# Patient Record
Sex: MALE | Race: WHITE | NOT HISPANIC OR LATINO | Employment: OTHER | URBAN - METROPOLITAN AREA
[De-identification: names, ages, dates, MRNs, and addresses within clinical notes are randomized per-mention and may not be internally consistent; named-entity substitution may affect disease eponyms.]

---

## 2017-01-03 ENCOUNTER — CONVERSION ENCOUNTER (OUTPATIENT)
Dept: CARDIOLOGY | Facility: CLINIC | Age: 82
End: 2017-01-03

## 2017-01-10 ENCOUNTER — CONVERSION ENCOUNTER (OUTPATIENT)
Dept: CARDIOLOGY | Facility: CLINIC | Age: 82
End: 2017-01-10

## 2017-01-30 ENCOUNTER — CONVERSION ENCOUNTER (OUTPATIENT)
Dept: CARDIOLOGY | Facility: CLINIC | Age: 82
End: 2017-01-30

## 2017-02-28 ENCOUNTER — CONVERSION ENCOUNTER (OUTPATIENT)
Dept: CARDIOLOGY | Facility: CLINIC | Age: 82
End: 2017-02-28

## 2017-03-15 ENCOUNTER — CONVERSION ENCOUNTER (OUTPATIENT)
Dept: CARDIOLOGY | Facility: CLINIC | Age: 82
End: 2017-03-15

## 2017-04-24 ENCOUNTER — CONVERSION ENCOUNTER (OUTPATIENT)
Dept: CARDIOLOGY | Facility: CLINIC | Age: 82
End: 2017-04-24

## 2017-05-08 ENCOUNTER — CONVERSION ENCOUNTER (OUTPATIENT)
Dept: CARDIOLOGY | Facility: CLINIC | Age: 82
End: 2017-05-08

## 2017-06-09 ENCOUNTER — CONVERSION ENCOUNTER (OUTPATIENT)
Dept: CARDIOLOGY | Facility: CLINIC | Age: 82
End: 2017-06-09

## 2017-06-15 ENCOUNTER — HOSPITAL ENCOUNTER (OUTPATIENT)
Dept: SLEEP MEDICINE | Facility: HOSPITAL | Age: 82
Discharge: HOME | End: 2017-06-15
Attending: INTERNAL MEDICINE | Admitting: INTERNAL MEDICINE

## 2017-06-16 ENCOUNTER — CONVERSION ENCOUNTER (OUTPATIENT)
Dept: CARDIOLOGY | Facility: CLINIC | Age: 82
End: 2017-06-16

## 2017-06-30 ENCOUNTER — CONVERSION ENCOUNTER (OUTPATIENT)
Dept: CARDIOLOGY | Facility: CLINIC | Age: 82
End: 2017-06-30

## 2017-07-02 ENCOUNTER — CONVERSION ENCOUNTER (OUTPATIENT)
Dept: CARDIOLOGY | Facility: CLINIC | Age: 82
End: 2017-07-02

## 2017-08-04 ENCOUNTER — CONVERSION ENCOUNTER (OUTPATIENT)
Dept: CARDIOLOGY | Facility: CLINIC | Age: 82
End: 2017-08-04

## 2017-09-01 ENCOUNTER — CONVERSION ENCOUNTER (OUTPATIENT)
Dept: CARDIOLOGY | Facility: CLINIC | Age: 82
End: 2017-09-01

## 2017-09-28 ENCOUNTER — CONVERSION ENCOUNTER (OUTPATIENT)
Dept: CARDIOLOGY | Facility: CLINIC | Age: 82
End: 2017-09-28

## 2017-10-30 ENCOUNTER — HOSPITAL ENCOUNTER (OUTPATIENT)
Dept: LAB | Facility: HOSPITAL | Age: 82
Discharge: HOME | End: 2017-10-30
Attending: INTERNAL MEDICINE | Admitting: INTERNAL MEDICINE

## 2017-10-30 LAB
ALBUMIN SERPL-MCNC: 3.6 G/DL (ref 3.5–4.8)
ALP SERPL-CCNC: 56 IU/L (ref 32–91)
ALT SERPL-CCNC: 9 IU/L (ref 17–63)
ANION GAP SERPL CALC-SCNC: 10.4 MMOL/L (ref 10–20)
AST SERPL-CCNC: 14 IU/L (ref 15–41)
BILIRUB DIRECT SERPL-MCNC: 0.1 MG/DL (ref 0.1–0.5)
BILIRUB SERPL-MCNC: 0.5 MG/DL (ref 0.3–1.2)
BUN SERPL-MCNC: 11 MG/DL (ref 8–20)
BUN/CREAT SERPL: 9.2 (ref 6.2–20.3)
CALCIUM SERPL-MCNC: 8.7 MG/DL (ref 8.9–10.3)
CHLORIDE SERPL-SCNC: 103 MMOL/L (ref 101–111)
CHOLEST SERPL-MCNC: 117 MG/DL
CHOLEST/HDLC SERPL: 3.1 {RATIO}
CK SERPL-CCNC: 286 IU/L (ref 49–397)
CONV CO2: 25 MMOL/L (ref 22–32)
CONV LDL CHOLESTEROL DIRECT: 54 MG/DL (ref 0–100)
CONV TOTAL PROTEIN: 6.1 G/DL (ref 6.1–7.9)
CREAT UR-MCNC: 1.2 MG/DL (ref 0.7–1.2)
GLUCOSE SERPL-MCNC: 271 MG/DL (ref 65–99)
HDLC SERPL-MCNC: 38 MG/DL
LDLC/HDLC SERPL: 1.4 {RATIO}
LIPID INTERPRETATION: ABNORMAL
POTASSIUM SERPL-SCNC: 4.4 MMOL/L (ref 3.6–5.1)
SODIUM SERPL-SCNC: 134 MMOL/L (ref 136–144)
TRIGL SERPL-MCNC: 244 MG/DL
VLDLC SERPL CALC-MCNC: 25.5 MG/DL

## 2017-11-06 ENCOUNTER — CONVERSION ENCOUNTER (OUTPATIENT)
Dept: CARDIOLOGY | Facility: CLINIC | Age: 82
End: 2017-11-06

## 2017-11-16 ENCOUNTER — CONVERSION ENCOUNTER (OUTPATIENT)
Dept: CARDIOLOGY | Facility: CLINIC | Age: 82
End: 2017-11-16

## 2017-11-17 ENCOUNTER — HOSPITAL ENCOUNTER (OUTPATIENT)
Dept: LAB | Facility: HOSPITAL | Age: 82
Setting detail: SPECIMEN
Discharge: HOME | End: 2017-11-17
Attending: INTERNAL MEDICINE | Admitting: INTERNAL MEDICINE

## 2017-11-17 LAB
ALBUMIN SERPL-MCNC: 3.5 G/DL (ref 3.5–4.8)
ALBUMIN/GLOB SERPL: 1.3 {RATIO} (ref 1–1.7)
ALP SERPL-CCNC: 55 IU/L (ref 32–91)
ALT SERPL-CCNC: 8 IU/L (ref 17–63)
ANION GAP SERPL CALC-SCNC: 10.9 MMOL/L (ref 10–20)
AST SERPL-CCNC: 12 IU/L (ref 15–41)
BILIRUB SERPL-MCNC: 0.9 MG/DL (ref 0.3–1.2)
BUN SERPL-MCNC: 13 MG/DL (ref 8–20)
BUN/CREAT SERPL: 10 (ref 6.2–20.3)
CALCIUM SERPL-MCNC: 9 MG/DL (ref 8.9–10.3)
CHLORIDE SERPL-SCNC: 103 MMOL/L (ref 101–111)
CONV CO2: 25 MMOL/L (ref 22–32)
CONV TOTAL PROTEIN: 6.1 G/DL (ref 6.1–7.9)
CREAT UR-MCNC: 1.3 MG/DL (ref 0.7–1.2)
GLOBULIN UR ELPH-MCNC: 2.6 G/DL (ref 2.5–3.8)
GLUCOSE SERPL-MCNC: 270 MG/DL (ref 65–99)
POTASSIUM SERPL-SCNC: 3.9 MMOL/L (ref 3.6–5.1)
SODIUM SERPL-SCNC: 135 MMOL/L (ref 136–144)

## 2017-12-05 ENCOUNTER — CONVERSION ENCOUNTER (OUTPATIENT)
Dept: CARDIOLOGY | Facility: CLINIC | Age: 82
End: 2017-12-05

## 2018-01-04 ENCOUNTER — CONVERSION ENCOUNTER (OUTPATIENT)
Dept: CARDIOLOGY | Facility: CLINIC | Age: 83
End: 2018-01-04

## 2018-02-05 ENCOUNTER — CONVERSION ENCOUNTER (OUTPATIENT)
Dept: CARDIOLOGY | Facility: CLINIC | Age: 83
End: 2018-02-05

## 2018-02-12 ENCOUNTER — HOSPITAL ENCOUNTER (OUTPATIENT)
Dept: LAB | Facility: HOSPITAL | Age: 83
Setting detail: SPECIMEN
Discharge: HOME | End: 2018-02-12
Attending: INTERNAL MEDICINE | Admitting: INTERNAL MEDICINE

## 2018-02-12 LAB
ALBUMIN SERPL-MCNC: 4.1 G/DL (ref 3.5–4.8)
ALBUMIN/GLOB SERPL: 1.6 {RATIO} (ref 1–1.7)
ALP SERPL-CCNC: 49 IU/L (ref 32–91)
ALT SERPL-CCNC: 9 IU/L (ref 17–63)
ANION GAP SERPL CALC-SCNC: 11.4 MMOL/L (ref 10–20)
AST SERPL-CCNC: 11 IU/L (ref 15–41)
BILIRUB SERPL-MCNC: 0.9 MG/DL (ref 0.3–1.2)
BUN SERPL-MCNC: 15 MG/DL (ref 8–20)
BUN/CREAT SERPL: 12.5 (ref 6.2–20.3)
CALCIUM SERPL-MCNC: 9.9 MG/DL (ref 8.9–10.3)
CHLORIDE SERPL-SCNC: 106 MMOL/L (ref 101–111)
CHOLEST SERPL-MCNC: 107 MG/DL
CHOLEST/HDLC SERPL: 2.5 {RATIO}
CONV CO2: 25 MMOL/L (ref 22–32)
CONV LDL CHOLESTEROL DIRECT: 50 MG/DL (ref 0–100)
CONV MICROALBUM.,U,RANDOM: 25 MG/L
CONV TOTAL PROTEIN: 6.6 G/DL (ref 6.1–7.9)
CREAT 24H UR-MCNC: 140.1 MG/DL
CREAT UR-MCNC: 1.2 MG/DL (ref 0.7–1.2)
GLOBULIN UR ELPH-MCNC: 2.5 G/DL (ref 2.5–3.8)
GLUCOSE SERPL-MCNC: 153 MG/DL (ref 65–99)
HDLC SERPL-MCNC: 43 MG/DL
LDLC/HDLC SERPL: 1.2 {RATIO}
LIPID INTERPRETATION: NORMAL
MICROALBUMIN/CREAT UR: 17.8 UG/MG
POTASSIUM SERPL-SCNC: 4.4 MMOL/L (ref 3.6–5.1)
SODIUM SERPL-SCNC: 138 MMOL/L (ref 136–144)
TRIGL SERPL-MCNC: 92 MG/DL
VLDLC SERPL CALC-MCNC: 14.8 MG/DL

## 2018-02-15 ENCOUNTER — CONVERSION ENCOUNTER (OUTPATIENT)
Dept: CARDIOLOGY | Facility: CLINIC | Age: 83
End: 2018-02-15

## 2018-03-05 ENCOUNTER — CONVERSION ENCOUNTER (OUTPATIENT)
Dept: CARDIOLOGY | Facility: CLINIC | Age: 83
End: 2018-03-05

## 2018-04-10 ENCOUNTER — CONVERSION ENCOUNTER (OUTPATIENT)
Dept: CARDIOLOGY | Facility: CLINIC | Age: 83
End: 2018-04-10

## 2018-05-21 ENCOUNTER — CONVERSION ENCOUNTER (OUTPATIENT)
Dept: CARDIOLOGY | Facility: CLINIC | Age: 83
End: 2018-05-21

## 2018-06-15 ENCOUNTER — CONVERSION ENCOUNTER (OUTPATIENT)
Dept: CARDIOLOGY | Facility: CLINIC | Age: 83
End: 2018-06-15

## 2018-06-25 ENCOUNTER — CONVERSION ENCOUNTER (OUTPATIENT)
Dept: CARDIOLOGY | Facility: CLINIC | Age: 83
End: 2018-06-25

## 2018-07-30 ENCOUNTER — CONVERSION ENCOUNTER (OUTPATIENT)
Dept: CARDIOLOGY | Facility: CLINIC | Age: 83
End: 2018-07-30

## 2018-08-09 ENCOUNTER — HOSPITAL ENCOUNTER (OUTPATIENT)
Dept: LAB | Facility: HOSPITAL | Age: 83
Setting detail: SPECIMEN
Discharge: HOME | End: 2018-08-09
Attending: INTERNAL MEDICINE | Admitting: INTERNAL MEDICINE

## 2018-08-09 LAB
ALBUMIN SERPL-MCNC: 3.5 G/DL (ref 3.5–4.8)
ALBUMIN/GLOB SERPL: 1.5 {RATIO} (ref 1–1.7)
ALP SERPL-CCNC: 59 IU/L (ref 32–91)
ALT SERPL-CCNC: 9 IU/L (ref 17–63)
ANION GAP SERPL CALC-SCNC: 9.9 MMOL/L (ref 10–20)
AST SERPL-CCNC: 13 IU/L (ref 15–41)
BILIRUB SERPL-MCNC: 0.3 MG/DL (ref 0.3–1.2)
BUN SERPL-MCNC: 18 MG/DL (ref 8–20)
BUN/CREAT SERPL: 13.8 (ref 6.2–20.3)
CALCIUM SERPL-MCNC: 9.3 MG/DL (ref 8.9–10.3)
CHLORIDE SERPL-SCNC: 106 MMOL/L (ref 101–111)
CHOLEST SERPL-MCNC: 94 MG/DL
CHOLEST/HDLC SERPL: 2.9 {RATIO}
CONV CO2: 26 MMOL/L (ref 22–32)
CONV LDL CHOLESTEROL DIRECT: 41 MG/DL (ref 0–100)
CONV MICROALBUM.,U,RANDOM: 17 MG/L
CONV TOTAL PROTEIN: 5.9 G/DL (ref 6.1–7.9)
CREAT 24H UR-MCNC: 135.5 MG/DL
CREAT UR-MCNC: 1.3 MG/DL (ref 0.7–1.2)
GLOBULIN UR ELPH-MCNC: 2.4 G/DL (ref 2.5–3.8)
GLUCOSE SERPL-MCNC: 224 MG/DL (ref 65–99)
HDLC SERPL-MCNC: 33 MG/DL
LDLC/HDLC SERPL: 1.3 {RATIO}
LIPID INTERPRETATION: ABNORMAL
MICROALBUMIN/CREAT UR: 12.5 UG/MG
POTASSIUM SERPL-SCNC: 3.9 MMOL/L (ref 3.6–5.1)
SODIUM SERPL-SCNC: 138 MMOL/L (ref 136–144)
TRIGL SERPL-MCNC: 199 MG/DL
VLDLC SERPL CALC-MCNC: 20.2 MG/DL

## 2018-08-16 ENCOUNTER — CONVERSION ENCOUNTER (OUTPATIENT)
Dept: CARDIOLOGY | Facility: CLINIC | Age: 83
End: 2018-08-16

## 2018-09-10 ENCOUNTER — CONVERSION ENCOUNTER (OUTPATIENT)
Dept: CARDIOLOGY | Facility: CLINIC | Age: 83
End: 2018-09-10

## 2018-10-15 ENCOUNTER — CONVERSION ENCOUNTER (OUTPATIENT)
Dept: CARDIOLOGY | Facility: CLINIC | Age: 83
End: 2018-10-15

## 2018-11-06 ENCOUNTER — HOSPITAL ENCOUNTER (OUTPATIENT)
Dept: LAB | Facility: HOSPITAL | Age: 83
Discharge: HOME | End: 2018-11-06
Attending: FAMILY MEDICINE | Admitting: FAMILY MEDICINE

## 2018-11-06 LAB
ALBUMIN SERPL-MCNC: 3.7 G/DL (ref 3.5–4.8)
ALBUMIN/GLOB SERPL: 1.4 {RATIO} (ref 1–1.7)
ALP SERPL-CCNC: 61 IU/L (ref 32–91)
ALT SERPL-CCNC: 11 IU/L (ref 17–63)
ANION GAP SERPL CALC-SCNC: 12.1 MMOL/L (ref 10–20)
AST SERPL-CCNC: 15 IU/L (ref 15–41)
BILIRUB SERPL-MCNC: 0.7 MG/DL (ref 0.3–1.2)
BUN SERPL-MCNC: 25 MG/DL (ref 8–20)
BUN/CREAT SERPL: 15.6 (ref 6.2–20.3)
CALCIUM SERPL-MCNC: 9 MG/DL (ref 8.9–10.3)
CHLORIDE SERPL-SCNC: 104 MMOL/L (ref 101–111)
CHOLEST SERPL-MCNC: 128 MG/DL
CHOLEST/HDLC SERPL: 3.1 {RATIO}
CONV CO2: 27 MMOL/L (ref 22–32)
CONV LDL CHOLESTEROL DIRECT: 70 MG/DL (ref 0–100)
CONV TOTAL PROTEIN: 6.3 G/DL (ref 6.1–7.9)
CREAT UR-MCNC: 1.6 MG/DL (ref 0.7–1.2)
GLOBULIN UR ELPH-MCNC: 2.6 G/DL (ref 2.5–3.8)
GLUCOSE SERPL-MCNC: 225 MG/DL (ref 65–99)
HDLC SERPL-MCNC: 41 MG/DL
LDLC/HDLC SERPL: 1.7 {RATIO}
LIPID INTERPRETATION: ABNORMAL
MAGNESIUM SERPL-MCNC: 1.9 MG/DL (ref 1.8–2.5)
POTASSIUM SERPL-SCNC: 5.1 MMOL/L (ref 3.6–5.1)
SODIUM SERPL-SCNC: 138 MMOL/L (ref 136–144)
TRIGL SERPL-MCNC: 208 MG/DL
VLDLC SERPL CALC-MCNC: 16.7 MG/DL

## 2018-11-19 ENCOUNTER — CONVERSION ENCOUNTER (OUTPATIENT)
Dept: CARDIOLOGY | Facility: CLINIC | Age: 83
End: 2018-11-19

## 2018-11-27 ENCOUNTER — HOSPITAL ENCOUNTER (OUTPATIENT)
Dept: LAB | Facility: HOSPITAL | Age: 83
Discharge: HOME | End: 2018-11-27
Attending: INTERNAL MEDICINE | Admitting: INTERNAL MEDICINE

## 2018-11-27 LAB
ALBUMIN SERPL-MCNC: 3.7 G/DL (ref 3.5–4.8)
ALP SERPL-CCNC: 56 IU/L (ref 32–91)
ALT SERPL-CCNC: 12 IU/L (ref 17–63)
ANION GAP SERPL CALC-SCNC: 16.6 MMOL/L (ref 10–20)
AST SERPL-CCNC: 15 IU/L (ref 15–41)
BILIRUB DIRECT SERPL-MCNC: 0.1 MG/DL (ref 0.1–0.5)
BILIRUB SERPL-MCNC: 0.5 MG/DL (ref 0.3–1.2)
BUN SERPL-MCNC: 15 MG/DL (ref 8–20)
BUN/CREAT SERPL: 10 (ref 6.2–20.3)
CALCIUM SERPL-MCNC: 9.5 MG/DL (ref 8.9–10.3)
CHLORIDE SERPL-SCNC: 101 MMOL/L (ref 101–111)
CHOLEST SERPL-MCNC: 113 MG/DL
CHOLEST/HDLC SERPL: 2.8 {RATIO}
CK SERPL-CCNC: 40 IU/L (ref 49–397)
CONV CO2: 24 MMOL/L (ref 22–32)
CONV LDL CHOLESTEROL DIRECT: 55 MG/DL (ref 0–100)
CONV TOTAL PROTEIN: 6.2 G/DL (ref 6.1–7.9)
CREAT UR-MCNC: 1.5 MG/DL (ref 0.7–1.2)
DIGOXIN SERPL-MCNC: 0.8 NG/ML (ref 0.8–2)
GLUCOSE SERPL-MCNC: 200 MG/DL (ref 65–99)
HDLC SERPL-MCNC: 41 MG/DL
LDLC/HDLC SERPL: 1.3 {RATIO}
LIPID INTERPRETATION: ABNORMAL
POTASSIUM SERPL-SCNC: 4.6 MMOL/L (ref 3.6–5.1)
SODIUM SERPL-SCNC: 137 MMOL/L (ref 136–144)
TRIGL SERPL-MCNC: 239 MG/DL
VLDLC SERPL CALC-MCNC: 17.4 MG/DL

## 2018-12-03 ENCOUNTER — CONVERSION ENCOUNTER (OUTPATIENT)
Dept: CARDIOLOGY | Facility: CLINIC | Age: 83
End: 2018-12-03

## 2018-12-21 ENCOUNTER — CONVERSION ENCOUNTER (OUTPATIENT)
Dept: CARDIOLOGY | Facility: CLINIC | Age: 83
End: 2018-12-21

## 2019-01-04 ENCOUNTER — HOSPITAL ENCOUNTER (OUTPATIENT)
Dept: LAB | Facility: HOSPITAL | Age: 84
Discharge: HOME | End: 2019-01-04
Attending: INTERNAL MEDICINE | Admitting: INTERNAL MEDICINE

## 2019-01-04 LAB
ANION GAP SERPL CALC-SCNC: 13.1 MMOL/L (ref 10–20)
BILIRUB UR QL STRIP: NEGATIVE MG/DL
BUN SERPL-MCNC: 23 MG/DL (ref 8–20)
BUN/CREAT SERPL: 16.4 (ref 6.2–20.3)
CALCIUM SERPL-MCNC: 9 MG/DL (ref 8.9–10.3)
CASTS URNS QL MICRO: ABNORMAL /[LPF]
CHLORIDE SERPL-SCNC: 101 MMOL/L (ref 101–111)
COLOR UR: YELLOW
CONV BACTERIA IN URINE MICRO: NEGATIVE
CONV CLARITY OF URINE: CLEAR
CONV CO2: 26 MMOL/L (ref 22–32)
CONV HYALINE CASTS IN URINE MICRO: 3 /[LPF] (ref 0–5)
CONV PROTEIN IN URINE BY AUTOMATED TEST STRIP: NEGATIVE MG/DL
CONV SMALL ROUND CELLS: ABNORMAL /[HPF]
CONV UROBILINOGEN IN URINE BY AUTOMATED TEST STRIP: 0.2 MG/DL
CREAT 24H UR-MCNC: 109.3 MG/DL
CREAT UR-MCNC: 1.4 MG/DL (ref 0.7–1.2)
CULTURE INDICATED?: ABNORMAL
ERYTHROCYTE [DISTWIDTH] IN BLOOD BY AUTOMATED COUNT: 15 % (ref 11.5–14.5)
GLUCOSE SERPL-MCNC: 289 MG/DL (ref 65–99)
GLUCOSE UR QL: >1000 MG/DL
HCT VFR BLD AUTO: 37.2 % (ref 40–54)
HGB BLD-MCNC: 12.2 G/DL (ref 14–18)
HGB UR QL STRIP: NEGATIVE
KETONES UR QL STRIP: NEGATIVE MG/DL
LEUKOCYTE ESTERASE UR QL STRIP: ABNORMAL
MCH RBC QN AUTO: 32.8 PG (ref 26–32)
MCHC RBC AUTO-ENTMCNC: 32.9 G/DL (ref 32–36)
MCV RBC AUTO: 99.7 FL (ref 80–94)
NITRITE UR QL STRIP: NEGATIVE
PH UR STRIP.AUTO: 5.5 [PH] (ref 4.5–8)
PLATELET # BLD AUTO: 235 10*3/UL (ref 150–450)
PMV BLD AUTO: 7.9 FL (ref 7.4–10.4)
POTASSIUM SERPL-SCNC: 5.1 MMOL/L (ref 3.6–5.1)
PROT UR-MCNC: 14 MG/DL
PROT/CREAT UR: 0.1 MG/MG (ref 0–22)
RBC # BLD AUTO: 3.73 10*6/UL (ref 4.6–6)
RBC #/AREA URNS HPF: 0 /[HPF] (ref 0–3)
SODIUM SERPL-SCNC: 135 MMOL/L (ref 136–144)
SP GR UR: 1.02 (ref 1–1.03)
SPERM URNS QL MICRO: ABNORMAL /[HPF]
SQUAMOUS SPT QL MICRO: 1 /[HPF] (ref 0–5)
UNIDENT CRYS URNS QL MICRO: ABNORMAL /[HPF]
WBC # BLD AUTO: 9.1 10*3/UL (ref 4.5–11.5)
WBC #/AREA URNS HPF: 11 /[HPF] (ref 0–5)
YEAST SPEC QL WET PREP: ABNORMAL /[HPF]

## 2019-01-08 LAB — PROT PATTERN SERPL IFE-IMP: NORMAL

## 2019-01-24 ENCOUNTER — CONVERSION ENCOUNTER (OUTPATIENT)
Dept: CARDIOLOGY | Facility: CLINIC | Age: 84
End: 2019-01-24

## 2019-01-25 ENCOUNTER — HOSPITAL ENCOUNTER (OUTPATIENT)
Dept: OTHER | Facility: HOSPITAL | Age: 84
Discharge: HOME | End: 2019-01-25
Attending: INTERNAL MEDICINE | Admitting: INTERNAL MEDICINE

## 2019-02-06 ENCOUNTER — HOSPITAL ENCOUNTER (OUTPATIENT)
Dept: LAB | Facility: HOSPITAL | Age: 84
Setting detail: SPECIMEN
Discharge: HOME | End: 2019-02-06
Attending: INTERNAL MEDICINE | Admitting: INTERNAL MEDICINE

## 2019-02-06 LAB
ALBUMIN SERPL-MCNC: 3.6 G/DL (ref 3.5–4.8)
ALBUMIN/GLOB SERPL: 1.4 {RATIO} (ref 1–1.7)
ALP SERPL-CCNC: 57 IU/L (ref 32–91)
ALT SERPL-CCNC: 13 IU/L (ref 17–63)
ANION GAP SERPL CALC-SCNC: 12.6 MMOL/L (ref 10–20)
AST SERPL-CCNC: 13 IU/L (ref 15–41)
BILIRUB SERPL-MCNC: 0.6 MG/DL (ref 0.3–1.2)
BUN SERPL-MCNC: 20 MG/DL (ref 8–20)
BUN/CREAT SERPL: 14.3 (ref 6.2–20.3)
CALCIUM SERPL-MCNC: 8.9 MG/DL (ref 8.9–10.3)
CHLORIDE SERPL-SCNC: 101 MMOL/L (ref 101–111)
CONV CO2: 24 MMOL/L (ref 22–32)
CONV TOTAL PROTEIN: 6.2 G/DL (ref 6.1–7.9)
CREAT UR-MCNC: 1.4 MG/DL (ref 0.7–1.2)
GLOBULIN UR ELPH-MCNC: 2.6 G/DL (ref 2.5–3.8)
GLUCOSE SERPL-MCNC: 250 MG/DL (ref 65–99)
POTASSIUM SERPL-SCNC: 4.6 MMOL/L (ref 3.6–5.1)
SODIUM SERPL-SCNC: 133 MMOL/L (ref 136–144)

## 2019-02-12 ENCOUNTER — CONVERSION ENCOUNTER (OUTPATIENT)
Dept: CARDIOLOGY | Facility: CLINIC | Age: 84
End: 2019-02-12

## 2019-02-13 ENCOUNTER — CONVERSION ENCOUNTER (OUTPATIENT)
Dept: CARDIOLOGY | Facility: CLINIC | Age: 84
End: 2019-02-13

## 2019-02-28 ENCOUNTER — CONVERSION ENCOUNTER (OUTPATIENT)
Dept: CARDIOLOGY | Facility: CLINIC | Age: 84
End: 2019-02-28

## 2019-04-04 ENCOUNTER — CONVERSION ENCOUNTER (OUTPATIENT)
Dept: CARDIOLOGY | Facility: CLINIC | Age: 84
End: 2019-04-04

## 2019-04-11 ENCOUNTER — HOSPITAL ENCOUNTER (OUTPATIENT)
Dept: LAB | Facility: HOSPITAL | Age: 84
Setting detail: SPECIMEN
Discharge: HOME | End: 2019-04-11
Attending: NURSE PRACTITIONER | Admitting: NURSE PRACTITIONER

## 2019-04-11 LAB
ALBUMIN SERPL-MCNC: 3.8 G/DL (ref 3.5–4.8)
ALBUMIN/GLOB SERPL: 1.5 {RATIO} (ref 1–1.7)
ALP SERPL-CCNC: 53 IU/L (ref 32–91)
ALT SERPL-CCNC: 7 IU/L (ref 17–63)
ANION GAP SERPL CALC-SCNC: 15.2 MMOL/L (ref 10–20)
AST SERPL-CCNC: 10 IU/L (ref 15–41)
BILIRUB SERPL-MCNC: 0.7 MG/DL (ref 0.3–1.2)
BUN SERPL-MCNC: 13 MG/DL (ref 8–20)
BUN/CREAT SERPL: 9.3 (ref 6.2–20.3)
CALCIUM SERPL-MCNC: 9 MG/DL (ref 8.9–10.3)
CHLORIDE SERPL-SCNC: 104 MMOL/L (ref 101–111)
CHOLEST SERPL-MCNC: 109 MG/DL
CHOLEST/HDLC SERPL: 3.1 {RATIO}
CONV CO2: 23 MMOL/L (ref 22–32)
CONV LDL CHOLESTEROL DIRECT: 50 MG/DL (ref 0–100)
CONV MICROALBUM.,U,RANDOM: 71 MG/L
CONV TOTAL PROTEIN: 6.4 G/DL (ref 6.1–7.9)
CREAT 24H UR-MCNC: 148.9 MG/DL
CREAT UR-MCNC: 1.4 MG/DL (ref 0.7–1.2)
GLOBULIN UR ELPH-MCNC: 2.6 G/DL (ref 2.5–3.8)
GLUCOSE SERPL-MCNC: 184 MG/DL (ref 65–99)
HDLC SERPL-MCNC: 35 MG/DL
LDLC/HDLC SERPL: 1.4 {RATIO}
LIPID INTERPRETATION: ABNORMAL
MICROALBUMIN/CREAT UR: 47.7 UG/MG
POTASSIUM SERPL-SCNC: 4.2 MMOL/L (ref 3.6–5.1)
SODIUM SERPL-SCNC: 138 MMOL/L (ref 136–144)
TRIGL SERPL-MCNC: 173 MG/DL
VLDLC SERPL CALC-MCNC: 24.3 MG/DL

## 2019-04-18 ENCOUNTER — CONVERSION ENCOUNTER (OUTPATIENT)
Dept: CARDIOLOGY | Facility: CLINIC | Age: 84
End: 2019-04-18

## 2019-05-02 ENCOUNTER — CONVERSION ENCOUNTER (OUTPATIENT)
Dept: CARDIOLOGY | Facility: CLINIC | Age: 84
End: 2019-05-02

## 2019-05-02 ENCOUNTER — HOSPITAL ENCOUNTER (OUTPATIENT)
Dept: FAMILY MEDICINE CLINIC | Facility: CLINIC | Age: 84
Setting detail: SPECIMEN
Discharge: HOME | End: 2019-05-02
Attending: FAMILY MEDICINE | Admitting: FAMILY MEDICINE

## 2019-05-03 ENCOUNTER — ON CAMPUS - OUTPATIENT (AMBULATORY)
Dept: URBAN - METROPOLITAN AREA HOSPITAL 85 | Facility: HOSPITAL | Age: 84
End: 2019-05-03
Payer: MEDICARE

## 2019-05-03 DIAGNOSIS — I10 ESSENTIAL (PRIMARY) HYPERTENSION: ICD-10-CM

## 2019-05-03 DIAGNOSIS — K92.1 MELENA: ICD-10-CM

## 2019-05-03 DIAGNOSIS — R19.7 DIARRHEA, UNSPECIFIED: ICD-10-CM

## 2019-05-03 DIAGNOSIS — I48.91 UNSPECIFIED ATRIAL FIBRILLATION: ICD-10-CM

## 2019-05-03 DIAGNOSIS — K30 FUNCTIONAL DYSPEPSIA: ICD-10-CM

## 2019-05-03 DIAGNOSIS — R11.2 NAUSEA WITH VOMITING, UNSPECIFIED: ICD-10-CM

## 2019-05-03 PROCEDURE — 99204 OFFICE O/P NEW MOD 45 MIN: CPT | Performed by: NURSE PRACTITIONER

## 2019-05-04 ENCOUNTER — ON CAMPUS - OUTPATIENT (AMBULATORY)
Dept: URBAN - METROPOLITAN AREA HOSPITAL 85 | Facility: HOSPITAL | Age: 84
End: 2019-05-04
Payer: MEDICARE

## 2019-05-04 DIAGNOSIS — K44.9 DIAPHRAGMATIC HERNIA WITHOUT OBSTRUCTION OR GANGRENE: ICD-10-CM

## 2019-05-04 DIAGNOSIS — D63.8 ANEMIA IN OTHER CHRONIC DISEASES CLASSIFIED ELSEWHERE: ICD-10-CM

## 2019-05-04 DIAGNOSIS — K92.1 MELENA: ICD-10-CM

## 2019-05-04 DIAGNOSIS — K29.70 GASTRITIS, UNSPECIFIED, WITHOUT BLEEDING: ICD-10-CM

## 2019-05-04 PROCEDURE — 43235 EGD DIAGNOSTIC BRUSH WASH: CPT | Performed by: INTERNAL MEDICINE

## 2019-05-29 ENCOUNTER — CONVERSION ENCOUNTER (OUTPATIENT)
Dept: FAMILY MEDICINE CLINIC | Facility: CLINIC | Age: 84
End: 2019-05-29

## 2019-06-04 VITALS
WEIGHT: 193 LBS | SYSTOLIC BLOOD PRESSURE: 165 MMHG | WEIGHT: 182.75 LBS | BODY MASS INDEX: 29.41 KG/M2 | SYSTOLIC BLOOD PRESSURE: 111 MMHG | HEART RATE: 70 BPM | HEART RATE: 103 BPM | WEIGHT: 185.75 LBS | DIASTOLIC BLOOD PRESSURE: 71 MMHG | SYSTOLIC BLOOD PRESSURE: 136 MMHG | DIASTOLIC BLOOD PRESSURE: 66 MMHG | SYSTOLIC BLOOD PRESSURE: 105 MMHG | WEIGHT: 193 LBS | SYSTOLIC BLOOD PRESSURE: 135 MMHG | HEART RATE: 70 BPM | SYSTOLIC BLOOD PRESSURE: 135 MMHG | SYSTOLIC BLOOD PRESSURE: 119 MMHG | OXYGEN SATURATION: 97 % | RESPIRATION RATE: 16 BRPM | DIASTOLIC BLOOD PRESSURE: 56 MMHG | HEART RATE: 70 BPM | WEIGHT: 185 LBS | WEIGHT: 190 LBS | DIASTOLIC BLOOD PRESSURE: 64 MMHG | HEART RATE: 70 BPM | WEIGHT: 188.75 LBS | SYSTOLIC BLOOD PRESSURE: 118 MMHG | SYSTOLIC BLOOD PRESSURE: 131 MMHG | HEART RATE: 70 BPM | SYSTOLIC BLOOD PRESSURE: 125 MMHG | BODY MASS INDEX: 28.31 KG/M2 | SYSTOLIC BLOOD PRESSURE: 128 MMHG | BODY MASS INDEX: 27.15 KG/M2 | OXYGEN SATURATION: 95 % | DIASTOLIC BLOOD PRESSURE: 70 MMHG | DIASTOLIC BLOOD PRESSURE: 72 MMHG | RESPIRATION RATE: 18 BRPM | WEIGHT: 188 LBS | HEART RATE: 71 BPM | DIASTOLIC BLOOD PRESSURE: 72 MMHG | WEIGHT: 190 LBS | DIASTOLIC BLOOD PRESSURE: 66 MMHG | WEIGHT: 178 LBS | WEIGHT: 182.25 LBS | SYSTOLIC BLOOD PRESSURE: 108 MMHG | HEART RATE: 70 BPM | HEIGHT: 67 IN | BODY MASS INDEX: 29.48 KG/M2 | RESPIRATION RATE: 14 BRPM | SYSTOLIC BLOOD PRESSURE: 120 MMHG | WEIGHT: 196 LBS | DIASTOLIC BLOOD PRESSURE: 66 MMHG | SYSTOLIC BLOOD PRESSURE: 142 MMHG | WEIGHT: 189.75 LBS | BODY MASS INDEX: 25.96 KG/M2 | SYSTOLIC BLOOD PRESSURE: 128 MMHG | DIASTOLIC BLOOD PRESSURE: 54 MMHG | BODY MASS INDEX: 27.57 KG/M2 | WEIGHT: 188 LBS | BODY MASS INDEX: 28.7 KG/M2 | SYSTOLIC BLOOD PRESSURE: 149 MMHG | SYSTOLIC BLOOD PRESSURE: 126 MMHG | DIASTOLIC BLOOD PRESSURE: 67 MMHG | DIASTOLIC BLOOD PRESSURE: 89 MMHG | SYSTOLIC BLOOD PRESSURE: 122 MMHG | HEART RATE: 70 BPM | HEIGHT: 67 IN | RESPIRATION RATE: 18 BRPM | WEIGHT: 180.4 LBS | DIASTOLIC BLOOD PRESSURE: 67 MMHG | DIASTOLIC BLOOD PRESSURE: 61 MMHG | SYSTOLIC BLOOD PRESSURE: 122 MMHG | WEIGHT: 173 LBS | OXYGEN SATURATION: 98 % | DIASTOLIC BLOOD PRESSURE: 67 MMHG | HEART RATE: 70 BPM | WEIGHT: 187 LBS | WEIGHT: 187.25 LBS | HEART RATE: 70 BPM | DIASTOLIC BLOOD PRESSURE: 68 MMHG | HEART RATE: 70 BPM | OXYGEN SATURATION: 95 % | BODY MASS INDEX: 28.97 KG/M2 | WEIGHT: 189 LBS | HEART RATE: 70 BPM | WEIGHT: 188.25 LBS | HEART RATE: 70 BPM | SYSTOLIC BLOOD PRESSURE: 127 MMHG | OXYGEN SATURATION: 97 % | SYSTOLIC BLOOD PRESSURE: 92 MMHG | WEIGHT: 189.5 LBS | OXYGEN SATURATION: 94 % | WEIGHT: 186.75 LBS | HEART RATE: 70 BPM | SYSTOLIC BLOOD PRESSURE: 120 MMHG | BODY MASS INDEX: 29.09 KG/M2 | HEART RATE: 70 BPM | BODY MASS INDEX: 27.94 KG/M2 | HEART RATE: 70 BPM | DIASTOLIC BLOOD PRESSURE: 66 MMHG | BODY MASS INDEX: 29.03 KG/M2 | DIASTOLIC BLOOD PRESSURE: 70 MMHG | SYSTOLIC BLOOD PRESSURE: 140 MMHG | SYSTOLIC BLOOD PRESSURE: 147 MMHG | HEART RATE: 71 BPM | HEART RATE: 70 BPM | BODY MASS INDEX: 28.62 KG/M2 | HEART RATE: 70 BPM | WEIGHT: 186 LBS | WEIGHT: 190 LBS | SYSTOLIC BLOOD PRESSURE: 143 MMHG | HEART RATE: 70 BPM | DIASTOLIC BLOOD PRESSURE: 59 MMHG | HEART RATE: 70 BPM | HEART RATE: 70 BPM | WEIGHT: 183.25 LBS | WEIGHT: 188.25 LBS | DIASTOLIC BLOOD PRESSURE: 69 MMHG | SYSTOLIC BLOOD PRESSURE: 116 MMHG | BODY MASS INDEX: 28 KG/M2 | WEIGHT: 190 LBS | HEART RATE: 70 BPM | SYSTOLIC BLOOD PRESSURE: 140 MMHG | SYSTOLIC BLOOD PRESSURE: 131 MMHG | SYSTOLIC BLOOD PRESSURE: 132 MMHG | BODY MASS INDEX: 29.76 KG/M2 | DIASTOLIC BLOOD PRESSURE: 72 MMHG | SYSTOLIC BLOOD PRESSURE: 110 MMHG | BODY MASS INDEX: 29.6 KG/M2 | SYSTOLIC BLOOD PRESSURE: 121 MMHG | DIASTOLIC BLOOD PRESSURE: 57 MMHG | SYSTOLIC BLOOD PRESSURE: 127 MMHG | SYSTOLIC BLOOD PRESSURE: 101 MMHG | SYSTOLIC BLOOD PRESSURE: 122 MMHG | WEIGHT: 185 LBS | DIASTOLIC BLOOD PRESSURE: 71 MMHG | OXYGEN SATURATION: 99 % | HEART RATE: 70 BPM | WEIGHT: 184.5 LBS | HEART RATE: 70 BPM | WEIGHT: 191.75 LBS | DIASTOLIC BLOOD PRESSURE: 67 MMHG | SYSTOLIC BLOOD PRESSURE: 130 MMHG | HEART RATE: 70 BPM | BODY MASS INDEX: 27.17 KG/M2 | HEART RATE: 70 BPM | HEIGHT: 67 IN | DIASTOLIC BLOOD PRESSURE: 61 MMHG | SYSTOLIC BLOOD PRESSURE: 131 MMHG | SYSTOLIC BLOOD PRESSURE: 133 MMHG | HEART RATE: 70 BPM | OXYGEN SATURATION: 97 % | HEART RATE: 72 BPM | HEART RATE: 70 BPM | BODY MASS INDEX: 29.19 KG/M2 | HEART RATE: 70 BPM | OXYGEN SATURATION: 96 % | HEART RATE: 70 BPM | OXYGEN SATURATION: 93 % | DIASTOLIC BLOOD PRESSURE: 61 MMHG | WEIGHT: 165.4 LBS | DIASTOLIC BLOOD PRESSURE: 79 MMHG | HEART RATE: 71 BPM | WEIGHT: 190 LBS | WEIGHT: 176 LBS | DIASTOLIC BLOOD PRESSURE: 63 MMHG | WEIGHT: 173.5 LBS | WEIGHT: 195.5 LBS | DIASTOLIC BLOOD PRESSURE: 68 MMHG | DIASTOLIC BLOOD PRESSURE: 60 MMHG | HEIGHT: 67 IN | HEIGHT: 67 IN | WEIGHT: 187.75 LBS | HEART RATE: 70 BPM | WEIGHT: 178.75 LBS | DIASTOLIC BLOOD PRESSURE: 59 MMHG | HEART RATE: 71 BPM | HEART RATE: 70 BPM | HEIGHT: 67 IN | DIASTOLIC BLOOD PRESSURE: 85 MMHG | SYSTOLIC BLOOD PRESSURE: 117 MMHG | DIASTOLIC BLOOD PRESSURE: 63 MMHG | DIASTOLIC BLOOD PRESSURE: 82 MMHG | DIASTOLIC BLOOD PRESSURE: 54 MMHG | DIASTOLIC BLOOD PRESSURE: 68 MMHG | WEIGHT: 185 LBS | HEART RATE: 70 BPM | RESPIRATION RATE: 18 BRPM | BODY MASS INDEX: 28.54 KG/M2 | BODY MASS INDEX: 29.03 KG/M2 | SYSTOLIC BLOOD PRESSURE: 125 MMHG | DIASTOLIC BLOOD PRESSURE: 64 MMHG | DIASTOLIC BLOOD PRESSURE: 62 MMHG | DIASTOLIC BLOOD PRESSURE: 74 MMHG | BODY MASS INDEX: 28.9 KG/M2 | WEIGHT: 189 LBS | HEART RATE: 70 BPM | HEIGHT: 67 IN

## 2019-06-04 VITALS
DIASTOLIC BLOOD PRESSURE: 57 MMHG | HEIGHT: 67 IN | WEIGHT: 172.4 LBS | HEART RATE: 70 BPM | BODY MASS INDEX: 27.06 KG/M2 | SYSTOLIC BLOOD PRESSURE: 106 MMHG | RESPIRATION RATE: 20 BRPM | OXYGEN SATURATION: 96 %

## 2019-06-06 NOTE — PROGRESS NOTES
[    Visit Type:  Follow-up Visit  Referring Provider:  Yusef Sosa MD  Primary Provider:  Ian SALAS MD    CC:  Hospital follow up nausea, vomiting and diarrhea.    History of Present Illness:  Chief complaint:  Gastroenteritis coagulopathy    The patient is a 83-year-old white male comes in for follow-up of maintenance of his current problems which include    1. gastroenteritis -new-patient was recently hospitalized by me because of nausea vomiting and diarrhea.  The patient was hospitalized.  He was treated with IV fluids anti medics.  He underwent upper GI endoscopy which was normal.  Patient's nausea   vomiting and diarrhea improved.  He was able to be discharged home.    2.  Coagulopathy- new-while in the hospital patient was found to have elevated INR.  The patient had is Coumadin withheld.  He was given vitamin K. With this his INR returned to normal.  Patient on discharge was continued on his Coumadin 3 mg daily.    His other problems include COPD sleep apnea hypertension hyperlipidemia type 2 diabetes mellitus coronary artery disease in atrial fibrillation      Past Medical History:     Reviewed history from 05/04/2016 and no changes required:        Coronary Artery Disease        Diabetes, Type 2        Hypertension        C O P D        emphysema        Sleep Apnea        Cataract        Ventricular Arrhythmia        Bradycardia        Cardiomyopathy    Past Surgical History:     Reviewed history from 05/04/2016 and no changes required:        Lung resection 2008        Carotid Endarterectomy:  (1900)        Tracheostomy 1900        laryngoscopy 1900        P T C A: (1997)        Heart Catherization (02/04/2015)        Heart Catherization (04/25/2016)        Pacemaker: Dual chamber: [employer]  (04/25/2016)    Family History Summary:      Reviewed history Last on 05/02/2019 and no changes required:05/29/2019  Father - Has Family History of Stroke/CVA - Entered On: 2/12/2019  Mother -  Has Family History of Hypertension - Entered On: 2/12/2019  Mother - Has Family History of Heart Disease - Entered On: 2/12/2019    General Comments - FH:  FH Heart Disease mother brother  FH Hypertension   FH Stroke: No father  FH Breast Cancersister  FH Colon Cancer sister  FH Diabetes uncle      Social History:     Reviewed history from 05/02/2019 and no changes required:        Patient is a former smoker.        Passive Smoke: N        Alcohol Use: Y        Drug Use: N        HIV/High Risk: N        Regular Exercise: N            Social History Summary:  Patient is a former smoker.  Passive Smoke: N  Alcohol Use: Y  Drug Use: N  HIV/High Risk: N  Regular Exercise: N  Social History Reviewed: 05/29/2019          Risk Factors:     Smoked Tobacco Use:  Former smoker     Cigarettes:  Yes        Year quit:  2008        Years Since Last Quit:  11  Smokeless Tobacco Use:  Never  Passive smoke exposure:  no  Drug use:  no  HIV high-risk behavior:  no  Caffeine use:  <1 drinks per day  Alcohol use:  yes     Drinks per day:  social  Exercise:  no  Seatbelt use:  100 %  Sun Exposure:  remote    Family History Risk Factors:     Family History of MI in females < 65 years old:  no     Family History of MI in males < 55 years old:  no    Previous Tobacco Use: Signed On - 05/02/2019  Smoked Tobacco Use:  Former smoker     Cigarettes:  Yes        Year quit:  2008        Years Since Last Quit:  11 years, 4 months, 28 days  Smokeless Tobacco Use:  Never     Counseled to quit/cut down:  no  Passive smoke exposure:  no  Drug use:  no  HIV high-risk behavior:  no  Caffeine use:  <1 drinks per day    Previous Alcohol Use: Signed On - 05/02/2019  Alcohol use:  yes     Drinks per day:  social  Exercise:  no  Seatbelt use:  100 %  Sun Exposure:  remote    Family History Risk Factors:     Family History of MI in females < 65 years old:  no     Family History of MI in males < 55 years old:  no    Colonoscopy History:     Date of Last  Colonoscopy:  10/07/2015        Vital Signs:    Patient Profile:    83 Years Old Male  Height:     67 inches (170.18 cm)  Weight:     172.4 pounds  BMI:        27.00     O2 Sat:     96 %  Temp:       97.5 degrees F oral  Pulse rate: 70 / minute  Resp:       20 per minute  BP Sittin / 57  (left arm)    Cuff size:  large      Problems: Active problems were reviewed with the patient during this visit.  Medications: Medications were reviewed with the patient during this visit.  Allergies: Allergies were reviewed with the patient during this visit.        Vitals Entered By: Yajaira Lr CMA (May 29, 2019 11:38 AM)      Physical Exam    General:      No distress  Head:      normocephalic and atraumatic.    Eyes:      PERRL/EOM intact, conjunctiva and sclera clear with out nystagmus.    Ears:      TM's intact and clear with normal canals with grossly normal hearing.    Nose:      no deformity, discharge, inflammation, or lesions.    Mouth:      no deformity or lesions with good dentition.    Neck:      no masses, thyromegaly, or abnormal cervical nodes.    Lungs:      clear bilaterally to auscultation.    Heart:      irregularly irregular rhythm, normal rate, no murmurs and no rubs.    Abdomen:       normal bowel sounds; no hepatosplenomegaly no ventral,umbilical hernias or masses noted.    Msk:      no deformity or scoliosis noted of thoracic or lumbar spine.    Extremities:      no clubbing, cyanosis, edema, or deformity noted with normal full range of motion of joints of all four extremities   Skin:      intact without lesions or rashes.        Blood Pressure:  Today's BP: 106/57 mm Hg    Labwork:   Most Recent Lab Results:   LDL: 50 mg/dL 2019  HbA1c: : 6.8 % 2019        Impression & Recommendations:    Problem # 1:  Gastroenteritis (ICD-558.9) (DCX38-D46.9)  Assessment: New    Problem # 2:  Coagulopathy, coumadin-induced (ICD-286.5) (MJU51-M92.32)  Assessment: New        Patient Instructions:  1)   Continue your current medications and treatment.  2)  Please schedule a follow-up appointment in 6 months.  3)  Laboratory testing at that time                      Medication Administration    Orders Added:  1)  Ofc Vst, Est Level III [22419]  ]      Electronically signed by Yusef Sosa MD on 05/29/2019 at 12:11 PM  ________________________________________________________________________       Disclaimer: Converted Note message may not contain all data elements that existed in the [employer] source system. Please see [employer] for the original note details.

## 2019-06-11 ENCOUNTER — HOSPITAL ENCOUNTER (OUTPATIENT)
Dept: LAB | Facility: HOSPITAL | Age: 84
Discharge: HOME | End: 2019-06-11
Attending: INTERNAL MEDICINE | Admitting: INTERNAL MEDICINE

## 2019-06-11 LAB
ALBUMIN SERPL-MCNC: 3.7 G/DL (ref 3.5–4.8)
ALP SERPL-CCNC: 42 IU/L (ref 32–91)
ALT SERPL-CCNC: 13 IU/L (ref 17–63)
ANION GAP SERPL CALC-SCNC: 13.8 MMOL/L (ref 10–20)
AST SERPL-CCNC: 13 IU/L (ref 15–41)
BILIRUB DIRECT SERPL-MCNC: 0.1 MG/DL (ref 0.1–0.5)
BILIRUB SERPL-MCNC: 0.5 MG/DL (ref 0.3–1.2)
BUN SERPL-MCNC: 19 MG/DL (ref 8–20)
BUN/CREAT SERPL: 14.6 (ref 6.2–20.3)
CALCIUM SERPL-MCNC: 9 MG/DL (ref 8.9–10.3)
CHLORIDE SERPL-SCNC: 110 MMOL/L (ref 101–111)
CHOLEST SERPL-MCNC: 90 MG/DL
CHOLEST/HDLC SERPL: 2.5 {RATIO}
CK SERPL-CCNC: 47 IU/L (ref 49–397)
CONV CO2: 22 MMOL/L (ref 22–32)
CONV LDL CHOLESTEROL DIRECT: 39 MG/DL (ref 0–100)
CONV TOTAL PROTEIN: 6.2 G/DL (ref 6.1–7.9)
CREAT UR-MCNC: 1.3 MG/DL (ref 0.7–1.2)
GLUCOSE SERPL-MCNC: 115 MG/DL (ref 65–99)
HDLC SERPL-MCNC: 37 MG/DL
LDLC/HDLC SERPL: 1.1 {RATIO}
LIPID INTERPRETATION: ABNORMAL
POTASSIUM SERPL-SCNC: 3.8 MMOL/L (ref 3.6–5.1)
SODIUM SERPL-SCNC: 142 MMOL/L (ref 136–144)
TRIGL SERPL-MCNC: 133 MG/DL
VLDLC SERPL CALC-MCNC: 14.8 MG/DL

## 2019-06-14 ENCOUNTER — OFFICE (AMBULATORY)
Dept: URBAN - METROPOLITAN AREA CLINIC 64 | Facility: CLINIC | Age: 84
End: 2019-06-14
Payer: MEDICARE

## 2019-06-14 VITALS
SYSTOLIC BLOOD PRESSURE: 105 MMHG | HEIGHT: 67 IN | WEIGHT: 171 LBS | HEART RATE: 71 BPM | DIASTOLIC BLOOD PRESSURE: 53 MMHG

## 2019-06-14 DIAGNOSIS — K92.1 MELENA: ICD-10-CM

## 2019-06-14 DIAGNOSIS — R11.2 NAUSEA WITH VOMITING, UNSPECIFIED: ICD-10-CM

## 2019-06-14 DIAGNOSIS — R19.7 DIARRHEA, UNSPECIFIED: ICD-10-CM

## 2019-06-14 PROCEDURE — 99214 OFFICE O/P EST MOD 30 MIN: CPT | Performed by: NURSE PRACTITIONER

## 2019-06-17 ENCOUNTER — CLINICAL SUPPORT NO REQUIREMENTS (OUTPATIENT)
Dept: CARDIOLOGY | Facility: CLINIC | Age: 84
End: 2019-06-17

## 2019-06-17 ENCOUNTER — ANTICOAGULATION VISIT (OUTPATIENT)
Dept: CARDIOLOGY | Facility: CLINIC | Age: 84
End: 2019-06-17

## 2019-06-17 ENCOUNTER — OFFICE VISIT (OUTPATIENT)
Dept: CARDIOLOGY | Facility: CLINIC | Age: 84
End: 2019-06-17

## 2019-06-17 VITALS
HEIGHT: 67 IN | BODY MASS INDEX: 27.47 KG/M2 | SYSTOLIC BLOOD PRESSURE: 157 MMHG | HEART RATE: 70 BPM | WEIGHT: 175 LBS | DIASTOLIC BLOOD PRESSURE: 80 MMHG

## 2019-06-17 VITALS
HEART RATE: 70 BPM | DIASTOLIC BLOOD PRESSURE: 80 MMHG | BODY MASS INDEX: 27.41 KG/M2 | SYSTOLIC BLOOD PRESSURE: 157 MMHG | WEIGHT: 175 LBS

## 2019-06-17 DIAGNOSIS — I10 ESSENTIAL HYPERTENSION: ICD-10-CM

## 2019-06-17 DIAGNOSIS — I25.118 CORONARY ARTERY DISEASE OF NATIVE ARTERY OF NATIVE HEART WITH STABLE ANGINA PECTORIS (HCC): ICD-10-CM

## 2019-06-17 DIAGNOSIS — Z79.01 LONG TERM (CURRENT) USE OF ANTICOAGULANTS: ICD-10-CM

## 2019-06-17 DIAGNOSIS — I49.5 SICK SINUS SYNDROME (HCC): Primary | ICD-10-CM

## 2019-06-17 DIAGNOSIS — I48.21 PERMANENT ATRIAL FIBRILLATION (HCC): ICD-10-CM

## 2019-06-17 DIAGNOSIS — E78.2 MIXED HYPERLIPIDEMIA: Primary | ICD-10-CM

## 2019-06-17 PROBLEM — I48.91 ATRIAL FIBRILLATION (HCC): Status: ACTIVE | Noted: 2019-06-17

## 2019-06-17 LAB — INR PPP: 2.2 (ref 2–3)

## 2019-06-17 PROCEDURE — 36416 COLLJ CAPILLARY BLOOD SPEC: CPT | Performed by: INTERNAL MEDICINE

## 2019-06-17 PROCEDURE — 99213 OFFICE O/P EST LOW 20 MIN: CPT | Performed by: INTERNAL MEDICINE

## 2019-06-17 PROCEDURE — 85610 PROTHROMBIN TIME: CPT | Performed by: INTERNAL MEDICINE

## 2019-06-17 RX ORDER — SIMVASTATIN 20 MG
20 TABLET ORAL
COMMUNITY
Start: 2019-04-08 | End: 2019-10-03 | Stop reason: SDUPTHER

## 2019-06-17 RX ORDER — GLIMEPIRIDE 2 MG/1
2 TABLET ORAL 2 TIMES DAILY
COMMUNITY
Start: 2019-05-15 | End: 2020-02-04 | Stop reason: SDUPTHER

## 2019-06-17 RX ORDER — TAMSULOSIN HYDROCHLORIDE 0.4 MG/1
0.4 CAPSULE ORAL DAILY
COMMUNITY
Start: 2019-05-15 | End: 2019-07-03 | Stop reason: SINTOL

## 2019-06-17 RX ORDER — DIGOXIN 125 MCG
125 TABLET ORAL DAILY
COMMUNITY
Start: 2019-04-08 | End: 2019-10-18 | Stop reason: SDUPTHER

## 2019-06-17 RX ORDER — LISINOPRIL 2.5 MG/1
2.5 TABLET ORAL DAILY
COMMUNITY
Start: 2019-04-08 | End: 2019-07-03 | Stop reason: SINTOL

## 2019-06-17 RX ORDER — CLONIDINE HYDROCHLORIDE 0.2 MG/1
0.1 TABLET ORAL NIGHTLY
COMMUNITY
Start: 2014-03-07 | End: 2019-10-03

## 2019-06-17 RX ORDER — CARVEDILOL 12.5 MG/1
12.5 TABLET ORAL 2 TIMES DAILY
COMMUNITY
Start: 2017-08-28 | End: 2020-03-09

## 2019-06-17 RX ORDER — THEOPHYLLINE 300 MG/1
300 TABLET, EXTENDED RELEASE ORAL NIGHTLY
COMMUNITY
Start: 2019-04-08

## 2019-06-17 RX ORDER — WARFARIN SODIUM 3 MG/1
3 TABLET ORAL
COMMUNITY
Start: 2019-06-10 | End: 2019-07-14 | Stop reason: SDUPTHER

## 2019-06-17 NOTE — PROGRESS NOTES
"    Subjective:     Encounter Date:06/17/2019      Patient ID: Cali Santoro is a 83 y.o. male.    Chief Complaint:  History of Present Illness  83-year-old white male patient with known history of paroxysmal atrial fibrillation CAD sick sinus node syndrome status post biventricular pacemaker hypertension dyslipidemia diabetes COPD comes back for follow-up    Last cardiac catheterization showed LAD 40% disease mild to moderate diffuse in-stent restenosis to 50% and also in the midportion 60 to 70% left main 40% RCA up to 40%  Patient denies of any active symptoms his labs showed LDL 39 so advised him to take the simvastatin every other day creatinine 1.3 so advised him to follow-up with PCP regarding that  Echocardiogram May 2019 EF 60% concentric LVH biatrial enlargement mild aortic stenosis  Patient will be followed again in 6 months  Pacemaker check within normal limits    Past Medical History:   Diagnosis Date   • Arrhythmia    • Bradycardia    • Cardiomyopathy (CMS/HCC)    • Cataract    • COPD (chronic obstructive pulmonary disease) (CMS/HCC)    • Coronary artery disease    • Diabetes mellitus, type 2 (CMS/HCC)    • Emphysema, unspecified (CMS/HCC)    • Hypertension    • Sleep apnea    • Ventricular arrhythmia      Past Surgical History:   Procedure Laterality Date   • CARDIAC CATHETERIZATION  02/04/2015   • CARDIAC CATHETERIZATION  04/25/2016   • CAROTID ENDARTERECTOMY     • CORONARY ANGIOPLASTY  1997   • LARYNGOSCOPY     • LUNG SURGERY  2008    Lung resection   • PACEMAKER IMPLANTATION  04/25/2016    Dual chamber; [employer]   • TRACHEOSTOMY       /80   Pulse 70   Ht 170.2 cm (67\")   Wt 79.4 kg (175 lb)   BMI 27.41 kg/m²   Family History   Problem Relation Age of Onset   • Heart disease Mother    • Hypertension Mother    • Stroke Father    • Breast cancer Sister    • Colon cancer Sister    • Heart disease Brother    • Diabetes Other        Current Outpatient Medications:   •  aspirin 81 MG " tablet, Take 81 mg by mouth Daily., Disp: , Rfl:   •  carvedilol (COREG) 12.5 MG tablet, Take 12.5 mg by mouth 2 (Two) Times a Day., Disp: , Rfl:   •  Cholecalciferol (VITAMIN D) 1000 units tablet, Take 5,000 Int'l Units by mouth Daily., Disp: , Rfl:   •  CloNIDine (CATAPRES) 0.2 MG tablet, Take 0.2 mg by mouth Daily., Disp: , Rfl:   •  SITagliptin-metFORMIN HCl ER (JANUMET XR)  MG tablet, Take  mg by mouth Every 12 (Twelve) Hours., Disp: , Rfl:   •  digoxin (LANOXIN) 125 MCG tablet, Take 125 mcg by mouth Daily., Disp: , Rfl:   •  glimepiride (AMARYL) 2 MG tablet, Take 2 mg by mouth 2 (Two) Times a Day., Disp: , Rfl:   •  lisinopril (PRINIVIL,ZESTRIL) 2.5 MG tablet, Take 2.5 mg by mouth 2 (Two) Times a Day., Disp: , Rfl:   •  simvastatin (ZOCOR) 20 MG tablet, Take 20 mg by mouth every night at bedtime., Disp: , Rfl:   •  tamsulosin (FLOMAX) 0.4 MG capsule 24 hr capsule, Take 0.4 mg by mouth Daily., Disp: , Rfl:   •  theophylline (THEODUR) 300 MG 12 hr tablet, Take 300 mg by mouth 2 (Two) Times a Day., Disp: , Rfl:   •  warfarin (COUMADIN) 3 MG tablet, Take 3 mg by mouth Daily. Or as directed, Disp: , Rfl:   Social History     Socioeconomic History   • Marital status:      Spouse name: Not on file   • Number of children: Not on file   • Years of education: Not on file   • Highest education level: Not on file   Occupational History   • Occupation: Retired   Social Needs   • Financial resource strain: Patient refused   • Food insecurity:     Worry: Patient refused     Inability: Patient refused   • Transportation needs:     Medical: Patient refused     Non-medical: Patient refused   Tobacco Use   • Smoking status: Former Smoker     Types: Cigarettes   • Smokeless tobacco: Never Used   Substance and Sexual Activity   • Alcohol use: Yes   • Drug use: No     Allergies   Allergen Reactions   • Penicillins Itching     Review of Systems   Constitution: Negative for fever and malaise/fatigue.   HENT:  Negative for congestion and hearing loss.    Eyes: Negative for double vision and visual disturbance.   Cardiovascular: Negative for chest pain, claudication, dyspnea on exertion, leg swelling and syncope.   Respiratory: Negative for cough and shortness of breath.    Endocrine: Negative for cold intolerance.   Skin: Negative for color change and rash.   Musculoskeletal: Negative for arthritis and joint pain.   Gastrointestinal: Negative for abdominal pain and heartburn.   Genitourinary: Negative for hematuria.   Neurological: Negative for excessive daytime sleepiness and dizziness.   Psychiatric/Behavioral: Negative for depression. The patient is not nervous/anxious.    All other systems reviewed and are negative.             Objective:     Physical Exam   Constitutional: He is oriented to person, place, and time. He appears well-developed and well-nourished. He is cooperative.   HENT:   Head: Normocephalic and atraumatic.   Mouth/Throat: Uvula is midline and oropharynx is clear and moist. No oral lesions.   Eyes: Conjunctivae are normal. No scleral icterus.   Neck: Trachea normal. Neck supple. No JVD present. Carotid bruit is not present. No thyromegaly present.   Cardiovascular: Normal rate, regular rhythm, S1 normal, S2 normal, normal heart sounds, intact distal pulses and normal pulses. PMI is not displaced. Exam reveals no gallop and no friction rub.   No murmur heard.  Pulmonary/Chest: Effort normal and breath sounds normal.   Abdominal: Soft. Bowel sounds are normal.   Musculoskeletal: Normal range of motion.   Neurological: He is alert and oriented to person, place, and time. He has normal strength.   No focal deficits   Skin: Skin is warm. No cyanosis.   Psychiatric: He has a normal mood and affect.       Procedures    Lab Review:       Assessment:          Diagnosis Plan   1. Mixed hyperlipidemia  Comprehensive Metabolic Panel    Lipid Panel    CK   2. Coronary artery disease of native artery of native  heart with stable angina pectoris (CMS/HCC)  Comprehensive Metabolic Panel    Lipid Panel    CK   3. Essential hypertension  Comprehensive Metabolic Panel    Lipid Panel    CK   Status post permanent pacemaker placement  Atrial fibrillation  Renal insufficiency       Plan:       Will decrease the simvastatin  Follow-up in 6 months

## 2019-06-17 NOTE — PROGRESS NOTES
Subjective   Cali Santoro is a 83 y.o. male.     History of Present Illness     {Common H&P Review Areas:32486}    Review of Systems   Respiratory: Negative for shortness of breath.    Cardiovascular: Negative for chest pain, palpitations and leg swelling.   Gastrointestinal: Negative for nausea and vomiting.   Neurological: Negative for dizziness, syncope and light-headedness.       Objective   Physical Exam      Assessment/Plan   {Assess/PlanSmartLinks:84228}

## 2019-06-20 ENCOUNTER — CLINICAL SUPPORT NO REQUIREMENTS (OUTPATIENT)
Dept: CARDIOLOGY | Facility: CLINIC | Age: 84
End: 2019-06-20

## 2019-06-20 DIAGNOSIS — I49.5 SICK SINUS SYNDROME (HCC): Primary | ICD-10-CM

## 2019-06-20 PROCEDURE — 93281 PM DEVICE PROGR EVAL MULTI: CPT | Performed by: INTERNAL MEDICINE

## 2019-06-27 ENCOUNTER — HOSPITAL ENCOUNTER (OUTPATIENT)
Facility: HOSPITAL | Age: 84
Setting detail: OBSERVATION
Discharge: LEFT BEFORE DISCHARGE | End: 2019-06-28
Attending: EMERGENCY MEDICINE | Admitting: FAMILY MEDICINE

## 2019-06-27 ENCOUNTER — APPOINTMENT (OUTPATIENT)
Dept: GENERAL RADIOLOGY | Facility: HOSPITAL | Age: 84
End: 2019-06-27

## 2019-06-27 DIAGNOSIS — I10 UNCONTROLLED HYPERTENSION: ICD-10-CM

## 2019-06-27 DIAGNOSIS — I25.10 CORONARY ARTERY DISEASE INVOLVING NATIVE CORONARY ARTERY OF NATIVE HEART WITHOUT ANGINA PECTORIS: ICD-10-CM

## 2019-06-27 DIAGNOSIS — R07.9 CHEST PAIN, UNSPECIFIED TYPE: Primary | ICD-10-CM

## 2019-06-27 PROBLEM — J44.9 CHRONIC OBSTRUCTIVE PULMONARY DISEASE (HCC): Status: ACTIVE | Noted: 2019-06-27

## 2019-06-27 PROBLEM — G47.30 SLEEP APNEA: Status: ACTIVE | Noted: 2019-06-27

## 2019-06-27 PROBLEM — N19 RENAL FAILURE: Status: ACTIVE | Noted: 2018-12-03

## 2019-06-27 PROBLEM — R06.00 DYSPNEA: Status: RESOLVED | Noted: 2019-06-27 | Resolved: 2019-06-27

## 2019-06-27 PROBLEM — R06.00 DYSPNEA: Status: ACTIVE | Noted: 2019-06-27

## 2019-06-27 PROBLEM — E11.65 TYPE 2 DIABETES MELLITUS WITH HYPERGLYCEMIA (HCC): Status: ACTIVE | Noted: 2019-06-27

## 2019-06-27 LAB
ANION GAP SERPL CALCULATED.3IONS-SCNC: 11.4 MMOL/L (ref 10–20)
BASOPHILS # BLD AUTO: 0.1 10*3/MM3 (ref 0–0.2)
BASOPHILS NFR BLD AUTO: 0.8 % (ref 0–1.5)
BNP SERPL-MCNC: 228 PG/ML
BUN BLD-MCNC: 18 MG/DL (ref 8–20)
BUN/CREAT SERPL: 12.9 (ref 6.2–20.3)
CALCIUM SPEC-SCNC: 9.1 MG/DL (ref 8.9–10.3)
CHLORIDE SERPL-SCNC: 107 MMOL/L (ref 101–111)
CO2 SERPL-SCNC: 24 MMOL/L (ref 22–32)
CREAT BLD-MCNC: 1.4 MG/DL (ref 0.7–1.2)
DEPRECATED RDW RBC AUTO: 59.1 FL (ref 37–54)
EOSINOPHIL # BLD AUTO: 0.3 10*3/MM3 (ref 0–0.4)
EOSINOPHIL NFR BLD AUTO: 3.4 % (ref 0.3–6.2)
ERYTHROCYTE [DISTWIDTH] IN BLOOD BY AUTOMATED COUNT: 17 % (ref 12.3–15.4)
GFR SERPL CREATININE-BSD FRML MDRD: 49 ML/MIN/1.73
GLUCOSE BLD-MCNC: 149 MG/DL (ref 65–99)
GLUCOSE BLDC GLUCOMTR-MCNC: 117 MG/DL (ref 70–105)
GLUCOSE BLDC GLUCOMTR-MCNC: 128 MG/DL (ref 70–105)
HCT VFR BLD AUTO: 36.6 % (ref 37.5–51)
HGB BLD-MCNC: 12.2 G/DL (ref 13–17.7)
HOLD SPECIMEN: NORMAL
HOLD SPECIMEN: NORMAL
INR PPP: 2.36 (ref 2–3)
INR PPP: 2.58 (ref 2–3)
LYMPHOCYTES # BLD AUTO: 1.7 10*3/MM3 (ref 0.7–3.1)
LYMPHOCYTES NFR BLD AUTO: 21.2 % (ref 19.6–45.3)
MCH RBC QN AUTO: 33.3 PG (ref 26.6–33)
MCHC RBC AUTO-ENTMCNC: 33.4 G/DL (ref 31.5–35.7)
MCV RBC AUTO: 99.6 FL (ref 79–97)
MONOCYTES # BLD AUTO: 0.7 10*3/MM3 (ref 0.1–0.9)
MONOCYTES NFR BLD AUTO: 9 % (ref 5–12)
NEUTROPHILS # BLD AUTO: 5.1 10*3/MM3 (ref 1.7–7)
NEUTROPHILS NFR BLD AUTO: 65.6 % (ref 42.7–76)
NRBC BLD AUTO-RTO: 0 /100 WBC (ref 0–0.2)
PLATELET # BLD AUTO: 227 10*3/MM3 (ref 140–450)
PMV BLD AUTO: 7.5 FL (ref 6–12)
POTASSIUM BLD-SCNC: 4.4 MMOL/L (ref 3.6–5.1)
PROTHROMBIN TIME: 22.7 SECONDS (ref 19.4–28.5)
PROTHROMBIN TIME: 24.9 SECONDS (ref 19.4–28.5)
RBC # BLD AUTO: 3.68 10*6/MM3 (ref 4.14–5.8)
SODIUM BLD-SCNC: 138 MMOL/L (ref 136–144)
TROPONIN I SERPL-MCNC: 0.03 NG/ML (ref 0–0.03)
TROPONIN I SERPL-MCNC: 0.03 NG/ML (ref 0–0.03)
TROPONIN I SERPL-MCNC: <0.03 NG/ML (ref 0–0.03)
WBC NRBC COR # BLD: 7.8 10*3/MM3 (ref 3.4–10.8)
WHOLE BLOOD HOLD SPECIMEN: NORMAL
WHOLE BLOOD HOLD SPECIMEN: NORMAL

## 2019-06-27 PROCEDURE — 25010000002 HEPARIN (PORCINE) PER 1000 UNITS: Performed by: FAMILY MEDICINE

## 2019-06-27 PROCEDURE — 80048 BASIC METABOLIC PNL TOTAL CA: CPT | Performed by: EMERGENCY MEDICINE

## 2019-06-27 PROCEDURE — 94799 UNLISTED PULMONARY SVC/PX: CPT

## 2019-06-27 PROCEDURE — 84484 ASSAY OF TROPONIN QUANT: CPT | Performed by: FAMILY MEDICINE

## 2019-06-27 PROCEDURE — 99215 OFFICE O/P EST HI 40 MIN: CPT | Performed by: NURSE PRACTITIONER

## 2019-06-27 PROCEDURE — 85025 COMPLETE CBC W/AUTO DIFF WBC: CPT | Performed by: EMERGENCY MEDICINE

## 2019-06-27 PROCEDURE — 85610 PROTHROMBIN TIME: CPT | Performed by: EMERGENCY MEDICINE

## 2019-06-27 PROCEDURE — 93005 ELECTROCARDIOGRAM TRACING: CPT | Performed by: EMERGENCY MEDICINE

## 2019-06-27 PROCEDURE — 82962 GLUCOSE BLOOD TEST: CPT

## 2019-06-27 PROCEDURE — G0378 HOSPITAL OBSERVATION PER HR: HCPCS

## 2019-06-27 PROCEDURE — 99285 EMERGENCY DEPT VISIT HI MDM: CPT

## 2019-06-27 PROCEDURE — 71045 X-RAY EXAM CHEST 1 VIEW: CPT

## 2019-06-27 PROCEDURE — 25010000002 FUROSEMIDE PER 20 MG: Performed by: FAMILY MEDICINE

## 2019-06-27 PROCEDURE — 99222 1ST HOSP IP/OBS MODERATE 55: CPT | Performed by: FAMILY MEDICINE

## 2019-06-27 PROCEDURE — 96374 THER/PROPH/DIAG INJ IV PUSH: CPT

## 2019-06-27 PROCEDURE — 84484 ASSAY OF TROPONIN QUANT: CPT | Performed by: EMERGENCY MEDICINE

## 2019-06-27 PROCEDURE — 85610 PROTHROMBIN TIME: CPT | Performed by: FAMILY MEDICINE

## 2019-06-27 PROCEDURE — 96372 THER/PROPH/DIAG INJ SC/IM: CPT

## 2019-06-27 PROCEDURE — 83880 ASSAY OF NATRIURETIC PEPTIDE: CPT | Performed by: EMERGENCY MEDICINE

## 2019-06-27 RX ORDER — TAMSULOSIN HYDROCHLORIDE 0.4 MG/1
0.4 CAPSULE ORAL DAILY
Status: DISCONTINUED | OUTPATIENT
Start: 2019-06-27 | End: 2019-06-28 | Stop reason: HOSPADM

## 2019-06-27 RX ORDER — CARVEDILOL 6.25 MG/1
12.5 TABLET ORAL 2 TIMES DAILY WITH MEALS
Status: DISCONTINUED | OUTPATIENT
Start: 2019-06-27 | End: 2019-06-28 | Stop reason: HOSPADM

## 2019-06-27 RX ORDER — SODIUM CHLORIDE 0.9 % (FLUSH) 0.9 %
3 SYRINGE (ML) INJECTION EVERY 12 HOURS SCHEDULED
Status: DISCONTINUED | OUTPATIENT
Start: 2019-06-27 | End: 2019-06-28 | Stop reason: HOSPADM

## 2019-06-27 RX ORDER — ACETAMINOPHEN 325 MG/1
650 TABLET ORAL EVERY 4 HOURS PRN
Status: DISCONTINUED | OUTPATIENT
Start: 2019-06-27 | End: 2019-06-28 | Stop reason: HOSPADM

## 2019-06-27 RX ORDER — CALCITRIOL 0.25 UG/1
0.25 CAPSULE, LIQUID FILLED ORAL 3 TIMES WEEKLY
COMMUNITY

## 2019-06-27 RX ORDER — CALCIUM CARBONATE 200(500)MG
2 TABLET,CHEWABLE ORAL 2 TIMES DAILY PRN
Status: DISCONTINUED | OUTPATIENT
Start: 2019-06-27 | End: 2019-06-28 | Stop reason: HOSPADM

## 2019-06-27 RX ORDER — DIGOXIN 125 MCG
125 TABLET ORAL DAILY
Status: DISCONTINUED | OUTPATIENT
Start: 2019-06-27 | End: 2019-06-28 | Stop reason: HOSPADM

## 2019-06-27 RX ORDER — SENNA PLUS 8.6 MG/1
1 TABLET ORAL NIGHTLY
Status: DISCONTINUED | OUTPATIENT
Start: 2019-06-27 | End: 2019-06-28 | Stop reason: HOSPADM

## 2019-06-27 RX ORDER — ASPIRIN 81 MG/1
324 TABLET, CHEWABLE ORAL ONCE
Status: DISCONTINUED | OUTPATIENT
Start: 2019-06-27 | End: 2019-06-28 | Stop reason: HOSPADM

## 2019-06-27 RX ORDER — SODIUM CHLORIDE 0.9 % (FLUSH) 0.9 %
3-10 SYRINGE (ML) INJECTION AS NEEDED
Status: DISCONTINUED | OUTPATIENT
Start: 2019-06-27 | End: 2019-06-28 | Stop reason: HOSPADM

## 2019-06-27 RX ORDER — SODIUM CHLORIDE 0.9 % (FLUSH) 0.9 %
10 SYRINGE (ML) INJECTION AS NEEDED
Status: DISCONTINUED | OUTPATIENT
Start: 2019-06-27 | End: 2019-06-28 | Stop reason: HOSPADM

## 2019-06-27 RX ORDER — WARFARIN SODIUM 3 MG/1
3 TABLET ORAL
Status: DISCONTINUED | OUTPATIENT
Start: 2019-06-27 | End: 2019-06-28 | Stop reason: HOSPADM

## 2019-06-27 RX ORDER — THEOPHYLLINE 300 MG/1
300 TABLET, EXTENDED RELEASE ORAL 2 TIMES DAILY
Status: DISCONTINUED | OUTPATIENT
Start: 2019-06-27 | End: 2019-06-28 | Stop reason: HOSPADM

## 2019-06-27 RX ORDER — FUROSEMIDE 10 MG/ML
40 INJECTION INTRAMUSCULAR; INTRAVENOUS ONCE
Status: COMPLETED | OUTPATIENT
Start: 2019-06-27 | End: 2019-06-27

## 2019-06-27 RX ORDER — DIPHENHYDRAMINE HCL 25 MG
25 TABLET ORAL NIGHTLY PRN
COMMUNITY
End: 2019-07-31

## 2019-06-27 RX ORDER — HEPARIN SODIUM 5000 [USP'U]/ML
5000 INJECTION, SOLUTION INTRAVENOUS; SUBCUTANEOUS EVERY 12 HOURS SCHEDULED
Status: DISCONTINUED | OUTPATIENT
Start: 2019-06-27 | End: 2019-06-27

## 2019-06-27 RX ORDER — WARFARIN SODIUM 4 MG/1
4.5 TABLET ORAL
COMMUNITY
End: 2019-07-31

## 2019-06-27 RX ORDER — LISINOPRIL 5 MG/1
2.5 TABLET ORAL DAILY
Status: DISCONTINUED | OUTPATIENT
Start: 2019-06-27 | End: 2019-06-28 | Stop reason: HOSPADM

## 2019-06-27 RX ORDER — FUROSEMIDE 20 MG/1
20 TABLET ORAL DAILY
Status: DISCONTINUED | OUTPATIENT
Start: 2019-06-28 | End: 2019-06-28

## 2019-06-27 RX ORDER — ASPIRIN 81 MG/1
81 TABLET, CHEWABLE ORAL DAILY
Status: DISCONTINUED | OUTPATIENT
Start: 2019-06-27 | End: 2019-06-28 | Stop reason: HOSPADM

## 2019-06-27 RX ORDER — FAMOTIDINE 20 MG/1
40 TABLET, FILM COATED ORAL DAILY
Status: DISCONTINUED | OUTPATIENT
Start: 2019-06-27 | End: 2019-06-28 | Stop reason: HOSPADM

## 2019-06-27 RX ORDER — CLONIDINE HYDROCHLORIDE 0.1 MG/1
0.2 TABLET ORAL NIGHTLY
Status: DISCONTINUED | OUTPATIENT
Start: 2019-06-27 | End: 2019-06-28 | Stop reason: HOSPADM

## 2019-06-27 RX ORDER — ATORVASTATIN CALCIUM 10 MG/1
10 TABLET, FILM COATED ORAL NIGHTLY
Status: DISCONTINUED | OUTPATIENT
Start: 2019-06-27 | End: 2019-06-28 | Stop reason: HOSPADM

## 2019-06-27 RX ORDER — NITROGLYCERIN 0.4 MG/1
0.4 TABLET SUBLINGUAL
Status: DISCONTINUED | OUTPATIENT
Start: 2019-06-27 | End: 2019-06-28 | Stop reason: HOSPADM

## 2019-06-27 RX ORDER — ONDANSETRON 2 MG/ML
4 INJECTION INTRAMUSCULAR; INTRAVENOUS EVERY 6 HOURS PRN
Status: DISCONTINUED | OUTPATIENT
Start: 2019-06-27 | End: 2019-06-28 | Stop reason: HOSPADM

## 2019-06-27 RX ADMIN — ASPIRIN 81 MG 81 MG: 81 TABLET ORAL at 17:08

## 2019-06-27 RX ADMIN — ATORVASTATIN CALCIUM 10 MG: 10 TABLET, FILM COATED ORAL at 20:31

## 2019-06-27 RX ADMIN — FUROSEMIDE 40 MG: 10 INJECTION, SOLUTION INTRAVENOUS at 17:06

## 2019-06-27 RX ADMIN — DIGOXIN 125 MCG: 0.12 TABLET ORAL at 17:08

## 2019-06-27 RX ADMIN — NITROGLYCERIN 1 INCH: 20 OINTMENT TOPICAL at 12:23

## 2019-06-27 RX ADMIN — CLONIDINE HYDROCHLORIDE 0.2 MG: 0.1 TABLET ORAL at 20:31

## 2019-06-27 RX ADMIN — WARFARIN SODIUM 3 MG: 3 TABLET ORAL at 17:07

## 2019-06-27 RX ADMIN — LISINOPRIL 2.5 MG: 5 TABLET ORAL at 17:07

## 2019-06-27 RX ADMIN — Medication 3 ML: at 20:32

## 2019-06-27 RX ADMIN — THEOPHYLLINE 300 MG: 300 TABLET, EXTENDED RELEASE ORAL at 20:32

## 2019-06-27 RX ADMIN — TAMSULOSIN HYDROCHLORIDE 0.4 MG: 0.4 CAPSULE ORAL at 17:06

## 2019-06-27 RX ADMIN — SENNOSIDES 1 TABLET: 8.6 TABLET, FILM COATED ORAL at 20:31

## 2019-06-27 RX ADMIN — HEPARIN SODIUM 5000 UNITS: 5000 INJECTION INTRAVENOUS; SUBCUTANEOUS at 20:31

## 2019-06-27 RX ADMIN — FAMOTIDINE 40 MG: 20 TABLET, FILM COATED ORAL at 17:07

## 2019-06-27 RX ADMIN — CARVEDILOL 12.5 MG: 6.25 TABLET, FILM COATED ORAL at 17:09

## 2019-06-28 ENCOUNTER — APPOINTMENT (OUTPATIENT)
Dept: NUCLEAR MEDICINE | Facility: HOSPITAL | Age: 84
End: 2019-06-28

## 2019-06-28 VITALS
SYSTOLIC BLOOD PRESSURE: 167 MMHG | DIASTOLIC BLOOD PRESSURE: 71 MMHG | WEIGHT: 167.77 LBS | TEMPERATURE: 97.2 F | OXYGEN SATURATION: 93 % | HEIGHT: 67 IN | HEART RATE: 73 BPM | RESPIRATION RATE: 14 BRPM | BODY MASS INDEX: 26.33 KG/M2

## 2019-06-28 LAB
ALBUMIN SERPL-MCNC: 3.5 G/DL (ref 3.5–4.8)
ALBUMIN/GLOB SERPL: 1.6 G/DL (ref 1–1.7)
ALP SERPL-CCNC: 49 U/L (ref 32–91)
ALT SERPL W P-5'-P-CCNC: 13 U/L (ref 17–63)
ANION GAP SERPL CALCULATED.3IONS-SCNC: 15.7 MMOL/L (ref 10–20)
AST SERPL-CCNC: 12 U/L (ref 15–41)
BASOPHILS # BLD AUTO: 0.1 10*3/MM3 (ref 0–0.2)
BASOPHILS NFR BLD AUTO: 1 % (ref 0–1.5)
BILIRUB SERPL-MCNC: 0.7 MG/DL (ref 0.3–1.2)
BUN BLD-MCNC: 20 MG/DL (ref 8–20)
BUN/CREAT SERPL: 14.3 (ref 6.2–20.3)
CALCIUM SPEC-SCNC: 9.3 MG/DL (ref 8.9–10.3)
CHLORIDE SERPL-SCNC: 105 MMOL/L (ref 101–111)
CO2 SERPL-SCNC: 22 MMOL/L (ref 22–32)
CREAT BLD-MCNC: 1.4 MG/DL (ref 0.7–1.2)
DEPRECATED RDW RBC AUTO: 59.9 FL (ref 37–54)
EOSINOPHIL # BLD AUTO: 0.3 10*3/MM3 (ref 0–0.4)
EOSINOPHIL NFR BLD AUTO: 3.5 % (ref 0.3–6.2)
ERYTHROCYTE [DISTWIDTH] IN BLOOD BY AUTOMATED COUNT: 17 % (ref 12.3–15.4)
GFR SERPL CREATININE-BSD FRML MDRD: 49 ML/MIN/1.73
GLOBULIN UR ELPH-MCNC: 2.2 GM/DL (ref 2.5–3.8)
GLUCOSE BLD-MCNC: 141 MG/DL (ref 65–99)
GLUCOSE BLDC GLUCOMTR-MCNC: 117 MG/DL (ref 70–105)
GLUCOSE BLDC GLUCOMTR-MCNC: 130 MG/DL (ref 70–105)
GLUCOSE BLDC GLUCOMTR-MCNC: 216 MG/DL (ref 70–105)
HCT VFR BLD AUTO: 34.6 % (ref 37.5–51)
HGB BLD-MCNC: 11.4 G/DL (ref 13–17.7)
INR PPP: 2.42 (ref 2–3)
LYMPHOCYTES # BLD AUTO: 1.9 10*3/MM3 (ref 0.7–3.1)
LYMPHOCYTES NFR BLD AUTO: 19.4 % (ref 19.6–45.3)
MCH RBC QN AUTO: 33 PG (ref 26.6–33)
MCHC RBC AUTO-ENTMCNC: 33.1 G/DL (ref 31.5–35.7)
MCV RBC AUTO: 99.6 FL (ref 79–97)
MONOCYTES # BLD AUTO: 0.9 10*3/MM3 (ref 0.1–0.9)
MONOCYTES NFR BLD AUTO: 8.8 % (ref 5–12)
NEUTROPHILS # BLD AUTO: 6.6 10*3/MM3 (ref 1.7–7)
NEUTROPHILS NFR BLD AUTO: 67.3 % (ref 42.7–76)
NRBC BLD AUTO-RTO: 0 /100 WBC (ref 0–0.2)
PLATELET # BLD AUTO: 236 10*3/MM3 (ref 140–450)
PMV BLD AUTO: 7.2 FL (ref 6–12)
POTASSIUM BLD-SCNC: 4.7 MMOL/L (ref 3.6–5.1)
PROT SERPL-MCNC: 5.7 G/DL (ref 6.1–7.9)
PROTHROMBIN TIME: 23.3 SECONDS (ref 19.4–28.5)
RBC # BLD AUTO: 3.47 10*6/MM3 (ref 4.14–5.8)
SODIUM BLD-SCNC: 138 MMOL/L (ref 136–144)
TROPONIN I SERPL-MCNC: 0.03 NG/ML (ref 0–0.03)
WBC NRBC COR # BLD: 9.9 10*3/MM3 (ref 3.4–10.8)

## 2019-06-28 PROCEDURE — 99214 OFFICE O/P EST MOD 30 MIN: CPT | Performed by: INTERNAL MEDICINE

## 2019-06-28 PROCEDURE — G0378 HOSPITAL OBSERVATION PER HR: HCPCS

## 2019-06-28 PROCEDURE — 78452 HT MUSCLE IMAGE SPECT MULT: CPT

## 2019-06-28 PROCEDURE — 99238 HOSP IP/OBS DSCHRG MGMT 30/<: CPT | Performed by: FAMILY MEDICINE

## 2019-06-28 PROCEDURE — 25010000002 REGADENOSON 0.4 MG/5ML SOLUTION: Performed by: FAMILY MEDICINE

## 2019-06-28 PROCEDURE — 82962 GLUCOSE BLOOD TEST: CPT

## 2019-06-28 PROCEDURE — 93016 CV STRESS TEST SUPVJ ONLY: CPT | Performed by: NURSE PRACTITIONER

## 2019-06-28 PROCEDURE — 80053 COMPREHEN METABOLIC PANEL: CPT | Performed by: FAMILY MEDICINE

## 2019-06-28 PROCEDURE — 0 TECHNETIUM SESTAMIBI: Performed by: FAMILY MEDICINE

## 2019-06-28 PROCEDURE — 84484 ASSAY OF TROPONIN QUANT: CPT | Performed by: FAMILY MEDICINE

## 2019-06-28 PROCEDURE — 78452 HT MUSCLE IMAGE SPECT MULT: CPT | Performed by: INTERNAL MEDICINE

## 2019-06-28 PROCEDURE — 85610 PROTHROMBIN TIME: CPT | Performed by: FAMILY MEDICINE

## 2019-06-28 PROCEDURE — 93017 CV STRESS TEST TRACING ONLY: CPT

## 2019-06-28 PROCEDURE — 85025 COMPLETE CBC W/AUTO DIFF WBC: CPT | Performed by: FAMILY MEDICINE

## 2019-06-28 PROCEDURE — 93018 CV STRESS TEST I&R ONLY: CPT | Performed by: INTERNAL MEDICINE

## 2019-06-28 PROCEDURE — A9500 TC99M SESTAMIBI: HCPCS | Performed by: FAMILY MEDICINE

## 2019-06-28 RX ORDER — FUROSEMIDE 40 MG/1
40 TABLET ORAL DAILY
Status: DISCONTINUED | OUTPATIENT
Start: 2019-06-28 | End: 2019-06-28 | Stop reason: HOSPADM

## 2019-06-28 RX ORDER — FUROSEMIDE 40 MG/1
40 TABLET ORAL DAILY
Qty: 30 TABLET | Refills: 2 | Status: SHIPPED | OUTPATIENT
Start: 2019-06-29 | End: 2019-09-16 | Stop reason: SDUPTHER

## 2019-06-28 RX ADMIN — ASPIRIN 81 MG 81 MG: 81 TABLET ORAL at 08:21

## 2019-06-28 RX ADMIN — FUROSEMIDE 40 MG: 40 TABLET ORAL at 08:27

## 2019-06-28 RX ADMIN — DIGOXIN 125 MCG: 0.12 TABLET ORAL at 08:21

## 2019-06-28 RX ADMIN — ACETAMINOPHEN 650 MG: 325 TABLET, FILM COATED ORAL at 05:15

## 2019-06-28 RX ADMIN — THEOPHYLLINE 300 MG: 300 TABLET, EXTENDED RELEASE ORAL at 08:17

## 2019-06-28 RX ADMIN — Medication 10 ML: at 08:16

## 2019-06-28 RX ADMIN — CARVEDILOL 12.5 MG: 6.25 TABLET, FILM COATED ORAL at 17:43

## 2019-06-28 RX ADMIN — TECHNETIUM TC 99M SESTAMIBI 1 DOSE: 1 INJECTION INTRAVENOUS at 08:15

## 2019-06-28 RX ADMIN — FAMOTIDINE 40 MG: 20 TABLET, FILM COATED ORAL at 08:22

## 2019-06-28 RX ADMIN — CARVEDILOL 12.5 MG: 6.25 TABLET, FILM COATED ORAL at 11:09

## 2019-06-28 RX ADMIN — LISINOPRIL 2.5 MG: 5 TABLET ORAL at 08:17

## 2019-06-28 RX ADMIN — TAMSULOSIN HYDROCHLORIDE 0.4 MG: 0.4 CAPSULE ORAL at 08:22

## 2019-06-28 RX ADMIN — TECHNETIUM TC 99M SESTAMIBI 1 DOSE: 1 INJECTION INTRAVENOUS at 10:00

## 2019-06-28 RX ADMIN — WARFARIN SODIUM 4.5 MG: 2.5 TABLET ORAL at 17:40

## 2019-06-28 RX ADMIN — REGADENOSON 0.4 MG: 0.08 INJECTION, SOLUTION INTRAVENOUS at 10:00

## 2019-06-29 LAB
BH CV NUCLEAR PRIOR STUDY: 3
BH CV STRESS COMMENTS STAGE 1: NORMAL
BH CV STRESS DOSE REGADENOSON STAGE 1: 0.4
BH CV STRESS DURATION MIN STAGE 1: 0
BH CV STRESS DURATION SEC STAGE 1: 10
BH CV STRESS PROTOCOL 1: NORMAL
BH CV STRESS RECOVERY BP: NORMAL MMHG
BH CV STRESS RECOVERY HR: 70 BPM
BH CV STRESS STAGE 1: 1
LV EF NUC BP: 74 %
MAXIMAL PREDICTED HEART RATE: 138 BPM
PERCENT MAX PREDICTED HR: 57.25 %
STRESS BASELINE BP: NORMAL MMHG
STRESS BASELINE HR: 70 BPM
STRESS PERCENT HR: 67 %
STRESS POST PEAK BP: NORMAL MMHG
STRESS POST PEAK HR: 79 BPM
STRESS TARGET HR: 117 BPM

## 2019-07-01 ENCOUNTER — TELEPHONE (OUTPATIENT)
Dept: FAMILY MEDICINE CLINIC | Facility: CLINIC | Age: 84
End: 2019-07-01

## 2019-07-02 NOTE — TELEPHONE ENCOUNTER
I spoke with the patient's wife.  He is to stop the Lisinopril and decrease the Clonidine to 0.1mgm at night.  He is to see me to omid.

## 2019-07-03 ENCOUNTER — OFFICE VISIT (OUTPATIENT)
Dept: FAMILY MEDICINE CLINIC | Facility: CLINIC | Age: 84
End: 2019-07-03

## 2019-07-03 VITALS
BODY MASS INDEX: 25.3 KG/M2 | SYSTOLIC BLOOD PRESSURE: 137 MMHG | DIASTOLIC BLOOD PRESSURE: 65 MMHG | RESPIRATION RATE: 14 BRPM | WEIGHT: 161.2 LBS | TEMPERATURE: 97.6 F | OXYGEN SATURATION: 93 % | HEIGHT: 67 IN | HEART RATE: 70 BPM

## 2019-07-03 DIAGNOSIS — I25.5 ISCHEMIC CARDIOMYOPATHY: ICD-10-CM

## 2019-07-03 DIAGNOSIS — I42.2 HYPERTROPHIC NONOBSTRUCTIVE CARDIOMYOPATHY (HCC): ICD-10-CM

## 2019-07-03 DIAGNOSIS — I10 ESSENTIAL HYPERTENSION: Primary | ICD-10-CM

## 2019-07-03 DIAGNOSIS — N18.30 STAGE 3 CHRONIC KIDNEY DISEASE (HCC): ICD-10-CM

## 2019-07-03 DIAGNOSIS — E11.65 TYPE 2 DIABETES MELLITUS WITH HYPERGLYCEMIA, WITHOUT LONG-TERM CURRENT USE OF INSULIN (HCC): ICD-10-CM

## 2019-07-03 PROBLEM — N19 RENAL FAILURE: Status: RESOLVED | Noted: 2018-12-03 | Resolved: 2019-07-03

## 2019-07-03 PROBLEM — R07.9 CHEST PAIN: Status: RESOLVED | Noted: 2019-06-27 | Resolved: 2019-07-03

## 2019-07-03 PROCEDURE — 99213 OFFICE O/P EST LOW 20 MIN: CPT | Performed by: FAMILY MEDICINE

## 2019-07-03 NOTE — PATIENT INSTRUCTIONS
Continue current medications and treatment    Up in the office in 2 weeks.    Laboratory testing at that time including BUN/creatinine and potassium.

## 2019-07-03 NOTE — PROGRESS NOTES
"Subjective   Cali Santoro is a 83 y.o. male.     Chief Complaint   Patient presents with   • Shortness of Breath     HOSPITAL F/U       HPI  Chief complaint: Cardia myopathy hypertension chronic kidney disease    The patient is a 83-year-old white male comes in for follow-up and maintenance of his current problems which include    1.  Ischemic cardia myopathy-deteriorated-patient was recently hospitalized for shortness of breath.  Patient found to have volume excess.  Patient was diuresed.  With this the patient shortness of breath improved  And he was able to be discharged home.  The patient had chest pain orthopnea or PND.  The patient does complain of increased shortness of breath and weakness with minimal activity.    2. hypertension-.-Patient states that patient has episodes where his blood pressure is very low.  Patient had been on lisinopril 2.5 mg once a day which was stopped and he was on clonidine 20 mg a day which was decreased 0.1 mg daily yesterday.  Patient is on Lasix 40 mg daily and Coreg.  His blood pressure is better controlled since making these changes    3. patient has history of chronic kidney disease.  Patient's creatinines run slightly elevated.  Patient needs follow-up BUN and creatinine.    4.  Type 2 diabetes mellitus-stable-patient on Amaryl metformin and Januvia denied polydipsia polyphagia or polyuria        The following portions of the patient's history were reviewed and updated as appropriate: allergies, current medications, past family history, past medical history, past social history, past surgical history and problem list.    Review of Systems    Objective     /65 (BP Location: Left arm, Patient Position: Sitting, Cuff Size: Adult)   Pulse 70   Temp 97.6 °F (36.4 °C) (Oral)   Resp 14   Ht 170.2 cm (67\")   Wt 73.1 kg (161 lb 3.2 oz)   SpO2 93%   BMI 25.25 kg/m²     Physical Exam   Constitutional: He is oriented to person, place, and time. He appears well-developed " and well-nourished.   HENT:   Head: Normocephalic and atraumatic.   Eyes: Conjunctivae and EOM are normal. Pupils are equal, round, and reactive to light.   Neck: Normal range of motion. Neck supple.   Cardiovascular: Normal rate, regular rhythm, normal heart sounds and intact distal pulses.   Pulmonary/Chest: Effort normal and breath sounds normal.   Abdominal: Soft. Bowel sounds are normal.   Musculoskeletal: Normal range of motion.   Neurological: He is alert and oriented to person, place, and time.   Skin: Skin is warm and dry.   Psychiatric: He has a normal mood and affect. His behavior is normal.   Nursing note and vitals reviewed.        Assessment/Plan   Cali was seen today for shortness of breath.    Diagnoses and all orders for this visit:    Essential hypertension    Hypertrophic nonobstructive cardiomyopathy (CMS/HCC)  -     Comprehensive Metabolic Panel; Future  -     CBC & Differential; Future  -     Basic Metabolic Panel; Future    Type 2 diabetes mellitus with hyperglycemia, without long-term current use of insulin (CMS/HCC)    Ischemic cardiomyopathy    Stage 3 chronic kidney disease (CMS/HCC)      Patient Instructions   Continue current medications and treatment    Up in the office in 2 weeks.    Laboratory testing at that time including BUN/creatinine and potassium.      Yusef Sosa Jr., MD    07/03/19

## 2019-07-12 ENCOUNTER — OFFICE VISIT (OUTPATIENT)
Dept: FAMILY MEDICINE CLINIC | Facility: CLINIC | Age: 84
End: 2019-07-12

## 2019-07-12 VITALS
RESPIRATION RATE: 16 BRPM | SYSTOLIC BLOOD PRESSURE: 115 MMHG | TEMPERATURE: 98.1 F | OXYGEN SATURATION: 96 % | WEIGHT: 151.2 LBS | BODY MASS INDEX: 23.73 KG/M2 | DIASTOLIC BLOOD PRESSURE: 72 MMHG | HEIGHT: 67 IN | HEART RATE: 72 BPM

## 2019-07-12 DIAGNOSIS — R63.4 WEIGHT LOSS: ICD-10-CM

## 2019-07-12 DIAGNOSIS — R04.2 HEMOPTYSIS: Primary | ICD-10-CM

## 2019-07-12 DIAGNOSIS — R63.0 ANOREXIA: ICD-10-CM

## 2019-07-12 PROCEDURE — 99213 OFFICE O/P EST LOW 20 MIN: CPT | Performed by: FAMILY MEDICINE

## 2019-07-12 RX ORDER — MIRTAZAPINE 15 MG/1
15 TABLET, FILM COATED ORAL NIGHTLY
Qty: 30 TABLET | Refills: 2 | Status: SHIPPED | OUTPATIENT
Start: 2019-07-12 | End: 2019-08-21

## 2019-07-12 NOTE — PATIENT INSTRUCTIONS
Have the follow up labs and CT scan of the chest done and call for results.    Continue your current medications and treatment    Start taking the Remeron at night.    Follow up with Dr Hopson.

## 2019-07-12 NOTE — PROGRESS NOTES
"Subjective   Cali Santoro is a 83 y.o. male.     Chief Complaint   Patient presents with   • Coughing Up Blood     Says dark colored. Coughed blood x one week    • Cardiomyopathy      follow up    • Diabetes       HPI  Chief complain hemoptysis anorexia and weight loss    The patient is a 83-year-old white male who states that last week he had several days where he had hemoptysis.  The patient denied fever chills.  He denies shortness of breath.  Denies chest pain.  States that it has subsided.  Patient does have atrial fibrillation and is on.  Last INR was at goal    Patient also complains of lack of appetite and weight loss.  Patient lost about 25 pounds in the past 6 weeks.  He states he just does not have an appetite.  He denies fever chills.  He denies abdominal pain.  He states that nothing tastes good.      His other problems include atrial for relation chronic disease hypertension ischemic cardiopathy COPD sleep apnea type studies noticed chronic kidney disease.        The following portions of the patient's history were reviewed and updated as appropriate: allergies, current medications, past family history, past medical history, past social history, past surgical history and problem list.    Review of Systems    Objective     /72 (BP Location: Right arm, Patient Position: Sitting, Cuff Size: Adult)   Pulse 72   Temp 98.1 °F (36.7 °C) (Oral)   Resp 16   Ht 170.2 cm (67\")   Wt 68.6 kg (151 lb 3.2 oz)   SpO2 96%   BMI 23.68 kg/m²     Physical Exam   Constitutional: He is oriented to person, place, and time. He appears well-developed and well-nourished.   HENT:   Head: Normocephalic and atraumatic.   Eyes: Conjunctivae and EOM are normal. Pupils are equal, round, and reactive to light.   Neck: Normal range of motion. Neck supple.   Cardiovascular: Normal rate, normal heart sounds and intact distal pulses. An irregularly irregular rhythm present.   Pulmonary/Chest: Effort normal and breath sounds " normal.   Abdominal: Soft. Bowel sounds are normal.   Musculoskeletal: Normal range of motion.   Neurological: He is alert and oriented to person, place, and time.   Skin: Skin is warm and dry.   Psychiatric: He has a normal mood and affect. His behavior is normal.   Nursing note and vitals reviewed.        Assessment/Plan   Cali was seen today for coughing up blood, cardiomyopathy and diabetes.    Diagnoses and all orders for this visit:    Hemoptysis  -     CT Chest Without Contrast; Future    Anorexia    Weight loss  -     Comprehensive Metabolic Panel; Future  -     CBC & Differential; Future  -     Protime-INR; Future  -     Digoxin level; Future  -     CT Chest Without Contrast; Future  -     External Prothrombin Time; Future  -     Urinalysis With / Culture If Indicated -; Future    Other orders  -     mirtazapine (REMERON) 15 MG tablet; Take 1 tablet by mouth Every Night.    The patient and his wife were lysed that I was unable to find an obvious reason for his anorexia and weight loss.  Patient is to have testing done.    Patient Instructions   Have the follow up labs and CT scan of the chest done and call for results.    Continue your current medications and treatment    Start taking the Remeron at night.    Follow up with Dr Hopson.          Yusef Sosa Jr., MD    07/12/19

## 2019-07-13 ENCOUNTER — LAB (OUTPATIENT)
Dept: LAB | Facility: HOSPITAL | Age: 84
End: 2019-07-13

## 2019-07-13 DIAGNOSIS — R63.4 WEIGHT LOSS: ICD-10-CM

## 2019-07-13 DIAGNOSIS — I42.2 HYPERTROPHIC NONOBSTRUCTIVE CARDIOMYOPATHY (HCC): ICD-10-CM

## 2019-07-13 LAB
ALBUMIN SERPL-MCNC: 3.9 G/DL (ref 3.5–4.8)
ALBUMIN/GLOB SERPL: 1.1 G/DL (ref 1–1.7)
ALP SERPL-CCNC: 67 U/L (ref 32–91)
ALT SERPL W P-5'-P-CCNC: 10 U/L (ref 17–63)
ANION GAP SERPL CALCULATED.3IONS-SCNC: 17.8 MMOL/L (ref 5–15)
AST SERPL-CCNC: 13 U/L (ref 15–41)
BASOPHILS # BLD AUTO: 0.1 10*3/MM3 (ref 0–0.2)
BASOPHILS NFR BLD AUTO: 0.7 % (ref 0–1.5)
BILIRUB SERPL-MCNC: 0.9 MG/DL (ref 0.3–1.2)
BUN BLD-MCNC: 26 MG/DL (ref 8–20)
BUN/CREAT SERPL: 15.3 (ref 6.2–20.3)
CALCIUM SPEC-SCNC: 9.9 MG/DL (ref 8.9–10.3)
CHLORIDE SERPL-SCNC: 98 MMOL/L (ref 101–111)
CO2 SERPL-SCNC: 27 MMOL/L (ref 22–32)
CREAT BLD-MCNC: 1.7 MG/DL (ref 0.7–1.2)
DEPRECATED RDW RBC AUTO: 52.1 FL (ref 37–54)
EOSINOPHIL # BLD AUTO: 0.2 10*3/MM3 (ref 0–0.4)
EOSINOPHIL NFR BLD AUTO: 1.3 % (ref 0.3–6.2)
ERYTHROCYTE [DISTWIDTH] IN BLOOD BY AUTOMATED COUNT: 15.1 % (ref 12.3–15.4)
GFR SERPL CREATININE-BSD FRML MDRD: 39 ML/MIN/1.73
GLOBULIN UR ELPH-MCNC: 3.4 GM/DL (ref 2.5–3.8)
GLUCOSE BLD-MCNC: 194 MG/DL (ref 65–99)
HCT VFR BLD AUTO: 40.8 % (ref 37.5–51)
HGB BLD-MCNC: 13.7 G/DL (ref 13–17.7)
LYMPHOCYTES # BLD AUTO: 1.8 10*3/MM3 (ref 0.7–3.1)
LYMPHOCYTES NFR BLD AUTO: 14 % (ref 19.6–45.3)
MCH RBC QN AUTO: 32.2 PG (ref 26.6–33)
MCHC RBC AUTO-ENTMCNC: 33.4 G/DL (ref 31.5–35.7)
MCV RBC AUTO: 96.3 FL (ref 79–97)
MONOCYTES # BLD AUTO: 1.1 10*3/MM3 (ref 0.1–0.9)
MONOCYTES NFR BLD AUTO: 8.6 % (ref 5–12)
NEUTROPHILS # BLD AUTO: 9.5 10*3/MM3 (ref 1.7–7)
NEUTROPHILS NFR BLD AUTO: 75.4 % (ref 42.7–76)
NRBC BLD AUTO-RTO: 0.1 /100 WBC (ref 0–0.2)
PLATELET # BLD AUTO: 339 10*3/MM3 (ref 140–450)
PMV BLD AUTO: 7.7 FL (ref 6–12)
POTASSIUM BLD-SCNC: 4.8 MMOL/L (ref 3.6–5.1)
PROT SERPL-MCNC: 7.3 G/DL (ref 6.1–7.9)
RBC # BLD AUTO: 4.24 10*6/MM3 (ref 4.14–5.8)
SODIUM BLD-SCNC: 138 MMOL/L (ref 136–144)
WBC NRBC COR # BLD: 12.6 10*3/MM3 (ref 3.4–10.8)

## 2019-07-13 PROCEDURE — 85025 COMPLETE CBC W/AUTO DIFF WBC: CPT

## 2019-07-13 PROCEDURE — 36415 COLL VENOUS BLD VENIPUNCTURE: CPT

## 2019-07-13 PROCEDURE — 80053 COMPREHEN METABOLIC PANEL: CPT

## 2019-07-15 RX ORDER — WARFARIN SODIUM 3 MG/1
TABLET ORAL
Qty: 34 TABLET | Refills: 1 | Status: SHIPPED | OUTPATIENT
Start: 2019-07-15 | End: 2019-09-14 | Stop reason: SDUPTHER

## 2019-07-16 ENCOUNTER — TELEPHONE (OUTPATIENT)
Dept: FAMILY MEDICINE CLINIC | Facility: CLINIC | Age: 84
End: 2019-07-16

## 2019-07-16 NOTE — TELEPHONE ENCOUNTER
PA for CT Chest was submitted via [employer]-Approved [id number] at [facility] from 7/16/19-08/15/19. I called and LVM for patient about the auth and he could call and schedule with [facility]. I faxed the referral with authorization information to [facility]

## 2019-07-19 ENCOUNTER — APPOINTMENT (OUTPATIENT)
Dept: CT IMAGING | Facility: HOSPITAL | Age: 84
End: 2019-07-19

## 2019-07-22 ENCOUNTER — ANTICOAGULATION VISIT (OUTPATIENT)
Dept: CARDIOLOGY | Facility: CLINIC | Age: 84
End: 2019-07-22

## 2019-07-22 VITALS
DIASTOLIC BLOOD PRESSURE: 83 MMHG | WEIGHT: 158.25 LBS | HEART RATE: 70 BPM | BODY MASS INDEX: 24.79 KG/M2 | SYSTOLIC BLOOD PRESSURE: 128 MMHG

## 2019-07-22 DIAGNOSIS — Z79.01 LONG TERM (CURRENT) USE OF ANTICOAGULANTS: ICD-10-CM

## 2019-07-22 DIAGNOSIS — I48.20 CHRONIC ATRIAL FIBRILLATION (HCC): ICD-10-CM

## 2019-07-22 LAB — INR PPP: 2.4 (ref 2–3)

## 2019-07-22 PROCEDURE — 85610 PROTHROMBIN TIME: CPT | Performed by: INTERNAL MEDICINE

## 2019-07-22 PROCEDURE — 36416 COLLJ CAPILLARY BLOOD SPEC: CPT | Performed by: INTERNAL MEDICINE

## 2019-07-25 DIAGNOSIS — R63.4 WEIGHT LOSS: ICD-10-CM

## 2019-07-25 DIAGNOSIS — R04.2 HEMOPTYSIS: ICD-10-CM

## 2019-07-30 ENCOUNTER — TELEPHONE (OUTPATIENT)
Dept: CARDIOLOGY | Facility: CLINIC | Age: 84
End: 2019-07-30

## 2019-07-30 NOTE — PROGRESS NOTES
"    Subjective:     Encounter Date:07/31/2019      Patient ID: Cali Santoro is a 83 y.o. male.    Chief Complaint: Abnormal Chest CT  History of Present Illness  83-year-old white male patient with known history of paroxysmal atrial fibrillation CAD sick sinus node syndrome status post biventricular pacemaker hypertension dyslipidemia diabetes COPD comes back for follow-up     Last cardiac catheterization showed LAD 40% disease mild to moderate diffuse in-stent restenosis to 50% and also in the midportion 60 to 70% left main 40% RCA up to 40%  Patient does have mild chronic renal insufficiency  Echocardiogram May 2019 EF 60% concentric LVH biatrial enlargement mild aortic stenosis    Recently patient had significant cough followed by small amounts of hemoptysis  CT scan of the chest showed small to moderate pericardial effusion  Patient denies of any active symptoms today I advised repeat echocardiogram for pericardial effusion  Follow-up with echo results    Past Medical History:   Diagnosis Date   • Arrhythmia    • Bradycardia    • Cardiomyopathy (CMS/HCC)    • Cataract    • COPD (chronic obstructive pulmonary disease) (CMS/HCC)    • Coronary artery disease    • Diabetes mellitus, type 2 (CMS/HCC)    • Emphysema, unspecified (CMS/HCC)    • Hypertension    • Sleep apnea    • Ventricular arrhythmia      Past Surgical History:   Procedure Laterality Date   • CARDIAC CATHETERIZATION  02/04/2015   • CARDIAC CATHETERIZATION  04/25/2016   • CAROTID ENDARTERECTOMY     • CORONARY ANGIOPLASTY  1997   • LARYNGOSCOPY     • LUNG SURGERY  2008    Lung resection   • PACEMAKER IMPLANTATION  04/25/2016    Dual chamber; [employer]   • TRACHEOSTOMY       /78 (BP Location: Right arm, Patient Position: Sitting, Cuff Size: Adult)   Pulse 74   Ht 170.2 cm (67\")   Wt 73.8 kg (162 lb 12.8 oz)   SpO2 96%   BMI 25.50 kg/m²   Family History   Problem Relation Age of Onset   • Heart disease Mother    • Hypertension Mother  "   • Stroke Father    • Breast cancer Sister    • Colon cancer Sister    • Heart disease Brother    • Diabetes Other        Current Outpatient Medications:   •  aspirin 81 MG tablet, Take 81 mg by mouth Daily., Disp: , Rfl:   •  calcitriol (ROCALTROL) 0.25 MCG capsule, Take 0.25 mcg by mouth 3 (Three) Times a Week. Mon, Wed, Fri, Disp: , Rfl:   •  carvedilol (COREG) 12.5 MG tablet, Take 12.5 mg by mouth 2 (Two) Times a Day. Take 1 tablet in the am and 1/2 tablet in the pm, Disp: , Rfl:   •  CloNIDine (CATAPRES) 0.2 MG tablet, Take 0.1 mg by mouth Every Night., Disp: , Rfl:   •  digoxin (LANOXIN) 125 MCG tablet, Take 125 mcg by mouth Daily., Disp: , Rfl:   •  furosemide (LASIX) 40 MG tablet, Take 1 tablet by mouth Daily., Disp: 30 tablet, Rfl: 2  •  glimepiride (AMARYL) 2 MG tablet, Take 2 mg by mouth 2 (Two) Times a Day., Disp: , Rfl:   •  mirtazapine (REMERON) 15 MG tablet, Take 1 tablet by mouth Every Night., Disp: 30 tablet, Rfl: 2  •  simvastatin (ZOCOR) 20 MG tablet, Take 20 mg by mouth every night at bedtime., Disp: , Rfl:   •  SITagliptin-metFORMIN HCl ER (JANUMET XR)  MG tablet, Take 1 tablet by mouth Daily., Disp: , Rfl:   •  theophylline (THEODUR) 300 MG 12 hr tablet, Take 300 mg by mouth 2 (Two) Times a Day., Disp: , Rfl:   •  warfarin (COUMADIN) 3 MG tablet, Take 1 and 1/2 tablets on Monday and Friday and take 1 tablet the other 5 days a week., Disp: 34 tablet, Rfl: 1  Social History     Socioeconomic History   • Marital status:      Spouse name: Not on file   • Number of children: Not on file   • Years of education: Not on file   • Highest education level: Not on file   Occupational History   • Occupation: Retired   Social Needs   • Financial resource strain: Patient refused   • Food insecurity:     Worry: Patient refused     Inability: Patient refused   • Transportation needs:     Medical: Patient refused     Non-medical: Patient refused   Tobacco Use   • Smoking status: Former Smoker      Types: Cigarettes   • Smokeless tobacco: Never Used   Substance and Sexual Activity   • Alcohol use: Yes   • Drug use: No   • Sexual activity: Defer     Allergies   Allergen Reactions   • Penicillins Itching     Review of Systems   Constitution: Negative for fever and malaise/fatigue.   HENT: Negative for congestion and hearing loss.    Eyes: Negative for double vision and visual disturbance.   Cardiovascular: Negative for chest pain, claudication, dyspnea on exertion, leg swelling and syncope.   Respiratory: Negative for cough and shortness of breath.    Endocrine: Negative for cold intolerance.   Skin: Negative for color change and rash.   Musculoskeletal: Negative for arthritis and joint pain.   Gastrointestinal: Negative for abdominal pain and heartburn.   Genitourinary: Negative for hematuria.   Neurological: Negative for excessive daytime sleepiness and dizziness.   Psychiatric/Behavioral: Negative for depression. The patient is not nervous/anxious.    All other systems reviewed and are negative.             Objective:     Physical Exam   Constitutional: He is oriented to person, place, and time. He appears well-developed and well-nourished. He is cooperative.   HENT:   Head: Normocephalic and atraumatic.   Mouth/Throat: Uvula is midline and oropharynx is clear and moist. No oral lesions.   Eyes: Conjunctivae are normal. No scleral icterus.   Neck: Trachea normal. Neck supple. No JVD present. Carotid bruit is not present. No thyromegaly present.   Cardiovascular: Normal rate, regular rhythm, S1 normal, S2 normal, normal heart sounds, intact distal pulses and normal pulses. PMI is not displaced. Exam reveals no gallop and no friction rub.   No murmur heard.  Pulmonary/Chest: Effort normal and breath sounds normal.   Abdominal: Soft. Bowel sounds are normal.   Musculoskeletal: Normal range of motion.   Neurological: He is alert and oriented to person, place, and time. He has normal strength.   No focal deficits    Skin: Skin is warm. No cyanosis.   Psychiatric: He has a normal mood and affect.       Procedures    Lab Review:       Assessment:          Diagnosis Plan   1. Chronic atrial fibrillation (CMS/HCC)  Adult Transthoracic Echo Complete W/ Cont if Necessary Per Protocol   2. Coronary artery disease involving native coronary artery of native heart without angina pectoris  Adult Transthoracic Echo Complete W/ Cont if Necessary Per Protocol   3. Essential hypertension  Adult Transthoracic Echo Complete W/ Cont if Necessary Per Protocol   4. Presence of cardiac pacemaker  Adult Transthoracic Echo Complete W/ Cont if Necessary Per Protocol   5. Ischemic cardiomyopathy  Adult Transthoracic Echo Complete W/ Cont if Necessary Per Protocol   6. Stage 3 chronic kidney disease (CMS/HCC)  Adult Transthoracic Echo Complete W/ Cont if Necessary Per Protocol   7. Pericardial effusion  Adult Transthoracic Echo Complete W/ Cont if Necessary Per Protocol          Plan:       Chronic atrial fibrillation on anticoagulation  History of CAD stable without any active symptoms  Hypertension stable  Pacemaker functioning well  History of ischemic cardiomyopathy now having some problems with pericardial effusion will have a follow-up echocardiogram  Chronic renal insufficiency follow-up with PCP regularly

## 2019-07-30 NOTE — TELEPHONE ENCOUNTER
PATIENT HAD CT OF CHEST DONE AT [facility]. REPORT IS SCANNED INTO MEDIA TAB IN CHART. PATIENTS WIFE CALLED AND SAID DR BRAVO CALLED AND INFORMED THEM THERE WAS FLUID AROUND HIS HEART. IF IMMEDIATE ACTION IS NOT TAKEN, DR BRAVO IS GOING TO ADMIT HIM. PLEASE ADVISE. I WILL NOTIFY CRISTIAN AT [facility] TO PRINT OUT REPORT AND HAVE DEV REVIEW. PATIENT NUMBER# [phone]

## 2019-07-31 ENCOUNTER — OFFICE VISIT (OUTPATIENT)
Dept: CARDIOLOGY | Facility: CLINIC | Age: 84
End: 2019-07-31

## 2019-07-31 VITALS
OXYGEN SATURATION: 96 % | HEIGHT: 67 IN | WEIGHT: 162.8 LBS | SYSTOLIC BLOOD PRESSURE: 133 MMHG | DIASTOLIC BLOOD PRESSURE: 78 MMHG | BODY MASS INDEX: 25.55 KG/M2 | HEART RATE: 74 BPM

## 2019-07-31 DIAGNOSIS — I25.5 ISCHEMIC CARDIOMYOPATHY: ICD-10-CM

## 2019-07-31 DIAGNOSIS — I10 ESSENTIAL HYPERTENSION: ICD-10-CM

## 2019-07-31 DIAGNOSIS — I25.10 CORONARY ARTERY DISEASE INVOLVING NATIVE CORONARY ARTERY OF NATIVE HEART WITHOUT ANGINA PECTORIS: ICD-10-CM

## 2019-07-31 DIAGNOSIS — N18.30 STAGE 3 CHRONIC KIDNEY DISEASE (HCC): ICD-10-CM

## 2019-07-31 DIAGNOSIS — I48.20 CHRONIC ATRIAL FIBRILLATION (HCC): Primary | ICD-10-CM

## 2019-07-31 DIAGNOSIS — Z95.0 PRESENCE OF CARDIAC PACEMAKER: ICD-10-CM

## 2019-07-31 DIAGNOSIS — I31.39 PERICARDIAL EFFUSION: ICD-10-CM

## 2019-07-31 PROCEDURE — 99214 OFFICE O/P EST MOD 30 MIN: CPT | Performed by: INTERNAL MEDICINE

## 2019-08-09 ENCOUNTER — HOSPITAL ENCOUNTER (OUTPATIENT)
Dept: CARDIOLOGY | Facility: HOSPITAL | Age: 84
Discharge: HOME | End: 2019-08-09
Admitting: INTERNAL MEDICINE

## 2019-08-09 VITALS
HEIGHT: 67 IN | WEIGHT: 160 LBS | BODY MASS INDEX: 25.11 KG/M2 | SYSTOLIC BLOOD PRESSURE: 129 MMHG | DIASTOLIC BLOOD PRESSURE: 75 MMHG

## 2019-08-09 DIAGNOSIS — I48.20 CHRONIC ATRIAL FIBRILLATION (HCC): ICD-10-CM

## 2019-08-09 DIAGNOSIS — Z95.0 PRESENCE OF CARDIAC PACEMAKER: ICD-10-CM

## 2019-08-09 DIAGNOSIS — I10 ESSENTIAL HYPERTENSION: ICD-10-CM

## 2019-08-09 DIAGNOSIS — N18.30 STAGE 3 CHRONIC KIDNEY DISEASE (HCC): ICD-10-CM

## 2019-08-09 DIAGNOSIS — I25.5 ISCHEMIC CARDIOMYOPATHY: ICD-10-CM

## 2019-08-09 DIAGNOSIS — I25.10 CORONARY ARTERY DISEASE INVOLVING NATIVE CORONARY ARTERY OF NATIVE HEART WITHOUT ANGINA PECTORIS: ICD-10-CM

## 2019-08-09 DIAGNOSIS — I31.39 PERICARDIAL EFFUSION: ICD-10-CM

## 2019-08-09 LAB
BH CV ECHO MEAS - AO MAX PG (FULL): 9.1 MMHG
BH CV ECHO MEAS - AO MAX PG: 12.1 MMHG
BH CV ECHO MEAS - AO MEAN PG (FULL): 4.8 MMHG
BH CV ECHO MEAS - AO MEAN PG: 6.5 MMHG
BH CV ECHO MEAS - AO ROOT AREA: 7.9 CM^2
BH CV ECHO MEAS - AO ROOT DIAM: 3.2 CM
BH CV ECHO MEAS - AO V2 MAX: 173.6 CM/SEC
BH CV ECHO MEAS - AO V2 MEAN: 122.2 CM/SEC
BH CV ECHO MEAS - AO V2 VTI: 31.6 CM
BH CV ECHO MEAS - ASC AORTA: 3.2 CM
BH CV ECHO MEAS - AVA(I,A): 1.8 CM^2
BH CV ECHO MEAS - AVA(I,D): 1.8 CM^2
BH CV ECHO MEAS - AVA(V,A): 1.7 CM^2
BH CV ECHO MEAS - AVA(V,D): 1.7 CM^2
BH CV ECHO MEAS - EDV(CUBED): 68.3 ML
BH CV ECHO MEAS - EDV(MOD-SP2): 53 ML
BH CV ECHO MEAS - EDV(MOD-SP4): 62.8 ML
BH CV ECHO MEAS - EDV(TEICH): 73.7 ML
BH CV ECHO MEAS - EF(CUBED): 86.9 %
BH CV ECHO MEAS - EF(MOD-SP2): 65.4 %
BH CV ECHO MEAS - EF(MOD-SP4): 86.7 %
BH CV ECHO MEAS - EF(TEICH): 81 %
BH CV ECHO MEAS - ESV(CUBED): 9 ML
BH CV ECHO MEAS - ESV(MOD-SP2): 18.4 ML
BH CV ECHO MEAS - ESV(MOD-SP4): 8.4 ML
BH CV ECHO MEAS - ESV(TEICH): 14 ML
BH CV ECHO MEAS - FS: 49.2 %
BH CV ECHO MEAS - IVS/LVPW: 1.1
BH CV ECHO MEAS - IVSD: 1.5 CM
BH CV ECHO MEAS - LA DIMENSION: 4.6 CM
BH CV ECHO MEAS - LA/AO: 1.5
BH CV ECHO MEAS - LV IVRT: 0.1 SEC
BH CV ECHO MEAS - LV MASS(C)D: 219 GRAMS
BH CV ECHO MEAS - LV MAX PG: 3 MMHG
BH CV ECHO MEAS - LV MEAN PG: 1.7 MMHG
BH CV ECHO MEAS - LV V1 MAX: 86.3 CM/SEC
BH CV ECHO MEAS - LV V1 MEAN: 62.7 CM/SEC
BH CV ECHO MEAS - LV V1 VTI: 17.1 CM
BH CV ECHO MEAS - LVIDD: 4.1 CM
BH CV ECHO MEAS - LVIDS: 2.1 CM
BH CV ECHO MEAS - LVOT AREA: 3.4 CM^2
BH CV ECHO MEAS - LVOT DIAM: 2.1 CM
BH CV ECHO MEAS - LVPWD: 1.4 CM
BH CV ECHO MEAS - MV A MAX VEL: 27.6 CM/SEC
BH CV ECHO MEAS - MV DEC SLOPE: 797.8 CM/SEC^2
BH CV ECHO MEAS - MV DEC TIME: 0.14 SEC
BH CV ECHO MEAS - MV E MAX VEL: 111.7 CM/SEC
BH CV ECHO MEAS - MV E/A: 4
BH CV ECHO MEAS - MV MAX PG: 6.8 MMHG
BH CV ECHO MEAS - MV MEAN PG: 2.1 MMHG
BH CV ECHO MEAS - MV V2 MAX: 130.3 CM/SEC
BH CV ECHO MEAS - MV V2 MEAN: 63.7 CM/SEC
BH CV ECHO MEAS - MV V2 VTI: 29.5 CM
BH CV ECHO MEAS - MVA(VTI): 1.9 CM^2
BH CV ECHO MEAS - PA ACC TIME: 0.12 SEC
BH CV ECHO MEAS - PA MAX PG (FULL): 0.48 MMHG
BH CV ECHO MEAS - PA MAX PG: 2 MMHG
BH CV ECHO MEAS - PA PR(ACCEL): 25 MMHG
BH CV ECHO MEAS - PA V2 MAX: 71.4 CM/SEC
BH CV ECHO MEAS - PVA(V,A): 4.9 CM^2
BH CV ECHO MEAS - PVA(V,D): 4.9 CM^2
BH CV ECHO MEAS - QP/QS: 0.99
BH CV ECHO MEAS - RAP SYSTOLE: 3 MMHG
BH CV ECHO MEAS - RV MAX PG: 1.6 MMHG
BH CV ECHO MEAS - RV MEAN PG: 0.73 MMHG
BH CV ECHO MEAS - RV V1 MAX: 62.4 CM/SEC
BH CV ECHO MEAS - RV V1 MEAN: 40.1 CM/SEC
BH CV ECHO MEAS - RV V1 VTI: 10.1 CM
BH CV ECHO MEAS - RVDD: 3.4 CM
BH CV ECHO MEAS - RVOT AREA: 5.7 CM^2
BH CV ECHO MEAS - RVOT DIAM: 2.7 CM
BH CV ECHO MEAS - RVSP: 17.4 MMHG
BH CV ECHO MEAS - SV(AO): 248.2 ML
BH CV ECHO MEAS - SV(CUBED): 59.3 ML
BH CV ECHO MEAS - SV(LVOT): 57.5 ML
BH CV ECHO MEAS - SV(MOD-SP2): 34.6 ML
BH CV ECHO MEAS - SV(MOD-SP4): 54.5 ML
BH CV ECHO MEAS - SV(RVOT): 57.1 ML
BH CV ECHO MEAS - SV(TEICH): 59.7 ML
BH CV ECHO MEAS - TR MAX VEL: 189.6 CM/SEC
LV EF 2D ECHO EST: 60 %
MAXIMAL PREDICTED HEART RATE: 138 BPM
STRESS TARGET HR: 117 BPM

## 2019-08-09 PROCEDURE — 0399T ADULT TRANSTHORACIC ECHO COMPLETE W/ CONT IF NECESSARY PER PROTOCOL: CPT | Performed by: INTERNAL MEDICINE

## 2019-08-09 PROCEDURE — 93306 TTE W/DOPPLER COMPLETE: CPT

## 2019-08-09 PROCEDURE — 0399T HC MYOCARDL STRAIN IMAG QUAN ASSMT PER SESS: CPT

## 2019-08-09 PROCEDURE — 93306 TTE W/DOPPLER COMPLETE: CPT | Performed by: INTERNAL MEDICINE

## 2019-08-16 ENCOUNTER — PREP FOR SURGERY (OUTPATIENT)
Dept: OTHER | Facility: HOSPITAL | Age: 84
End: 2019-08-16

## 2019-08-16 ENCOUNTER — OFFICE VISIT (OUTPATIENT)
Dept: CARDIOLOGY | Facility: CLINIC | Age: 84
End: 2019-08-16

## 2019-08-16 VITALS
DIASTOLIC BLOOD PRESSURE: 70 MMHG | HEART RATE: 70 BPM | OXYGEN SATURATION: 98 % | WEIGHT: 167 LBS | SYSTOLIC BLOOD PRESSURE: 126 MMHG | BODY MASS INDEX: 26.16 KG/M2

## 2019-08-16 DIAGNOSIS — I42.0 DILATED CARDIOMYOPATHY (HCC): ICD-10-CM

## 2019-08-16 DIAGNOSIS — I31.39 PERICARDIAL EFFUSION: ICD-10-CM

## 2019-08-16 DIAGNOSIS — I10 ESSENTIAL HYPERTENSION: ICD-10-CM

## 2019-08-16 DIAGNOSIS — R04.2 COUGH WITH HEMOPTYSIS: ICD-10-CM

## 2019-08-16 DIAGNOSIS — I48.0 PAROXYSMAL ATRIAL FIBRILLATION (HCC): Primary | ICD-10-CM

## 2019-08-16 PROCEDURE — 99214 OFFICE O/P EST MOD 30 MIN: CPT | Performed by: INTERNAL MEDICINE

## 2019-08-16 NOTE — PROGRESS NOTES
History of Present Illness:  Sub--83-year-old male patient with coronary artery disease with a prior cardiac catheterization showing left and descending artery with 40% stenosis and mild-to-moderate diffuse InStent restenoses up to 50% and a mid vessel 60-70% stenosis, left main has distal 40% RCA  has up to 40% proximally coronary stenosis underwent biventricular pacemaker several months ago and comes in for follow-up  Since pacemaker implantation patient functional class is a nearly improved to class 1 without any symptoms of any congestive heart failure and edema feet or syncope and occasionally has mild orthostatic symptoms.  Patient had recent hemoptysis on anticoagulation  The patient does have paroxysmal atrial fibrillation atrial flutter from which is asymptomatic and also PVCs from which he is asymptomatic  Patient has diabetes, coronary disease, hypertension, hyperlipidemia  Patient does have significant skin bruising with warfarin intake   chads Vasc score--6  HASBLED---4    Assessment plan    Biventricular pacemaker in situ   Hypertension well controlled  Patient's functional class is class 1  Coronary artery disease stable  Asymptomatic atrial arrhythmias and patient on Coumadin  Asymptomatic PVCs and no further intervention needed  Symptoms of orthostasis    significant bruising from long-term warfarin intake--recent hemoptysis--patient educated about possible watchman device implantation to avoid long-term anticoagulation and scheduled for pulmonary DOMINIQUE with Dr. Loza to evaluate left atrial appendage anatomy and follow-up in 4 weeks            Vital Signs: Blood pressure 126/70 pulse rate 70 patient is afebrile respiration 12 times per minute      Medications: Medications were reviewed with the patient during this visit.  Allergies: Allergies were reviewed with the patient during this visit.      Current Allergies (reviewed today):  PCN (Critical)      Past Medical History:     Reviewed history from  05/04/2016 and no changes required:        Coronary Artery Disease        Diabetes, Type 2        Hypertension        C O P D        emphysema        Sleep Apnea        Cataract        Ventricular Arrhythmia        Bradycardia        Cardiomyopathy    Past Surgical History:     Reviewed history from 05/04/2016 and no changes required:        Lung resection 2008        Carotid Endarterectomy:  (1900)        Tracheostomy 1900        laryngoscopy 1900        P T C A: (1997)        Heart Catherization (02/04/2015)        Heart Catherization (04/25/2016)        Pacemaker: Dual chamber: [employer]  (04/25/2016)        Social History:     Reviewed history from 05/21/2018 and no changes required:        Patient is a former smoker.        Passive Smoke: N        Alcohol Use: Y        Drug Use: N        HIV/High Risk: N        Regular Exercise: N                Review of Systems   General: No fatigue or tiredness, No change in weight   Eyes: No redness  Cardiovascular: No chest pain, no palpitations  Respiratory: No shortness of breath  Gastrointestinal: No nausea or vomiting, bleeding  Genitourinary: no hematuria or dysuria  Musculoskeletal: No arthralgia or myalgia  Skin: No rash  Neurologic: No numbness, tingling, syncope  Hematologic/Lymphatic: No abnormal bleeding      Physical Exam    General:      well developed, well nourished, in no acute distress.    Head:      normocephalic and atraumatic.    Eyes:      PERRL/EOM intact, conjunctiva and sclera clear with out nystagmus.    Neck:      no masses, thyromegaly, trachea central with normal respiratory effort  Lungs:      clear bilaterally to auscultation.    Heart:      non-displaced PMI, chest non-tender; regular rate and rhythm, S1, S2 without murmurs, rubs, or gallops  Msk:      kyphosis.    Pulses:      pulses normal in all 4 extremities.    Extremities:      no pedal edema.   no cyanosis or clubbing  Neurologic:      no focal deficits, alert and oriented  x3  Skin:       subcutaneous  bruising noted  Psych:      alert and cooperative; normal mood and affect; normal attention span and concentration.

## 2019-08-20 NOTE — PROGRESS NOTES
Subjective:     Encounter Date:08/21/2019      Patient ID: Cali Santoro is a 83 y.o. male.    Chief Complaint:  Afib/ discuss watchman  History of Present Illness  83-year-old white male patient with known history of paroxysmal atrial fibrillation CAD sick sinus node syndrome status post biventricular pacemaker hypertension dyslipidemia diabetes COPD comes back for follow-up     Last cardiac catheterization showed LAD 40% disease mild to moderate diffuse in-stent restenosis to 50% and also in the midportion 60 to 70% left main 40% RCA up to 40%  Patient does have mild chronic renal insufficiency  Echocardiogram May 2019 EF 60% concentric LVH biatrial enlargement mild aortic stenosis     Recently patient had significant cough followed by small amounts of hemoptysis  CT scan of the chest showed small to moderate pericardial effusion  Patient denies of any active symptoms   July 2019 echocardiogram showed mild to moderate concentric LVH with normal LVEF of 60% RV size is mildly enlarged left atrium is enlarged mild aortic insufficiency and mitral insufficiency there is moderate pericardial effusion noted some hypokinesis of the basal anterolateral and inferolateral inferoseptal defects noted  Patient was seen by electrophysiology recently and was given the option of watchman device due to recent hemoptysis  I discussed with the patient and family about all the risks and benefits we will schedule a transitional echocardiogram to evaluate the left atrial appendage    Past Medical History:   Diagnosis Date   • Arrhythmia    • Bradycardia    • Cardiomyopathy (CMS/HCC)    • Cataract    • COPD (chronic obstructive pulmonary disease) (CMS/HCC)    • Coronary artery disease    • Diabetes mellitus, type 2 (CMS/HCC)    • Emphysema, unspecified (CMS/HCC)    • Hx of sick sinus syndrome     S/P Biventricular Pacemaker   • Hypertension    • Paroxysmal atrial fibrillation (CMS/HCC)    • Sleep apnea    • Ventricular arrhythmia   "    Past Surgical History:   Procedure Laterality Date   • CARDIAC CATHETERIZATION  02/04/2015   • CARDIAC CATHETERIZATION  04/25/2016   • CAROTID ENDARTERECTOMY     • CORONARY ANGIOPLASTY  1997   • LARYNGOSCOPY     • LUNG SURGERY  2008    Lung resection   • PACEMAKER IMPLANTATION  04/25/2016    Dual chamber; [employer]   • TRACHEOSTOMY       /73 (BP Location: Left arm, Patient Position: Sitting, Cuff Size: Adult)   Pulse 70   Ht 170.2 cm (67\")   Wt 74.8 kg (165 lb)   SpO2 98%   BMI 25.84 kg/m²   Family History   Problem Relation Age of Onset   • Heart disease Mother    • Hypertension Mother    • Stroke Father    • Breast cancer Sister    • Colon cancer Sister    • Heart disease Brother    • Diabetes Other        Current Outpatient Medications:   •  aspirin 81 MG tablet, Take 81 mg by mouth Daily., Disp: , Rfl:   •  calcitriol (ROCALTROL) 0.25 MCG capsule, Take 0.25 mcg by mouth 3 (Three) Times a Week. Mon, Wed, Fri, Disp: , Rfl:   •  carvedilol (COREG) 12.5 MG tablet, Take 12.5 mg by mouth 2 (Two) Times a Day. Take 1 tablet in the am and 1/2 tablet in the pm, Disp: , Rfl:   •  CloNIDine (CATAPRES) 0.2 MG tablet, Take 0.1 mg by mouth Every Night., Disp: , Rfl:   •  digoxin (LANOXIN) 125 MCG tablet, Take 125 mcg by mouth Daily., Disp: , Rfl:   •  furosemide (LASIX) 40 MG tablet, Take 1 tablet by mouth Daily., Disp: 30 tablet, Rfl: 2  •  glimepiride (AMARYL) 2 MG tablet, Take 2 mg by mouth 2 (Two) Times a Day., Disp: , Rfl:   •  simvastatin (ZOCOR) 20 MG tablet, Take 20 mg by mouth every night at bedtime., Disp: , Rfl:   •  SITagliptin-metFORMIN HCl ER (JANUMET XR)  MG tablet, Take 1 tablet by mouth Daily., Disp: , Rfl:   •  theophylline (THEODUR) 300 MG 12 hr tablet, Take 300 mg by mouth 2 (Two) Times a Day., Disp: , Rfl:   •  warfarin (COUMADIN) 3 MG tablet, Take 1 and 1/2 tablets on Monday and Friday and take 1 tablet the other 5 days a week., Disp: 34 tablet, Rfl: 1  Social History "     Socioeconomic History   • Marital status:      Spouse name: Not on file   • Number of children: Not on file   • Years of education: Not on file   • Highest education level: Not on file   Occupational History   • Occupation: Retired   Social Needs   • Financial resource strain: Patient refused   • Food insecurity:     Worry: Patient refused     Inability: Patient refused   • Transportation needs:     Medical: Patient refused     Non-medical: Patient refused   Tobacco Use   • Smoking status: Former Smoker     Types: Cigarettes   • Smokeless tobacco: Never Used   Substance and Sexual Activity   • Alcohol use: Yes   • Drug use: No   • Sexual activity: Defer     Allergies   Allergen Reactions   • Penicillins Itching     Review of Systems   Constitution: Negative for fever and malaise/fatigue.   HENT: Negative for congestion and hearing loss.    Eyes: Negative for double vision and visual disturbance.   Cardiovascular: Positive for dyspnea on exertion. Negative for chest pain, claudication, leg swelling and syncope.   Respiratory: Negative for cough and shortness of breath.    Endocrine: Negative for cold intolerance.   Hematologic/Lymphatic: Bruises/bleeds easily.   Skin: Negative for color change and rash.   Musculoskeletal: Negative for arthritis and joint pain.   Gastrointestinal: Negative for abdominal pain and heartburn.   Genitourinary: Negative for hematuria.   Neurological: Negative for excessive daytime sleepiness and dizziness.   Psychiatric/Behavioral: Negative for depression. The patient is not nervous/anxious.    All other systems reviewed and are negative.             Objective:     Physical Exam   Constitutional: He is oriented to person, place, and time. He appears well-developed and well-nourished. He is cooperative.   HENT:   Head: Normocephalic and atraumatic.   Mouth/Throat: Uvula is midline and oropharynx is clear and moist. No oral lesions.   Eyes: Conjunctivae are normal. No scleral  icterus.   Neck: Trachea normal. Neck supple. No JVD present. Carotid bruit is not present. No thyromegaly present.   Cardiovascular: Normal rate, regular rhythm, S1 normal, S2 normal, normal heart sounds, intact distal pulses and normal pulses. PMI is not displaced. Exam reveals no gallop and no friction rub.   No murmur heard.  Pulmonary/Chest: Effort normal and breath sounds normal.   Abdominal: Soft. Bowel sounds are normal.   Musculoskeletal: Normal range of motion.   Neurological: He is alert and oriented to person, place, and time. He has normal strength.   No focal deficits   Skin: Skin is warm. No cyanosis.   Psychiatric: He has a normal mood and affect.       Procedures    Lab Review:       Assessment:          Diagnosis Plan   1. Chronic atrial fibrillation (CMS/HCC)  Adult Transesophageal Echo (DOMINIQUE) W/ Cont if Necessary Per Protocol    CBC (No Diff)    Basic Metabolic Panel    Protime-INR   2. Coronary artery disease involving native coronary artery of native heart without angina pectoris  Adult Transesophageal Echo (DOMINIQUE) W/ Cont if Necessary Per Protocol    CBC (No Diff)    Basic Metabolic Panel    Protime-INR   3. Essential hypertension  Adult Transesophageal Echo (DOMINIQUE) W/ Cont if Necessary Per Protocol    CBC (No Diff)    Basic Metabolic Panel    Protime-INR   4. Presence of cardiac pacemaker  Adult Transesophageal Echo (DOMINIQUE) W/ Cont if Necessary Per Protocol    CBC (No Diff)    Basic Metabolic Panel    Protime-INR   5. Sleep apnea, unspecified type  Adult Transesophageal Echo (DOMINIQUE) W/ Cont if Necessary Per Protocol    CBC (No Diff)    Basic Metabolic Panel    Protime-INR   6. Stage 3 chronic kidney disease (CMS/HCC)  Adult Transesophageal Echo (DOMINIQUE) W/ Cont if Necessary Per Protocol    CBC (No Diff)    Basic Metabolic Panel    Protime-INR   7. Long term (current) use of anticoagulants [Z79.01]  Adult Transesophageal Echo (DOMINIQUE) W/ Cont if Necessary Per Protocol    CBC (No Diff)    Basic Metabolic  Panel    Protime-INR   8. Pericardial effusion  Adult Transesophageal Echo (DOMINIQUE) W/ Cont if Necessary Per Protocol    CBC (No Diff)    Basic Metabolic Panel    Protime-INR          Plan:     Atrial fibrillation on anticoagulation consideration for watchman device due to bleeding issues we will schedule DOMINIQUE  History of CAD stable continue to modify cardiac risk factors  Hypertension stable  Permanent pacemaker working well  Continue aggressive control of sleep apnea  Chronic renal failure followed by nephrology  Pericardial effusion currently asymptomatic needs close follow-up

## 2019-08-21 ENCOUNTER — OFFICE VISIT (OUTPATIENT)
Dept: CARDIOLOGY | Facility: CLINIC | Age: 84
End: 2019-08-21

## 2019-08-21 VITALS
DIASTOLIC BLOOD PRESSURE: 73 MMHG | OXYGEN SATURATION: 98 % | HEIGHT: 67 IN | WEIGHT: 165 LBS | SYSTOLIC BLOOD PRESSURE: 139 MMHG | BODY MASS INDEX: 25.9 KG/M2 | HEART RATE: 70 BPM

## 2019-08-21 DIAGNOSIS — Z79.01 LONG TERM (CURRENT) USE OF ANTICOAGULANTS: ICD-10-CM

## 2019-08-21 DIAGNOSIS — I31.39 PERICARDIAL EFFUSION: ICD-10-CM

## 2019-08-21 DIAGNOSIS — I48.20 CHRONIC ATRIAL FIBRILLATION (HCC): Primary | ICD-10-CM

## 2019-08-21 DIAGNOSIS — Z95.0 PRESENCE OF CARDIAC PACEMAKER: ICD-10-CM

## 2019-08-21 DIAGNOSIS — I10 ESSENTIAL HYPERTENSION: ICD-10-CM

## 2019-08-21 DIAGNOSIS — N18.30 STAGE 3 CHRONIC KIDNEY DISEASE (HCC): ICD-10-CM

## 2019-08-21 DIAGNOSIS — G47.30 SLEEP APNEA, UNSPECIFIED TYPE: ICD-10-CM

## 2019-08-21 DIAGNOSIS — I25.10 CORONARY ARTERY DISEASE INVOLVING NATIVE CORONARY ARTERY OF NATIVE HEART WITHOUT ANGINA PECTORIS: ICD-10-CM

## 2019-08-21 PROCEDURE — 99214 OFFICE O/P EST MOD 30 MIN: CPT | Performed by: INTERNAL MEDICINE

## 2019-08-23 ENCOUNTER — TELEPHONE (OUTPATIENT)
Dept: CARDIOLOGY | Facility: CLINIC | Age: 84
End: 2019-08-23

## 2019-08-23 NOTE — TELEPHONE ENCOUNTER
Pt's wife called. The pt does not want to have DOMINIQUE done. They will call Dr. Butler's office to inform them they don't want the procedure

## 2019-08-23 NOTE — TELEPHONE ENCOUNTER
Patient's family, Karli Yvan, called stating that Mr. Santoro is not interested in the Watchman procedure at this time.

## 2019-08-26 ENCOUNTER — ANTICOAGULATION VISIT (OUTPATIENT)
Dept: CARDIOLOGY | Facility: CLINIC | Age: 84
End: 2019-08-26

## 2019-08-26 VITALS
WEIGHT: 164.5 LBS | DIASTOLIC BLOOD PRESSURE: 74 MMHG | HEART RATE: 70 BPM | SYSTOLIC BLOOD PRESSURE: 115 MMHG | BODY MASS INDEX: 25.76 KG/M2

## 2019-08-26 DIAGNOSIS — Z79.01 LONG TERM (CURRENT) USE OF ANTICOAGULANTS: ICD-10-CM

## 2019-08-26 DIAGNOSIS — I48.20 CHRONIC ATRIAL FIBRILLATION (HCC): ICD-10-CM

## 2019-08-26 LAB — INR PPP: 1.6 (ref 2–3)

## 2019-08-26 PROCEDURE — 36416 COLLJ CAPILLARY BLOOD SPEC: CPT | Performed by: INTERNAL MEDICINE

## 2019-08-26 PROCEDURE — 85610 PROTHROMBIN TIME: CPT | Performed by: INTERNAL MEDICINE

## 2019-08-28 ENCOUNTER — HOSPITAL ENCOUNTER (OUTPATIENT)
Dept: CARDIOLOGY | Facility: HOSPITAL | Age: 84
End: 2019-08-28

## 2019-09-16 RX ORDER — WARFARIN SODIUM 3 MG/1
TABLET ORAL
Qty: 34 TABLET | Refills: 0 | Status: SHIPPED | OUTPATIENT
Start: 2019-09-16 | End: 2019-10-12 | Stop reason: SDUPTHER

## 2019-09-16 RX ORDER — FUROSEMIDE 40 MG/1
40 TABLET ORAL DAILY
Qty: 90 TABLET | Refills: 2 | Status: SHIPPED | OUTPATIENT
Start: 2019-09-16 | End: 2020-01-01

## 2019-09-17 ENCOUNTER — CLINICAL SUPPORT NO REQUIREMENTS (OUTPATIENT)
Dept: CARDIOLOGY | Facility: CLINIC | Age: 84
End: 2019-09-17

## 2019-09-17 DIAGNOSIS — I49.5 SICK SINUS SYNDROME (HCC): Primary | ICD-10-CM

## 2019-09-17 DIAGNOSIS — Z95.0 PACEMAKER: ICD-10-CM

## 2019-09-17 PROCEDURE — 93296 REM INTERROG EVL PM/IDS: CPT | Performed by: INTERNAL MEDICINE

## 2019-09-17 PROCEDURE — 93294 REM INTERROG EVL PM/LDLS PM: CPT | Performed by: INTERNAL MEDICINE

## 2019-09-19 DIAGNOSIS — E11.65 TYPE 2 DIABETES MELLITUS WITH HYPERGLYCEMIA, WITHOUT LONG-TERM CURRENT USE OF INSULIN (HCC): Primary | ICD-10-CM

## 2019-09-19 DIAGNOSIS — I10 ESSENTIAL HYPERTENSION: ICD-10-CM

## 2019-09-23 ENCOUNTER — ANTICOAGULATION VISIT (OUTPATIENT)
Dept: CARDIOLOGY | Facility: CLINIC | Age: 84
End: 2019-09-23

## 2019-09-23 VITALS
BODY MASS INDEX: 26.74 KG/M2 | HEART RATE: 70 BPM | DIASTOLIC BLOOD PRESSURE: 75 MMHG | SYSTOLIC BLOOD PRESSURE: 125 MMHG | WEIGHT: 170.75 LBS

## 2019-09-23 DIAGNOSIS — I48.20 CHRONIC ATRIAL FIBRILLATION (HCC): ICD-10-CM

## 2019-09-23 DIAGNOSIS — Z79.01 LONG TERM (CURRENT) USE OF ANTICOAGULANTS: ICD-10-CM

## 2019-09-23 LAB — INR PPP: 2.1 (ref 0.9–1.1)

## 2019-09-23 PROCEDURE — 36416 COLLJ CAPILLARY BLOOD SPEC: CPT | Performed by: INTERNAL MEDICINE

## 2019-09-23 PROCEDURE — 85610 PROTHROMBIN TIME: CPT | Performed by: INTERNAL MEDICINE

## 2019-09-25 RX ORDER — AZELASTINE 1 MG/ML
SPRAY, METERED NASAL
COMMUNITY
Start: 2019-09-05 | End: 2019-12-30

## 2019-09-25 RX ORDER — INFLUENZA A VIRUS A/MICHIGAN/45/2015 X-275 (H1N1) ANTIGEN (FORMALDEHYDE INACTIVATED), INFLUENZA A VIRUS A/SINGAPORE/INFIMH-16-0019/2016 IVR-186 (H3N2) ANTIGEN (FORMALDEHYDE INACTIVATED), AND INFLUENZA B VIRUS B/MARYLAND/15/2016 BX-69A (A B/COLORADO/6/2017-LIKE VIRUS) ANTIGEN (FORMALDEHYDE INACTIVATED) 60; 60; 60 UG/.5ML; UG/.5ML; UG/.5ML
INJECTION, SUSPENSION INTRAMUSCULAR
COMMUNITY
Start: 2019-09-17 | End: 2019-12-30

## 2019-09-26 ENCOUNTER — LAB (OUTPATIENT)
Dept: LAB | Facility: HOSPITAL | Age: 84
End: 2019-09-26

## 2019-09-26 DIAGNOSIS — E11.65 TYPE 2 DIABETES MELLITUS WITH HYPERGLYCEMIA, WITHOUT LONG-TERM CURRENT USE OF INSULIN (HCC): ICD-10-CM

## 2019-09-26 DIAGNOSIS — I10 ESSENTIAL HYPERTENSION: ICD-10-CM

## 2019-09-26 LAB
ALBUMIN SERPL-MCNC: 3.8 G/DL (ref 3.5–4.8)
ALBUMIN/GLOB SERPL: 1.2 G/DL (ref 1–1.7)
ALP SERPL-CCNC: 54 U/L (ref 32–91)
ALT SERPL W P-5'-P-CCNC: 11 U/L (ref 17–63)
ANION GAP SERPL CALCULATED.3IONS-SCNC: 16.1 MMOL/L (ref 5–15)
AST SERPL-CCNC: 13 U/L (ref 15–41)
BILIRUB SERPL-MCNC: 0.7 MG/DL (ref 0.3–1.2)
BUN BLD-MCNC: 23 MG/DL (ref 8–20)
BUN/CREAT SERPL: 14.4 (ref 6.2–20.3)
CALCIUM SPEC-SCNC: 9 MG/DL (ref 8.9–10.3)
CHLORIDE SERPL-SCNC: 98 MMOL/L (ref 101–111)
CO2 SERPL-SCNC: 27 MMOL/L (ref 22–32)
CREAT BLD-MCNC: 1.6 MG/DL (ref 0.7–1.2)
GFR SERPL CREATININE-BSD FRML MDRD: 42 ML/MIN/1.73
GLOBULIN UR ELPH-MCNC: 3.1 GM/DL (ref 2.5–3.8)
GLUCOSE BLD-MCNC: 265 MG/DL (ref 65–99)
HBA1C MFR BLD: 7.6 % (ref 3.5–5.6)
POTASSIUM BLD-SCNC: 4.1 MMOL/L (ref 3.6–5.1)
PROT SERPL-MCNC: 6.9 G/DL (ref 6.1–7.9)
SODIUM BLD-SCNC: 137 MMOL/L (ref 136–144)

## 2019-09-26 PROCEDURE — 36415 COLL VENOUS BLD VENIPUNCTURE: CPT

## 2019-09-26 PROCEDURE — 80053 COMPREHEN METABOLIC PANEL: CPT

## 2019-09-26 PROCEDURE — 83036 HEMOGLOBIN GLYCOSYLATED A1C: CPT

## 2019-10-03 ENCOUNTER — OFFICE VISIT (OUTPATIENT)
Dept: ENDOCRINOLOGY | Facility: CLINIC | Age: 84
End: 2019-10-03

## 2019-10-03 VITALS
DIASTOLIC BLOOD PRESSURE: 70 MMHG | HEART RATE: 69 BPM | OXYGEN SATURATION: 98 % | BODY MASS INDEX: 26.37 KG/M2 | SYSTOLIC BLOOD PRESSURE: 130 MMHG | WEIGHT: 168 LBS | HEIGHT: 67 IN

## 2019-10-03 DIAGNOSIS — E78.2 MIXED HYPERLIPIDEMIA: ICD-10-CM

## 2019-10-03 DIAGNOSIS — E11.65 TYPE 2 DIABETES MELLITUS WITH HYPERGLYCEMIA, WITHOUT LONG-TERM CURRENT USE OF INSULIN (HCC): Primary | ICD-10-CM

## 2019-10-03 DIAGNOSIS — I10 ESSENTIAL HYPERTENSION: ICD-10-CM

## 2019-10-03 DIAGNOSIS — E55.9 VITAMIN D DEFICIENCY: ICD-10-CM

## 2019-10-03 PROCEDURE — 99214 OFFICE O/P EST MOD 30 MIN: CPT | Performed by: INTERNAL MEDICINE

## 2019-10-03 RX ORDER — LISINOPRIL 2.5 MG/1
2.5 TABLET ORAL DAILY
COMMUNITY
End: 2019-11-09 | Stop reason: SDUPTHER

## 2019-10-03 RX ORDER — SIMVASTATIN 40 MG
TABLET ORAL
Qty: 90 TABLET | Refills: 4 | Status: SHIPPED | OUTPATIENT
Start: 2019-10-03 | End: 2020-01-07 | Stop reason: SDUPTHER

## 2019-10-03 NOTE — PROGRESS NOTES
Endocrine Progress Note Outpatient     Patient Care Team:  Yusef Sosa Jr., MD as PCP - General  Yusef Sosa Jr., MD as PCP - Family Medicine    Chief Complaint: Follow-up type 2 diabetes    HPI: 83-year-old male with history of type 2 diabetes, hypertension, hyperlipidemia, vitamin D deficiency is here for follow-up.  For type 2 diabetes he is currently on Janumet XR 50/thousand, 1 tablet daily with glimepiride 2 mg twice a day.  He did bring in 2 blood sugar readings that were both about 200.  He is taking his medications on a regular basis.  Has a stopped drinking orange juice.  He is trying to work on his diet.  Hypertension: Well-controlled  Hyperlipidemia: On simvastatin  Vitamin D deficiency: On vitamin D supplementation.    Past Medical History:   Diagnosis Date   • Arrhythmia    • Bradycardia    • Cardiomyopathy (CMS/HCC)    • Cataract    • COPD (chronic obstructive pulmonary disease) (CMS/HCC)    • Coronary artery disease    • Diabetes mellitus, type 2 (CMS/HCC)    • Emphysema, unspecified (CMS/HCC)    • Hx of sick sinus syndrome     S/P Biventricular Pacemaker   • Hypertension    • Paroxysmal atrial fibrillation (CMS/[facility])    • Sleep apnea    • Ventricular arrhythmia        Social History     Socioeconomic History   • Marital status:      Spouse name: Not on file   • Number of children: Not on file   • Years of education: Not on file   • Highest education level: Not on file   Occupational History   • Occupation: Retired   Social Needs   • Financial resource strain: Patient refused   • Food insecurity:     Worry: Patient refused     Inability: Patient refused   • Transportation needs:     Medical: Patient refused     Non-medical: Patient refused   Tobacco Use   • Smoking status: Former Smoker     Types: Cigarettes   • Smokeless tobacco: Never Used   Substance and Sexual Activity   • Alcohol use: Yes   • Drug use: No   • Sexual activity: Defer       Family History   Problem Relation Age of  Onset   • Heart disease Mother    • Hypertension Mother    • Stroke Father    • Breast cancer Sister    • Colon cancer Sister    • Heart disease Brother    • Diabetes Other        Allergies   Allergen Reactions   • Penicillins Itching       ROS:   Constitutional:  Denies fatigue, tiredness.    Eyes:  Denies change in visual acuity   HENT:  Denies nasal congestion or sore throat   Respiratory: denies cough, shortness of breath.   Cardiovascular:  denies chest pain, edema   GI:  Denies abdominal pain, nausea, vomiting.   Musculoskeletal:  Denies back pain or joint pain   Integument:  Denies dry skin and rash   Neurologic:  Denies headache, focal weakness or sensory changes   Endocrine:  Denies polyuria or polydipsia   Psychiatric:  Denies depression or anxiety      Vitals:    10/03/19 0956   BP: 130/70   Pulse: 69   SpO2: 98%       Physical Exam:  GEN: NAD, conversant  EYES: EOMI, PERRL, no conjunctival erythema  NECK: no thyromegaly, full ROM   CV: RRR, no murmurs/rubs/gallops, no peripheral edema  LUNG: CTAB, no wheezes/rales/ronchi  SKIN: no rashes, no acanthosis  MSK: no deformities, full ROM of all extremities  NEURO: no tremors, DTR normal  PSYCH: AOX3, appropriate mood, affect normal      Results Review:     I reviewed the patient's new clinical results.    Lab Results   Component Value Date    HGBA1C 7.6 (H) 09/26/2019      Lab Results   Component Value Date    GLUCOSE 265 (H) 09/26/2019    BUN 23 (H) 09/26/2019    CREATININE 1.60 (H) 09/26/2019    EGFRIFNONA 42 (L) 09/26/2019    BCR 14.4 09/26/2019    K 4.1 09/26/2019    CO2 27.0 09/26/2019    CALCIUM 9.0 09/26/2019    ALBUMIN 3.80 09/26/2019    LABIL2 1.5 04/11/2019    AST 13 (L) 09/26/2019    ALT 11 (L) 09/26/2019    CHOL 90 06/11/2019    TRIG 133 06/11/2019    LDL 39 06/11/2019    HDL 37 (L) 06/11/2019           Medication Review: Reviewed.       Current Outpatient Medications:   •  aspirin 81 MG tablet, Take 81 mg by mouth Daily., Disp: , Rfl:   •   azelastine (ASTELIN) 0.1 % nasal spray, , Disp: , Rfl:   •  calcitriol (ROCALTROL) 0.25 MCG capsule, Take 0.25 mcg by mouth 3 (Three) Times a Week. Mon, Wed, Fri, Disp: , Rfl:   •  carvedilol (COREG) 12.5 MG tablet, Take 12.5 mg by mouth 2 (Two) Times a Day. Take 1 tablet in the am and 1/2 tablet in the pm, Disp: , Rfl:   •  digoxin (LANOXIN) 125 MCG tablet, Take 125 mcg by mouth Daily., Disp: , Rfl:   •  furosemide (LASIX) 40 MG tablet, Take 1 tablet by mouth Daily., Disp: 90 tablet, Rfl: 2  •  glimepiride (AMARYL) 2 MG tablet, Take 2 mg by mouth 2 (Two) Times a Day., Disp: , Rfl:   •  lisinopril (PRINIVIL,ZESTRIL) 2.5 MG tablet, Take 2.5 mg by mouth Daily., Disp: , Rfl:   •  simvastatin (ZOCOR) 20 MG tablet, Take 20 mg by mouth every night at bedtime., Disp: , Rfl:   •  SITagliptin-metFORMIN HCl ER (JANUMET XR)  MG tablet, Take 1 tablet by mouth Daily., Disp: , Rfl:   •  theophylline (THEODUR) 300 MG 12 hr tablet, Take 300 mg by mouth 2 (Two) Times a Day., Disp: , Rfl:   •  warfarin (COUMADIN) 3 MG tablet, TAKE ONE AND ONE HALF TABLETS BY MOUTH ON MONDAY AND FRIDAY-TAKE 1 TABLET BY MOUTH ON ALL THE OTHER 5 DAYS OF THE WEEK, Disp: 34 tablet, Rfl: 0  •  FLUZONE HIGH-DOSE 0.5 ML suspension prefilled syringe injection, , Disp: , Rfl:       Assessment/Plan   1.  Diabetes mellitus type 2: Fair control with A1c 7.6%.  At this time I will continue current management and follow blood sugars and A1c.  2.  Hypertension: Well-controlled, continue current medication  3.  Hyperlipidemia: On simvastatin, will follow lipid panel.  I will increase simvastatin to 40 mg p.o. at bedtime to give him moderate dose of statin since he is diabetic with history of heart disease and had a stent placement in the past.  4.  Vitamin D deficiency: On vitamin D supplementation.            Jhonatan Mart MD FACE.    Much of the above report is an electronic transcription/translation of the spoken language to printed text using Dragon  Software. As such, the subtleties and finesse of the spoken language may permit erroneous, or at times, nonsensical words or phrases to be inadvertently transcribed; thus changes may be made at a later date to rectify these errors.

## 2019-10-03 NOTE — PATIENT INSTRUCTIONS
Increase simvastatin to 40 mg p.o. at bedtime  Follow-up in 6 months with labs  Please always keep glucose source with you in case of low blood sugar and get annual eye exam and flu vaccine.

## 2019-10-11 ENCOUNTER — TRANSCRIBE ORDERS (OUTPATIENT)
Dept: LAB | Facility: HOSPITAL | Age: 84
End: 2019-10-11

## 2019-10-11 ENCOUNTER — LAB (OUTPATIENT)
Dept: LAB | Facility: HOSPITAL | Age: 84
End: 2019-10-11

## 2019-10-11 DIAGNOSIS — N18.30 CHRONIC KIDNEY DISEASE, STAGE III (MODERATE) (HCC): ICD-10-CM

## 2019-10-11 DIAGNOSIS — N18.30 CHRONIC KIDNEY DISEASE, STAGE III (MODERATE) (HCC): Primary | ICD-10-CM

## 2019-10-11 LAB
ALBUMIN UR-MCNC: 7 MG/L
ANION GAP SERPL CALCULATED.3IONS-SCNC: 11.5 MMOL/L (ref 5–15)
BUN BLD-MCNC: 24 MG/DL (ref 8–20)
BUN/CREAT SERPL: 13.3 (ref 6.2–20.3)
CALCIUM SPEC-SCNC: 8.9 MG/DL (ref 8.9–10.3)
CHLORIDE SERPL-SCNC: 100 MMOL/L (ref 101–111)
CO2 SERPL-SCNC: 28 MMOL/L (ref 22–32)
CREAT BLD-MCNC: 1.8 MG/DL (ref 0.7–1.2)
CREAT UR-MCNC: 83.4 MG/DL
GFR SERPL CREATININE-BSD FRML MDRD: 36 ML/MIN/1.73
GLUCOSE BLD-MCNC: 213 MG/DL (ref 65–99)
MICROALBUMIN/CREAT UR: 8.4 UG/MG
POTASSIUM BLD-SCNC: 4.5 MMOL/L (ref 3.6–5.1)
SODIUM BLD-SCNC: 135 MMOL/L (ref 136–144)

## 2019-10-11 PROCEDURE — 82043 UR ALBUMIN QUANTITATIVE: CPT

## 2019-10-11 PROCEDURE — 80048 BASIC METABOLIC PNL TOTAL CA: CPT

## 2019-10-11 PROCEDURE — 36415 COLL VENOUS BLD VENIPUNCTURE: CPT

## 2019-10-11 PROCEDURE — 82570 ASSAY OF URINE CREATININE: CPT

## 2019-10-11 PROCEDURE — 83970 ASSAY OF PARATHORMONE: CPT

## 2019-10-14 ENCOUNTER — OFFICE VISIT (OUTPATIENT)
Dept: FAMILY MEDICINE CLINIC | Facility: CLINIC | Age: 84
End: 2019-10-14

## 2019-10-14 VITALS
HEIGHT: 67 IN | SYSTOLIC BLOOD PRESSURE: 104 MMHG | TEMPERATURE: 98.5 F | WEIGHT: 171.8 LBS | BODY MASS INDEX: 26.97 KG/M2 | DIASTOLIC BLOOD PRESSURE: 68 MMHG | OXYGEN SATURATION: 95 % | RESPIRATION RATE: 20 BRPM | HEART RATE: 69 BPM

## 2019-10-14 DIAGNOSIS — I10 ESSENTIAL HYPERTENSION: Primary | ICD-10-CM

## 2019-10-14 DIAGNOSIS — E11.65 TYPE 2 DIABETES MELLITUS WITH HYPERGLYCEMIA, WITHOUT LONG-TERM CURRENT USE OF INSULIN (HCC): ICD-10-CM

## 2019-10-14 DIAGNOSIS — E78.2 MIXED HYPERLIPIDEMIA: ICD-10-CM

## 2019-10-14 DIAGNOSIS — I48.19 OTHER PERSISTENT ATRIAL FIBRILLATION (HCC): ICD-10-CM

## 2019-10-14 DIAGNOSIS — I25.10 CORONARY ARTERY DISEASE INVOLVING NATIVE CORONARY ARTERY OF NATIVE HEART WITHOUT ANGINA PECTORIS: ICD-10-CM

## 2019-10-14 DIAGNOSIS — J44.9 CHRONIC OBSTRUCTIVE PULMONARY DISEASE, UNSPECIFIED COPD TYPE (HCC): ICD-10-CM

## 2019-10-14 LAB — PTH-INTACT SERPL-MCNC: 86 PG/ML (ref 11–72)

## 2019-10-14 PROCEDURE — 99214 OFFICE O/P EST MOD 30 MIN: CPT | Performed by: FAMILY MEDICINE

## 2019-10-14 RX ORDER — WARFARIN SODIUM 3 MG/1
TABLET ORAL
Qty: 34 TABLET | Refills: 0 | Status: SHIPPED | OUTPATIENT
Start: 2019-10-14 | End: 2019-11-15 | Stop reason: SDUPTHER

## 2019-10-14 NOTE — PATIENT INSTRUCTIONS
Continue your current medications and treatment.    Follow up in the office in 6 months.    Laboratory testing at that time.

## 2019-10-21 RX ORDER — DIGOXIN 125 MCG
TABLET ORAL
Qty: 90 TABLET | Refills: 2 | Status: SHIPPED | OUTPATIENT
Start: 2019-10-21 | End: 2020-04-21

## 2019-10-28 ENCOUNTER — ANTICOAGULATION VISIT (OUTPATIENT)
Dept: CARDIOLOGY | Facility: CLINIC | Age: 84
End: 2019-10-28

## 2019-10-28 VITALS
SYSTOLIC BLOOD PRESSURE: 133 MMHG | BODY MASS INDEX: 27.68 KG/M2 | HEART RATE: 70 BPM | WEIGHT: 176.75 LBS | DIASTOLIC BLOOD PRESSURE: 80 MMHG

## 2019-10-28 DIAGNOSIS — Z79.01 LONG TERM (CURRENT) USE OF ANTICOAGULANTS: ICD-10-CM

## 2019-10-28 DIAGNOSIS — I48.19 OTHER PERSISTENT ATRIAL FIBRILLATION (HCC): ICD-10-CM

## 2019-10-28 LAB — INR PPP: 1.9 (ref 0.9–1.1)

## 2019-10-28 PROCEDURE — 36416 COLLJ CAPILLARY BLOOD SPEC: CPT | Performed by: INTERNAL MEDICINE

## 2019-10-28 PROCEDURE — 85610 PROTHROMBIN TIME: CPT | Performed by: INTERNAL MEDICINE

## 2019-11-11 RX ORDER — LISINOPRIL 2.5 MG/1
TABLET ORAL
Qty: 180 TABLET | Refills: 2 | Status: SHIPPED | OUTPATIENT
Start: 2019-11-11 | End: 2020-01-22 | Stop reason: ALTCHOICE

## 2019-11-15 RX ORDER — WARFARIN SODIUM 3 MG/1
TABLET ORAL
Qty: 40 TABLET | Refills: 0 | Status: SHIPPED | OUTPATIENT
Start: 2019-11-15 | End: 2019-12-16 | Stop reason: SDUPTHER

## 2019-12-18 ENCOUNTER — LAB (OUTPATIENT)
Dept: LAB | Facility: HOSPITAL | Age: 84
End: 2019-12-18

## 2019-12-18 DIAGNOSIS — I10 ESSENTIAL HYPERTENSION: ICD-10-CM

## 2019-12-18 DIAGNOSIS — E78.2 MIXED HYPERLIPIDEMIA: ICD-10-CM

## 2019-12-18 DIAGNOSIS — I25.118 CORONARY ARTERY DISEASE OF NATIVE ARTERY OF NATIVE HEART WITH STABLE ANGINA PECTORIS (HCC): ICD-10-CM

## 2019-12-18 LAB
ALBUMIN SERPL-MCNC: 4.2 G/DL (ref 3.5–5.2)
ALBUMIN/GLOB SERPL: 1.4 G/DL
ALP SERPL-CCNC: 68 U/L (ref 39–117)
ALT SERPL W P-5'-P-CCNC: 11 U/L (ref 1–41)
ANION GAP SERPL CALCULATED.3IONS-SCNC: 11.2 MMOL/L (ref 5–15)
AST SERPL-CCNC: 15 U/L (ref 1–40)
BILIRUB SERPL-MCNC: 0.3 MG/DL (ref 0.2–1.2)
BUN BLD-MCNC: 27 MG/DL (ref 8–23)
BUN/CREAT SERPL: 15.4 (ref 7–25)
CALCIUM SPEC-SCNC: 9.8 MG/DL (ref 8.6–10.5)
CHLORIDE SERPL-SCNC: 101 MMOL/L (ref 98–107)
CO2 SERPL-SCNC: 26.8 MMOL/L (ref 22–29)
CREAT BLD-MCNC: 1.75 MG/DL (ref 0.76–1.27)
GFR SERPL CREATININE-BSD FRML MDRD: 37 ML/MIN/1.73
GLOBULIN UR ELPH-MCNC: 3.1 GM/DL
GLUCOSE BLD-MCNC: 189 MG/DL (ref 65–99)
POTASSIUM BLD-SCNC: 4.8 MMOL/L (ref 3.5–5.2)
PROT SERPL-MCNC: 7.3 G/DL (ref 6–8.5)
SODIUM BLD-SCNC: 139 MMOL/L (ref 136–145)

## 2019-12-18 PROCEDURE — 36415 COLL VENOUS BLD VENIPUNCTURE: CPT

## 2019-12-18 PROCEDURE — 80053 COMPREHEN METABOLIC PANEL: CPT

## 2019-12-18 RX ORDER — WARFARIN SODIUM 3 MG/1
TABLET ORAL
Qty: 40 TABLET | Refills: 1 | Status: SHIPPED | OUTPATIENT
Start: 2019-12-18 | End: 2020-02-28

## 2019-12-27 ENCOUNTER — CLINICAL SUPPORT NO REQUIREMENTS (OUTPATIENT)
Dept: CARDIOLOGY | Facility: CLINIC | Age: 84
End: 2019-12-27

## 2019-12-27 DIAGNOSIS — I48.19 OTHER PERSISTENT ATRIAL FIBRILLATION (HCC): Primary | ICD-10-CM

## 2019-12-27 DIAGNOSIS — Z95.0 PRESENCE OF CARDIAC PACEMAKER: ICD-10-CM

## 2019-12-30 ENCOUNTER — OFFICE VISIT (OUTPATIENT)
Dept: CARDIOLOGY | Facility: CLINIC | Age: 84
End: 2019-12-30

## 2019-12-30 ENCOUNTER — CLINICAL SUPPORT NO REQUIREMENTS (OUTPATIENT)
Dept: CARDIOLOGY | Facility: CLINIC | Age: 84
End: 2019-12-30

## 2019-12-30 VITALS
SYSTOLIC BLOOD PRESSURE: 136 MMHG | BODY MASS INDEX: 27.94 KG/M2 | HEIGHT: 67 IN | WEIGHT: 178 LBS | HEART RATE: 70 BPM | DIASTOLIC BLOOD PRESSURE: 85 MMHG | OXYGEN SATURATION: 94 %

## 2019-12-30 DIAGNOSIS — I48.19 OTHER PERSISTENT ATRIAL FIBRILLATION (HCC): Primary | ICD-10-CM

## 2019-12-30 DIAGNOSIS — Z95.0 PRESENCE OF CARDIAC PACEMAKER: ICD-10-CM

## 2019-12-30 DIAGNOSIS — I25.10 CORONARY ARTERY DISEASE INVOLVING NATIVE CORONARY ARTERY OF NATIVE HEART WITHOUT ANGINA PECTORIS: ICD-10-CM

## 2019-12-30 DIAGNOSIS — E78.2 MIXED HYPERLIPIDEMIA: ICD-10-CM

## 2019-12-30 DIAGNOSIS — I31.39 PERICARDIAL EFFUSION: ICD-10-CM

## 2019-12-30 DIAGNOSIS — I49.9 ARRHYTHMIA, VENTRICULAR: ICD-10-CM

## 2019-12-30 DIAGNOSIS — I10 ESSENTIAL HYPERTENSION: ICD-10-CM

## 2019-12-30 PROCEDURE — 99214 OFFICE O/P EST MOD 30 MIN: CPT | Performed by: INTERNAL MEDICINE

## 2019-12-30 PROCEDURE — 93281 PM DEVICE PROGR EVAL MULTI: CPT | Performed by: INTERNAL MEDICINE

## 2019-12-30 RX ORDER — MIRTAZAPINE 15 MG/1
15 TABLET, FILM COATED ORAL NIGHTLY
COMMUNITY
End: 2020-01-03 | Stop reason: SDUPTHER

## 2019-12-30 NOTE — PROGRESS NOTES
Subjective:     Encounter Date:12/30/2019      Patient ID: Cali Santoro is a 84 y.o. male.    Chief Complaint: Follow-up for CAD & HTN  History of Present Illness     84-year-old white male patient with known history of paroxysmal atrial fibrillation CAD sick sinus node syndrome status post biventricular pacemaker hypertension dyslipidemia diabetes COPD comes back for follow-up     Last cardiac catheterization showed LAD 40% disease mild to moderate diffuse in-stent restenosis to 50% and also in the midportion 60 to 70% left main 40% RCA up to 40%  Patient does have mild chronic renal insufficiency  Echocardiogram May 2019 EF 60% concentric LVH biatrial enlargement mild aortic stenosis     Recently patient had significant cough followed by small amounts of hemoptysis  CT scan of the chest showed small to moderate pericardial effusion  Patient denies of any active symptoms   Patient was seen by electrophysiology recently and was given the option of watchman device due to recent hemoptysis  Patient currently decided to continue anticoagulation no more hemoptysis refused watchman device  Patient is having increasing problems with fatigue unable to use the CPAP machine will be followed by pulmonary regarding that  Repeat echocardiogram August 2019  · Left ventricular wall thickness is consistent with mild-to-moderate concentric hypertrophy.  · Estimated EF = 60%.  · Left ventricular systolic function is normal.  · Right ventricular cavity is borderline dilated.  · Left atrial cavity size is mild-to-moderately dilated.  · There is calcification of the aortic valve.  · Mild aortic valve regurgitation is present.  · Mild mitral valve regurgitation is present  · There is a moderate (1-2cm) pericardial effusion.  · The following left ventricular wall segments are hypokinetic: basal anterolateral, basal inferolateral, basal inferoseptal and basal inferoseptal.  · Pericardial effusion to be stable will have a follow-up  "echocardiogram next year     Due to renal insufficiency patient also advised to get a dig level in the next few days  Follow-up labs in 6 months along with dig level    Past Medical History:   Diagnosis Date   • Arrhythmia    • Bradycardia    • Cardiomyopathy (CMS/HCC)    • Cataract    • COPD (chronic obstructive pulmonary disease) (CMS/HCC)    • Coronary artery disease    • Diabetes mellitus, type 2 (CMS/HCC)    • Emphysema, unspecified (CMS/HCC)    • Hx of sick sinus syndrome     S/P Biventricular Pacemaker   • Hypertension    • Paroxysmal atrial fibrillation (CMS/HCC)    • Sleep apnea    • Ventricular arrhythmia      Past Surgical History:   Procedure Laterality Date   • CARDIAC CATHETERIZATION  02/04/2015   • CARDIAC CATHETERIZATION  04/25/2016   • CAROTID ENDARTERECTOMY     • CORONARY ANGIOPLASTY  1997   • LARYNGOSCOPY     • LUNG SURGERY  2008    Lung resection   • PACEMAKER IMPLANTATION  04/25/2016    Dual chamber; [employer]   • TRACHEOSTOMY       /85 (BP Location: Right arm, Patient Position: Sitting, Cuff Size: Adult)   Pulse 70   Ht 170.2 cm (67\")   Wt 80.7 kg (178 lb)   SpO2 94%   BMI 27.88 kg/m²   Family History   Problem Relation Age of Onset   • Heart disease Mother    • Hypertension Mother    • Stroke Father    • Breast cancer Sister    • Colon cancer Sister    • Heart disease Brother    • Diabetes Other        Current Outpatient Medications:   •  aspirin 81 MG tablet, Take 81 mg by mouth Daily., Disp: , Rfl:   •  calcitriol (ROCALTROL) 0.25 MCG capsule, Take 0.25 mcg by mouth 3 (Three) Times a Week. Mon, Wed, Fri, Disp: , Rfl:   •  carvedilol (COREG) 12.5 MG tablet, Take 12.5 mg by mouth 2 (Two) Times a Day. Take 1 tablet in the am and 1/2 tablet in the pm, Disp: , Rfl:   •  digoxin (LANOXIN) 125 MCG tablet, TAKE 1 TABLET EVERY DAY, Disp: 90 tablet, Rfl: 2  •  furosemide (LASIX) 40 MG tablet, Take 1 tablet by mouth Daily., Disp: 90 tablet, Rfl: 2  •  glimepiride (AMARYL) 2 MG " tablet, Take 2 mg by mouth 2 (Two) Times a Day., Disp: , Rfl:   •  lisinopril (PRINIVIL,ZESTRIL) 2.5 MG tablet, TAKE 1 TABLET TWICE DAILY, Disp: 180 tablet, Rfl: 2  •  mirtazapine (REMERON) 15 MG tablet, Take 15 mg by mouth Every Night., Disp: , Rfl:   •  simvastatin (ZOCOR) 40 MG tablet, 1 tablet p.o. at bedtime., Disp: 90 tablet, Rfl: 4  •  SITagliptin-metFORMIN HCl ER (JANUMET XR)  MG tablet, Take 1 tablet by mouth Daily., Disp: , Rfl:   •  theophylline (THEODUR) 300 MG 12 hr tablet, Take 300 mg by mouth 2 (Two) Times a Day., Disp: , Rfl:   •  warfarin (COUMADIN) 3 MG tablet, TAKE ONE AND ONE-HALF  TABLET BY MOUTH ON MONDAY AND FRIDAY TAKE ONE TABLET BY MOUTH ON ALL OTHER 5 DAYS OF THE WEEK, Disp: 40 tablet, Rfl: 1  Social History     Socioeconomic History   • Marital status:      Spouse name: Not on file   • Number of children: Not on file   • Years of education: Not on file   • Highest education level: Not on file   Occupational History   • Occupation: Retired   Social Needs   • Financial resource strain: Patient refused   • Food insecurity:     Worry: Patient refused     Inability: Patient refused   • Transportation needs:     Medical: Patient refused     Non-medical: Patient refused   Tobacco Use   • Smoking status: Former Smoker     Types: Cigarettes   • Smokeless tobacco: Never Used   Substance and Sexual Activity   • Alcohol use: Yes   • Drug use: No   • Sexual activity: Defer     Allergies   Allergen Reactions   • Penicillins Itching     Review of Systems   Constitution: Negative for fever and malaise/fatigue.   HENT: Negative for congestion and hearing loss.    Eyes: Negative for double vision and visual disturbance.   Cardiovascular: Negative for chest pain, claudication, dyspnea on exertion, leg swelling and syncope.   Respiratory: Negative for cough and shortness of breath.    Endocrine: Negative for cold intolerance.   Skin: Negative for color change and rash.   Musculoskeletal:  Positive for muscle weakness (in legs). Negative for arthritis and joint pain.   Gastrointestinal: Negative for abdominal pain and heartburn.   Genitourinary: Negative for hematuria.   Neurological: Negative for excessive daytime sleepiness and dizziness.   Psychiatric/Behavioral: Negative for depression. The patient is not nervous/anxious.    All other systems reviewed and are negative.             Objective:     Physical Exam   Constitutional: He is oriented to person, place, and time. He appears well-developed and well-nourished. He is cooperative.   HENT:   Head: Normocephalic and atraumatic.   Mouth/Throat: Uvula is midline and oropharynx is clear and moist. No oral lesions.   Eyes: Conjunctivae are normal. No scleral icterus.   Neck: Trachea normal. Neck supple. No JVD present. Carotid bruit is not present. No thyromegaly present.   Cardiovascular: Normal rate, regular rhythm, S1 normal, S2 normal, normal heart sounds, intact distal pulses and normal pulses. PMI is not displaced. Exam reveals no gallop and no friction rub.   No murmur heard.  Pulmonary/Chest: Effort normal and breath sounds normal.   Abdominal: Soft. Bowel sounds are normal.   Musculoskeletal: Normal range of motion.   Neurological: He is alert and oriented to person, place, and time. He has normal strength.   No focal deficits   Skin: Skin is warm. No cyanosis.   Psychiatric: He has a normal mood and affect.       Procedures    Lab Review:       Assessment:          Diagnosis Plan   1. Other persistent atrial fibrillation  CK    Comprehensive Metabolic Panel    Lipid Panel    Digoxin Level    Digoxin Level   2. Arrhythmia, ventricular  CK    Comprehensive Metabolic Panel    Lipid Panel    Digoxin Level    Digoxin Level   3. Coronary artery disease involving native coronary artery of native heart without angina pectoris  CK    Comprehensive Metabolic Panel    Lipid Panel    Digoxin Level    Digoxin Level   4. Essential hypertension  CK     Comprehensive Metabolic Panel    Lipid Panel    Digoxin Level    Digoxin Level   5. Presence of cardiac pacemaker  CK    Comprehensive Metabolic Panel    Lipid Panel    Digoxin Level    Digoxin Level   6. Pericardial effusion  CK    Comprehensive Metabolic Panel    Lipid Panel    Digoxin Level    Digoxin Level   7. Mixed hyperlipidemia  CK    Comprehensive Metabolic Panel    Lipid Panel    Digoxin Level    Digoxin Level          Plan:       Continue anticoagulation  History of CAD stable  Continue aggressive control of hypertension dyslipidemia  Pacemaker functioning well  Mild pericardial effusion stable  Unable to use the CPAP machine follow-up with pulmonary

## 2020-01-01 ENCOUNTER — LAB (OUTPATIENT)
Dept: LAB | Facility: HOSPITAL | Age: 85
End: 2020-01-01

## 2020-01-01 ENCOUNTER — TRANSITIONAL CARE MANAGEMENT TELEPHONE ENCOUNTER (OUTPATIENT)
Dept: CALL CENTER | Facility: HOSPITAL | Age: 85
End: 2020-01-01

## 2020-01-01 ENCOUNTER — TELEPHONE (OUTPATIENT)
Dept: CARDIOLOGY | Facility: CLINIC | Age: 85
End: 2020-01-01

## 2020-01-01 ENCOUNTER — APPOINTMENT (OUTPATIENT)
Dept: GENERAL RADIOLOGY | Facility: HOSPITAL | Age: 85
End: 2020-01-01

## 2020-01-01 ENCOUNTER — ANTICOAGULATION VISIT (OUTPATIENT)
Dept: CARDIOLOGY | Facility: CLINIC | Age: 85
End: 2020-01-01

## 2020-01-01 ENCOUNTER — OFFICE VISIT (OUTPATIENT)
Dept: ENDOCRINOLOGY | Facility: CLINIC | Age: 85
End: 2020-01-01

## 2020-01-01 ENCOUNTER — READMISSION MANAGEMENT (OUTPATIENT)
Dept: CALL CENTER | Facility: HOSPITAL | Age: 85
End: 2020-01-01

## 2020-01-01 ENCOUNTER — CLINICAL SUPPORT NO REQUIREMENTS (OUTPATIENT)
Dept: CARDIOLOGY | Facility: CLINIC | Age: 85
End: 2020-01-01

## 2020-01-01 ENCOUNTER — OFFICE VISIT (OUTPATIENT)
Dept: FAMILY MEDICINE CLINIC | Facility: CLINIC | Age: 85
End: 2020-01-01

## 2020-01-01 ENCOUNTER — APPOINTMENT (OUTPATIENT)
Dept: CT IMAGING | Facility: HOSPITAL | Age: 85
End: 2020-01-01

## 2020-01-01 ENCOUNTER — OFFICE VISIT (OUTPATIENT)
Dept: CARDIOLOGY | Facility: CLINIC | Age: 85
End: 2020-01-01

## 2020-01-01 ENCOUNTER — APPOINTMENT (OUTPATIENT)
Dept: CARDIOLOGY | Facility: HOSPITAL | Age: 85
End: 2020-01-01

## 2020-01-01 ENCOUNTER — TELEPHONE (OUTPATIENT)
Dept: FAMILY MEDICINE CLINIC | Facility: CLINIC | Age: 85
End: 2020-01-01

## 2020-01-01 ENCOUNTER — HOSPITAL ENCOUNTER (OUTPATIENT)
Dept: GENERAL RADIOLOGY | Facility: HOSPITAL | Age: 85
Discharge: HOME | End: 2020-11-02

## 2020-01-01 ENCOUNTER — HOSPITAL ENCOUNTER (OUTPATIENT)
Dept: GENERAL RADIOLOGY | Facility: HOSPITAL | Age: 85
Discharge: HOME | End: 2020-12-29

## 2020-01-01 ENCOUNTER — HOSPITAL ENCOUNTER (INPATIENT)
Facility: HOSPITAL | Age: 85
LOS: 4 days | Discharge: HOME HEALTH | End: 2020-12-14
Attending: EMERGENCY MEDICINE | Admitting: HOSPITALIST

## 2020-01-01 VITALS
DIASTOLIC BLOOD PRESSURE: 75 MMHG | HEART RATE: 70 BPM | TEMPERATURE: 97.1 F | BODY MASS INDEX: 25.58 KG/M2 | OXYGEN SATURATION: 100 % | HEIGHT: 67 IN | SYSTOLIC BLOOD PRESSURE: 141 MMHG | RESPIRATION RATE: 22 BRPM | WEIGHT: 163 LBS

## 2020-01-01 VITALS
OXYGEN SATURATION: 96 % | SYSTOLIC BLOOD PRESSURE: 119 MMHG | WEIGHT: 168 LBS | HEIGHT: 67 IN | BODY MASS INDEX: 26.37 KG/M2 | HEART RATE: 70 BPM | DIASTOLIC BLOOD PRESSURE: 71 MMHG

## 2020-01-01 VITALS
DIASTOLIC BLOOD PRESSURE: 60 MMHG | HEART RATE: 70 BPM | BODY MASS INDEX: 26.16 KG/M2 | WEIGHT: 167 LBS | SYSTOLIC BLOOD PRESSURE: 111 MMHG

## 2020-01-01 VITALS
SYSTOLIC BLOOD PRESSURE: 110 MMHG | DIASTOLIC BLOOD PRESSURE: 70 MMHG | OXYGEN SATURATION: 95 % | TEMPERATURE: 96.8 F | HEART RATE: 70 BPM | HEIGHT: 67 IN | BODY MASS INDEX: 26.53 KG/M2 | WEIGHT: 169 LBS

## 2020-01-01 VITALS
SYSTOLIC BLOOD PRESSURE: 137 MMHG | WEIGHT: 167 LBS | HEART RATE: 64 BPM | BODY MASS INDEX: 26.21 KG/M2 | DIASTOLIC BLOOD PRESSURE: 75 MMHG | RESPIRATION RATE: 20 BRPM | OXYGEN SATURATION: 95 % | HEIGHT: 67 IN

## 2020-01-01 VITALS
SYSTOLIC BLOOD PRESSURE: 134 MMHG | RESPIRATION RATE: 20 BRPM | HEART RATE: 70 BPM | DIASTOLIC BLOOD PRESSURE: 81 MMHG | WEIGHT: 165 LBS | TEMPERATURE: 97.1 F | HEIGHT: 67 IN | BODY MASS INDEX: 25.9 KG/M2 | OXYGEN SATURATION: 92 %

## 2020-01-01 VITALS
WEIGHT: 165 LBS | SYSTOLIC BLOOD PRESSURE: 138 MMHG | BODY MASS INDEX: 25.84 KG/M2 | TEMPERATURE: 97.5 F | HEART RATE: 69 BPM | DIASTOLIC BLOOD PRESSURE: 64 MMHG

## 2020-01-01 VITALS
HEART RATE: 79 BPM | SYSTOLIC BLOOD PRESSURE: 119 MMHG | WEIGHT: 167.5 LBS | DIASTOLIC BLOOD PRESSURE: 55 MMHG | BODY MASS INDEX: 26.23 KG/M2

## 2020-01-01 VITALS
DIASTOLIC BLOOD PRESSURE: 67 MMHG | BODY MASS INDEX: 25.67 KG/M2 | TEMPERATURE: 97.4 F | HEIGHT: 67 IN | OXYGEN SATURATION: 99 % | SYSTOLIC BLOOD PRESSURE: 108 MMHG | RESPIRATION RATE: 18 BRPM | HEART RATE: 70 BPM | WEIGHT: 163.58 LBS

## 2020-01-01 DIAGNOSIS — I42.0 DILATED CARDIOMYOPATHY (HCC): ICD-10-CM

## 2020-01-01 DIAGNOSIS — I25.118 CORONARY ARTERY DISEASE OF NATIVE ARTERY OF NATIVE HEART WITH STABLE ANGINA PECTORIS (HCC): ICD-10-CM

## 2020-01-01 DIAGNOSIS — I48.21 PERMANENT ATRIAL FIBRILLATION (HCC): ICD-10-CM

## 2020-01-01 DIAGNOSIS — E11.65 TYPE 2 DIABETES MELLITUS WITH HYPERGLYCEMIA, WITHOUT LONG-TERM CURRENT USE OF INSULIN (HCC): ICD-10-CM

## 2020-01-01 DIAGNOSIS — I48.19 OTHER PERSISTENT ATRIAL FIBRILLATION (HCC): ICD-10-CM

## 2020-01-01 DIAGNOSIS — E78.2 MIXED HYPERLIPIDEMIA: ICD-10-CM

## 2020-01-01 DIAGNOSIS — J22 ACUTE RESPIRATORY INFECTION: ICD-10-CM

## 2020-01-01 DIAGNOSIS — I49.9 ARRHYTHMIA, VENTRICULAR: ICD-10-CM

## 2020-01-01 DIAGNOSIS — R07.9 CHEST PAIN, UNSPECIFIED TYPE: ICD-10-CM

## 2020-01-01 DIAGNOSIS — I10 ESSENTIAL HYPERTENSION: ICD-10-CM

## 2020-01-01 DIAGNOSIS — R06.00 DYSPNEA, UNSPECIFIED TYPE: Primary | ICD-10-CM

## 2020-01-01 DIAGNOSIS — E11.65 TYPE 2 DIABETES MELLITUS WITH HYPERGLYCEMIA, WITHOUT LONG-TERM CURRENT USE OF INSULIN (HCC): Primary | ICD-10-CM

## 2020-01-01 DIAGNOSIS — I50.9 CONGESTIVE HEART FAILURE, UNSPECIFIED HF CHRONICITY, UNSPECIFIED HEART FAILURE TYPE (HCC): ICD-10-CM

## 2020-01-01 DIAGNOSIS — Z95.0 PRESENCE OF CARDIAC PACEMAKER: ICD-10-CM

## 2020-01-01 DIAGNOSIS — N18.30 STAGE 3 CHRONIC KIDNEY DISEASE (HCC): ICD-10-CM

## 2020-01-01 DIAGNOSIS — J13 PNEUMONIA DUE TO STREPTOCOCCUS PNEUMONIAE, UNSPECIFIED LATERALITY, UNSPECIFIED PART OF LUNG (HCC): Primary | ICD-10-CM

## 2020-01-01 DIAGNOSIS — I25.5 ISCHEMIC CARDIOMYOPATHY: ICD-10-CM

## 2020-01-01 DIAGNOSIS — J44.9 CHRONIC OBSTRUCTIVE PULMONARY DISEASE, UNSPECIFIED COPD TYPE (HCC): ICD-10-CM

## 2020-01-01 DIAGNOSIS — I50.33 ACUTE ON CHRONIC HEART FAILURE WITH PRESERVED EJECTION FRACTION (HCC): ICD-10-CM

## 2020-01-01 DIAGNOSIS — I48.21 PERMANENT ATRIAL FIBRILLATION (HCC): Primary | ICD-10-CM

## 2020-01-01 DIAGNOSIS — Z00.00 ENCOUNTER FOR MEDICARE ANNUAL WELLNESS EXAM: ICD-10-CM

## 2020-01-01 DIAGNOSIS — R09.02 HYPOXEMIA: ICD-10-CM

## 2020-01-01 DIAGNOSIS — E55.9 VITAMIN D DEFICIENCY: ICD-10-CM

## 2020-01-01 DIAGNOSIS — J22 ACUTE RESPIRATORY INFECTION: Primary | ICD-10-CM

## 2020-01-01 DIAGNOSIS — R73.9 HYPERGLYCEMIA: ICD-10-CM

## 2020-01-01 DIAGNOSIS — J13 PNEUMONIA DUE TO STREPTOCOCCUS PNEUMONIAE, UNSPECIFIED LATERALITY, UNSPECIFIED PART OF LUNG (HCC): ICD-10-CM
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
## 2020-01-01 DIAGNOSIS — I10 ESSENTIAL HYPERTENSION: Primary | ICD-10-CM

## 2020-01-01 LAB
ALBUMIN SERPL-MCNC: 3.9 G/DL (ref 3.5–5.2)
ALBUMIN SERPL-MCNC: 3.9 G/DL (ref 3.5–5.2)
ALBUMIN SERPL-MCNC: 4.1 G/DL (ref 3.5–5.2)
ALBUMIN SERPL-MCNC: 4.1 G/DL (ref 3.5–5.2)
ALBUMIN SERPL-MCNC: 4.4 G/DL (ref 3.5–5.2)
ALBUMIN UR-MCNC: 1.8 MG/DL
ALBUMIN UR-MCNC: <1.2 MG/DL
ALBUMIN/GLOB SERPL: 1.3 G/DL
ALBUMIN/GLOB SERPL: 1.4 G/DL
ALBUMIN/GLOB SERPL: 1.5 G/DL
ALP SERPL-CCNC: 54 U/L (ref 39–117)
ALP SERPL-CCNC: 55 U/L (ref 39–117)
ALP SERPL-CCNC: 70 U/L (ref 39–117)
ALP SERPL-CCNC: 71 U/L (ref 39–117)
ALP SERPL-CCNC: 75 U/L (ref 39–117)
ALT SERPL W P-5'-P-CCNC: 14 U/L (ref 1–41)
ALT SERPL W P-5'-P-CCNC: 15 U/L (ref 1–41)
ALT SERPL W P-5'-P-CCNC: 6 U/L (ref 1–41)
ALT SERPL W P-5'-P-CCNC: 6 U/L (ref 1–41)
ALT SERPL W P-5'-P-CCNC: 7 U/L (ref 1–41)
ANION GAP SERPL CALCULATED.3IONS-SCNC: 10 MMOL/L (ref 5–15)
ANION GAP SERPL CALCULATED.3IONS-SCNC: 10.7 MMOL/L (ref 5–15)
ANION GAP SERPL CALCULATED.3IONS-SCNC: 11 MMOL/L (ref 5–15)
ANION GAP SERPL CALCULATED.3IONS-SCNC: 11.9 MMOL/L (ref 5–15)
ANION GAP SERPL CALCULATED.3IONS-SCNC: 12 MMOL/L (ref 5–15)
ANION GAP SERPL CALCULATED.3IONS-SCNC: 13 MMOL/L (ref 5–15)
ANION GAP SERPL CALCULATED.3IONS-SCNC: 13 MMOL/L (ref 5–15)
ANION GAP SERPL CALCULATED.3IONS-SCNC: 13.8 MMOL/L (ref 5–15)
AST SERPL-CCNC: 12 U/L (ref 1–40)
AST SERPL-CCNC: 13 U/L (ref 1–40)
AST SERPL-CCNC: 13 U/L (ref 1–40)
AST SERPL-CCNC: 15 U/L (ref 1–40)
AST SERPL-CCNC: 7 U/L (ref 1–40)
B PARAPERT DNA SPEC QL NAA+PROBE: NOT DETECTED
B PARAPERT DNA SPEC QL NAA+PROBE: NOT DETECTED
B PERT DNA SPEC QL NAA+PROBE: NOT DETECTED
B PERT DNA SPEC QL NAA+PROBE: NOT DETECTED
BASOPHILS # BLD AUTO: 0.09 10*3/MM3 (ref 0–0.2)
BASOPHILS # BLD AUTO: 0.1 10*3/MM3 (ref 0–0.2)
BASOPHILS # BLD AUTO: 0.14 10*3/MM3 (ref 0–0.2)
BASOPHILS NFR BLD AUTO: 0.7 % (ref 0–1.5)
BASOPHILS NFR BLD AUTO: 0.7 % (ref 0–1.5)
BASOPHILS NFR BLD AUTO: 0.8 % (ref 0–1.5)
BASOPHILS NFR BLD AUTO: 0.9 % (ref 0–1.5)
BASOPHILS NFR BLD AUTO: 1 % (ref 0–1.5)
BASOPHILS NFR BLD AUTO: 1 % (ref 0–1.5)
BASOPHILS NFR BLD AUTO: 1.1 % (ref 0–1.5)
BASOPHILS NFR BLD AUTO: 1.2 % (ref 0–1.5)
BH CV ECHO MEAS - % IVS THICK: 54.4 %
BH CV ECHO MEAS - % LVPW THICK: 62.9 %
BH CV ECHO MEAS - ACS: 1.3 CM
BH CV ECHO MEAS - AO MAX PG (FULL): 5.9 MMHG
BH CV ECHO MEAS - AO MAX PG: 9.7 MMHG
BH CV ECHO MEAS - AO MEAN PG (FULL): 3.7 MMHG
BH CV ECHO MEAS - AO MEAN PG: 5.6 MMHG
BH CV ECHO MEAS - AO ROOT AREA (BSA CORRECTED): 1.7
BH CV ECHO MEAS - AO ROOT AREA: 7.9 CM^2
BH CV ECHO MEAS - AO ROOT DIAM: 3.2 CM
BH CV ECHO MEAS - AO V2 MAX: 154.7 CM/SEC
BH CV ECHO MEAS - AO V2 MEAN: 111.7 CM/SEC
BH CV ECHO MEAS - AO V2 VTI: 25.1 CM
BH CV ECHO MEAS - ASC AORTA: 3 CM
BH CV ECHO MEAS - AVA(I,A): 1.9 CM^2
BH CV ECHO MEAS - AVA(I,D): 1.9 CM^2
BH CV ECHO MEAS - AVA(V,A): 1.9 CM^2
BH CV ECHO MEAS - AVA(V,D): 1.9 CM^2
BH CV ECHO MEAS - BSA(HAYCOCK): 1.9 M^2
BH CV ECHO MEAS - BSA: 1.8 M^2
BH CV ECHO MEAS - BZI_BMI: 25.4 KILOGRAMS/M^2
BH CV ECHO MEAS - BZI_METRIC_HEIGHT: 170.2 CM
BH CV ECHO MEAS - BZI_METRIC_WEIGHT: 73.5 KG
BH CV ECHO MEAS - EDV(CUBED): 26.6 ML
BH CV ECHO MEAS - EDV(MOD-SP4): 60.8 ML
BH CV ECHO MEAS - EDV(TEICH): 34.6 ML
BH CV ECHO MEAS - EF(CUBED): 66.6 %
BH CV ECHO MEAS - EF(MOD-BP): 61 %
BH CV ECHO MEAS - EF(MOD-SP4): 61.2 %
BH CV ECHO MEAS - EF(TEICH): 59.7 %
BH CV ECHO MEAS - ESV(CUBED): 8.9 ML
BH CV ECHO MEAS - ESV(MOD-SP4): 23.6 ML
BH CV ECHO MEAS - ESV(TEICH): 13.9 ML
BH CV ECHO MEAS - FS: 30.6 %
BH CV ECHO MEAS - IVS/LVPW: 1.1
BH CV ECHO MEAS - IVSD: 1.4 CM
BH CV ECHO MEAS - IVSS: 2.1 CM
BH CV ECHO MEAS - LA DIMENSION(2D): 4.6 CM
BH CV ECHO MEAS - LV DIASTOLIC VOL/BSA (35-75): 32.9 ML/M^2
BH CV ECHO MEAS - LV MASS(C)D: 119.1 GRAMS
BH CV ECHO MEAS - LV MASS(C)DI: 64.4 GRAMS/M^2
BH CV ECHO MEAS - LV MASS(C)S: 182.1 GRAMS
BH CV ECHO MEAS - LV MASS(C)SI: 98.5 GRAMS/M^2
BH CV ECHO MEAS - LV MAX PG: 3.8 MMHG
BH CV ECHO MEAS - LV MEAN PG: 1.9 MMHG
BH CV ECHO MEAS - LV SYSTOLIC VOL/BSA (12-30): 12.7 ML/M^2
BH CV ECHO MEAS - LV V1 MAX: 97.5 CM/SEC
BH CV ECHO MEAS - LV V1 MEAN: 64.8 CM/SEC
BH CV ECHO MEAS - LV V1 VTI: 16.6 CM
BH CV ECHO MEAS - LVIDD: 3 CM
BH CV ECHO MEAS - LVIDS: 2.1 CM
BH CV ECHO MEAS - LVOT AREA: 2.9 CM^2
BH CV ECHO MEAS - LVOT DIAM: 1.9 CM
BH CV ECHO MEAS - LVPWD: 1.2 CM
BH CV ECHO MEAS - LVPWS: 1.9 CM
BH CV ECHO MEAS - MV A MAX VEL: 0.44 CM/SEC
BH CV ECHO MEAS - MV DEC SLOPE: 464.5 CM/SEC^2
BH CV ECHO MEAS - MV DEC TIME: 0.18 SEC
BH CV ECHO MEAS - MV E MAX VEL: 84.7 CM/SEC
BH CV ECHO MEAS - MV E/A: 193.8
BH CV ECHO MEAS - MV MAX PG: 3.9 MMHG
BH CV ECHO MEAS - MV MEAN PG: 1.5 MMHG
BH CV ECHO MEAS - MV V2 MAX: 98.6 CM/SEC
BH CV ECHO MEAS - MV V2 MEAN: 55.8 CM/SEC
BH CV ECHO MEAS - MV V2 VTI: 16.6 CM
BH CV ECHO MEAS - MVA(VTI): 2.9 CM^2
BH CV ECHO MEAS - PA ACC TIME: 0.08 SEC
BH CV ECHO MEAS - PA MAX PG (FULL): 1 MMHG
BH CV ECHO MEAS - PA MAX PG: 2.7 MMHG
BH CV ECHO MEAS - PA MEAN PG (FULL): 0.63 MMHG
BH CV ECHO MEAS - PA MEAN PG: 1.4 MMHG
BH CV ECHO MEAS - PA PR(ACCEL): 43.2 MMHG
BH CV ECHO MEAS - PA V2 MAX: 82.7 CM/SEC
BH CV ECHO MEAS - PA V2 MEAN: 57.1 CM/SEC
BH CV ECHO MEAS - PA V2 VTI: 12.5 CM
BH CV ECHO MEAS - RAP SYSTOLE: 3 MMHG
BH CV ECHO MEAS - RV MAX PG: 1.7 MMHG
BH CV ECHO MEAS - RV MEAN PG: 0.81 MMHG
BH CV ECHO MEAS - RV V1 MAX: 65.7 CM/SEC
BH CV ECHO MEAS - RV V1 MEAN: 41.8 CM/SEC
BH CV ECHO MEAS - RV V1 VTI: 10.6 CM
BH CV ECHO MEAS - RVDD: 3.5 CM
BH CV ECHO MEAS - RVSP: 32.4 MMHG
BH CV ECHO MEAS - SI(AO): 107 ML/M^2
BH CV ECHO MEAS - SI(CUBED): 9.6 ML/M^2
BH CV ECHO MEAS - SI(LVOT): 26.4 ML/M^2
BH CV ECHO MEAS - SI(MOD-SP4): 20.1 ML/M^2
BH CV ECHO MEAS - SI(TEICH): 11.2 ML/M^2
BH CV ECHO MEAS - SV(AO): 197.8 ML
BH CV ECHO MEAS - SV(CUBED): 17.7 ML
BH CV ECHO MEAS - SV(LVOT): 48.8 ML
BH CV ECHO MEAS - SV(MOD-SP4): 37.3 ML
BH CV ECHO MEAS - SV(TEICH): 20.7 ML
BH CV ECHO MEAS - TR MAX VEL: 271.2 CM/SEC
BILIRUB SERPL-MCNC: 0.3 MG/DL (ref 0–1.2)
BILIRUB SERPL-MCNC: 0.4 MG/DL (ref 0.2–1.2)
BILIRUB SERPL-MCNC: 0.4 MG/DL (ref 0–1.2)
BILIRUB SERPL-MCNC: 0.4 MG/DL (ref 0–1.2)
BILIRUB SERPL-MCNC: 0.5 MG/DL (ref 0–1.2)
BUN BLD-MCNC: 23 MG/DL (ref 8–23)
BUN SERPL-MCNC: 26 MG/DL (ref 8–23)
BUN SERPL-MCNC: 27 MG/DL (ref 8–23)
BUN SERPL-MCNC: 28 MG/DL (ref 8–23)
BUN SERPL-MCNC: 30 MG/DL (ref 8–23)
BUN SERPL-MCNC: 39 MG/DL (ref 8–23)
BUN SERPL-MCNC: 41 MG/DL (ref 8–23)
BUN SERPL-MCNC: 44 MG/DL (ref 8–23)
BUN SERPL-MCNC: 48 MG/DL (ref 8–23)
BUN SERPL-MCNC: 49 MG/DL (ref 8–23)
BUN SERPL-MCNC: 52 MG/DL (ref 8–23)
BUN/CREAT SERPL: 12.6 (ref 7–25)
BUN/CREAT SERPL: 13.8 (ref 7–25)
BUN/CREAT SERPL: 14.1 (ref 7–25)
BUN/CREAT SERPL: 14.1 (ref 7–25)
BUN/CREAT SERPL: 14.7 (ref 7–25)
BUN/CREAT SERPL: 16.7 (ref 7–25)
BUN/CREAT SERPL: 17.1 (ref 7–25)
BUN/CREAT SERPL: 17.6 (ref 7–25)
BUN/CREAT SERPL: 18.3 (ref 7–25)
BUN/CREAT SERPL: 20 (ref 7–25)
BUN/CREAT SERPL: 20.9 (ref 7–25)
C PNEUM DNA NPH QL NAA+NON-PROBE: NOT DETECTED
C PNEUM DNA NPH QL NAA+NON-PROBE: NOT DETECTED
CALCIUM SPEC-SCNC: 10.1 MG/DL (ref 8.6–10.5)
CALCIUM SPEC-SCNC: 8.6 MG/DL (ref 8.6–10.5)
CALCIUM SPEC-SCNC: 8.9 MG/DL (ref 8.6–10.5)
CALCIUM SPEC-SCNC: 9.1 MG/DL (ref 8.6–10.5)
CALCIUM SPEC-SCNC: 9.2 MG/DL (ref 8.6–10.5)
CALCIUM SPEC-SCNC: 9.3 MG/DL (ref 8.6–10.5)
CALCIUM SPEC-SCNC: 9.5 MG/DL (ref 8.6–10.5)
CALCIUM SPEC-SCNC: 9.6 MG/DL (ref 8.6–10.5)
CALCIUM SPEC-SCNC: 9.8 MG/DL (ref 8.6–10.5)
CHLORIDE SERPL-SCNC: 100 MMOL/L (ref 98–107)
CHLORIDE SERPL-SCNC: 100 MMOL/L (ref 98–107)
CHLORIDE SERPL-SCNC: 101 MMOL/L (ref 98–107)
CHLORIDE SERPL-SCNC: 101 MMOL/L (ref 98–107)
CHLORIDE SERPL-SCNC: 102 MMOL/L (ref 98–107)
CHLORIDE SERPL-SCNC: 102 MMOL/L (ref 98–107)
CHLORIDE SERPL-SCNC: 103 MMOL/L (ref 98–107)
CHLORIDE SERPL-SCNC: 95 MMOL/L (ref 98–107)
CHLORIDE SERPL-SCNC: 98 MMOL/L (ref 98–107)
CHLORIDE SERPL-SCNC: 98 MMOL/L (ref 98–107)
CHLORIDE SERPL-SCNC: 99 MMOL/L (ref 98–107)
CHOLEST SERPL-MCNC: 103 MG/DL (ref 0–200)
CHOLEST SERPL-MCNC: 106 MG/DL (ref 0–200)
CHOLEST SERPL-MCNC: 131 MG/DL (ref 0–200)
CHOLEST SERPL-MCNC: 92 MG/DL (ref 0–200)
CK SERPL-CCNC: 34 U/L (ref 20–200)
CO2 SERPL-SCNC: 24 MMOL/L (ref 22–29)
CO2 SERPL-SCNC: 25 MMOL/L (ref 22–29)
CO2 SERPL-SCNC: 25.1 MMOL/L (ref 22–29)
CO2 SERPL-SCNC: 25.3 MMOL/L (ref 22–29)
CO2 SERPL-SCNC: 26.2 MMOL/L (ref 22–29)
CO2 SERPL-SCNC: 27 MMOL/L (ref 22–29)
CO2 SERPL-SCNC: 27 MMOL/L (ref 22–29)
CO2 SERPL-SCNC: 28 MMOL/L (ref 22–29)
CO2 SERPL-SCNC: 28 MMOL/L (ref 22–29)
CREAT BLD-MCNC: 1.82 MG/DL (ref 0.76–1.27)
CREAT SERPL-MCNC: 1.84 MG/DL (ref 0.76–1.27)
CREAT SERPL-MCNC: 1.91 MG/DL (ref 0.76–1.27)
CREAT SERPL-MCNC: 1.92 MG/DL (ref 0.76–1.27)
CREAT SERPL-MCNC: 1.96 MG/DL (ref 0.76–1.27)
CREAT SERPL-MCNC: 2.18 MG/DL (ref 0.76–1.27)
CREAT SERPL-MCNC: 2.21 MG/DL (ref 0.76–1.27)
CREAT SERPL-MCNC: 2.4 MG/DL (ref 0.76–1.27)
CREAT SERPL-MCNC: 2.41 MG/DL (ref 0.76–1.27)
CREAT SERPL-MCNC: 2.93 MG/DL (ref 0.76–1.27)
CREAT SERPL-MCNC: 3.04 MG/DL (ref 0.76–1.27)
CREAT UR-MCNC: 112.7 MG/DL
CREAT UR-MCNC: 25.5 MG/DL
CREAT UR-MCNC: 53.4 MG/DL
DEPRECATED RDW RBC AUTO: 47.2 FL (ref 37–54)
DEPRECATED RDW RBC AUTO: 52.7 FL (ref 37–54)
DEPRECATED RDW RBC AUTO: 53.8 FL (ref 37–54)
DEPRECATED RDW RBC AUTO: 63 FL (ref 37–54)
DEPRECATED RDW RBC AUTO: 63 FL (ref 37–54)
DEPRECATED RDW RBC AUTO: 63.4 FL (ref 37–54)
DEPRECATED RDW RBC AUTO: 63.9 FL (ref 37–54)
DEPRECATED RDW RBC AUTO: 64.3 FL (ref 37–54)
DIGOXIN SERPL-MCNC: 1.3 NG/ML (ref 0.6–1.2)
DIGOXIN SERPL-MCNC: 1.3 NG/ML (ref 0.6–1.2)
DIGOXIN SERPL-MCNC: 1.7 NG/ML (ref 0.6–1.2)
EOSINOPHIL # BLD AUTO: 0.75 10*3/MM3 (ref 0–0.4)
EOSINOPHIL # BLD AUTO: 0.8 10*3/MM3 (ref 0–0.4)
EOSINOPHIL # BLD AUTO: 0.9 10*3/MM3 (ref 0–0.4)
EOSINOPHIL # BLD AUTO: 1 10*3/MM3 (ref 0–0.4)
EOSINOPHIL # BLD AUTO: 1.1 10*3/MM3 (ref 0–0.4)
EOSINOPHIL # BLD AUTO: 1.1 10*3/MM3 (ref 0–0.4)
EOSINOPHIL NFR BLD AUTO: 10.2 % (ref 0.3–6.2)
EOSINOPHIL NFR BLD AUTO: 10.9 % (ref 0.3–6.2)
EOSINOPHIL NFR BLD AUTO: 11.1 % (ref 0.3–6.2)
EOSINOPHIL NFR BLD AUTO: 13.5 % (ref 0.3–6.2)
EOSINOPHIL NFR BLD AUTO: 13.9 % (ref 0.3–6.2)
EOSINOPHIL NFR BLD AUTO: 7.6 % (ref 0.3–6.2)
EOSINOPHIL NFR BLD AUTO: 7.9 % (ref 0.3–6.2)
EOSINOPHIL NFR BLD AUTO: 8.2 % (ref 0.3–6.2)
EOSINOPHIL NFR BLD AUTO: 8.2 % (ref 0.3–6.2)
EOSINOPHIL NFR BLD AUTO: 9.2 % (ref 0.3–6.2)
ERYTHROCYTE [DISTWIDTH] IN BLOOD BY AUTOMATED COUNT: 13 % (ref 12.3–15.4)
ERYTHROCYTE [DISTWIDTH] IN BLOOD BY AUTOMATED COUNT: 14.3 % (ref 12.3–15.4)
ERYTHROCYTE [DISTWIDTH] IN BLOOD BY AUTOMATED COUNT: 14.4 % (ref 12.3–15.4)
ERYTHROCYTE [DISTWIDTH] IN BLOOD BY AUTOMATED COUNT: 17.7 % (ref 12.3–15.4)
ERYTHROCYTE [DISTWIDTH] IN BLOOD BY AUTOMATED COUNT: 17.8 % (ref 12.3–15.4)
ERYTHROCYTE [DISTWIDTH] IN BLOOD BY AUTOMATED COUNT: 17.9 % (ref 12.3–15.4)
ERYTHROCYTE [DISTWIDTH] IN BLOOD BY AUTOMATED COUNT: 17.9 % (ref 12.3–15.4)
ERYTHROCYTE [DISTWIDTH] IN BLOOD BY AUTOMATED COUNT: 18 % (ref 12.3–15.4)
FLUAV H1 2009 PAND RNA NPH QL NAA+PROBE: NOT DETECTED
FLUAV H1 HA GENE NPH QL NAA+PROBE: NOT DETECTED
FLUAV H3 RNA NPH QL NAA+PROBE: NOT DETECTED
FLUAV SUBTYP SPEC NAA+PROBE: NOT DETECTED
FLUAV SUBTYP SPEC NAA+PROBE: NOT DETECTED
FLUBV RNA ISLT QL NAA+PROBE: NOT DETECTED
FLUBV RNA ISLT QL NAA+PROBE: NOT DETECTED
GFR SERPL CREATININE-BSD FRML MDRD: 20 ML/MIN/1.73
GFR SERPL CREATININE-BSD FRML MDRD: 21 ML/MIN/1.73
GFR SERPL CREATININE-BSD FRML MDRD: 26 ML/MIN/1.73
GFR SERPL CREATININE-BSD FRML MDRD: 26 ML/MIN/1.73
GFR SERPL CREATININE-BSD FRML MDRD: 29 ML/MIN/1.73
GFR SERPL CREATININE-BSD FRML MDRD: 29 ML/MIN/1.73
GFR SERPL CREATININE-BSD FRML MDRD: 33 ML/MIN/1.73
GFR SERPL CREATININE-BSD FRML MDRD: 34 ML/MIN/1.73
GFR SERPL CREATININE-BSD FRML MDRD: 34 ML/MIN/1.73
GFR SERPL CREATININE-BSD FRML MDRD: 35 ML/MIN/1.73
GFR SERPL CREATININE-BSD FRML MDRD: 36 ML/MIN/1.73
GLOBULIN UR ELPH-MCNC: 2.6 GM/DL
GLOBULIN UR ELPH-MCNC: 2.7 GM/DL
GLOBULIN UR ELPH-MCNC: 2.9 GM/DL
GLOBULIN UR ELPH-MCNC: 3 GM/DL
GLOBULIN UR ELPH-MCNC: 3 GM/DL
GLUCOSE BLD-MCNC: 153 MG/DL (ref 65–99)
GLUCOSE BLDC GLUCOMTR-MCNC: 148 MG/DL (ref 70–105)
GLUCOSE BLDC GLUCOMTR-MCNC: 149 MG/DL (ref 70–105)
GLUCOSE BLDC GLUCOMTR-MCNC: 154 MG/DL (ref 70–105)
GLUCOSE BLDC GLUCOMTR-MCNC: 163 MG/DL (ref 70–105)
GLUCOSE BLDC GLUCOMTR-MCNC: 164 MG/DL (ref 70–105)
GLUCOSE BLDC GLUCOMTR-MCNC: 168 MG/DL (ref 70–105)
GLUCOSE BLDC GLUCOMTR-MCNC: 168 MG/DL (ref 70–105)
GLUCOSE BLDC GLUCOMTR-MCNC: 174 MG/DL (ref 70–105)
GLUCOSE BLDC GLUCOMTR-MCNC: 179 MG/DL (ref 70–105)
GLUCOSE BLDC GLUCOMTR-MCNC: 182 MG/DL (ref 70–105)
GLUCOSE BLDC GLUCOMTR-MCNC: 183 MG/DL (ref 70–105)
GLUCOSE BLDC GLUCOMTR-MCNC: 185 MG/DL (ref 70–105)
GLUCOSE BLDC GLUCOMTR-MCNC: 186 MG/DL (ref 70–105)
GLUCOSE BLDC GLUCOMTR-MCNC: 188 MG/DL (ref 70–105)
GLUCOSE BLDC GLUCOMTR-MCNC: 189 MG/DL (ref 70–105)
GLUCOSE BLDC GLUCOMTR-MCNC: 206 MG/DL (ref 70–105)
GLUCOSE BLDC GLUCOMTR-MCNC: 210 MG/DL (ref 70–105)
GLUCOSE BLDC GLUCOMTR-MCNC: 223 MG/DL (ref 70–105)
GLUCOSE BLDC GLUCOMTR-MCNC: 223 MG/DL (ref 70–105)
GLUCOSE BLDC GLUCOMTR-MCNC: 223 MG/DL (ref 70–130)
GLUCOSE BLDC GLUCOMTR-MCNC: 226 MG/DL (ref 70–105)
GLUCOSE BLDC GLUCOMTR-MCNC: 239 MG/DL (ref 70–105)
GLUCOSE BLDC GLUCOMTR-MCNC: 254 MG/DL (ref 70–105)
GLUCOSE BLDC GLUCOMTR-MCNC: 264 MG/DL (ref 70–105)
GLUCOSE SERPL-MCNC: 117 MG/DL (ref 65–99)
GLUCOSE SERPL-MCNC: 124 MG/DL (ref 65–99)
GLUCOSE SERPL-MCNC: 154 MG/DL (ref 65–99)
GLUCOSE SERPL-MCNC: 164 MG/DL (ref 65–99)
GLUCOSE SERPL-MCNC: 164 MG/DL (ref 65–99)
GLUCOSE SERPL-MCNC: 166 MG/DL (ref 65–99)
GLUCOSE SERPL-MCNC: 172 MG/DL (ref 65–99)
GLUCOSE SERPL-MCNC: 187 MG/DL (ref 65–99)
GLUCOSE SERPL-MCNC: 223 MG/DL (ref 65–99)
GLUCOSE SERPL-MCNC: 246 MG/DL (ref 65–99)
HADV DNA SPEC NAA+PROBE: NOT DETECTED
HADV DNA SPEC NAA+PROBE: NOT DETECTED
HBA1C MFR BLD: 6.5 % (ref 4.8–5.6)
HBA1C MFR BLD: 6.6 % (ref 3.5–5.6)
HBA1C MFR BLD: 6.8 % (ref 3.5–5.6)
HBA1C MFR BLD: 7.1 % (ref 3.5–5.6)
HCOV 229E RNA SPEC QL NAA+PROBE: NOT DETECTED
HCOV 229E RNA SPEC QL NAA+PROBE: NOT DETECTED
HCOV HKU1 RNA SPEC QL NAA+PROBE: NOT DETECTED
HCOV HKU1 RNA SPEC QL NAA+PROBE: NOT DETECTED
HCOV NL63 RNA SPEC QL NAA+PROBE: NOT DETECTED
HCOV NL63 RNA SPEC QL NAA+PROBE: NOT DETECTED
HCOV OC43 RNA SPEC QL NAA+PROBE: NOT DETECTED
HCOV OC43 RNA SPEC QL NAA+PROBE: NOT DETECTED
HCT VFR BLD AUTO: 34.1 % (ref 37.5–51)
HCT VFR BLD AUTO: 34.4 % (ref 37.5–51)
HCT VFR BLD AUTO: 35.4 % (ref 37.5–51)
HCT VFR BLD AUTO: 35.5 % (ref 37.5–51)
HCT VFR BLD AUTO: 35.7 % (ref 37.5–51)
HCT VFR BLD AUTO: 36.6 % (ref 37.5–51)
HCT VFR BLD AUTO: 36.9 % (ref 37.5–51)
HCT VFR BLD AUTO: 37.3 % (ref 37.5–51)
HCT VFR BLD AUTO: 38 % (ref 37.5–51)
HCT VFR BLD AUTO: 38.5 % (ref 37.5–51)
HDLC SERPL-MCNC: 31 MG/DL (ref 40–60)
HDLC SERPL-MCNC: 37 MG/DL (ref 40–60)
HDLC SERPL-MCNC: 40 MG/DL (ref 40–60)
HDLC SERPL-MCNC: 40 MG/DL (ref 40–60)
HGB BLD-MCNC: 11.2 G/DL (ref 13–17.7)
HGB BLD-MCNC: 11.5 G/DL (ref 13–17.7)
HGB BLD-MCNC: 11.5 G/DL (ref 13–17.7)
HGB BLD-MCNC: 11.6 G/DL (ref 13–17.7)
HGB BLD-MCNC: 11.8 G/DL (ref 13–17.7)
HGB BLD-MCNC: 12.2 G/DL (ref 13–17.7)
HGB BLD-MCNC: 12.2 G/DL (ref 13–17.7)
HGB BLD-MCNC: 12.3 G/DL (ref 13–17.7)
HGB BLD-MCNC: 12.4 G/DL (ref 13–17.7)
HGB BLD-MCNC: 12.5 G/DL (ref 13–17.7)
HMPV RNA NPH QL NAA+NON-PROBE: NOT DETECTED
HMPV RNA NPH QL NAA+NON-PROBE: NOT DETECTED
HPIV1 RNA SPEC QL NAA+PROBE: NOT DETECTED
HPIV1 RNA SPEC QL NAA+PROBE: NOT DETECTED
HPIV2 RNA SPEC QL NAA+PROBE: NOT DETECTED
HPIV2 RNA SPEC QL NAA+PROBE: NOT DETECTED
HPIV3 RNA NPH QL NAA+PROBE: NOT DETECTED
HPIV3 RNA NPH QL NAA+PROBE: NOT DETECTED
HPIV4 P GENE NPH QL NAA+PROBE: NOT DETECTED
HPIV4 P GENE NPH QL NAA+PROBE: NOT DETECTED
IMM GRANULOCYTES # BLD AUTO: 0.02 10*3/MM3 (ref 0–0.05)
IMM GRANULOCYTES # BLD AUTO: 0.04 10*3/MM3 (ref 0–0.05)
IMM GRANULOCYTES # BLD AUTO: 0.13 10*3/MM3 (ref 0–0.05)
IMM GRANULOCYTES NFR BLD AUTO: 0.2 % (ref 0–0.5)
IMM GRANULOCYTES NFR BLD AUTO: 0.4 % (ref 0–0.5)
IMM GRANULOCYTES NFR BLD AUTO: 0.9 % (ref 0–0.5)
INR PPP: 1.65 (ref 2–3)
INR PPP: 1.68 (ref 2–3)
INR PPP: 1.78 (ref 2–3)
INR PPP: 1.85 (ref 2–3)
INR PPP: 1.9 (ref 0.9–1.1)
INR PPP: 1.9 (ref 0.9–1.1)
INR PPP: 1.93 (ref 2–3)
INR PPP: 2.01 (ref 2–3)
INR PPP: 2.03 (ref 2–3)
INR PPP: 2.24 (ref 2–3)
INR PPP: 2.3 (ref 0.9–1.1)
L PNEUMO1 AG UR QL IA: NEGATIVE
LDLC SERPL CALC-MCNC: 23 MG/DL (ref 0–100)
LDLC SERPL CALC-MCNC: 35 MG/DL (ref 0–100)
LDLC SERPL CALC-MCNC: 38 MG/DL (ref 0–100)
LDLC SERPL CALC-MCNC: 57 MG/DL (ref 0–100)
LDLC/HDLC SERPL: 0.63 {RATIO}
LDLC/HDLC SERPL: 0.75 {RATIO}
LDLC/HDLC SERPL: 1.22 {RATIO}
LDLC/HDLC SERPL: 1.24 {RATIO}
LV EF 2D ECHO EST: 60 %
LYMPHOCYTES # BLD AUTO: 1.7 10*3/MM3 (ref 0.7–3.1)
LYMPHOCYTES # BLD AUTO: 1.85 10*3/MM3 (ref 0.7–3.1)
LYMPHOCYTES # BLD AUTO: 1.87 10*3/MM3 (ref 0.7–3.1)
LYMPHOCYTES # BLD AUTO: 1.88 10*3/MM3 (ref 0.7–3.1)
LYMPHOCYTES # BLD AUTO: 2 10*3/MM3 (ref 0.7–3.1)
LYMPHOCYTES # BLD AUTO: 2.1 10*3/MM3 (ref 0.7–3.1)
LYMPHOCYTES # BLD AUTO: 2.2 10*3/MM3 (ref 0.7–3.1)
LYMPHOCYTES # BLD AUTO: 2.4 10*3/MM3 (ref 0.7–3.1)
LYMPHOCYTES NFR BLD AUTO: 13.5 % (ref 19.6–45.3)
LYMPHOCYTES NFR BLD AUTO: 16.8 % (ref 19.6–45.3)
LYMPHOCYTES NFR BLD AUTO: 18.9 % (ref 19.6–45.3)
LYMPHOCYTES NFR BLD AUTO: 20.8 % (ref 19.6–45.3)
LYMPHOCYTES NFR BLD AUTO: 21.1 % (ref 19.6–45.3)
LYMPHOCYTES NFR BLD AUTO: 21.2 % (ref 19.6–45.3)
LYMPHOCYTES NFR BLD AUTO: 23 % (ref 19.6–45.3)
LYMPHOCYTES NFR BLD AUTO: 25.4 % (ref 19.6–45.3)
LYMPHOCYTES NFR BLD AUTO: 26.5 % (ref 19.6–45.3)
LYMPHOCYTES NFR BLD AUTO: 31.6 % (ref 19.6–45.3)
M PNEUMO IGG SER IA-ACNC: NOT DETECTED
M PNEUMO IGG SER IA-ACNC: NOT DETECTED
MAGNESIUM SERPL-MCNC: 2.2 MG/DL (ref 1.6–2.4)
MAGNESIUM SERPL-MCNC: 2.3 MG/DL (ref 1.6–2.4)
MAGNESIUM SERPL-MCNC: 2.4 MG/DL (ref 1.6–2.4)
MAGNESIUM SERPL-MCNC: 2.5 MG/DL (ref 1.6–2.4)
MAXIMAL PREDICTED HEART RATE: 136 BPM
MCH RBC QN AUTO: 32.3 PG (ref 26.6–33)
MCH RBC QN AUTO: 32.5 PG (ref 26.6–33)
MCH RBC QN AUTO: 33 PG (ref 26.6–33)
MCH RBC QN AUTO: 33 PG (ref 26.6–33)
MCH RBC QN AUTO: 33.2 PG (ref 26.6–33)
MCH RBC QN AUTO: 33.4 PG (ref 26.6–33)
MCH RBC QN AUTO: 33.6 PG (ref 26.6–33)
MCH RBC QN AUTO: 33.7 PG (ref 26.6–33)
MCH RBC QN AUTO: 33.9 PG (ref 26.6–33)
MCH RBC QN AUTO: 34.1 PG (ref 26.6–33)
MCHC RBC AUTO-ENTMCNC: 32.2 G/DL (ref 31.5–35.7)
MCHC RBC AUTO-ENTMCNC: 32.4 G/DL (ref 31.5–35.7)
MCHC RBC AUTO-ENTMCNC: 32.5 G/DL (ref 31.5–35.7)
MCHC RBC AUTO-ENTMCNC: 32.7 G/DL (ref 31.5–35.7)
MCHC RBC AUTO-ENTMCNC: 32.9 G/DL (ref 31.5–35.7)
MCHC RBC AUTO-ENTMCNC: 33.2 G/DL (ref 31.5–35.7)
MCHC RBC AUTO-ENTMCNC: 33.4 G/DL (ref 31.5–35.7)
MCV RBC AUTO: 100.3 FL (ref 79–97)
MCV RBC AUTO: 100.5 FL (ref 79–97)
MCV RBC AUTO: 100.9 FL (ref 79–97)
MCV RBC AUTO: 101.5 FL (ref 79–97)
MCV RBC AUTO: 101.9 FL (ref 79–97)
MCV RBC AUTO: 102 FL (ref 79–97)
MCV RBC AUTO: 102.1 FL (ref 79–97)
MCV RBC AUTO: 102.3 FL (ref 79–97)
MCV RBC AUTO: 102.4 FL (ref 79–97)
MCV RBC AUTO: 97.8 FL (ref 79–97)
MICROALBUMIN/CREAT UR: 16 MG/G
MICROALBUMIN/CREAT UR: NORMAL MG/G{CREAT}
MONOCYTES # BLD AUTO: 0.74 10*3/MM3 (ref 0.1–0.9)
MONOCYTES # BLD AUTO: 0.8 10*3/MM3 (ref 0.1–0.9)
MONOCYTES # BLD AUTO: 0.9 10*3/MM3 (ref 0.1–0.9)
MONOCYTES # BLD AUTO: 0.91 10*3/MM3 (ref 0.1–0.9)
MONOCYTES # BLD AUTO: 0.98 10*3/MM3 (ref 0.1–0.9)
MONOCYTES # BLD AUTO: 1 10*3/MM3 (ref 0.1–0.9)
MONOCYTES # BLD AUTO: 1 10*3/MM3 (ref 0.1–0.9)
MONOCYTES # BLD AUTO: 1.1 10*3/MM3 (ref 0.1–0.9)
MONOCYTES NFR BLD AUTO: 10.1 % (ref 5–12)
MONOCYTES NFR BLD AUTO: 10.4 % (ref 5–12)
MONOCYTES NFR BLD AUTO: 10.4 % (ref 5–12)
MONOCYTES NFR BLD AUTO: 11.1 % (ref 5–12)
MONOCYTES NFR BLD AUTO: 11.7 % (ref 5–12)
MONOCYTES NFR BLD AUTO: 11.8 % (ref 5–12)
MONOCYTES NFR BLD AUTO: 6.5 % (ref 5–12)
MONOCYTES NFR BLD AUTO: 7.5 % (ref 5–12)
MONOCYTES NFR BLD AUTO: 8.3 % (ref 5–12)
MONOCYTES NFR BLD AUTO: 8.9 % (ref 5–12)
NEUTROPHILS # BLD AUTO: 6.41 10*3/MM3 (ref 1.7–7)
NEUTROPHILS NFR BLD AUTO: 3.1 10*3/MM3 (ref 1.7–7)
NEUTROPHILS NFR BLD AUTO: 3.9 10*3/MM3 (ref 1.7–7)
NEUTROPHILS NFR BLD AUTO: 4.3 10*3/MM3 (ref 1.7–7)
NEUTROPHILS NFR BLD AUTO: 4.5 10*3/MM3 (ref 1.7–7)
NEUTROPHILS NFR BLD AUTO: 42 % (ref 42.7–76)
NEUTROPHILS NFR BLD AUTO: 48.5 % (ref 42.7–76)
NEUTROPHILS NFR BLD AUTO: 5.5 10*3/MM3 (ref 1.7–7)
NEUTROPHILS NFR BLD AUTO: 5.6 10*3/MM3 (ref 1.7–7)
NEUTROPHILS NFR BLD AUTO: 51.1 % (ref 42.7–76)
NEUTROPHILS NFR BLD AUTO: 56.4 % (ref 42.7–76)
NEUTROPHILS NFR BLD AUTO: 56.8 % (ref 42.7–76)
NEUTROPHILS NFR BLD AUTO: 57.7 % (ref 42.7–76)
NEUTROPHILS NFR BLD AUTO: 6.4 10*3/MM3 (ref 1.7–7)
NEUTROPHILS NFR BLD AUTO: 60.7 % (ref 42.7–76)
NEUTROPHILS NFR BLD AUTO: 64.8 % (ref 42.7–76)
NEUTROPHILS NFR BLD AUTO: 64.9 % (ref 42.7–76)
NEUTROPHILS NFR BLD AUTO: 7.14 10*3/MM3 (ref 1.7–7)
NEUTROPHILS NFR BLD AUTO: 70.2 % (ref 42.7–76)
NEUTROPHILS NFR BLD AUTO: 9.81 10*3/MM3 (ref 1.7–7)
NRBC BLD AUTO-RTO: 0 /100 WBC (ref 0–0.2)
NRBC BLD AUTO-RTO: 0.1 /100 WBC (ref 0–0.2)
NRBC BLD AUTO-RTO: 0.2 /100 WBC (ref 0–0.2)
NT-PROBNP SERPL-MCNC: 2666 PG/ML (ref 0–1800)
NT-PROBNP SERPL-MCNC: 2850 PG/ML (ref 0–1800)
PLATELET # BLD AUTO: 237 10*3/MM3 (ref 140–450)
PLATELET # BLD AUTO: 243 10*3/MM3 (ref 140–450)
PLATELET # BLD AUTO: 243 10*3/MM3 (ref 140–450)
PLATELET # BLD AUTO: 254 10*3/MM3 (ref 140–450)
PLATELET # BLD AUTO: 259 10*3/MM3 (ref 140–450)
PLATELET # BLD AUTO: 260 10*3/MM3 (ref 140–450)
PLATELET # BLD AUTO: 265 10*3/MM3 (ref 140–450)
PLATELET # BLD AUTO: 272 10*3/MM3 (ref 140–450)
PLATELET # BLD AUTO: 294 10*3/MM3 (ref 140–450)
PLATELET # BLD AUTO: 295 10*3/MM3 (ref 140–450)
PMV BLD AUTO: 10 FL (ref 6–12)
PMV BLD AUTO: 6.7 FL (ref 6–12)
PMV BLD AUTO: 6.9 FL (ref 6–12)
PMV BLD AUTO: 7 FL (ref 6–12)
PMV BLD AUTO: 7.1 FL (ref 6–12)
PMV BLD AUTO: 7.2 FL (ref 6–12)
PMV BLD AUTO: 7.3 FL (ref 6–12)
PMV BLD AUTO: 7.4 FL (ref 6–12)
PMV BLD AUTO: 9.3 FL (ref 6–12)
PMV BLD AUTO: 9.4 FL (ref 6–12)
POTASSIUM BLD-SCNC: 4.6 MMOL/L (ref 3.5–5.2)
POTASSIUM SERPL-SCNC: 4.1 MMOL/L (ref 3.5–5.2)
POTASSIUM SERPL-SCNC: 4.4 MMOL/L (ref 3.5–5.2)
POTASSIUM SERPL-SCNC: 4.4 MMOL/L (ref 3.5–5.2)
POTASSIUM SERPL-SCNC: 4.5 MMOL/L (ref 3.5–5.2)
POTASSIUM SERPL-SCNC: 4.7 MMOL/L (ref 3.5–5.2)
POTASSIUM SERPL-SCNC: 4.8 MMOL/L (ref 3.5–5.2)
POTASSIUM SERPL-SCNC: 4.9 MMOL/L (ref 3.5–5.2)
POTASSIUM SERPL-SCNC: 5.1 MMOL/L (ref 3.5–5.2)
POTASSIUM SERPL-SCNC: 5.3 MMOL/L (ref 3.5–5.2)
POTASSIUM SERPL-SCNC: 5.3 MMOL/L (ref 3.5–5.2)
PROCALCITONIN SERPL-MCNC: 0.08 NG/ML (ref 0–0.25)
PROCALCITONIN SERPL-MCNC: 0.08 NG/ML (ref 0–0.25)
PROT SERPL-MCNC: 6.5 G/DL (ref 6–8.5)
PROT SERPL-MCNC: 6.8 G/DL (ref 6–8.5)
PROT SERPL-MCNC: 6.8 G/DL (ref 6–8.5)
PROT SERPL-MCNC: 7.1 G/DL (ref 6–8.5)
PROT SERPL-MCNC: 7.4 G/DL (ref 6–8.5)
PROT UR-MCNC: 6 MG/DL
PROT/CREAT UR: 112.4 MG/G CREA (ref 0–200)
PROTHROMBIN TIME: 17.7 SECONDS (ref 19.4–28.5)
PROTHROMBIN TIME: 18 SECONDS (ref 19.4–28.5)
PROTHROMBIN TIME: 19.1 SECONDS (ref 19.4–28.5)
PROTHROMBIN TIME: 19.8 SECONDS (ref 19.4–28.5)
PROTHROMBIN TIME: 20.6 SECONDS (ref 19.4–28.5)
PROTHROMBIN TIME: 21.4 SECONDS (ref 19.4–28.5)
PROTHROMBIN TIME: 21.6 SECONDS (ref 19.4–28.5)
PROTHROMBIN TIME: 23.7 SECONDS (ref 19.4–28.5)
QT INTERVAL: 424 MS
RBC # BLD AUTO: 3.36 10*6/MM3 (ref 4.14–5.8)
RBC # BLD AUTO: 3.38 10*6/MM3 (ref 4.14–5.8)
RBC # BLD AUTO: 3.46 10*6/MM3 (ref 4.14–5.8)
RBC # BLD AUTO: 3.56 10*6/MM3 (ref 4.14–5.8)
RBC # BLD AUTO: 3.59 10*6/MM3 (ref 4.14–5.8)
RBC # BLD AUTO: 3.63 10*6/MM3 (ref 4.14–5.8)
RBC # BLD AUTO: 3.64 10*6/MM3 (ref 4.14–5.8)
RBC # BLD AUTO: 3.71 10*6/MM3 (ref 4.14–5.8)
RBC # BLD AUTO: 3.73 10*6/MM3 (ref 4.14–5.8)
RBC # BLD AUTO: 3.77 10*6/MM3 (ref 4.14–5.8)
RHINOVIRUS RNA SPEC NAA+PROBE: NOT DETECTED
RHINOVIRUS RNA SPEC NAA+PROBE: NOT DETECTED
RSV RNA NPH QL NAA+NON-PROBE: NOT DETECTED
RSV RNA NPH QL NAA+NON-PROBE: NOT DETECTED
S PNEUM AG SPEC QL LA: POSITIVE
SARS-COV-2 RNA NPH QL NAA+NON-PROBE: NOT DETECTED
SARS-COV-2 RNA NPH QL NAA+NON-PROBE: NOT DETECTED
SODIUM BLD-SCNC: 137 MMOL/L (ref 136–145)
SODIUM SERPL-SCNC: 135 MMOL/L (ref 136–145)
SODIUM SERPL-SCNC: 136 MMOL/L (ref 136–145)
SODIUM SERPL-SCNC: 138 MMOL/L (ref 136–145)
SODIUM SERPL-SCNC: 140 MMOL/L (ref 136–145)
SODIUM SERPL-SCNC: 140 MMOL/L (ref 136–145)
STRESS TARGET HR: 116 BPM
THEOPHYLLINE SERPL-MCNC: 15.2 MCG/ML (ref 10–20)
TRIGL SERPL-MCNC: 159 MG/DL (ref 0–150)
TRIGL SERPL-MCNC: 166 MG/DL (ref 0–150)
TRIGL SERPL-MCNC: 186 MG/DL (ref 0–150)
TRIGL SERPL-MCNC: 207 MG/DL (ref 0–150)
TROPONIN T SERPL-MCNC: 0.02 NG/ML (ref 0–0.03)
TROPONIN T SERPL-MCNC: 0.03 NG/ML (ref 0–0.03)
VLDLC SERPL-MCNC: 28 MG/DL (ref 5–40)
VLDLC SERPL-MCNC: 31.8 MG/DL (ref 5–40)
VLDLC SERPL-MCNC: 34 MG/DL (ref 5–40)
VLDLC SERPL-MCNC: 37.2 MG/DL (ref 5–40)
WBC # BLD AUTO: 10.5 10*3/MM3 (ref 3.4–10.8)
WBC # BLD AUTO: 11.01 10*3/MM3 (ref 3.4–10.8)
WBC # BLD AUTO: 13.97 10*3/MM3 (ref 3.4–10.8)
WBC # BLD AUTO: 7.5 10*3/MM3 (ref 3.4–10.8)
WBC # BLD AUTO: 8 10*3/MM3 (ref 3.4–10.8)
WBC # BLD AUTO: 8.2 10*3/MM3 (ref 3.4–10.8)
WBC # BLD AUTO: 8.4 10*3/MM3 (ref 3.4–10.8)
WBC # BLD AUTO: 9.7 10*3/MM3 (ref 3.4–10.8)
WBC # BLD AUTO: 9.7 10*3/MM3 (ref 3.4–10.8)
WBC NRBC COR # BLD: 9.88 10*3/MM3 (ref 3.4–10.8)

## 2020-01-01 PROCEDURE — G0378 HOSPITAL OBSERVATION PER HR: HCPCS

## 2020-01-01 PROCEDURE — G0439 PPPS, SUBSEQ VISIT: HCPCS | Performed by: FAMILY MEDICINE

## 2020-01-01 PROCEDURE — 83735 ASSAY OF MAGNESIUM: CPT | Performed by: PHYSICIAN ASSISTANT

## 2020-01-01 PROCEDURE — 63710000001 INSULIN GLARGINE PER 5 UNITS: Performed by: PHYSICIAN ASSISTANT

## 2020-01-01 PROCEDURE — 99232 SBSQ HOSP IP/OBS MODERATE 35: CPT | Performed by: INTERNAL MEDICINE

## 2020-01-01 PROCEDURE — 99214 OFFICE O/P EST MOD 30 MIN: CPT | Performed by: INTERNAL MEDICINE

## 2020-01-01 PROCEDURE — 80061 LIPID PANEL: CPT

## 2020-01-01 PROCEDURE — 25010000002 CEFTRIAXONE PER 250 MG: Performed by: INTERNAL MEDICINE

## 2020-01-01 PROCEDURE — 36415 COLL VENOUS BLD VENIPUNCTURE: CPT

## 2020-01-01 PROCEDURE — 25010000002 FUROSEMIDE PER 20 MG: Performed by: INTERNAL MEDICINE

## 2020-01-01 PROCEDURE — 83036 HEMOGLOBIN GLYCOSYLATED A1C: CPT

## 2020-01-01 PROCEDURE — 94640 AIRWAY INHALATION TREATMENT: CPT

## 2020-01-01 PROCEDURE — 82570 ASSAY OF URINE CREATININE: CPT

## 2020-01-01 PROCEDURE — 85025 COMPLETE CBC W/AUTO DIFF WBC: CPT | Performed by: PHYSICIAN ASSISTANT

## 2020-01-01 PROCEDURE — 82962 GLUCOSE BLOOD TEST: CPT

## 2020-01-01 PROCEDURE — 80053 COMPREHEN METABOLIC PANEL: CPT

## 2020-01-01 PROCEDURE — 99284 EMERGENCY DEPT VISIT MOD MDM: CPT

## 2020-01-01 PROCEDURE — 84145 PROCALCITONIN (PCT): CPT

## 2020-01-01 PROCEDURE — 36416 COLLJ CAPILLARY BLOOD SPEC: CPT | Performed by: INTERNAL MEDICINE

## 2020-01-01 PROCEDURE — 85610 PROTHROMBIN TIME: CPT | Performed by: PHYSICIAN ASSISTANT

## 2020-01-01 PROCEDURE — 85610 PROTHROMBIN TIME: CPT | Performed by: INTERNAL MEDICINE

## 2020-01-01 PROCEDURE — 99233 SBSQ HOSP IP/OBS HIGH 50: CPT | Performed by: INTERNAL MEDICINE

## 2020-01-01 PROCEDURE — 93005 ELECTROCARDIOGRAM TRACING: CPT | Performed by: EMERGENCY MEDICINE

## 2020-01-01 PROCEDURE — 80162 ASSAY OF DIGOXIN TOTAL: CPT | Performed by: PHYSICIAN ASSISTANT

## 2020-01-01 PROCEDURE — 0202U NFCT DS 22 TRGT SARS-COV-2: CPT

## 2020-01-01 PROCEDURE — 99214 OFFICE O/P EST MOD 30 MIN: CPT | Performed by: FAMILY MEDICINE

## 2020-01-01 PROCEDURE — 87899 AGENT NOS ASSAY W/OPTIC: CPT | Performed by: INTERNAL MEDICINE

## 2020-01-01 PROCEDURE — 99222 1ST HOSP IP/OBS MODERATE 55: CPT | Performed by: PHYSICIAN ASSISTANT

## 2020-01-01 PROCEDURE — 93306 TTE W/DOPPLER COMPLETE: CPT

## 2020-01-01 PROCEDURE — 80162 ASSAY OF DIGOXIN TOTAL: CPT

## 2020-01-01 PROCEDURE — 80048 BASIC METABOLIC PNL TOTAL CA: CPT | Performed by: PHYSICIAN ASSISTANT

## 2020-01-01 PROCEDURE — 83880 ASSAY OF NATRIURETIC PEPTIDE: CPT | Performed by: EMERGENCY MEDICINE

## 2020-01-01 PROCEDURE — 71046 X-RAY EXAM CHEST 2 VIEWS: CPT

## 2020-01-01 PROCEDURE — 80061 LIPID PANEL: CPT | Performed by: PHYSICIAN ASSISTANT

## 2020-01-01 PROCEDURE — 63710000001 INSULIN LISPRO (HUMAN) PER 5 UNITS: Performed by: PHYSICIAN ASSISTANT

## 2020-01-01 PROCEDURE — 85025 COMPLETE CBC W/AUTO DIFF WBC: CPT

## 2020-01-01 PROCEDURE — 93281 PM DEVICE PROGR EVAL MULTI: CPT | Performed by: INTERNAL MEDICINE

## 2020-01-01 PROCEDURE — 85610 PROTHROMBIN TIME: CPT | Performed by: EMERGENCY MEDICINE

## 2020-01-01 PROCEDURE — C9803 HOPD COVID-19 SPEC COLLECT: HCPCS

## 2020-01-01 PROCEDURE — 97162 PT EVAL MOD COMPLEX 30 MIN: CPT

## 2020-01-01 PROCEDURE — 84156 ASSAY OF PROTEIN URINE: CPT

## 2020-01-01 PROCEDURE — 93306 TTE W/DOPPLER COMPLETE: CPT | Performed by: INTERNAL MEDICINE

## 2020-01-01 PROCEDURE — 82550 ASSAY OF CK (CPK): CPT

## 2020-01-01 PROCEDURE — 0202U NFCT DS 22 TRGT SARS-COV-2: CPT | Performed by: EMERGENCY MEDICINE

## 2020-01-01 PROCEDURE — 25010000002 FUROSEMIDE PER 20 MG: Performed by: PHYSICIAN ASSISTANT

## 2020-01-01 PROCEDURE — 83880 ASSAY OF NATRIURETIC PEPTIDE: CPT

## 2020-01-01 PROCEDURE — 99239 HOSP IP/OBS DSCHRG MGMT >30: CPT | Performed by: HOSPITALIST

## 2020-01-01 PROCEDURE — 99213 OFFICE O/P EST LOW 20 MIN: CPT | Performed by: FAMILY MEDICINE

## 2020-01-01 PROCEDURE — 93005 ELECTROCARDIOGRAM TRACING: CPT

## 2020-01-01 PROCEDURE — 25010000002 FUROSEMIDE PER 20 MG: Performed by: EMERGENCY MEDICINE

## 2020-01-01 PROCEDURE — 80053 COMPREHEN METABOLIC PANEL: CPT | Performed by: EMERGENCY MEDICINE

## 2020-01-01 PROCEDURE — 84484 ASSAY OF TROPONIN QUANT: CPT | Performed by: EMERGENCY MEDICINE

## 2020-01-01 PROCEDURE — 82043 UR ALBUMIN QUANTITATIVE: CPT

## 2020-01-01 PROCEDURE — 84484 ASSAY OF TROPONIN QUANT: CPT | Performed by: PHYSICIAN ASSISTANT

## 2020-01-01 PROCEDURE — 71045 X-RAY EXAM CHEST 1 VIEW: CPT

## 2020-01-01 PROCEDURE — 94799 UNLISTED PULMONARY SVC/PX: CPT

## 2020-01-01 PROCEDURE — 71250 CT THORAX DX C-: CPT

## 2020-01-01 PROCEDURE — 82962 GLUCOSE BLOOD TEST: CPT | Performed by: INTERNAL MEDICINE

## 2020-01-01 PROCEDURE — 85025 COMPLETE CBC W/AUTO DIFF WBC: CPT | Performed by: EMERGENCY MEDICINE

## 2020-01-01 PROCEDURE — 96160 PT-FOCUSED HLTH RISK ASSMT: CPT | Performed by: FAMILY MEDICINE

## 2020-01-01 PROCEDURE — 93000 ELECTROCARDIOGRAM COMPLETE: CPT | Performed by: INTERNAL MEDICINE

## 2020-01-01 PROCEDURE — 80198 ASSAY OF THEOPHYLLINE: CPT | Performed by: PHYSICIAN ASSISTANT

## 2020-01-01 PROCEDURE — 83036 HEMOGLOBIN GLYCOSYLATED A1C: CPT | Performed by: PHYSICIAN ASSISTANT

## 2020-01-01 PROCEDURE — 84145 PROCALCITONIN (PCT): CPT | Performed by: PHYSICIAN ASSISTANT

## 2020-01-01 RX ORDER — INSULIN LISPRO 100 [IU]/ML
0-14 INJECTION, SOLUTION INTRAVENOUS; SUBCUTANEOUS AS NEEDED
Status: DISCONTINUED | OUTPATIENT
Start: 2020-01-01 | End: 2020-01-01 | Stop reason: HOSPADM

## 2020-01-01 RX ORDER — ACETAMINOPHEN 650 MG/1
650 SUPPOSITORY RECTAL EVERY 4 HOURS PRN
Status: DISCONTINUED | OUTPATIENT
Start: 2020-01-01 | End: 2020-01-01 | Stop reason: HOSPADM

## 2020-01-01 RX ORDER — WARFARIN SODIUM 3 MG/1
3 TABLET ORAL NIGHTLY
Qty: 34 TABLET | Refills: 2 | Status: ON HOLD | OUTPATIENT
Start: 2020-01-01 | End: 2021-01-01 | Stop reason: SDUPTHER

## 2020-01-01 RX ORDER — WARFARIN SODIUM 3 MG/1
3 TABLET ORAL
Status: DISCONTINUED | OUTPATIENT
Start: 2020-01-01 | End: 2020-01-01

## 2020-01-01 RX ORDER — FUROSEMIDE 10 MG/ML
20 INJECTION INTRAMUSCULAR; INTRAVENOUS 3 TIMES DAILY
Status: DISCONTINUED | OUTPATIENT
Start: 2020-01-01 | End: 2020-01-01

## 2020-01-01 RX ORDER — CALCITRIOL 0.25 UG/1
0.25 CAPSULE, LIQUID FILLED ORAL 3 TIMES WEEKLY
Status: DISCONTINUED | OUTPATIENT
Start: 2020-01-01 | End: 2020-01-01 | Stop reason: HOSPADM

## 2020-01-01 RX ORDER — WARFARIN SODIUM 4 MG/1
4 TABLET ORAL
Status: COMPLETED | OUTPATIENT
Start: 2020-01-01 | End: 2020-01-01

## 2020-01-01 RX ORDER — MULTIVITAMIN WITH IRON
1 TABLET ORAL DAILY
COMMUNITY

## 2020-01-01 RX ORDER — CARVEDILOL 12.5 MG/1
12.5 TABLET ORAL NIGHTLY
COMMUNITY

## 2020-01-01 RX ORDER — SPIRONOLACTONE 25 MG/1
25 TABLET ORAL DAILY
Status: DISCONTINUED | OUTPATIENT
Start: 2020-01-01 | End: 2020-01-01

## 2020-01-01 RX ORDER — ONDANSETRON 2 MG/ML
4 INJECTION INTRAMUSCULAR; INTRAVENOUS EVERY 6 HOURS PRN
Status: DISCONTINUED | OUTPATIENT
Start: 2020-01-01 | End: 2020-01-01 | Stop reason: HOSPADM

## 2020-01-01 RX ORDER — WARFARIN SODIUM 3 MG/1
TABLET ORAL
Qty: 34 TABLET | Refills: 0 | Status: SHIPPED | OUTPATIENT
Start: 2020-01-01 | End: 2020-01-01 | Stop reason: SDUPTHER

## 2020-01-01 RX ORDER — FUROSEMIDE 40 MG/1
40 TABLET ORAL
Status: DISCONTINUED | OUTPATIENT
Start: 2020-01-01 | End: 2020-01-01 | Stop reason: HOSPADM

## 2020-01-01 RX ORDER — DIGOXIN 125 MCG
125 TABLET ORAL EVERY OTHER DAY
Start: 2020-01-01 | End: 2021-01-01

## 2020-01-01 RX ORDER — GLIMEPIRIDE 2 MG/1
TABLET ORAL
Qty: 180 TABLET | Refills: 1 | Status: SHIPPED | OUTPATIENT
Start: 2020-01-01 | End: 2021-01-01

## 2020-01-01 RX ORDER — FUROSEMIDE 10 MG/ML
40 INJECTION INTRAMUSCULAR; INTRAVENOUS DAILY
Status: DISCONTINUED | OUTPATIENT
Start: 2020-01-01 | End: 2020-01-01

## 2020-01-01 RX ORDER — ACETAMINOPHEN 160 MG/5ML
650 SOLUTION ORAL EVERY 4 HOURS PRN
Status: DISCONTINUED | OUTPATIENT
Start: 2020-01-01 | End: 2020-01-01 | Stop reason: HOSPADM

## 2020-01-01 RX ORDER — FUROSEMIDE 40 MG/1
40 TABLET ORAL
Status: DISCONTINUED | OUTPATIENT
Start: 2020-01-01 | End: 2020-01-01

## 2020-01-01 RX ORDER — SODIUM CHLORIDE 0.9 % (FLUSH) 0.9 %
10 SYRINGE (ML) INJECTION EVERY 12 HOURS SCHEDULED
Status: DISCONTINUED | OUTPATIENT
Start: 2020-01-01 | End: 2020-01-01 | Stop reason: HOSPADM

## 2020-01-01 RX ORDER — ACETAMINOPHEN 500 MG
500 TABLET ORAL EVERY 6 HOURS PRN
COMMUNITY

## 2020-01-01 RX ORDER — SODIUM CHLORIDE 0.9 % (FLUSH) 0.9 %
10 SYRINGE (ML) INJECTION AS NEEDED
Status: DISCONTINUED | OUTPATIENT
Start: 2020-01-01 | End: 2020-01-01 | Stop reason: HOSPADM

## 2020-01-01 RX ORDER — THEOPHYLLINE 300 MG/1
300 TABLET, EXTENDED RELEASE ORAL NIGHTLY
Status: DISCONTINUED | OUTPATIENT
Start: 2020-01-01 | End: 2020-01-01 | Stop reason: HOSPADM

## 2020-01-01 RX ORDER — ASPIRIN 325 MG
325 TABLET ORAL ONCE
Status: COMPLETED | OUTPATIENT
Start: 2020-01-01 | End: 2020-01-01

## 2020-01-01 RX ORDER — DIGOXIN 125 MCG
1 TABLET ORAL DAILY
COMMUNITY
Start: 2020-01-01 | End: 2020-01-01

## 2020-01-01 RX ORDER — WARFARIN SODIUM 2 MG/1
2 TABLET ORAL
Status: COMPLETED | OUTPATIENT
Start: 2020-01-01 | End: 2020-01-01

## 2020-01-01 RX ORDER — BLOOD-GLUCOSE METER
EACH MISCELLANEOUS
Qty: 1 KIT | Refills: 1 | Status: SHIPPED | OUTPATIENT
Start: 2020-01-01 | End: 2020-01-01

## 2020-01-01 RX ORDER — INSULIN LISPRO 100 [IU]/ML
0-14 INJECTION, SOLUTION INTRAVENOUS; SUBCUTANEOUS
Status: DISCONTINUED | OUTPATIENT
Start: 2020-01-01 | End: 2020-01-01 | Stop reason: HOSPADM

## 2020-01-01 RX ORDER — WARFARIN SODIUM 3 MG/1
3 TABLET ORAL ONCE
Status: COMPLETED | OUTPATIENT
Start: 2020-01-01 | End: 2020-01-01

## 2020-01-01 RX ORDER — FUROSEMIDE 40 MG/1
TABLET ORAL
Qty: 60 TABLET | Refills: 0 | Status: SHIPPED | OUTPATIENT
Start: 2020-01-01 | End: 2021-01-01

## 2020-01-01 RX ORDER — BLOOD SUGAR DIAGNOSTIC
STRIP MISCELLANEOUS
Qty: 100 EACH | Refills: 6 | Status: SHIPPED | OUTPATIENT
Start: 2020-01-01 | End: 2020-01-01 | Stop reason: SDUPTHER

## 2020-01-01 RX ORDER — CEFDINIR 300 MG/1
300 CAPSULE ORAL DAILY
Qty: 7 CAPSULE | Refills: 0 | Status: SHIPPED | OUTPATIENT
Start: 2020-01-01 | End: 2020-01-01

## 2020-01-01 RX ORDER — CARVEDILOL 6.25 MG/1
6.25 TABLET ORAL
Status: DISCONTINUED | OUTPATIENT
Start: 2020-01-01 | End: 2020-01-01 | Stop reason: HOSPADM

## 2020-01-01 RX ORDER — FUROSEMIDE 10 MG/ML
40 INJECTION INTRAMUSCULAR; INTRAVENOUS ONCE
Status: COMPLETED | OUTPATIENT
Start: 2020-01-01 | End: 2020-01-01

## 2020-01-01 RX ORDER — FUROSEMIDE 40 MG/1
40 TABLET ORAL 2 TIMES DAILY
Qty: 60 TABLET | Refills: 0 | Status: SHIPPED | OUTPATIENT
Start: 2020-01-01 | End: 2020-01-01

## 2020-01-01 RX ORDER — CARVEDILOL 6.25 MG/1
12.5 TABLET ORAL
Status: DISCONTINUED | OUTPATIENT
Start: 2020-01-01 | End: 2020-01-01 | Stop reason: HOSPADM

## 2020-01-01 RX ORDER — CHOLECALCIFEROL (VITAMIN D3) 125 MCG
10 CAPSULE ORAL NIGHTLY PRN
COMMUNITY
End: 2021-01-01

## 2020-01-01 RX ORDER — WARFARIN SODIUM 3 MG/1
3 TABLET ORAL
COMMUNITY
End: 2020-01-01

## 2020-01-01 RX ORDER — ALBUTEROL SULFATE 90 UG/1
2 AEROSOL, METERED RESPIRATORY (INHALATION) ONCE
Status: COMPLETED | OUTPATIENT
Start: 2020-01-01 | End: 2020-01-01

## 2020-01-01 RX ORDER — NICOTINE POLACRILEX 4 MG
15 LOZENGE BUCCAL
Status: DISCONTINUED | OUTPATIENT
Start: 2020-01-01 | End: 2020-01-01 | Stop reason: HOSPADM

## 2020-01-01 RX ORDER — ATORVASTATIN CALCIUM 20 MG/1
20 TABLET, FILM COATED ORAL DAILY
Status: DISCONTINUED | OUTPATIENT
Start: 2020-01-01 | End: 2020-01-01 | Stop reason: HOSPADM

## 2020-01-01 RX ORDER — WARFARIN SODIUM 3 MG/1
TABLET ORAL
Qty: 96 TABLET | Refills: 0 | Status: SHIPPED | OUTPATIENT
Start: 2020-01-01 | End: 2020-01-01

## 2020-01-01 RX ORDER — INSULIN GLARGINE 100 [IU]/ML
10 INJECTION, SOLUTION SUBCUTANEOUS NIGHTLY
Status: DISCONTINUED | OUTPATIENT
Start: 2020-01-01 | End: 2020-01-01 | Stop reason: HOSPADM

## 2020-01-01 RX ORDER — ASPIRIN 81 MG/1
81 TABLET ORAL DAILY
Status: DISCONTINUED | OUTPATIENT
Start: 2020-01-01 | End: 2020-01-01 | Stop reason: HOSPADM

## 2020-01-01 RX ORDER — WARFARIN SODIUM 3 MG/1
TABLET ORAL
Qty: 34 TABLET | Refills: 1 | Status: SHIPPED | OUTPATIENT
Start: 2020-01-01 | End: 2020-01-01 | Stop reason: DRUGHIGH

## 2020-01-01 RX ORDER — MIRTAZAPINE 15 MG/1
15 TABLET, FILM COATED ORAL NIGHTLY
Qty: 90 TABLET | Refills: 1 | Status: SHIPPED | OUTPATIENT
Start: 2020-01-01 | End: 2021-01-01

## 2020-01-01 RX ORDER — ACETAMINOPHEN 325 MG/1
650 TABLET ORAL EVERY 4 HOURS PRN
Status: DISCONTINUED | OUTPATIENT
Start: 2020-01-01 | End: 2020-01-01 | Stop reason: HOSPADM

## 2020-01-01 RX ORDER — BLOOD SUGAR DIAGNOSTIC
STRIP MISCELLANEOUS
Qty: 100 EACH | Refills: 6 | Status: SHIPPED | OUTPATIENT
Start: 2020-01-01 | End: 2020-01-01

## 2020-01-01 RX ORDER — BLOOD SUGAR DIAGNOSTIC
STRIP MISCELLANEOUS
Qty: 100 EACH | Refills: 3 | Status: SHIPPED | OUTPATIENT
Start: 2020-01-01 | End: 2020-01-01

## 2020-01-01 RX ORDER — DEXTROSE MONOHYDRATE 25 G/50ML
25 INJECTION, SOLUTION INTRAVENOUS
Status: DISCONTINUED | OUTPATIENT
Start: 2020-01-01 | End: 2020-01-01 | Stop reason: HOSPADM

## 2020-01-01 RX ORDER — MIRTAZAPINE 15 MG/1
15 TABLET, FILM COATED ORAL NIGHTLY
Status: DISCONTINUED | OUTPATIENT
Start: 2020-01-01 | End: 2020-01-01 | Stop reason: HOSPADM

## 2020-01-01 RX ORDER — CEFDINIR 300 MG/1
300 CAPSULE ORAL DAILY
Qty: 7 CAPSULE | Refills: 0 | Status: SHIPPED | OUTPATIENT
Start: 2020-01-01 | End: 2020-01-01 | Stop reason: SDUPTHER

## 2020-01-01 RX ORDER — ONDANSETRON 4 MG/1
4 TABLET, FILM COATED ORAL EVERY 6 HOURS PRN
Status: DISCONTINUED | OUTPATIENT
Start: 2020-01-01 | End: 2020-01-01 | Stop reason: HOSPADM

## 2020-01-01 RX ORDER — CARVEDILOL 12.5 MG/1
TABLET ORAL
Qty: 180 TABLET | Refills: 1 | Status: SHIPPED | OUTPATIENT
Start: 2020-01-01 | End: 2020-01-01

## 2020-01-01 RX ORDER — DIGOXIN 125 MCG
125 TABLET ORAL EVERY OTHER DAY
Status: DISCONTINUED | OUTPATIENT
Start: 2020-01-01 | End: 2020-01-01

## 2020-01-01 RX ORDER — FUROSEMIDE 40 MG/1
TABLET ORAL
Qty: 90 TABLET | Refills: 2 | Status: SHIPPED | OUTPATIENT
Start: 2020-01-01 | End: 2020-01-01 | Stop reason: HOSPADM

## 2020-01-01 RX ORDER — LANCETS
EACH MISCELLANEOUS
Qty: 100 EACH | Refills: 6 | Status: SHIPPED | OUTPATIENT
Start: 2020-01-01 | End: 2020-01-01

## 2020-01-01 RX ORDER — NITROGLYCERIN 0.4 MG/1
0.4 TABLET SUBLINGUAL
Status: DISCONTINUED | OUTPATIENT
Start: 2020-01-01 | End: 2020-01-01 | Stop reason: HOSPADM

## 2020-01-01 RX ORDER — SODIUM CHLORIDE 9 MG/ML
50 INJECTION, SOLUTION INTRAVENOUS CONTINUOUS
Status: DISPENSED | OUTPATIENT
Start: 2020-01-01 | End: 2020-01-01

## 2020-01-01 RX ORDER — INSULIN GLARGINE 100 [IU]/ML
INJECTION, SOLUTION SUBCUTANEOUS
Qty: 15 PEN | Refills: 3 | Status: SHIPPED | OUTPATIENT
Start: 2020-01-01 | End: 2021-01-01

## 2020-01-01 RX ORDER — CHOLECALCIFEROL (VITAMIN D3) 125 MCG
5 CAPSULE ORAL NIGHTLY PRN
Status: DISCONTINUED | OUTPATIENT
Start: 2020-01-01 | End: 2020-01-01 | Stop reason: HOSPADM

## 2020-01-01 RX ADMIN — NITROGLYCERIN 0.5 INCH: 20 OINTMENT TOPICAL at 19:18

## 2020-01-01 RX ADMIN — Medication 10 ML: at 09:13

## 2020-01-01 RX ADMIN — WARFARIN SODIUM 3 MG: 3 TABLET ORAL at 18:05

## 2020-01-01 RX ADMIN — ASPIRIN 81 MG: 81 TABLET, COATED ORAL at 09:07

## 2020-01-01 RX ADMIN — INSULIN LISPRO 3 UNITS: 100 INJECTION, SOLUTION INTRAVENOUS; SUBCUTANEOUS at 09:07

## 2020-01-01 RX ADMIN — CEFTRIAXONE SODIUM 1 G: 1 INJECTION, POWDER, FOR SOLUTION INTRAMUSCULAR; INTRAVENOUS at 13:59

## 2020-01-01 RX ADMIN — MIRTAZAPINE 15 MG: 15 TABLET, FILM COATED ORAL at 21:05

## 2020-01-01 RX ADMIN — ACETAMINOPHEN 650 MG: 325 TABLET, FILM COATED ORAL at 16:32

## 2020-01-01 RX ADMIN — CARVEDILOL 6.25 MG: 6.25 TABLET, FILM COATED ORAL at 09:30

## 2020-01-01 RX ADMIN — SPIRONOLACTONE 25 MG: 25 TABLET ORAL at 08:50

## 2020-01-01 RX ADMIN — INSULIN LISPRO 8 UNITS: 100 INJECTION, SOLUTION INTRAVENOUS; SUBCUTANEOUS at 12:11

## 2020-01-01 RX ADMIN — INSULIN LISPRO 3 UNITS: 100 INJECTION, SOLUTION INTRAVENOUS; SUBCUTANEOUS at 12:25

## 2020-01-01 RX ADMIN — Medication 10 ML: at 08:50

## 2020-01-01 RX ADMIN — CARVEDILOL 12.5 MG: 6.25 TABLET, FILM COATED ORAL at 17:09

## 2020-01-01 RX ADMIN — ASPIRIN 81 MG: 81 TABLET, COATED ORAL at 09:12

## 2020-01-01 RX ADMIN — INSULIN LISPRO 3 UNITS: 100 INJECTION, SOLUTION INTRAVENOUS; SUBCUTANEOUS at 18:09

## 2020-01-01 RX ADMIN — INSULIN GLARGINE 10 UNITS: 100 INJECTION, SOLUTION SUBCUTANEOUS at 21:45

## 2020-01-01 RX ADMIN — Medication 10 ML: at 00:38

## 2020-01-01 RX ADMIN — SODIUM CHLORIDE 50 ML/HR: 9 INJECTION, SOLUTION INTRAVENOUS at 08:15

## 2020-01-01 RX ADMIN — ASPIRIN 325 MG ORAL TABLET 325 MG: 325 PILL ORAL at 17:04

## 2020-01-01 RX ADMIN — FUROSEMIDE 40 MG: 40 TABLET ORAL at 17:02

## 2020-01-01 RX ADMIN — WARFARIN SODIUM 3 MG: 3 TABLET ORAL at 17:09

## 2020-01-01 RX ADMIN — ATORVASTATIN CALCIUM 20 MG: 20 TABLET, FILM COATED ORAL at 08:35

## 2020-01-01 RX ADMIN — CARVEDILOL 12.5 MG: 6.25 TABLET, FILM COATED ORAL at 17:53

## 2020-01-01 RX ADMIN — CALCITRIOL CAPSULES 0.25 MCG 0.25 MCG: 0.25 CAPSULE ORAL at 09:38

## 2020-01-01 RX ADMIN — FUROSEMIDE 20 MG: 20 INJECTION, SOLUTION INTRAMUSCULAR; INTRAVENOUS at 08:50

## 2020-01-01 RX ADMIN — ATORVASTATIN CALCIUM 20 MG: 20 TABLET, FILM COATED ORAL at 08:49

## 2020-01-01 RX ADMIN — Medication 5 MG: at 22:27

## 2020-01-01 RX ADMIN — FUROSEMIDE 40 MG: 40 TABLET ORAL at 08:35

## 2020-01-01 RX ADMIN — CARVEDILOL 6.25 MG: 6.25 TABLET, FILM COATED ORAL at 08:14

## 2020-01-01 RX ADMIN — ATORVASTATIN CALCIUM 20 MG: 20 TABLET, FILM COATED ORAL at 09:08

## 2020-01-01 RX ADMIN — INSULIN LISPRO 3 UNITS: 100 INJECTION, SOLUTION INTRAVENOUS; SUBCUTANEOUS at 08:49

## 2020-01-01 RX ADMIN — Medication 10 ML: at 20:46

## 2020-01-01 RX ADMIN — CARVEDILOL 6.25 MG: 6.25 TABLET, FILM COATED ORAL at 08:39

## 2020-01-01 RX ADMIN — FUROSEMIDE 40 MG: 40 TABLET ORAL at 09:08

## 2020-01-01 RX ADMIN — Medication 10 ML: at 08:10

## 2020-01-01 RX ADMIN — CEFTRIAXONE SODIUM 1 G: 1 INJECTION, POWDER, FOR SOLUTION INTRAMUSCULAR; INTRAVENOUS at 15:47

## 2020-01-01 RX ADMIN — CARVEDILOL 6.25 MG: 6.25 TABLET, FILM COATED ORAL at 08:50

## 2020-01-01 RX ADMIN — SODIUM CHLORIDE 250 ML: 9 INJECTION, SOLUTION INTRAVENOUS at 13:38

## 2020-01-01 RX ADMIN — INSULIN GLARGINE 10 UNITS: 100 INJECTION, SOLUTION SUBCUTANEOUS at 21:00

## 2020-01-01 RX ADMIN — Medication 10 ML: at 08:49

## 2020-01-01 RX ADMIN — MIRTAZAPINE 15 MG: 15 TABLET, FILM COATED ORAL at 19:51

## 2020-01-01 RX ADMIN — THEOPHYLLINE 300 MG: 300 TABLET, EXTENDED RELEASE ORAL at 21:46

## 2020-01-01 RX ADMIN — SPIRONOLACTONE 25 MG: 25 TABLET ORAL at 17:50

## 2020-01-01 RX ADMIN — INSULIN LISPRO 3 UNITS: 100 INJECTION, SOLUTION INTRAVENOUS; SUBCUTANEOUS at 17:20

## 2020-01-01 RX ADMIN — INSULIN GLARGINE 10 UNITS: 100 INJECTION, SOLUTION SUBCUTANEOUS at 19:54

## 2020-01-01 RX ADMIN — ACETAMINOPHEN 650 MG: 325 TABLET, FILM COATED ORAL at 19:51

## 2020-01-01 RX ADMIN — INSULIN LISPRO 3 UNITS: 100 INJECTION, SOLUTION INTRAVENOUS; SUBCUTANEOUS at 12:02

## 2020-01-01 RX ADMIN — ALBUTEROL SULFATE 2 PUFF: 108 AEROSOL, METERED RESPIRATORY (INHALATION) at 16:50

## 2020-01-01 RX ADMIN — CARVEDILOL 12.5 MG: 6.25 TABLET, FILM COATED ORAL at 17:20

## 2020-01-01 RX ADMIN — MAGNESIUM GLUCONATE 500 MG ORAL TABLET 200 MG: 500 TABLET ORAL at 08:10

## 2020-01-01 RX ADMIN — MAGNESIUM GLUCONATE 500 MG ORAL TABLET 200 MG: 500 TABLET ORAL at 08:49

## 2020-01-01 RX ADMIN — INSULIN LISPRO 5 UNITS: 100 INJECTION, SOLUTION INTRAVENOUS; SUBCUTANEOUS at 17:02

## 2020-01-01 RX ADMIN — Medication 10 ML: at 08:36

## 2020-01-01 RX ADMIN — ACETAMINOPHEN 650 MG: 325 TABLET, FILM COATED ORAL at 21:04

## 2020-01-01 RX ADMIN — CARVEDILOL 6.25 MG: 6.25 TABLET, FILM COATED ORAL at 09:12

## 2020-01-01 RX ADMIN — MAGNESIUM GLUCONATE 500 MG ORAL TABLET 200 MG: 500 TABLET ORAL at 08:50

## 2020-01-01 RX ADMIN — THEOPHYLLINE 300 MG: 300 TABLET, EXTENDED RELEASE ORAL at 19:43

## 2020-01-01 RX ADMIN — MAGNESIUM GLUCONATE 500 MG ORAL TABLET 200 MG: 500 TABLET ORAL at 09:07

## 2020-01-01 RX ADMIN — CEFTRIAXONE SODIUM 1 G: 1 INJECTION, POWDER, FOR SOLUTION INTRAMUSCULAR; INTRAVENOUS at 14:23

## 2020-01-01 RX ADMIN — INSULIN LISPRO 3 UNITS: 100 INJECTION, SOLUTION INTRAVENOUS; SUBCUTANEOUS at 11:39

## 2020-01-01 RX ADMIN — MIRTAZAPINE 15 MG: 15 TABLET, FILM COATED ORAL at 00:38

## 2020-01-01 RX ADMIN — CARVEDILOL 12.5 MG: 6.25 TABLET, FILM COATED ORAL at 18:06

## 2020-01-01 RX ADMIN — Medication 10 ML: at 19:52

## 2020-01-01 RX ADMIN — ASPIRIN 81 MG: 81 TABLET, COATED ORAL at 08:50

## 2020-01-01 RX ADMIN — WARFARIN SODIUM 3 MG: 3 TABLET ORAL at 17:50

## 2020-01-01 RX ADMIN — FUROSEMIDE 40 MG: 10 INJECTION, SOLUTION INTRAMUSCULAR; INTRAVENOUS at 19:18

## 2020-01-01 RX ADMIN — WARFARIN SODIUM 3 MG: 3 TABLET ORAL at 17:20

## 2020-01-01 RX ADMIN — INSULIN LISPRO 3 UNITS: 100 INJECTION, SOLUTION INTRAVENOUS; SUBCUTANEOUS at 08:10

## 2020-01-01 RX ADMIN — Medication 10 ML: at 21:47

## 2020-01-01 RX ADMIN — FUROSEMIDE 40 MG: 10 INJECTION, SOLUTION INTRAMUSCULAR; INTRAVENOUS at 09:11

## 2020-01-01 RX ADMIN — DIGOXIN 125 MCG: 125 TABLET ORAL at 12:17

## 2020-01-01 RX ADMIN — THEOPHYLLINE 300 MG: 300 TABLET, EXTENDED RELEASE ORAL at 00:38

## 2020-01-01 RX ADMIN — MIRTAZAPINE 15 MG: 15 TABLET, FILM COATED ORAL at 20:45

## 2020-01-01 RX ADMIN — INSULIN LISPRO 3 UNITS: 100 INJECTION, SOLUTION INTRAVENOUS; SUBCUTANEOUS at 09:11

## 2020-01-01 RX ADMIN — Medication 5 MG: at 00:37

## 2020-01-01 RX ADMIN — INSULIN LISPRO 3 UNITS: 100 INJECTION, SOLUTION INTRAVENOUS; SUBCUTANEOUS at 08:50

## 2020-01-01 RX ADMIN — INSULIN LISPRO 5 UNITS: 100 INJECTION, SOLUTION INTRAVENOUS; SUBCUTANEOUS at 11:41

## 2020-01-01 RX ADMIN — THEOPHYLLINE 300 MG: 300 TABLET, EXTENDED RELEASE ORAL at 20:45

## 2020-01-01 RX ADMIN — ACETAMINOPHEN 650 MG: 325 TABLET, FILM COATED ORAL at 05:23

## 2020-01-01 RX ADMIN — FUROSEMIDE 20 MG: 20 INJECTION, SOLUTION INTRAMUSCULAR; INTRAVENOUS at 21:46

## 2020-01-01 RX ADMIN — CALCITRIOL CAPSULES 0.25 MCG 0.25 MCG: 0.25 CAPSULE ORAL at 09:13

## 2020-01-01 RX ADMIN — CARVEDILOL 6.25 MG: 6.25 TABLET, FILM COATED ORAL at 09:08

## 2020-01-01 RX ADMIN — INSULIN GLARGINE 10 UNITS: 100 INJECTION, SOLUTION SUBCUTANEOUS at 20:46

## 2020-01-01 RX ADMIN — CARVEDILOL 12.5 MG: 6.25 TABLET, FILM COATED ORAL at 17:02

## 2020-01-01 RX ADMIN — MAGNESIUM GLUCONATE 500 MG ORAL TABLET 200 MG: 500 TABLET ORAL at 08:35

## 2020-01-01 RX ADMIN — ACETAMINOPHEN 650 MG: 325 TABLET, FILM COATED ORAL at 02:48

## 2020-01-01 RX ADMIN — WARFARIN 4 MG: 4 TABLET ORAL at 16:33

## 2020-01-01 RX ADMIN — ACETAMINOPHEN 650 MG: 325 TABLET, FILM COATED ORAL at 21:46

## 2020-01-01 RX ADMIN — WARFARIN 2 MG: 2 TABLET ORAL at 18:05

## 2020-01-01 RX ADMIN — THEOPHYLLINE 300 MG: 300 TABLET, EXTENDED RELEASE ORAL at 19:51

## 2020-01-01 RX ADMIN — Medication 10 ML: at 21:05

## 2020-01-01 RX ADMIN — ATORVASTATIN CALCIUM 20 MG: 20 TABLET, FILM COATED ORAL at 08:09

## 2020-01-01 RX ADMIN — ACETAMINOPHEN 650 MG: 325 TABLET, FILM COATED ORAL at 10:50

## 2020-01-01 RX ADMIN — MIRTAZAPINE 15 MG: 15 TABLET, FILM COATED ORAL at 19:43

## 2020-01-01 RX ADMIN — FUROSEMIDE 40 MG: 40 TABLET ORAL at 18:06

## 2020-01-01 RX ADMIN — ASPIRIN 81 MG: 81 TABLET, COATED ORAL at 08:35

## 2020-01-01 RX ADMIN — INSULIN GLARGINE 10 UNITS: 100 INJECTION, SOLUTION SUBCUTANEOUS at 00:37

## 2020-01-01 RX ADMIN — Medication 10 ML: at 09:07

## 2020-01-01 RX ADMIN — ASPIRIN 81 MG: 81 TABLET, COATED ORAL at 08:49

## 2020-01-01 RX ADMIN — INSULIN LISPRO 3 UNITS: 100 INJECTION, SOLUTION INTRAVENOUS; SUBCUTANEOUS at 17:49

## 2020-01-01 RX ADMIN — INSULIN LISPRO 3 UNITS: 100 INJECTION, SOLUTION INTRAVENOUS; SUBCUTANEOUS at 08:35

## 2020-01-01 RX ADMIN — ACETAMINOPHEN 650 MG: 325 TABLET, FILM COATED ORAL at 19:43

## 2020-01-01 RX ADMIN — ASPIRIN 81 MG: 81 TABLET, COATED ORAL at 08:09

## 2020-01-01 RX ADMIN — CALCITRIOL CAPSULES 0.25 MCG 0.25 MCG: 0.25 CAPSULE ORAL at 08:09

## 2020-01-01 RX ADMIN — ACETAMINOPHEN 650 MG: 325 TABLET, FILM COATED ORAL at 03:20

## 2020-01-01 RX ADMIN — MIRTAZAPINE 15 MG: 15 TABLET, FILM COATED ORAL at 21:46

## 2020-01-01 RX ADMIN — INSULIN GLARGINE 10 UNITS: 100 INJECTION, SOLUTION SUBCUTANEOUS at 19:43

## 2020-01-01 RX ADMIN — INSULIN LISPRO 5 UNITS: 100 INJECTION, SOLUTION INTRAVENOUS; SUBCUTANEOUS at 20:45

## 2020-01-01 RX ADMIN — THEOPHYLLINE 300 MG: 300 TABLET, EXTENDED RELEASE ORAL at 21:05

## 2020-01-01 RX ADMIN — ATORVASTATIN CALCIUM 20 MG: 20 TABLET, FILM COATED ORAL at 09:15

## 2020-01-01 RX ADMIN — Medication 10 ML: at 19:47

## 2020-01-01 RX ADMIN — WARFARIN SODIUM 3 MG: 3 TABLET ORAL at 00:37

## 2020-01-01 RX ADMIN — MAGNESIUM GLUCONATE 500 MG ORAL TABLET 200 MG: 500 TABLET ORAL at 09:12

## 2020-01-03 ENCOUNTER — ANTICOAGULATION VISIT (OUTPATIENT)
Dept: CARDIOLOGY | Facility: CLINIC | Age: 85
End: 2020-01-03

## 2020-01-03 VITALS
DIASTOLIC BLOOD PRESSURE: 64 MMHG | BODY MASS INDEX: 27.25 KG/M2 | HEART RATE: 70 BPM | WEIGHT: 174 LBS | SYSTOLIC BLOOD PRESSURE: 95 MMHG

## 2020-01-03 DIAGNOSIS — Z79.01 LONG TERM (CURRENT) USE OF ANTICOAGULANTS: ICD-10-CM

## 2020-01-03 DIAGNOSIS — I48.19 OTHER PERSISTENT ATRIAL FIBRILLATION (HCC): ICD-10-CM

## 2020-01-03 LAB — INR PPP: 2.1 (ref 0.9–1.1)

## 2020-01-03 PROCEDURE — 85610 PROTHROMBIN TIME: CPT | Performed by: INTERNAL MEDICINE

## 2020-01-03 PROCEDURE — 36416 COLLJ CAPILLARY BLOOD SPEC: CPT | Performed by: INTERNAL MEDICINE

## 2020-01-03 RX ORDER — MIRTAZAPINE 15 MG/1
15 TABLET, FILM COATED ORAL NIGHTLY
Qty: 90 TABLET | Refills: 1 | Status: SHIPPED | OUTPATIENT
Start: 2020-01-03 | End: 2020-01-01

## 2020-01-07 RX ORDER — SIMVASTATIN 40 MG
TABLET ORAL
Qty: 90 TABLET | Refills: 4 | Status: ON HOLD | OUTPATIENT
Start: 2020-01-07 | End: 2021-01-01

## 2020-01-13 DIAGNOSIS — N18.30 STAGE 3 CHRONIC KIDNEY DISEASE (HCC): Primary | ICD-10-CM

## 2020-01-18 ENCOUNTER — LAB (OUTPATIENT)
Dept: LAB | Facility: HOSPITAL | Age: 85
End: 2020-01-18

## 2020-01-18 ENCOUNTER — TRANSCRIBE ORDERS (OUTPATIENT)
Dept: ADMINISTRATIVE | Facility: HOSPITAL | Age: 85
End: 2020-01-18

## 2020-01-18 DIAGNOSIS — N18.30 CKD (CHRONIC KIDNEY DISEASE), STAGE III (HCC): Primary | ICD-10-CM

## 2020-01-18 DIAGNOSIS — N18.30 CKD (CHRONIC KIDNEY DISEASE), STAGE III (HCC): ICD-10-CM

## 2020-01-18 LAB
ALBUMIN UR-MCNC: 2.7 MG/DL
ANION GAP SERPL CALCULATED.3IONS-SCNC: 13 MMOL/L (ref 5–15)
BUN BLD-MCNC: 25 MG/DL (ref 8–23)
BUN/CREAT SERPL: 13.9 (ref 7–25)
CALCIUM SPEC-SCNC: 9.7 MG/DL (ref 8.6–10.5)
CHLORIDE SERPL-SCNC: 100 MMOL/L (ref 98–107)
CO2 SERPL-SCNC: 26 MMOL/L (ref 22–29)
CREAT BLD-MCNC: 1.8 MG/DL (ref 0.76–1.27)
CREAT UR-MCNC: 150.5 MG/DL
GFR SERPL CREATININE-BSD FRML MDRD: 36 ML/MIN/1.73
GLUCOSE BLD-MCNC: 252 MG/DL (ref 65–99)
MICROALBUMIN/CREAT UR: 17.9 MG/G
POTASSIUM BLD-SCNC: 4.8 MMOL/L (ref 3.5–5.2)
SODIUM BLD-SCNC: 139 MMOL/L (ref 136–145)

## 2020-01-18 PROCEDURE — 82043 UR ALBUMIN QUANTITATIVE: CPT

## 2020-01-18 PROCEDURE — 82570 ASSAY OF URINE CREATININE: CPT

## 2020-01-18 PROCEDURE — 80048 BASIC METABOLIC PNL TOTAL CA: CPT

## 2020-01-18 PROCEDURE — 36415 COLL VENOUS BLD VENIPUNCTURE: CPT

## 2020-01-22 ENCOUNTER — TELEPHONE (OUTPATIENT)
Dept: CARDIOLOGY | Facility: CLINIC | Age: 85
End: 2020-01-22

## 2020-01-22 NOTE — TELEPHONE ENCOUNTER
Patients wife called stating that  took the patient off of Lisinopril back on 7/2/2019 when he was in the hospital and he was never instructed to restart it.     She is wondering if he should be taking this medication or not.

## 2020-01-22 NOTE — TELEPHONE ENCOUNTER
Okay to stop the lisinopril  Also please check with the patient he needs to get a digoxin level this week I have not seen any level done this month

## 2020-01-23 ENCOUNTER — LAB (OUTPATIENT)
Dept: LAB | Facility: HOSPITAL | Age: 85
End: 2020-01-23

## 2020-01-23 DIAGNOSIS — N18.30 STAGE 3 CHRONIC KIDNEY DISEASE (HCC): ICD-10-CM

## 2020-01-23 DIAGNOSIS — I48.19 OTHER PERSISTENT ATRIAL FIBRILLATION (HCC): ICD-10-CM

## 2020-01-23 DIAGNOSIS — I10 ESSENTIAL HYPERTENSION: ICD-10-CM

## 2020-01-23 DIAGNOSIS — I49.9 ARRHYTHMIA, VENTRICULAR: ICD-10-CM

## 2020-01-23 DIAGNOSIS — E78.2 MIXED HYPERLIPIDEMIA: ICD-10-CM

## 2020-01-23 DIAGNOSIS — I31.39 PERICARDIAL EFFUSION: ICD-10-CM

## 2020-01-23 DIAGNOSIS — I25.10 CORONARY ARTERY DISEASE INVOLVING NATIVE CORONARY ARTERY OF NATIVE HEART WITHOUT ANGINA PECTORIS: ICD-10-CM

## 2020-01-23 DIAGNOSIS — Z95.0 PRESENCE OF CARDIAC PACEMAKER: ICD-10-CM

## 2020-01-23 LAB
CHOLEST SERPL-MCNC: 118 MG/DL (ref 0–200)
DIGOXIN SERPL-MCNC: 1.5 NG/ML (ref 0.6–1.2)
HDLC SERPL-MCNC: 37 MG/DL (ref 40–60)
LDLC SERPL CALC-MCNC: 25 MG/DL (ref 0–100)
LDLC/HDLC SERPL: 0.68 {RATIO}
TRIGL SERPL-MCNC: 279 MG/DL (ref 0–150)
VLDLC SERPL-MCNC: 55.8 MG/DL (ref 5–40)

## 2020-01-23 PROCEDURE — 80061 LIPID PANEL: CPT

## 2020-01-23 PROCEDURE — 36415 COLL VENOUS BLD VENIPUNCTURE: CPT

## 2020-01-23 PROCEDURE — 80162 ASSAY OF DIGOXIN TOTAL: CPT

## 2020-02-04 RX ORDER — GLIMEPIRIDE 2 MG/1
2 TABLET ORAL 2 TIMES DAILY
Qty: 180 TABLET | Refills: 1 | Status: SHIPPED | OUTPATIENT
Start: 2020-02-04 | End: 2020-01-01

## 2020-02-06 ENCOUNTER — TELEPHONE (OUTPATIENT)
Dept: CARDIOLOGY | Facility: CLINIC | Age: 85
End: 2020-02-06

## 2020-02-06 ENCOUNTER — ANTICOAGULATION VISIT (OUTPATIENT)
Dept: CARDIOLOGY | Facility: CLINIC | Age: 85
End: 2020-02-06

## 2020-02-06 VITALS
WEIGHT: 172 LBS | SYSTOLIC BLOOD PRESSURE: 100 MMHG | DIASTOLIC BLOOD PRESSURE: 60 MMHG | HEART RATE: 80 BPM | BODY MASS INDEX: 26.94 KG/M2

## 2020-02-06 DIAGNOSIS — I48.19 OTHER PERSISTENT ATRIAL FIBRILLATION (HCC): ICD-10-CM

## 2020-02-06 DIAGNOSIS — Z79.01 LONG TERM (CURRENT) USE OF ANTICOAGULANTS: ICD-10-CM

## 2020-02-06 LAB — INR PPP: 3.1 (ref 0.9–1.1)

## 2020-02-06 PROCEDURE — 36416 COLLJ CAPILLARY BLOOD SPEC: CPT | Performed by: INTERNAL MEDICINE

## 2020-02-06 PROCEDURE — 85610 PROTHROMBIN TIME: CPT | Performed by: INTERNAL MEDICINE

## 2020-02-06 NOTE — TELEPHONE ENCOUNTER
Pt in for protime this morning. Asking about Dig level result. It was high at 1.5. Please call patient and advise him how to proceed with dosage. Thanks

## 2020-02-07 ENCOUNTER — LAB (OUTPATIENT)
Dept: LAB | Facility: HOSPITAL | Age: 85
End: 2020-02-07

## 2020-02-07 ENCOUNTER — HOSPITAL ENCOUNTER (OUTPATIENT)
Dept: GENERAL RADIOLOGY | Facility: HOSPITAL | Age: 85
Discharge: HOME | End: 2020-02-07
Admitting: FAMILY MEDICINE

## 2020-02-07 ENCOUNTER — OFFICE VISIT (OUTPATIENT)
Dept: FAMILY MEDICINE CLINIC | Facility: CLINIC | Age: 85
End: 2020-02-07

## 2020-02-07 VITALS
WEIGHT: 171.6 LBS | DIASTOLIC BLOOD PRESSURE: 71 MMHG | HEIGHT: 67 IN | BODY MASS INDEX: 26.93 KG/M2 | RESPIRATION RATE: 24 BRPM | SYSTOLIC BLOOD PRESSURE: 118 MMHG | OXYGEN SATURATION: 93 % | HEART RATE: 72 BPM | TEMPERATURE: 98.6 F

## 2020-02-07 DIAGNOSIS — J22 ACUTE RESPIRATORY INFECTION: ICD-10-CM

## 2020-02-07 DIAGNOSIS — J22 ACUTE RESPIRATORY INFECTION: Primary | ICD-10-CM

## 2020-02-07 LAB
B PERT DNA SPEC QL NAA+PROBE: NOT DETECTED
BASOPHILS # BLD AUTO: 0.09 10*3/MM3 (ref 0–0.2)
BASOPHILS NFR BLD AUTO: 1.1 % (ref 0–1.5)
C PNEUM DNA NPH QL NAA+NON-PROBE: NOT DETECTED
DEPRECATED RDW RBC AUTO: 46.7 FL (ref 37–54)
EOSINOPHIL # BLD AUTO: 0.96 10*3/MM3 (ref 0–0.4)
EOSINOPHIL NFR BLD AUTO: 11.9 % (ref 0.3–6.2)
ERYTHROCYTE [DISTWIDTH] IN BLOOD BY AUTOMATED COUNT: 13.1 % (ref 12.3–15.4)
FLUAV H1 2009 PAND RNA NPH QL NAA+PROBE: NOT DETECTED
FLUAV H1 HA GENE NPH QL NAA+PROBE: NOT DETECTED
FLUAV H3 RNA NPH QL NAA+PROBE: NOT DETECTED
FLUAV SUBTYP SPEC NAA+PROBE: NOT DETECTED
FLUBV RNA ISLT QL NAA+PROBE: NOT DETECTED
HADV DNA SPEC NAA+PROBE: NOT DETECTED
HCOV 229E RNA SPEC QL NAA+PROBE: NOT DETECTED
HCOV HKU1 RNA SPEC QL NAA+PROBE: NOT DETECTED
HCOV NL63 RNA SPEC QL NAA+PROBE: NOT DETECTED
HCOV OC43 RNA SPEC QL NAA+PROBE: NOT DETECTED
HCT VFR BLD AUTO: 38.9 % (ref 37.5–51)
HGB BLD-MCNC: 13 G/DL (ref 13–17.7)
HMPV RNA NPH QL NAA+NON-PROBE: NOT DETECTED
HPIV1 RNA SPEC QL NAA+PROBE: NOT DETECTED
HPIV2 RNA SPEC QL NAA+PROBE: NOT DETECTED
HPIV3 RNA NPH QL NAA+PROBE: NOT DETECTED
HPIV4 P GENE NPH QL NAA+PROBE: NOT DETECTED
IMM GRANULOCYTES # BLD AUTO: 0.03 10*3/MM3 (ref 0–0.05)
IMM GRANULOCYTES NFR BLD AUTO: 0.4 % (ref 0–0.5)
LYMPHOCYTES # BLD AUTO: 1.44 10*3/MM3 (ref 0.7–3.1)
LYMPHOCYTES NFR BLD AUTO: 17.8 % (ref 19.6–45.3)
M PNEUMO IGG SER IA-ACNC: NOT DETECTED
MCH RBC QN AUTO: 32.3 PG (ref 26.6–33)
MCHC RBC AUTO-ENTMCNC: 33.4 G/DL (ref 31.5–35.7)
MCV RBC AUTO: 96.8 FL (ref 79–97)
MONOCYTES # BLD AUTO: 0.78 10*3/MM3 (ref 0.1–0.9)
MONOCYTES NFR BLD AUTO: 9.6 % (ref 5–12)
NEUTROPHILS # BLD AUTO: 4.79 10*3/MM3 (ref 1.7–7)
NEUTROPHILS NFR BLD AUTO: 59.2 % (ref 42.7–76)
NRBC BLD AUTO-RTO: 0 /100 WBC (ref 0–0.2)
PLATELET # BLD AUTO: 238 10*3/MM3 (ref 140–450)
PMV BLD AUTO: 9.2 FL (ref 6–12)
PROCALCITONIN SERPL-MCNC: 0.09 NG/ML (ref 0.1–0.25)
RBC # BLD AUTO: 4.02 10*6/MM3 (ref 4.14–5.8)
RHINOVIRUS RNA SPEC NAA+PROBE: NOT DETECTED
RSV RNA NPH QL NAA+NON-PROBE: NOT DETECTED
WBC NRBC COR # BLD: 8.09 10*3/MM3 (ref 3.4–10.8)

## 2020-02-07 PROCEDURE — 84145 PROCALCITONIN (PCT): CPT

## 2020-02-07 PROCEDURE — 85025 COMPLETE CBC W/AUTO DIFF WBC: CPT

## 2020-02-07 PROCEDURE — 36415 COLL VENOUS BLD VENIPUNCTURE: CPT

## 2020-02-07 PROCEDURE — 0099U HC BIOFIRE FILMARRAY RESP PANEL 1: CPT

## 2020-02-07 PROCEDURE — 99213 OFFICE O/P EST LOW 20 MIN: CPT | Performed by: FAMILY MEDICINE

## 2020-02-07 PROCEDURE — 71046 X-RAY EXAM CHEST 2 VIEWS: CPT

## 2020-02-07 RX ORDER — CLONIDINE HYDROCHLORIDE 0.2 MG/1
1 TABLET ORAL 2 TIMES DAILY
COMMUNITY
Start: 2020-01-28 | End: 2020-01-01

## 2020-02-07 NOTE — PROGRESS NOTES
"Subjective   Cali Santoro is a 84 y.o. male.     Chief Complaint   Patient presents with   • Cough     wife sick also       HPI  Chief complaint: Cough and sinus congestion    The patient is a an 84-year-old white male who became ill yesterday with sudden onset sinus congestion drainage cough and he denied fever.  States the cough is nonproductive.  Denied nausea or vomiting.  States he feels tired but does not feel terrible.  He states his wife had a similar illness.        The following portions of the patient's history were reviewed and updated as appropriate: allergies, current medications, past family history, past medical history, past social history, past surgical history and problem list.    Review of Systems    Objective     /71 (BP Location: Left arm, Patient Position: Sitting, Cuff Size: Adult)   Pulse 72   Temp 98.6 °F (37 °C) (Oral)   Resp 24   Ht 170.2 cm (67\")   Wt 77.8 kg (171 lb 9.6 oz)   SpO2 93%   BMI 26.88 kg/m²     Physical Exam   Constitutional: He is oriented to person, place, and time.   HENT:   Head: Normocephalic and atraumatic.   Eyes: Pupils are equal, round, and reactive to light. Conjunctivae and EOM are normal.   Neck: Normal range of motion. Neck supple.   Cardiovascular: Normal rate, regular rhythm, normal heart sounds and intact distal pulses.   Pulmonary/Chest: Effort normal and breath sounds normal.   Abdominal: Soft. Bowel sounds are normal.   Musculoskeletal: Normal range of motion.   Neurological: He is alert and oriented to person, place, and time.   Skin: Skin is warm and dry.   Psychiatric: He has a normal mood and affect. His behavior is normal.   Nursing note and vitals reviewed.    The patient the option of empiric treatment versus evaluation.  Patient opted to have evaluation which included chest x-ray respiratory virus panel and pro calcitonin level.  The patient was likely advised that this was likely viral for which nothing can be done other than " symptomatic and supportive care.    Assessment/Plan   Cali was seen today for cough.    Diagnoses and all orders for this visit:    Acute respiratory infection  -     CBC & Differential; Future  -     Procalcitonin; Future  -     Respiratory Panel, PCR - Swab, Nasopharynx; Future  -     XR Chest PA & Lateral; Future      Patient Instructions   Continue your current medications and treatment.    Have the labs and xray done and call for results.    Further care will depend on the results of the tests and how you progress.      Yusef Sosa Jr., MD    02/07/20

## 2020-02-07 NOTE — TELEPHONE ENCOUNTER
Stop the digoxin  Restart from Monday and take every other day  Follow-up digoxin level in 10 days

## 2020-02-10 ENCOUNTER — TELEPHONE (OUTPATIENT)
Dept: FAMILY MEDICINE CLINIC | Facility: CLINIC | Age: 85
End: 2020-02-10

## 2020-02-10 NOTE — TELEPHONE ENCOUNTER
I spoke with the patient's wife.  His tests suggest a viral respiratory infection.  I recommended symptomatic therapy.  He is to follow up if no improvement or his symptoms get worse.

## 2020-02-11 NOTE — TELEPHONE ENCOUNTER
Called and spoke to wife. Gave her new orders for the digoxin dosage. He will get Dig level drawn 10 days after resuming Digoxin.

## 2020-02-12 ENCOUNTER — TELEPHONE (OUTPATIENT)
Dept: CARDIOLOGY | Facility: CLINIC | Age: 85
End: 2020-02-12

## 2020-02-12 DIAGNOSIS — I48.21 PERMANENT ATRIAL FIBRILLATION (HCC): Primary | ICD-10-CM

## 2020-02-24 ENCOUNTER — LAB (OUTPATIENT)
Dept: LAB | Facility: HOSPITAL | Age: 85
End: 2020-02-24

## 2020-02-24 DIAGNOSIS — E55.9 VITAMIN D DEFICIENCY: ICD-10-CM

## 2020-02-24 DIAGNOSIS — E11.65 TYPE 2 DIABETES MELLITUS WITH HYPERGLYCEMIA, WITHOUT LONG-TERM CURRENT USE OF INSULIN (HCC): ICD-10-CM

## 2020-02-24 DIAGNOSIS — I49.9 ARRHYTHMIA, VENTRICULAR: ICD-10-CM

## 2020-02-24 DIAGNOSIS — I48.21 PERMANENT ATRIAL FIBRILLATION (HCC): ICD-10-CM

## 2020-02-24 DIAGNOSIS — I25.118 CORONARY ARTERY DISEASE OF NATIVE ARTERY OF NATIVE HEART WITH STABLE ANGINA PECTORIS (HCC): ICD-10-CM

## 2020-02-24 DIAGNOSIS — I31.39 PERICARDIAL EFFUSION: ICD-10-CM

## 2020-02-24 DIAGNOSIS — I10 ESSENTIAL HYPERTENSION: ICD-10-CM

## 2020-02-24 DIAGNOSIS — E78.2 MIXED HYPERLIPIDEMIA: ICD-10-CM

## 2020-02-24 DIAGNOSIS — I25.10 CORONARY ARTERY DISEASE INVOLVING NATIVE CORONARY ARTERY OF NATIVE HEART WITHOUT ANGINA PECTORIS: ICD-10-CM

## 2020-02-24 DIAGNOSIS — I48.19 OTHER PERSISTENT ATRIAL FIBRILLATION (HCC): ICD-10-CM

## 2020-02-24 DIAGNOSIS — Z95.0 PRESENCE OF CARDIAC PACEMAKER: ICD-10-CM

## 2020-02-24 LAB
ALBUMIN SERPL-MCNC: 3.7 G/DL (ref 3.5–5.2)
ALBUMIN UR-MCNC: <1.2 MG/DL
ALBUMIN/GLOB SERPL: 1.3 G/DL
ALP SERPL-CCNC: 59 U/L (ref 39–117)
ALT SERPL W P-5'-P-CCNC: 10 U/L (ref 1–41)
ANION GAP SERPL CALCULATED.3IONS-SCNC: 10.5 MMOL/L (ref 5–15)
AST SERPL-CCNC: 12 U/L (ref 1–40)
BILIRUB SERPL-MCNC: 0.3 MG/DL (ref 0.2–1.2)
BUN BLD-MCNC: 28 MG/DL (ref 8–23)
BUN/CREAT SERPL: 16.6 (ref 7–25)
CALCIUM SPEC-SCNC: 8.9 MG/DL (ref 8.6–10.5)
CHLORIDE SERPL-SCNC: 101 MMOL/L (ref 98–107)
CHOLEST SERPL-MCNC: 121 MG/DL (ref 0–200)
CK SERPL-CCNC: 36 U/L (ref 20–200)
CO2 SERPL-SCNC: 25.5 MMOL/L (ref 22–29)
CREAT BLD-MCNC: 1.69 MG/DL (ref 0.76–1.27)
CREAT UR-MCNC: 121.9 MG/DL
DIGOXIN SERPL-MCNC: 0.6 NG/ML (ref 0.6–1.2)
GFR SERPL CREATININE-BSD FRML MDRD: 39 ML/MIN/1.73
GLOBULIN UR ELPH-MCNC: 2.8 GM/DL
GLUCOSE BLD-MCNC: 218 MG/DL (ref 65–99)
HBA1C MFR BLD: 9.5 % (ref 3.5–5.6)
HDLC SERPL-MCNC: 35 MG/DL (ref 40–60)
LDLC SERPL CALC-MCNC: 32 MG/DL (ref 0–100)
LDLC/HDLC SERPL: 0.91 {RATIO}
MICROALBUMIN/CREAT UR: NORMAL MG/G{CREAT}
POTASSIUM BLD-SCNC: 4.9 MMOL/L (ref 3.5–5.2)
PROT SERPL-MCNC: 6.5 G/DL (ref 6–8.5)
SODIUM BLD-SCNC: 137 MMOL/L (ref 136–145)
TRIGL SERPL-MCNC: 270 MG/DL (ref 0–150)
VLDLC SERPL-MCNC: 54 MG/DL (ref 5–40)

## 2020-02-24 PROCEDURE — 80053 COMPREHEN METABOLIC PANEL: CPT

## 2020-02-24 PROCEDURE — 82043 UR ALBUMIN QUANTITATIVE: CPT

## 2020-02-24 PROCEDURE — 36415 COLL VENOUS BLD VENIPUNCTURE: CPT

## 2020-02-24 PROCEDURE — 82570 ASSAY OF URINE CREATININE: CPT

## 2020-02-24 PROCEDURE — 80162 ASSAY OF DIGOXIN TOTAL: CPT

## 2020-02-24 PROCEDURE — 83036 HEMOGLOBIN GLYCOSYLATED A1C: CPT

## 2020-02-24 PROCEDURE — 82550 ASSAY OF CK (CPK): CPT

## 2020-02-24 PROCEDURE — 80061 LIPID PANEL: CPT

## 2020-02-28 RX ORDER — WARFARIN SODIUM 3 MG/1
TABLET ORAL
Qty: 96 TABLET | Refills: 0 | Status: SHIPPED | OUTPATIENT
Start: 2020-02-28 | End: 2020-01-01

## 2020-03-09 RX ORDER — CARVEDILOL 12.5 MG/1
TABLET ORAL
Qty: 180 TABLET | Refills: 1 | Status: SHIPPED | OUTPATIENT
Start: 2020-03-09 | End: 2020-01-01

## 2020-03-11 ENCOUNTER — ANTICOAGULATION VISIT (OUTPATIENT)
Dept: CARDIOLOGY | Facility: CLINIC | Age: 85
End: 2020-03-11

## 2020-03-11 VITALS
BODY MASS INDEX: 27.57 KG/M2 | DIASTOLIC BLOOD PRESSURE: 72 MMHG | WEIGHT: 176 LBS | SYSTOLIC BLOOD PRESSURE: 140 MMHG | HEART RATE: 64 BPM

## 2020-03-11 DIAGNOSIS — I48.21 PERMANENT ATRIAL FIBRILLATION (HCC): ICD-10-CM

## 2020-03-11 DIAGNOSIS — Z79.01 LONG TERM (CURRENT) USE OF ANTICOAGULANTS: ICD-10-CM

## 2020-03-11 LAB — INR PPP: 3.4 (ref 0.9–1.1)

## 2020-03-11 PROCEDURE — 36416 COLLJ CAPILLARY BLOOD SPEC: CPT | Performed by: INTERNAL MEDICINE

## 2020-03-11 PROCEDURE — 85610 PROTHROMBIN TIME: CPT | Performed by: INTERNAL MEDICINE

## 2020-04-01 ENCOUNTER — CLINICAL SUPPORT NO REQUIREMENTS (OUTPATIENT)
Dept: CARDIOLOGY | Facility: CLINIC | Age: 85
End: 2020-04-01

## 2020-04-01 DIAGNOSIS — Z95.0 PACEMAKER: ICD-10-CM

## 2020-04-01 DIAGNOSIS — I25.5 ISCHEMIC CARDIOMYOPATHY: Primary | ICD-10-CM

## 2020-04-01 PROBLEM — H53.002 AMBLYOPIA, LEFT: Status: ACTIVE | Noted: 2020-04-01

## 2020-04-01 PROBLEM — Z96.1 LENS REPLACED BY OTHER MEANS: Status: ACTIVE | Noted: 2020-04-01

## 2020-04-01 PROBLEM — H35.363 DRUSEN (DEGENERATIVE) OF MACULA, BILATERAL: Status: ACTIVE | Noted: 2020-04-01

## 2020-04-01 PROBLEM — E11.39 TYPE 2 DIABETES MELLITUS WITH OTHER DIABETIC OPHTHALMIC COMPLICATION (HCC): Status: ACTIVE | Noted: 2020-04-01

## 2020-04-01 PROBLEM — H01.006 BLEPHARITIS OF EYELID OF LEFT EYE: Status: ACTIVE | Noted: 2020-04-01

## 2020-04-01 PROBLEM — H01.003 BLEPHARITIS OF EYELID OF RIGHT EYE: Status: ACTIVE | Noted: 2020-04-01

## 2020-04-01 PROBLEM — E11.9 TYPE 2 DIABETES MELLITUS WITHOUT COMPLICATION (HCC): Status: ACTIVE | Noted: 2017-02-22

## 2020-04-01 PROBLEM — Z96.1 PSEUDOPHAKIA OF BOTH EYES: Status: ACTIVE | Noted: 2020-04-01

## 2020-04-01 PROBLEM — Z96.1 PRESENCE OF INTRAOCULAR LENS: Status: ACTIVE | Noted: 2020-04-01

## 2020-04-01 PROBLEM — H43.813 PVD (POSTERIOR VITREOUS DETACHMENT), BOTH EYES: Status: ACTIVE | Noted: 2020-04-01

## 2020-04-01 PROCEDURE — 93294 REM INTERROG EVL PM/LDLS PM: CPT | Performed by: INTERNAL MEDICINE

## 2020-04-01 PROCEDURE — 93296 REM INTERROG EVL PM/IDS: CPT | Performed by: INTERNAL MEDICINE

## 2020-04-01 RX ORDER — CLOPIDOGREL BISULFATE 75 MG/1
1 TABLET ORAL
COMMUNITY
End: 2020-04-09

## 2020-04-01 RX ORDER — LISINOPRIL 2.5 MG/1
1 TABLET ORAL
COMMUNITY
End: 2020-01-01

## 2020-04-01 RX ORDER — AMLODIPINE BESYLATE 10 MG/1
1 TABLET ORAL
COMMUNITY
Start: 2016-03-10 | End: 2020-01-01

## 2020-04-02 ENCOUNTER — LAB (OUTPATIENT)
Dept: LAB | Facility: HOSPITAL | Age: 85
End: 2020-04-02

## 2020-04-02 DIAGNOSIS — Z95.0 PRESENCE OF CARDIAC PACEMAKER: ICD-10-CM

## 2020-04-02 DIAGNOSIS — I31.39 PERICARDIAL EFFUSION: ICD-10-CM

## 2020-04-02 DIAGNOSIS — I48.19 OTHER PERSISTENT ATRIAL FIBRILLATION (HCC): ICD-10-CM

## 2020-04-02 DIAGNOSIS — E55.9 VITAMIN D DEFICIENCY: ICD-10-CM

## 2020-04-02 DIAGNOSIS — I25.10 CORONARY ARTERY DISEASE INVOLVING NATIVE CORONARY ARTERY OF NATIVE HEART WITHOUT ANGINA PECTORIS: ICD-10-CM

## 2020-04-02 DIAGNOSIS — E11.65 TYPE 2 DIABETES MELLITUS WITH HYPERGLYCEMIA, WITHOUT LONG-TERM CURRENT USE OF INSULIN (HCC): ICD-10-CM

## 2020-04-02 DIAGNOSIS — E78.2 MIXED HYPERLIPIDEMIA: ICD-10-CM

## 2020-04-02 DIAGNOSIS — I49.9 ARRHYTHMIA, VENTRICULAR: ICD-10-CM

## 2020-04-02 DIAGNOSIS — I10 ESSENTIAL HYPERTENSION: ICD-10-CM

## 2020-04-02 LAB
ALBUMIN SERPL-MCNC: 4.3 G/DL (ref 3.5–5.2)
ALBUMIN UR-MCNC: 1.2 MG/DL
ALBUMIN/GLOB SERPL: 1.3 G/DL
ALP SERPL-CCNC: 66 U/L (ref 39–117)
ALT SERPL W P-5'-P-CCNC: 12 U/L (ref 1–41)
ANION GAP SERPL CALCULATED.3IONS-SCNC: 14.9 MMOL/L (ref 5–15)
AST SERPL-CCNC: 17 U/L (ref 1–40)
BILIRUB SERPL-MCNC: 0.4 MG/DL (ref 0.2–1.2)
BUN BLD-MCNC: 18 MG/DL (ref 8–23)
BUN/CREAT SERPL: 9.4 (ref 7–25)
CALCIUM SPEC-SCNC: 9.9 MG/DL (ref 8.6–10.5)
CHLORIDE SERPL-SCNC: 101 MMOL/L (ref 98–107)
CHOLEST SERPL-MCNC: 117 MG/DL (ref 0–200)
CO2 SERPL-SCNC: 24.1 MMOL/L (ref 22–29)
CREAT BLD-MCNC: 1.92 MG/DL (ref 0.76–1.27)
CREAT UR-MCNC: 26.8 MG/DL
GFR SERPL CREATININE-BSD FRML MDRD: 34 ML/MIN/1.73
GLOBULIN UR ELPH-MCNC: 3.2 GM/DL
GLUCOSE BLD-MCNC: 216 MG/DL (ref 65–99)
HDLC SERPL-MCNC: 42 MG/DL (ref 40–60)
LDLC SERPL CALC-MCNC: 34 MG/DL (ref 0–100)
LDLC/HDLC SERPL: 0.8 {RATIO}
MICROALBUMIN/CREAT UR: 44.8 MG/G
POTASSIUM BLD-SCNC: 4.8 MMOL/L (ref 3.5–5.2)
PROT SERPL-MCNC: 7.5 G/DL (ref 6–8.5)
SODIUM BLD-SCNC: 140 MMOL/L (ref 136–145)
TRIGL SERPL-MCNC: 206 MG/DL (ref 0–150)
VLDLC SERPL-MCNC: 41.2 MG/DL (ref 5–40)

## 2020-04-02 PROCEDURE — 36415 COLL VENOUS BLD VENIPUNCTURE: CPT

## 2020-04-02 PROCEDURE — 82570 ASSAY OF URINE CREATININE: CPT

## 2020-04-02 PROCEDURE — 80061 LIPID PANEL: CPT

## 2020-04-02 PROCEDURE — 82043 UR ALBUMIN QUANTITATIVE: CPT

## 2020-04-02 PROCEDURE — 80053 COMPREHEN METABOLIC PANEL: CPT

## 2020-04-09 ENCOUNTER — TELEMEDICINE (OUTPATIENT)
Dept: ENDOCRINOLOGY | Facility: CLINIC | Age: 85
End: 2020-04-09

## 2020-04-09 VITALS
DIASTOLIC BLOOD PRESSURE: 72 MMHG | SYSTOLIC BLOOD PRESSURE: 140 MMHG | HEART RATE: 64 BPM | HEIGHT: 67 IN | BODY MASS INDEX: 27.47 KG/M2 | WEIGHT: 175 LBS

## 2020-04-09 DIAGNOSIS — E78.2 MIXED HYPERLIPIDEMIA: ICD-10-CM

## 2020-04-09 DIAGNOSIS — E11.65 TYPE 2 DIABETES MELLITUS WITH HYPERGLYCEMIA, WITHOUT LONG-TERM CURRENT USE OF INSULIN (HCC): Primary | ICD-10-CM

## 2020-04-09 DIAGNOSIS — I10 ESSENTIAL HYPERTENSION: ICD-10-CM

## 2020-04-09 DIAGNOSIS — N18.30 STAGE 3 CHRONIC KIDNEY DISEASE (HCC): ICD-10-CM

## 2020-04-09 PROCEDURE — 99214 OFFICE O/P EST MOD 30 MIN: CPT | Performed by: INTERNAL MEDICINE

## 2020-04-09 NOTE — PATIENT INSTRUCTIONS
Please work on your diet and reduce sweet intake and starches and add fruits and vegetables to your diet  Check blood sugars at least once a day on a rotating fashion  Start Lantus 10 units subcu nightly  Follow-up in 3 months with labs  Always keep glucose source with you in case of low blood sugar  Please get regular eye exam and flu vaccine.

## 2020-04-09 NOTE — PROGRESS NOTES
Endocrine Progress Note Outpatient     Patient Care Team:  Yusef Sosa Jr., MD as PCP - General  Yusef Sosa Jr., MD as PCP - Family Medicine  Luis Paulino MD as Consulting Physician (Cardiology)    Chief Complaint: Follow-up type 2 diabetes: Visit was conducted through telehealth due to coronavirus pandemic  Unable to complete visit using a video connection to the patient. A phone visit was used to complete this visits. Total time of discussion was 15 minutes.    HPI: 84-year-old male with history of type 2 diabetes, hypertension, hyperlipidemia, vitamin D deficiency is followed through telehealth.  For type 2 diabetes he is currently on Janumet XR 50/thousand, 1 tablet daily with glimepiride 2 mg twice a day.  He has not been working on his diet, not checking blood sugars at home.  Hypertension: Well-controlled  Hyperlipidemia: On simvastatin  Vitamin D deficiency: On vitamin D supplementation.  CKD stage III disease: Stable    Past Medical History:   Diagnosis Date   • Arrhythmia    • Bradycardia    • Cardiomyopathy (CMS/HCC)    • Cataract    • COPD (chronic obstructive pulmonary disease) (CMS/HCC)    • Coronary artery disease    • Diabetes mellitus, type 2 (CMS/HCC)    • Emphysema, unspecified (CMS/HCC)    • Hx of sick sinus syndrome     S/P Biventricular Pacemaker   • Hypertension    • Paroxysmal atrial fibrillation (CMS/HCC)    • Sleep apnea    • Ventricular arrhythmia        Social History     Socioeconomic History   • Marital status:      Spouse name: Not on file   • Number of children: Not on file   • Years of education: Not on file   • Highest education level: Not on file   Occupational History   • Occupation: Retired   Social Needs   • Financial resource strain: Patient refused   • Food insecurity:     Worry: Patient refused     Inability: Patient refused   • Transportation needs:     Medical: Patient refused     Non-medical: Patient refused   Tobacco Use   • Smoking  status: Former Smoker     Types: Cigarettes   • Smokeless tobacco: Never Used   Substance and Sexual Activity   • Alcohol use: Yes   • Drug use: No   • Sexual activity: Defer       Family History   Problem Relation Age of Onset   • Heart disease Mother    • Hypertension Mother    • Stroke Father    • Breast cancer Sister    • Colon cancer Sister    • Heart disease Brother    • Diabetes Other        Allergies   Allergen Reactions   • Penicillins Itching       ROS:   Constitutional:  Denies fatigue, tiredness.    Eyes:  Denies change in visual acuity   HENT:  Denies nasal congestion or sore throat   Respiratory: denies cough, shortness of breath.   Cardiovascular:  denies chest pain, edema   GI:  Denies abdominal pain, nausea, vomiting.   Musculoskeletal:  Denies back pain or joint pain   Integument:  Denies dry skin and rash   Neurologic:  Denies headache, focal weakness or sensory changes   Endocrine:  Denies polyuria or polydipsia   Psychiatric:  Denies depression or anxiety      There were no vitals filed for this visit.    Physical Exam:  GEN: NAD, able to carry on conversation and alert and oriented.  Rest of the exam was deferred due to this being a telehealth visit.      Results Review:     I reviewed the patient's new clinical results.    Lab Results   Component Value Date    HGBA1C 9.5 (H) 02/24/2020    HGBA1C 7.6 (H) 09/26/2019      Lab Results   Component Value Date    GLUCOSE 216 (H) 04/02/2020    BUN 18 04/02/2020    CREATININE 1.92 (H) 04/02/2020    EGFRIFNONA 34 (L) 04/02/2020    BCR 9.4 04/02/2020    K 4.8 04/02/2020    CO2 24.1 04/02/2020    CALCIUM 9.9 04/02/2020    ALBUMIN 4.30 04/02/2020    LABIL2 1.5 04/11/2019    AST 17 04/02/2020    ALT 12 04/02/2020    CHOL 117 04/02/2020    TRIG 206 (H) 04/02/2020    LDL 34 04/02/2020    HDL 42 04/02/2020           Medication Review: Reviewed.       Current Outpatient Medications:   •  amLODIPine (NORVASC) 10 MG tablet, Take 1 tablet by mouth., Disp: , Rfl:    •  aspirin 81 MG tablet, Take 81 mg by mouth Daily., Disp: , Rfl:   •  calcitriol (ROCALTROL) 0.25 MCG capsule, Take 0.25 mcg by mouth 3 (Three) Times a Week. Mon, Wed, Fri, Disp: , Rfl:   •  carvedilol (COREG) 12.5 MG tablet, TAKE 1 TABLET TWICE DAILY, Disp: 180 tablet, Rfl: 1  •  cloNIDine (CATAPRES) 0.2 MG tablet, Take 1 tablet by mouth 2 (Two) Times a Day., Disp: , Rfl:   •  digoxin (LANOXIN) 125 MCG tablet, TAKE 1 TABLET EVERY DAY, Disp: 90 tablet, Rfl: 2  •  furosemide (LASIX) 40 MG tablet, Take 1 tablet by mouth Daily., Disp: 90 tablet, Rfl: 2  •  glimepiride (AMARYL) 2 MG tablet, Take 1 tablet by mouth 2 (Two) Times a Day., Disp: 180 tablet, Rfl: 1  •  Ibuprofen-diphenhydrAMINE Cit (Advil PM) 200-38 MG tablet, , Disp: , Rfl:   •  lisinopril (PRINIVIL,ZESTRIL) 2.5 MG tablet, Take 1 tablet by mouth., Disp: , Rfl:   •  mirtazapine (REMERON) 15 MG tablet, Take 1 tablet by mouth Every Night., Disp: 90 tablet, Rfl: 1  •  simvastatin (ZOCOR) 40 MG tablet, 1 tablet p.o. at bedtime., Disp: 90 tablet, Rfl: 4  •  SITagliptin-metFORMIN HCl ER (JANUMET XR)  MG tablet, Take 1 tablet by mouth Daily., Disp: , Rfl:   •  theophylline (THEODUR) 300 MG 12 hr tablet, Take 300 mg by mouth 2 (Two) Times a Day., Disp: , Rfl:   •  warfarin (COUMADIN) 3 MG tablet, Take 1 1/2 tabs on Monday and 1 tablet all other days, po, qd or as directed. (Patient taking differently: Take 1 tabs on Monday and 1 tablet all other days, po, qd or as directed.), Disp: 96 tablet, Rfl: 0  •  Insulin Glargine (Lantus SoloStar) 100 UNIT/ML injection pen, Inject 10 units sq at bedtime, Disp: 5 pen, Rfl: 3  •  Insulin Pen Needle (Advocate Insulin Pen Needles) 31G X 5 MM misc, Use once a day for insulin inejction, Disp: 100 each, Rfl: 3      Assessment/Plan   1.  Diabetes mellitus type 2: Uncontrolled with worsening of A1c at 9.5%.  Will add Lantus 10 units subcu nightly and continue Janumet with glimepiride.  Strongly encouraged to work on his diet  and avoid sweets and starches and add fruits and vegetables to his diet.  Will refer for eye exam.  Also advised to check blood sugars at least once a day and bring the records for review at next visit.  We will see him back in 3 months with labs.  2.  Hypertension: Well-controlled, continue current medication  3.  Hyperlipidemia: On simvastatin, will follow lipid panel.    4.  Vitamin D deficiency: On vitamin D supplementation.  5.  CKD stage III disease: Stable.            Jhonatan Mart MD FACE.    Much of the above report is an electronic transcription/translation of the spoken language to printed text using Dragon Software. As such, the subtleties and finesse of the spoken language may permit erroneous, or at times, nonsensical words or phrases to be inadvertently transcribed; thus changes may be made at a later date to rectify these errors.

## 2020-04-21 RX ORDER — DIGOXIN 125 MCG
TABLET ORAL
Qty: 90 TABLET | Refills: 2 | Status: SHIPPED | OUTPATIENT
Start: 2020-04-21 | End: 2020-01-01

## 2020-06-10 PROBLEM — I25.118 CORONARY ARTERY DISEASE OF NATIVE ARTERY OF NATIVE HEART WITH STABLE ANGINA PECTORIS (HCC): Status: ACTIVE | Noted: 2019-06-27

## 2020-06-10 PROBLEM — H43.813 PVD (POSTERIOR VITREOUS DETACHMENT), BOTH EYES: Status: RESOLVED | Noted: 2020-04-01 | Resolved: 2020-01-01

## 2020-06-10 PROBLEM — Z79.01 LONG TERM (CURRENT) USE OF ANTICOAGULANTS: Status: RESOLVED | Noted: 2019-06-17 | Resolved: 2020-01-01

## 2020-06-10 PROBLEM — I31.39 PERICARDIAL EFFUSION: Status: RESOLVED | Noted: 2019-08-21 | Resolved: 2020-01-01
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
## 2020-06-10 PROBLEM — Z96.1 LENS REPLACED BY OTHER MEANS: Status: RESOLVED | Noted: 2020-04-01 | Resolved: 2020-01-01

## 2020-06-10 PROBLEM — H01.006 BLEPHARITIS OF EYELID OF LEFT EYE: Status: RESOLVED | Noted: 2020-04-01 | Resolved: 2020-01-01

## 2020-06-10 PROBLEM — Z96.1 PRESENCE OF INTRAOCULAR LENS: Status: RESOLVED | Noted: 2020-04-01 | Resolved: 2020-01-01

## 2020-06-10 PROBLEM — J22 ACUTE RESPIRATORY INFECTION: Status: RESOLVED | Noted: 2020-02-07 | Resolved: 2020-01-01

## 2020-06-10 PROBLEM — H53.002 AMBLYOPIA, LEFT: Status: RESOLVED | Noted: 2020-04-01 | Resolved: 2020-01-01

## 2020-06-10 PROBLEM — H01.003 BLEPHARITIS OF EYELID OF RIGHT EYE: Status: RESOLVED | Noted: 2020-04-01 | Resolved: 2020-01-01

## 2020-06-10 PROBLEM — E11.9 TYPE 2 DIABETES MELLITUS WITHOUT COMPLICATION (HCC): Status: RESOLVED | Noted: 2017-02-22 | Resolved: 2020-01-01

## 2020-06-10 PROBLEM — Z96.1 PSEUDOPHAKIA OF BOTH EYES: Status: RESOLVED | Noted: 2020-04-01 | Resolved: 2020-01-01

## 2020-06-10 PROBLEM — H35.363 DRUSEN (DEGENERATIVE) OF MACULA, BILATERAL: Status: RESOLVED | Noted: 2020-04-01 | Resolved: 2020-01-01

## 2020-06-10 NOTE — PROGRESS NOTES
Subjective   Cali Santoro is a 84 y.o. male.     Chief Complaint   Patient presents with   • Medicare Wellness-Initial Visit   • Atrial Fibrillation   • Hypertension   • Diabetes       HPI  Chief complaint: Atrial fibrillation COPD coronary artery disease hypertension hyperlipidemia type 2 diabetes mellitus    Patient is an 84-year-old white male comes in for follow-up and maintenance of his current problems which include    1.  Hypertension-stable-patient is on Lasix 40 mg daily potassium replacement therapy amlodipine 10 mg daily Coreg 12.5 mg twice a day lisinopril 2.5 mg daily Catapres 0.2 mg twice a day.  He denied headache lightheadedness dizziness or chest pain.    2.  Hyperlipidemia-stable-patient on Zocor 40 mg daily.  He denies myalgias and arthralgias.  Denies nausea or anorexia.    3.  Ischemic cardiomyopathy-stable-patient is on Lasix potassium Coreg lisinopril and aspirin.  He denies chest pain shortness of breath orthopnea or PND.    4.  Atrial fibrillation-stable-patient is on Coumadin and Coreg.  Denies episodes of rapid or slow heart rhythm.  Denies heart palpitations lightheadedness or dizziness.    5.  COPD-stable-patient on Jeff 300 mg twice a day.  Denies cough shortness breath wheezing or sputum production.    6.  Type 2 diabetes mellitus-stable-patient is currently on Amaryl 2 mg twice a day long-acting insulin Januvia 50 mg daily Metformin 1000 mg daily.  Denies polydipsia polyphagia or polyuria.  Denies low blood sugars.    7.  Falls-new-patient his wife note that he has had increased unsteadiness and falls.  This is been going on for a while.  The patient is refused physical therapy in the past.  Encourage physical therapy.        The following portions of the patient's history were reviewed and updated as appropriate: allergies, current medications, past family history, past medical history, past social history, past surgical history and problem list.    Review of  "Systems    Objective     BP (!) 195/67 (BP Location: Left arm, Patient Position: Sitting, Cuff Size: Adult)   Pulse 79   Resp 20   Ht 170.2 cm (67\")   Wt 75.8 kg (167 lb)   SpO2 95%   BMI 26.16 kg/m²     Physical Exam   Constitutional: He is oriented to person, place, and time. He appears well-developed and well-nourished.   HENT:   Head: Normocephalic and atraumatic.   Eyes: Pupils are equal, round, and reactive to light. Conjunctivae and EOM are normal.   Neck: Normal range of motion. Neck supple.   Cardiovascular: Normal rate, regular rhythm, normal heart sounds and intact distal pulses.   Pulmonary/Chest: Effort normal and breath sounds normal.   Abdominal: Soft. Bowel sounds are normal.   Musculoskeletal: Normal range of motion.   Neurological: He is alert and oriented to person, place, and time.   Skin: Skin is warm and dry.   Psychiatric: He has a normal mood and affect. His behavior is normal.   Nursing note and vitals reviewed.        Assessment/Plan   Cali was seen today for medicare wellness-initial visit, atrial fibrillation, hypertension and diabetes.    Diagnoses and all orders for this visit:    Essential hypertension  -     CBC & Differential; Future  -     Comprehensive Metabolic Panel; Future    Dilated cardiomyopathy (CMS/HCC)    Chronic obstructive pulmonary disease, unspecified COPD type (CMS/HCC)    Type 2 diabetes mellitus with hyperglycemia, without long-term current use of insulin (CMS/HCC)  -     Hemoglobin A1c; Future  -     Protein / Creatinine Ratio, Urine - Urine, Clean Catch; Future    Coronary artery disease of native artery of native heart with stable angina pectoris (CMS/HCC)    Other persistent atrial fibrillation (CMS/HCC)    Mixed hyperlipidemia  -     Lipid Panel; Future    Encounter for Medicare annual wellness exam    Fall, initial encounter      Patient Instructions   Continue your current medications and treatment.    Consider physical therapy assessment due to the " falls.    Get the immunizations; dT, shingles.    Follow up in the office in 3 months.          Yusef Sosa Jr., MD    06/10/20

## 2020-06-10 NOTE — PATIENT INSTRUCTIONS
Continue your current medications and treatment.    Consider physical therapy assessment due to the falls.    Get the immunizations; dT, shingles.    Follow up in the office in 3 months.

## 2020-06-10 NOTE — PROGRESS NOTES
The ABCs of the Annual Wellness Visit  Initial Medicare Wellness Visit    Chief Complaint   Patient presents with   • Medicare Wellness-Initial Visit   • Atrial Fibrillation   • Hypertension   • Diabetes       Subjective   History of Present Illness:  Cali Santoro is a 84 y.o. male who presents for an Initial Medicare Wellness Visit.    HEALTH RISK ASSESSMENT    Recent Hospitalizations:  Recently treated at the following:  [facility]     Current Medical Providers:  Patient Care Team:  Yusef Sosa Jr., MD as PCP - General  Yusef Sosa Jr., MD as PCP - Family Medicine  Luis Paulino MD as Consulting Physician (Cardiology)    Smoking Status:  Social History     Tobacco Use   Smoking Status Former Smoker   • Types: Cigarettes   Smokeless Tobacco Never Used       Alcohol Consumption:  Social History     Substance and Sexual Activity   Alcohol Use Yes       Depression Screen:   PHQ-2/PHQ-9 Depression Screening 6/10/2020   Little interest or pleasure in doing things 1   Feeling down, depressed, or hopeless 0   Total Score 1       Fall Risk Screen:  STEADI Fall Risk Assessment was completed, and patient is at HIGH risk for falls. Assessment completed on:6/10/2020    Health Habits and Functional and Cognitive Screening:  Functional & Cognitive Status 6/10/2020   Do you have difficulty preparing food and eating? No   Do you have difficulty bathing yourself, getting dressed or grooming yourself? No   Do you have difficulty using the toilet? No   Do you have difficulty moving around from place to place? No   Do you have trouble with steps or getting out of a bed or a chair? Yes   Current Diet Unhealthy Diet   Dental Exam Not up to date   Eye Exam Up to date   Exercise (times per week) 0 times per week   Current Exercise Activities Include Yard Work   Do you need help using the phone?  No   Are you deaf or do you have serious difficulty hearing?  Yes   Do you need help with transportation?  No   Do you need help shopping? No   Do you need help preparing meals?  No   Do you need help with housework?  No   Do you need help with laundry? No   Do you need help taking your medications? Yes   Do you need help managing money? No   Do you ever drive or ride in a car without wearing a seat belt? No   Have you felt unusual stress, anger or loneliness in the last month? No   Who do you live with? Spouse   If you need help, do you have trouble finding someone available to you? No   Have you been bothered in the last four weeks by sexual problems? No   Do you have difficulty concentrating, remembering or making decisions? No         Does the patient have evidence of cognitive impairment? No    Asprin use counseling:Taking ASA appropriately as indicated    Age-appropriate Screening Schedule:  Refer to the list below for future screening recommendations based on patient's age, sex and/or medical conditions. Orders for these recommended tests are listed in the plan section. The patient has been provided with a written plan.    Health Maintenance   Topic Date Due   • TDAP/TD VACCINES (1 - Tdap) 06/10/2020 (Originally 10/16/1946)   • ZOSTER VACCINE (1 of 2) 06/10/2021 (Originally 10/16/1985)   • INFLUENZA VACCINE  08/01/2020   • HEMOGLOBIN A1C  08/24/2020   • DIABETIC EYE EXAM  09/01/2020   • LIPID PANEL  04/02/2021   • URINE MICROALBUMIN  04/02/2021          The following portions of the patient's history were reviewed and updated as appropriate: allergies, current medications, past family history, past medical history, past social history, past surgical history and problem list.    Outpatient Medications Prior to Visit   Medication Sig Dispense Refill   • amLODIPine (NORVASC) 10 MG tablet Take 1 tablet by mouth.     • aspirin 81 MG tablet Take 81 mg by mouth Daily.     • calcitriol (ROCALTROL) 0.25 MCG capsule Take 0.25 mcg by mouth 3 (Three) Times a Week. Mon, Wed, Fri     • carvedilol (COREG) 12.5 MG tablet TAKE 1  TABLET TWICE DAILY 180 tablet 1   • cloNIDine (CATAPRES) 0.2 MG tablet Take 1 tablet by mouth 2 (Two) Times a Day.     • digoxin (LANOXIN) 125 MCG tablet TAKE 1 TABLET EVERY DAY 90 tablet 2   • furosemide (LASIX) 40 MG tablet Take 1 tablet by mouth Daily. 90 tablet 2   • glimepiride (AMARYL) 2 MG tablet Take 1 tablet by mouth 2 (Two) Times a Day. 180 tablet 1   • Ibuprofen-diphenhydrAMINE Cit (Advil PM) 200-38 MG tablet      • Insulin Glargine (Lantus SoloStar) 100 UNIT/ML injection pen Inject 10 units sq at bedtime 5 pen 3   • Insulin Pen Needle (Advocate Insulin Pen Needles) 31G X 5 MM misc Use once a day for insulin inejction 100 each 3   • lisinopril (PRINIVIL,ZESTRIL) 2.5 MG tablet Take 1 tablet by mouth.     • mirtazapine (REMERON) 15 MG tablet Take 1 tablet by mouth Every Night. 90 tablet 1   • simvastatin (ZOCOR) 40 MG tablet 1 tablet p.o. at bedtime. 90 tablet 4   • SITagliptin-metFORMIN HCl ER (Janumet XR)  MG tablet Take 1 tablet by mouth Daily. 90 tablet 2   • theophylline (THEODUR) 300 MG 12 hr tablet Take 300 mg by mouth 2 (Two) Times a Day.     • warfarin (COUMADIN) 3 MG tablet TAKE ONE AND ONE-HALF (1  1/2) TABLET BY MOUTH ON MONDAY AND ONE TABLET BY MOUTH ALL OTHER DAYS OR AS DIRECTED 96 tablet 0     No facility-administered medications prior to visit.        Patient Active Problem List   Diagnosis   • Atrial fibrillation (CMS/[facility]) [I48.91]   • Arrhythmia, ventricular   • Body mass index (BMI) of 27.0-27.9 in adult   • Chronic obstructive pulmonary disease (CMS/HCC)   • Coronary artery disease of native artery of native heart with stable angina pectoris (CMS/HCC)   • Essential hypertension   • Presence of cardiac pacemaker   • Sleep apnea   • Type 2 diabetes mellitus with hyperglycemia, without long-term current use of insulin (CMS/HCC)   • Vitamin D deficiency   • Ischemic cardiomyopathy   • Stage 3 chronic kidney disease (CMS/HCC)   • Mixed hyperlipidemia   • 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 Patient has an advance directive in EMR which is still valid.     Review of Systems    Compared to one year ago, the patient feels his physical health is the same.  Compared to one year ago, the patient feels his mental health is the same.    Reviewed chart for potential of high risk medication in the elderly: yes  Reviewed chart for potential of harmful drug interactions in the elderly:yes    Objective         Vitals:    06/10/20 1024   BP: (!) 195/67   BP Location: Left arm   Patient Position: Sitting   Cuff Size: Adult   Pulse: 79   Resp: 20   SpO2: 95%   Weight: 75.8 kg (167 lb)   Height: 170.2 cm (67\")       Body mass index is 26.16 kg/m².  Discussed the patient's BMI with him. The BMI is in the acceptable range.    Physical Exam   Constitutional: He is oriented to person, place, and time. He appears well-developed and well-nourished.   HENT:   Head: Normocephalic and atraumatic.   Eyes: Pupils are equal, round, and reactive to light. EOM are normal.   Neck: Neck supple.   Cardiovascular: Normal rate, regular rhythm, normal heart sounds and intact distal pulses.   Pulmonary/Chest: Effort normal and breath sounds normal.   Abdominal: Soft. Bowel sounds are normal.   Musculoskeletal: Normal range of motion.   Neurological: He is alert and oriented to person, place, and time. He displays normal reflexes. No cranial nerve deficit or sensory deficit. He exhibits normal muscle tone. Coordination normal.   Skin: Skin is warm and dry.   Psychiatric: He has a normal mood and affect. His behavior is normal. Judgment and thought content normal.   Nursing note and vitals reviewed.      Lab Results   Component Value Date    TRIG 206 (H) 04/02/2020    HDL 42 04/02/2020    LDL 34 04/02/2020    VLDL 41.2 (H) 04/02/2020        Assessment/Plan   Medicare Risks and Personalized " Health Plan  CMS Preventative Services Quick Reference  Immunizations Discussed/Encouraged (specific immunizations; Td and Shingrix )    The above risks/problems have been discussed with the patient.  Pertinent information has been shared with the patient in the After Visit Summary.  Follow up plans and orders are seen below in the Assessment/Plan Section.    Diagnoses and all orders for this visit:    1. Encounter for Medicare annual wellness exam (Primary)    2. Dilated cardiomyopathy (CMS/[facility])    3. Chronic obstructive pulmonary disease, unspecified COPD type (CMS/[facility])    4. Type 2 diabetes mellitus with hyperglycemia, without long-term current use of insulin (CMS/[facility])    5. Coronary artery disease of native artery of native heart with stable angina pectoris (CMS/[facility])    6. Other persistent atrial fibrillation (CMS/[facility])    7. Mixed hyperlipidemia    8. Essential hypertension      Follow Up:  Return in about 6 months (around 12/10/2020) for Recheck.     An After Visit Summary and PPPS were given to the patient.

## 2020-07-13 NOTE — PROGRESS NOTES
Subjective:     Encounter Date:07/13/2020      Patient ID: Cali Santoro is a 84 y.o. male.    Chief Complaint: Atrial Fibrillation & Pacemaker Check  History of Present Illness     84-year-old white male patient with known history of paroxysmal atrial fibrillation CAD sick sinus node syndrome status post biventricular pacemaker hypertension dyslipidemia diabetes COPD comes back for follow-up     Last cardiac catheterization showed LAD 40% disease mild to moderate diffuse in-stent restenosis to 50% and also in the midportion 60 to 70% left main 40% RCA up to 40%  Patient does have mild chronic renal insufficiency  Echocardiogram May 2019 EF 60% concentric LVH biatrial enlargement mild aortic stenosis     patient had significant cough followed by small amounts of hemoptysis  CT scan of the chest showed small to moderate pericardial effusion  Patient denies of any active symptoms   Patient was seen by electrophysiology recently and was given the option of watchman device due to recent hemoptysis  Patient currently decided to continue anticoagulation no more hemoptysis refused watchman device  Patient is having increasing problems with fatigue unable to use the CPAP machine will be followed by pulmonary regarding that  Repeat echocardiogram August 2019  · Left ventricular wall thickness is consistent with mild-to-moderate concentric hypertrophy.  · Estimated EF = 60%.  · Left ventricular systolic function is normal.  · Right ventricular cavity is borderline dilated.  · Left atrial cavity size is mild-to-moderately dilated.  · There is calcification of the aortic valve.  · Mild aortic valve regurgitation is present.  · Mild mitral valve regurgitation is present  · There is a moderate (1-2cm) pericardial effusion.  · The following left ventricular wall segments are hypokinetic: basal anterolateral, basal inferolateral, basal inferoseptal and basal inferoseptal.  · Pericardial effusion to be stable will have a  "follow-up echocardiogram next year     EKG today atrial fibrillation with biventricular pacing no changes compared to the last EKG    The following portions of the patient's history were reviewed and updated as appropriate: Allergies current medications past family history past medical history past social history past surgical history problem list and review of systems  Past Medical History:   Diagnosis Date   • Arrhythmia    • Bradycardia    • Cardiomyopathy (CMS/HCC)    • Cataract    • COPD (chronic obstructive pulmonary disease) (CMS/HCC)    • Coronary artery disease    • Diabetes mellitus, type 2 (CMS/HCC)    • Emphysema, unspecified (CMS/HCC)    • Hx of sick sinus syndrome     S/P Biventricular Pacemaker   • Hypertension    • Paroxysmal atrial fibrillation (CMS/HCC)    • Sleep apnea    • Ventricular arrhythmia      Past Surgical History:   Procedure Laterality Date   • CARDIAC CATHETERIZATION  02/04/2015   • CARDIAC CATHETERIZATION  04/25/2016   • CAROTID ENDARTERECTOMY     • CORONARY ANGIOPLASTY  1997   • LARYNGOSCOPY     • LUNG SURGERY  2008    Lung resection   • PACEMAKER IMPLANTATION  04/25/2016    Dual chamber; [employer]   • TRACHEOSTOMY       /71 (BP Location: Left arm, Patient Position: Sitting, Cuff Size: Adult)   Pulse 70   Ht 170.2 cm (67\")   Wt 76.2 kg (168 lb)   SpO2 96%   BMI 26.31 kg/m²   Family History   Problem Relation Age of Onset   • Heart disease Mother    • Hypertension Mother    • Stroke Father    • Breast cancer Sister    • Colon cancer Sister    • Heart disease Brother    • Diabetes Other        Current Outpatient Medications:   •  ACCU-CHEK RAJANI PLUS test strip, Use as instructed to check blood sugars once daily, Disp: 100 each, Rfl: 6  •  Accu-Chek Softclix Lancets lancets, Use to check blood sugar once daily, Disp: 100 each, Rfl: 6  •  amLODIPine (NORVASC) 10 MG tablet, Take 1 tablet by mouth., Disp: , Rfl:   •  aspirin 81 MG tablet, Take 81 mg by mouth Daily., " Disp: , Rfl:   •  Blood Glucose Monitoring Suppl (ACCU-CHEK RAJANI PLUS) w/Device kit, USE TO CHECK BLOOD SUGARS ONCE DAILY, Disp: 1 kit, Rfl: 1  •  calcitriol (ROCALTROL) 0.25 MCG capsule, Take 0.25 mcg by mouth 3 (Three) Times a Week. Mon, Wed, Fri, Disp: , Rfl:   •  carvedilol (COREG) 12.5 MG tablet, TAKE 1 TABLET TWICE DAILY (Patient taking differently: Take 12.5 mg by mouth Daily.), Disp: 180 tablet, Rfl: 1  •  furosemide (LASIX) 40 MG tablet, TAKE 1 TABLET EVERY DAY, Disp: 90 tablet, Rfl: 2  •  glimepiride (AMARYL) 2 MG tablet, Take 1 tablet by mouth 2 (Two) Times a Day., Disp: 180 tablet, Rfl: 1  •  Insulin Glargine (Lantus SoloStar) 100 UNIT/ML injection pen, Inject 10 units sq at bedtime, Disp: 5 pen, Rfl: 3  •  Insulin Pen Needle (Advocate Insulin Pen Needles) 31G X 5 MM misc, Use once a day for insulin inejction, Disp: 100 each, Rfl: 3  •  lisinopril (PRINIVIL,ZESTRIL) 2.5 MG tablet, Take 1 tablet by mouth., Disp: , Rfl:   •  mirtazapine (REMERON) 15 MG tablet, TAKE 1 TABLET BY MOUTH EVERY NIGHT., Disp: 90 tablet, Rfl: 1  •  simvastatin (ZOCOR) 40 MG tablet, 1 tablet p.o. at bedtime., Disp: 90 tablet, Rfl: 4  •  SITagliptin-metFORMIN HCl ER (Janumet XR)  MG tablet, Take 1 tablet by mouth Daily., Disp: 90 tablet, Rfl: 2  •  theophylline (THEODUR) 300 MG 12 hr tablet, Take 300 mg by mouth Daily., Disp: , Rfl:   •  warfarin (COUMADIN) 3 MG tablet, TAKE ONE AND ONE-HALF (1  1/2) TABLET BY MOUTH ON MONDAY AND ONE TABLET BY MOUTH ALL OTHER DAYS OR AS DIRECTED, Disp: 96 tablet, Rfl: 0  •  acetaminophen (TYLENOL) 500 MG tablet, Take 500 mg by mouth Every 6 (Six) Hours As Needed for Mild Pain ., Disp: , Rfl:   Social History     Socioeconomic History   • Marital status:      Spouse name: Not on file   • Number of children: Not on file   • Years of education: Not on file   • Highest education level: Not on file   Occupational History   • Occupation: Retired   Social Needs   • Financial resource strain:  Patient refused   • Food insecurity:     Worry: Patient refused     Inability: Patient refused   • Transportation needs:     Medical: Patient refused     Non-medical: Patient refused   Tobacco Use   • Smoking status: Former Smoker     Types: Cigarettes   • Smokeless tobacco: Never Used   Substance and Sexual Activity   • Alcohol use: Yes     Comment: occ   • Drug use: No   • Sexual activity: Defer     Allergies   Allergen Reactions   • Penicillins Itching     Review of Systems   Constitution: Negative for fever and malaise/fatigue.   HENT: Negative for congestion and hearing loss.    Eyes: Negative for double vision and visual disturbance.   Cardiovascular: Negative for chest pain, claudication, dyspnea on exertion, leg swelling and syncope.   Respiratory: Negative for cough and shortness of breath.    Endocrine: Negative for cold intolerance.   Skin: Negative for color change and rash.   Musculoskeletal: Negative for arthritis and joint pain.   Gastrointestinal: Negative for abdominal pain and heartburn.   Genitourinary: Negative for hematuria.   Neurological: Negative for excessive daytime sleepiness and dizziness.   Psychiatric/Behavioral: Negative for depression. The patient is not nervous/anxious.    All other systems reviewed and are negative.             Objective:     Physical Exam   Constitutional: He is oriented to person, place, and time. He appears well-developed and well-nourished. He is cooperative.   HENT:   Head: Normocephalic and atraumatic.   Mouth/Throat: Uvula is midline and oropharynx is clear and moist. No oral lesions.   Eyes: Conjunctivae are normal. No scleral icterus.   Neck: Trachea normal. Neck supple. No JVD present. Carotid bruit is not present. No thyromegaly present.   Cardiovascular: Normal rate, regular rhythm, S1 normal, S2 normal, normal heart sounds, intact distal pulses and normal pulses. PMI is not displaced. Exam reveals no gallop and no friction rub.   No murmur  heard.  Pulmonary/Chest: Effort normal and breath sounds normal.   Abdominal: Soft. Bowel sounds are normal.   Musculoskeletal: Normal range of motion.   Neurological: He is alert and oriented to person, place, and time. He has normal strength.   No focal deficits   Skin: Skin is warm. No cyanosis.   Pacer site looks okay   Psychiatric: He has a normal mood and affect.         ECG 12 Lead  Date/Time: 7/13/2020 4:01 PM  Performed by: Luis Paulino MD  Authorized by: Luis Paulino MD   Comments:   EKG today atrial fibrillation with biventricular pacing no changes compared to the last EKG            Lab Review:       Assessment:          Diagnosis Plan   1. Permanent atrial fibrillation (CMS/HCC)  CK    Comprehensive Metabolic Panel    Lipid Panel   2. Presence of cardiac pacemaker  CK    Comprehensive Metabolic Panel    Lipid Panel   3. Arrhythmia, ventricular  CK    Comprehensive Metabolic Panel    Lipid Panel   4. Essential hypertension  CK    Comprehensive Metabolic Panel    Lipid Panel   5. Ischemic cardiomyopathy  CK    Comprehensive Metabolic Panel    Lipid Panel   6. Mixed hyperlipidemia  CK    Comprehensive Metabolic Panel    Lipid Panel   7. Coronary artery disease of native artery of native heart with stable angina pectoris (CMS/HCC)     8. Type 2 diabetes mellitus with hyperglycemia, without long-term current use of insulin (CMS/HCC)            Plan:         Continue aggressive control of hypertension dyslipidemia stable  Continue anticoagulation  Needs aggressive control of diabetes modify cardiac risk factors  Coronary artery disease stable

## 2020-07-14 NOTE — PATIENT INSTRUCTIONS
Continue current management  Always keep glucose source with you in case of low blood sugar  Get regular eye exam and flu vaccine  Follow-up in 6 months with labs.

## 2020-07-14 NOTE — PROGRESS NOTES
Endocrine Progress Note Outpatient     Patient Care Team:  Yusef Sosa Jr., MD as PCP - General  Yusef Sosa Jr., MD as PCP - Family Medicine  Luis Paulino MD as Consulting Physician (Cardiology)    Chief Complaint: Follow-up type 2 diabetes    HPI: 84-year-old male with history of type 2 diabetes, hypertension, hyperlipidemia, vitamin D deficiency is here for follow-up.  For type 2 diabetes he is currently on Janumet XR 50/thousand, 1 tablet daily with glimepiride 2 mg twice a day 10 units of Lantus subcu nightly.  He did bring in 2 blood sugar readings running <200.  No low blood sugars. He is taking his medications on a regular basis.  Has a stopped drinking orange juice.  He is trying to work on his diet.  Hypertension: Well-controlled  Hyperlipidemia: On simvastatin  Vitamin D deficiency: On vitamin D supplementation.    Past Medical History:   Diagnosis Date   • Arrhythmia    • Bradycardia    • Cardiomyopathy (CMS/HCC)    • Cataract    • COPD (chronic obstructive pulmonary disease) (CMS/HCC)    • Coronary artery disease    • Diabetes mellitus, type 2 (CMS/HCC)    • Emphysema, unspecified (CMS/HCC)    • Hx of sick sinus syndrome     S/P Biventricular Pacemaker   • Hypertension    • Paroxysmal atrial fibrillation (CMS/HCC)    • Sleep apnea    • Ventricular arrhythmia        Social History     Socioeconomic History   • Marital status:      Spouse name: Not on file   • Number of children: Not on file   • Years of education: Not on file   • Highest education level: Not on file   Occupational History   • Occupation: Retired   Social Needs   • Financial resource strain: Patient refused   • Food insecurity:     Worry: Patient refused     Inability: Patient refused   • Transportation needs:     Medical: Patient refused     Non-medical: Patient refused   Tobacco Use   • Smoking status: Former Smoker     Types: Cigarettes   • Smokeless tobacco: Never Used   Substance and Sexual Activity    • Alcohol use: Yes     Comment: occ   • Drug use: No   • Sexual activity: Defer       Family History   Problem Relation Age of Onset   • Heart disease Mother    • Hypertension Mother    • Stroke Father    • Breast cancer Sister    • Colon cancer Sister    • Heart disease Brother    • Diabetes Other        Allergies   Allergen Reactions   • Penicillins Itching       ROS:   Constitutional:  Denies fatigue, tiredness.    Eyes:  Denies change in visual acuity   HENT:  Denies nasal congestion or sore throat   Respiratory: denies cough, shortness of breath.   Cardiovascular:  denies chest pain, edema   GI:  Denies abdominal pain, nausea, vomiting.   Musculoskeletal:  Denies back pain or joint pain   Integument:  Denies dry skin and rash   Neurologic:  Denies headache, focal weakness or sensory changes   Endocrine:  Denies polyuria or polydipsia   Psychiatric:  Denies depression or anxiety      Vitals:    07/14/20 0937   BP: 110/70   Pulse: 70   Temp: 96.8 °F (36 °C)   SpO2: 95%       Physical Exam:  GEN: NAD, conversant  EYES: EOMI, PERRL, no conjunctival erythema  NECK: no thyromegaly, full ROM   CV: RRR, no murmurs/rubs/gallops, no peripheral edema  LUNG: CTAB, no wheezes/rales/ronchi  SKIN: no rashes, no acanthosis  MSK: no deformities, full ROM of all extremities  NEURO: no tremors, DTR normal  PSYCH: AOX3, appropriate mood, affect normal      Results Review:     I reviewed the patient's new clinical results.    Lab Results   Component Value Date    HGBA1C 6.6 (H) 07/06/2020    HGBA1C 6.8 (H) 06/10/2020    HGBA1C 9.5 (H) 02/24/2020      Lab Results   Component Value Date    GLUCOSE 187 (H) 07/06/2020    BUN 27 (H) 07/06/2020    CREATININE 1.92 (H) 07/06/2020    EGFRIFNONA 34 (L) 07/06/2020    BCR 14.1 07/06/2020    K 4.7 07/06/2020    CO2 25.3 07/06/2020    CALCIUM 9.3 07/06/2020    ALBUMIN 3.90 07/06/2020    LABIL2 1.5 04/11/2019    AST 13 07/06/2020    ALT 15 07/06/2020    CHOL 106 07/06/2020    TRIG 186 (H)  07/06/2020    LDL 38 07/06/2020    HDL 31 (L) 07/06/2020           Medication Review: Reviewed.       Current Outpatient Medications:   •  ACCU-CHEK RAJANI PLUS test strip, Use as instructed to check blood sugars once daily, Disp: 100 each, Rfl: 6  •  Accu-Chek Softclix Lancets lancets, Use to check blood sugar once daily, Disp: 100 each, Rfl: 6  •  acetaminophen (TYLENOL) 500 MG tablet, Take 500 mg by mouth Every 6 (Six) Hours As Needed for Mild Pain ., Disp: , Rfl:   •  amLODIPine (NORVASC) 10 MG tablet, Take 1 tablet by mouth., Disp: , Rfl:   •  aspirin 81 MG tablet, Take 81 mg by mouth Daily., Disp: , Rfl:   •  Blood Glucose Monitoring Suppl (ACCU-CHEK RAJANI PLUS) w/Device kit, USE TO CHECK BLOOD SUGARS ONCE DAILY, Disp: 1 kit, Rfl: 1  •  calcitriol (ROCALTROL) 0.25 MCG capsule, Take 0.25 mcg by mouth 3 (Three) Times a Week. Mon, Wed, Fri, Disp: , Rfl:   •  carvedilol (COREG) 12.5 MG tablet, TAKE 1 TABLET TWICE DAILY (Patient taking differently: Take 12.5 mg by mouth Daily.), Disp: 180 tablet, Rfl: 1  •  furosemide (LASIX) 40 MG tablet, TAKE 1 TABLET EVERY DAY, Disp: 90 tablet, Rfl: 2  •  glimepiride (AMARYL) 2 MG tablet, Take 1 tablet by mouth 2 (Two) Times a Day., Disp: 180 tablet, Rfl: 1  •  Insulin Glargine (Lantus SoloStar) 100 UNIT/ML injection pen, Inject 10 units sq at bedtime, Disp: 5 pen, Rfl: 3  •  Insulin Pen Needle (Advocate Insulin Pen Needles) 31G X 5 MM misc, Use once a day for insulin inejction, Disp: 100 each, Rfl: 3  •  lisinopril (PRINIVIL,ZESTRIL) 2.5 MG tablet, Take 1 tablet by mouth., Disp: , Rfl:   •  mirtazapine (REMERON) 15 MG tablet, TAKE 1 TABLET BY MOUTH EVERY NIGHT., Disp: 90 tablet, Rfl: 1  •  simvastatin (ZOCOR) 40 MG tablet, 1 tablet p.o. at bedtime., Disp: 90 tablet, Rfl: 4  •  SITagliptin-metFORMIN HCl ER (Janumet XR)  MG tablet, Take 1 tablet by mouth Daily., Disp: 90 tablet, Rfl: 2  •  theophylline (THEODUR) 300 MG 12 hr tablet, Take 300 mg by mouth Daily., Disp: , Rfl:    •  warfarin (COUMADIN) 3 MG tablet, TAKE ONE AND ONE-HALF (1  1/2) TABLET BY MOUTH ON MONDAY AND ONE TABLET BY MOUTH ALL OTHER DAYS OR AS DIRECTED, Disp: 96 tablet, Rfl: 0      Assessment/Plan   1.  Diabetes mellitus type 2: Fair control with A1c 6.6%.  At this time I will continue current management and follow blood sugars and A1c.  2.  Hypertension: Well-controlled, continue current medication  3.  Hyperlipidemia: On simvastatin, will follow lipid panel.  I will increase simvastatin to 40 mg p.o. at bedtime to give him moderate dose of statin since he is diabetic with history of heart disease and had a stent placement in the past.  4.  Vitamin D deficiency: On vitamin D supplementation.  5.  CKD stage III disease: Follows with Dr. Valentin Mart MD [facility].    Much of the above report is an electronic transcription/translation of the spoken language to printed text using Dragon Software. As such, the subtleties and finesse of the spoken language may permit erroneous, or at times, nonsensical words or phrases to be inadvertently transcribed; thus changes may be made at a later date to rectify these errors.

## 2020-11-02 NOTE — PROGRESS NOTES
"Subjective   Cali Santoro is a 85 y.o. male.     Chief Complaint   Patient presents with   • Fatigue       HPI  Chief complaint: Fatigue    Patient is an 85-year-old white male states that for the past 2 weeks he has had tiredness and fatigue.  He states he started out with sinus congestion and drainage.  He states he feels cold.  He has not had a fever however.  He states he has had chills.  He does not have cough.  He states he has no appetite.  He states he has had diarrhea for the past 2 days.  The patient states that he went to a wedding in early October in which most of the people there were not wearing mask her using proper protection        The following portions of the patient's history were reviewed and updated as appropriate: allergies, current medications, past family history, past medical history, past social history, past surgical history and problem list.    Review of Systems    Objective     /81 (BP Location: Left arm, Patient Position: Sitting, Cuff Size: Large Adult)   Pulse 70   Temp 97.1 °F (36.2 °C) (Infrared)   Resp 20   Ht 170.2 cm (67\")   Wt 74.8 kg (165 lb)   SpO2 92%   BMI 25.84 kg/m²     Physical Exam  Vitals signs and nursing note reviewed.   Constitutional:       Appearance: He is well-developed.   HENT:      Head: Normocephalic and atraumatic.   Eyes:      Pupils: Pupils are equal, round, and reactive to light.   Neck:      Musculoskeletal: Neck supple.   Cardiovascular:      Rate and Rhythm: Normal rate. Rhythm irregularly irregular.      Heart sounds: Normal heart sounds.   Pulmonary:      Effort: Pulmonary effort is normal.      Breath sounds: Normal breath sounds.   Abdominal:      General: Bowel sounds are normal.      Palpations: Abdomen is soft.   Musculoskeletal: Normal range of motion.   Skin:     General: Skin is warm and dry.   Neurological:      Mental Status: He is oriented to person, place, and time.   Psychiatric:         Behavior: Behavior normal.         " Thought Content: Thought content normal.         Judgment: Judgment normal.           Assessment/Plan   Diagnoses and all orders for this visit:    1. Acute respiratory infection (Primary)  -     CBC & Differential; Future  -     Comprehensive Metabolic Panel; Future  -     Procalcitonin; Future  -     proBNP; Future  -     COVID-19,[facility] ROUTINE, NP/OP SWAB IN TRANSPORT MEDIA OR ESWAB 72 HR TAT - Swab, Nasopharynx; Future  -     XR Chest 2 View; Future  -     Respiratory Panel, PCR - Swab, Nasopharynx; Future      Patient Instructions   Continue current medications and treatment.    Have a follow-up labs done and call for results.    Further care depend results of studies and how you progress.      Yusef Sosa Jr., MD    11/02/20

## 2020-11-02 NOTE — PATIENT INSTRUCTIONS
Continue current medications and treatment.    Have a follow-up labs done and call for results.    Further care depend results of studies and how you progress.

## 2020-11-06 NOTE — TELEPHONE ENCOUNTER
I called and left a message that the patient's COVID test was negative and that the Lanoxin level was mildly elevated.

## 2020-11-13 NOTE — TELEPHONE ENCOUNTER
Pt called to verify dosing instructions on warfarin. Per Ambika AGUIRRE pt takes 3mg daily. Updated chart to show correct dosing on warfarin.

## 2020-12-08 PROBLEM — N17.9 ACUTE-ON-CHRONIC KIDNEY INJURY (HCC): Status: ACTIVE | Noted: 2020-01-01

## 2020-12-08 PROBLEM — N18.9 ACUTE-ON-CHRONIC KIDNEY INJURY (HCC): Status: ACTIVE | Noted: 2020-01-01

## 2020-12-08 PROBLEM — I50.33 ACUTE ON CHRONIC HEART FAILURE WITH PRESERVED EJECTION FRACTION (HCC): Status: ACTIVE | Noted: 2020-01-01

## 2020-12-08 NOTE — ED PROVIDER NOTES
Subjective   History of Present Illness  Shortness of breath  85-year-old male states has had some shortness of breath over the last few weeks.  He was noted to have low oxygen saturations at home today in the mid 80s.  States had a slight cough nonproductive he reports no fevers or chills or any known ill contacts.  States he does have some mild substernal chest tightness at times when he is short of breath.  States he has had dyspnea on minimal exertion as well as some nighttime dyspnea.  Review of Systems   Constitutional: Negative.    HENT: Negative.    Eyes: Negative.    Respiratory: Positive for chest tightness and shortness of breath.    Cardiovascular: Positive for chest pain.   Gastrointestinal: Negative.    Genitourinary: Negative.    Musculoskeletal: Negative.    Skin: Negative.    Neurological: Negative.    Psychiatric/Behavioral: Negative.        Past Medical History:   Diagnosis Date   • Arrhythmia    • Bradycardia    • Cardiomyopathy (CMS/HCC)    • Cataract    • COPD (chronic obstructive pulmonary disease) (CMS/HCC)    • Coronary artery disease    • Diabetes mellitus, type 2 (CMS/HCC)    • Emphysema, unspecified (CMS/HCC)    • Hx of sick sinus syndrome     S/P Biventricular Pacemaker   • Hypertension    • Paroxysmal atrial fibrillation (CMS/HCC)    • Sleep apnea    • Ventricular arrhythmia        Allergies   Allergen Reactions   • Penicillins Itching       Past Surgical History:   Procedure Laterality Date   • CARDIAC CATHETERIZATION  02/04/2015   • CARDIAC CATHETERIZATION  04/25/2016   • CAROTID ENDARTERECTOMY     • CORONARY ANGIOPLASTY  1997   • LARYNGOSCOPY     • LUNG SURGERY  2008    Lung resection   • PACEMAKER IMPLANTATION  04/25/2016    Dual chamber; [employer]   • TRACHEOSTOMY         Family History   Problem Relation Age of Onset   • Heart disease Mother    • Hypertension Mother    • Stroke Father    • Breast cancer Sister    • Colon cancer Sister    • Heart disease Brother    •  Diabetes Other        Social History     Socioeconomic History   • Marital status:      Spouse name: Not on file   • Number of children: Not on file   • Years of education: Not on file   • Highest education level: Not on file   Occupational History   • Occupation: Retired   Social Needs   • Financial resource strain: Patient refused   • Food insecurity     Worry: Patient refused     Inability: Patient refused   • Transportation needs     Medical: Patient refused     Non-medical: Patient refused   Tobacco Use   • Smoking status: Former Smoker     Types: Cigarettes   • Smokeless tobacco: Never Used   Substance and Sexual Activity   • Alcohol use: Yes     Comment: occ   • Drug use: No   • Sexual activity: Defer     Prior to Admission medications    Medication Sig Start Date End Date Taking? Authorizing Provider   Accu-Chek Helga Plus test strip Use as instructed to check blood sugars once daily 10/13/20   Jhonatan Mart MD   Accu-Chek Softclix Lancets lancets Use to check blood sugar once daily 7/10/20 7/10/21  Jhonatan Mart MD   acetaminophen (TYLENOL) 500 MG tablet Take 500 mg by mouth Every 6 (Six) Hours As Needed for Mild Pain .    Astrid Gonzalez MD   amLODIPine (NORVASC) 10 MG tablet Take 1 tablet by mouth. 3/10/16   Astrid Gonzalez MD   aspirin 81 MG tablet Take 81 mg by mouth Daily. 3/7/14   Astrid Gonzalez MD   Blood Glucose Monitoring Suppl (ACCU-CHEK HELGA PLUS) w/Device kit USE TO CHECK BLOOD SUGARS ONCE DAILY 7/10/20   Jhonatan Mart MD   calcitriol (ROCALTROL) 0.25 MCG capsule Take 0.25 mcg by mouth 3 (Three) Times a Week. Mon, Wed, Fri    Astrid Gonzalez MD   carvedilol (COREG) 12.5 MG tablet TAKE 1 TABLET TWICE DAILY 11/20/20   Luis Paulino MD   digoxin (LANOXIN) 125 MCG tablet Take 1 tablet by mouth Every Other Day. 11/3/20   Yusef Sosa Jr., MD   furosemide (LASIX) 40 MG tablet TAKE 1 TABLET EVERY DAY 6/19/20   Liliam Goode APRN   glimepiride  "(AMARYL) 2 MG tablet TAKE 1 TABLET TWICE DAILY 8/20/20   Jhonatan Mart MD   Insulin Glargine (Lantus SoloStar) 100 UNIT/ML injection pen Inject 10 units sq at bedtime 11/5/20   Jhonatan Mart MD   Insulin Pen Needle (Advocate Insulin Pen Needles) 31G X 5 MM misc Use once a day for insulin inejction 11/11/20   Jhonatan Mart MD   lisinopril (PRINIVIL,ZESTRIL) 2.5 MG tablet Take 1 tablet by mouth.    Astrid Gonzalez MD   mirtazapine (REMERON) 15 MG tablet TAKE 1 TABLET BY MOUTH EVERY NIGHT. 6/19/20   Liliam Goode APRN   simvastatin (ZOCOR) 40 MG tablet 1 tablet p.o. at bedtime. 1/7/20   Jhonatan Mart MD   SITagliptin-metFORMIN HCl ER (Janumet XR)  MG tablet Take 1 tablet by mouth Daily. 4/21/20   Jhonatan Mart MD   theophylline (THEODUR) 300 MG 12 hr tablet Take 300 mg by mouth Daily. 4/8/19   Astrid Gonzalez MD   warfarin (COUMADIN) 3 MG tablet Take 3 mg by mouth Daily.    ProviderAstrid MD     /75 (BP Location: Left arm, Patient Position: Sitting)   Pulse 72   Temp 97.4 °F (36.3 °C) (Oral)   Resp 15   Ht 170.2 cm (67\")   Wt 75 kg (165 lb 5.5 oz)   SpO2 94%   BMI 25.90 kg/m²   I examined the patient using the appropriate personal protective equipment.          Objective   Physical Exam  General: Well-developed well-appearing, no acute distress, alert and appropriate  Eyes: Pupils round and equal, sclera nonicteric  HEENT: Mucous membranes moist, no mucosal swelling  Neck: Supple, no nuchal rigidity, no lymphadenopathy  Respirations: Respirations nonlabored, equal breath sounds bilaterally, clear lungs  Heart regular rate and rhythm, no murmurs rubs or gallops,   Abdomen soft nontender nondistended, no hepatosplenomegaly,   Extremities no clubbing cyanosis or edema, calves are symmetric and nontender  Neuro cranial nerves grossly intact, no focal limb deficits  Psych oriented, pleasant affect  Skin no rash, brisk cap refill  Procedures           ED Course      My EKG " interpretation paced rhythm rate of 72           Results for orders placed or performed during the hospital encounter of 12/08/20   Respiratory Panel PCR w/COVID-19(SARS-CoV-2) [facility]THA/TOMAS/[facility]/TITA In-House, NP Swab in UTM/VTM, 3-4 HR TAT - Swab, Nasopharynx    Specimen: Nasopharynx; Swab   Result Value Ref Range    ADENOVIRUS, PCR Not Detected Not Detected    Coronavirus 229E Not Detected Not Detected    Coronavirus HKU1 Not Detected Not Detected    Coronavirus NL63 Not Detected Not Detected    Coronavirus OC43 Not Detected Not Detected    COVID19 Not Detected Not Detected - Ref. Range    Human Metapneumovirus Not Detected Not Detected    Human Rhinovirus/Enterovirus Not Detected Not Detected    Influenza A PCR Not Detected Not Detected    Influenza B PCR Not Detected Not Detected    Parainfluenza Virus 1 Not Detected Not Detected    Parainfluenza Virus 2 Not Detected Not Detected    Parainfluenza Virus 3 Not Detected Not Detected    Parainfluenza Virus 4 Not Detected Not Detected    RSV, PCR Not Detected Not Detected    Bordetella pertussis pcr Not Detected Not Detected    Bordetella parapertussis PCR Not Detected Not Detected    Chlamydophila pneumoniae PCR Not Detected Not Detected    Mycoplasma pneumo by PCR Not Detected Not Detected   Comprehensive Metabolic Panel    Specimen: Blood   Result Value Ref Range    Glucose 117 (H) 65 - 99 mg/dL    BUN 30 (H) 8 - 23 mg/dL    Creatinine 2.18 (H) 0.76 - 1.27 mg/dL    Sodium 138 136 - 145 mmol/L    Potassium 4.8 3.5 - 5.2 mmol/L    Chloride 102 98 - 107 mmol/L    CO2 25.0 22.0 - 29.0 mmol/L    Calcium 9.3 8.6 - 10.5 mg/dL    Total Protein 6.8 6.0 - 8.5 g/dL    Albumin 3.90 3.50 - 5.20 g/dL    ALT (SGPT) 6 1 - 41 U/L    AST (SGOT) 12 1 - 40 U/L    Alkaline Phosphatase 75 39 - 117 U/L    Total Bilirubin 0.4 0.0 - 1.2 mg/dL    eGFR Non African Amer 29 (L) >60 mL/min/1.73    Globulin 2.9 gm/dL    A/G Ratio 1.3 g/dL    BUN/Creatinine Ratio 13.8 7.0 - 25.0    Anion  Gap 11.0 5.0 - 15.0 mmol/L   Troponin    Specimen: Blood   Result Value Ref Range    Troponin T 0.018 0.000 - 0.030 ng/mL   BNP    Specimen: Blood   Result Value Ref Range    proBNP 2,850.0 (H) 0.0-1,800.0 pg/mL   Protime-INR    Specimen: Blood   Result Value Ref Range    Protime 19.8 19.4 - 28.5 Seconds    INR 1.85 (L) 2.00 - 3.00   CBC Auto Differential    Specimen: Blood   Result Value Ref Range    WBC 8.00 3.40 - 10.80 10*3/mm3    RBC 3.59 (L) 4.14 - 5.80 10*6/mm3    Hemoglobin 12.2 (L) 13.0 - 17.7 g/dL    Hematocrit 36.6 (L) 37.5 - 51.0 %    .0 (H) 79.0 - 97.0 fL    MCH 33.9 (H) 26.6 - 33.0 pg    MCHC 33.2 31.5 - 35.7 g/dL    RDW 17.8 (H) 12.3 - 15.4 %    RDW-SD 63.9 (H) 37.0 - 54.0 fl    MPV 6.7 6.0 - 12.0 fL    Platelets 272 140 - 450 10*3/mm3    Neutrophil % 56.4 42.7 - 76.0 %    Lymphocyte % 21.2 19.6 - 45.3 %    Monocyte % 10.1 5.0 - 12.0 %    Eosinophil % 11.1 (H) 0.3 - 6.2 %    Basophil % 1.2 0.0 - 1.5 %    Neutrophils, Absolute 4.50 1.70 - 7.00 10*3/mm3    Lymphocytes, Absolute 1.70 0.70 - 3.10 10*3/mm3    Monocytes, Absolute 0.80 0.10 - 0.90 10*3/mm3    Eosinophils, Absolute 0.90 (H) 0.00 - 0.40 10*3/mm3    Basophils, Absolute 0.10 0.00 - 0.20 10*3/mm3    nRBC 0.2 0.0 - 0.2 /100 WBC   ECG 12 Lead   Result Value Ref Range    QT Interval 424 ms     Xr Chest 1 View    Result Date: 12/8/2020  Interstitial thickening in both lungs, with more confluent disease in the right lower lobe. FINDINGS are worrisome for right basilar pneumonia and superimposed mild generalized interstitial edema.  Stable cardiomegaly.  Electronically Signed By-Alexandra Preston MD On:12/8/2020 4:48 PM This report was finalized on [id number] by  Alexandra Preston MD.                                 MDM  Patient was not having symptoms of pneumonia no cough or fever.  He describes the symptoms having been going on for several weeks.  I suspect the x-ray findings are more related to pulmonary edema and congestive failure and less  likely pneumonia.  He has no leukocytosis.  He does have an elevated BNP.  He was ordered some Lasix and nitroglycerin and aspirin.  He is resting company reexamination stable on the monitor Covid screen negative.  Case cussed with the hospital service patient placed hospitalization for further chest pain evaluation.  Final diagnoses:   Dyspnea, unspecified type   Chest pain, unspecified type   Congestive heart failure, unspecified HF chronicity, unspecified heart failure type (CMS/[facility])            Quinn Domingo MD  12/08/20 5892

## 2020-12-08 NOTE — TELEPHONE ENCOUNTER
Called and spoke to pt, he is c/o SOA at rest. Pt does not wear any O2. Denies any other sx. O2 is 87% at rest.   Advised pt and his wife to go to ER or call 911 for transportation-he states wife can take him- agreed to go-advised to monitor o2 while going to the car.    Pt and wife understand. Appt confirmed for 12/18/20.    Provider notified.

## 2020-12-08 NOTE — ED NOTES
Pt reports SOB has been going on for some time. Reports it has not gotten any better so he came in today. Reports hx of COPD. Reports he does not wear O2 at home.     Timoteo Velazquez, LPN  12/08/20 5056

## 2020-12-08 NOTE — TELEPHONE ENCOUNTER
Phoned Mr. Santoro to remind him of his pt/inr appt on 12/18/20. Spoke to his wife who states she is concerned about his SOA.  Yesterday she states his 02 was 83 then went down to 73 while he was sitting.  He has appt w/Dr. Sosa on the 23rd of Dec was told to go to the er and he refuses.  Currently today his O2 was 89 after taking out the garbage.  Wife is very concerned, wants to know what to do.

## 2020-12-08 NOTE — ED NOTES
EKG obtained and showed to MD.   Patient placed on oxygen 2 L via NC at triage while waiting for available room.  Charge nurse aware .       Carlie Stover RN  12/08/20 1600

## 2020-12-09 NOTE — PLAN OF CARE
Problem: Adult Inpatient Plan of Care  Goal: Plan of Care Review  Outcome: Ongoing, Progressing  Flowsheets (Taken 12/8/2020 2256)  Progress: no change  Plan of Care Reviewed With: patient  Outcome Summary: Pt in ED awaiting admittance to floor. Pt presented to ED with increase difficulty breathing and found to be in acute on chronic heart failure. Vitals currently stable, will continue to monitor.  Goal: Patient-Specific Goal (Individualized)  Outcome: Ongoing, Progressing  Goal: Absence of Hospital-Acquired Illness or Injury  Outcome: Ongoing, Progressing  Goal: Optimal Comfort and Wellbeing  Outcome: Ongoing, Progressing  Goal: Readiness for Transition of Care  Outcome: Ongoing, Progressing  Intervention: Mutually Develop Transition Plan  Recent Flowsheet Documentation  Taken 12/8/2020 2253 by Martín Griffin RN  Transportation Anticipated: family or friend will provide  Patient/Family Anticipated Services at Transition: none  Patient/Family Anticipates Transition to: home with family  Taken 12/8/2020 2252 by Martín Griffin, RN  Equipment Currently Used at Home: none     Problem: Adjustment to Illness (Heart Failure)  Goal: Optimal Coping  Outcome: Ongoing, Progressing     Problem: Arrhythmia/Dysrhythmia (Heart Failure)  Goal: Stable Heart Rate and Rhythm  Outcome: Ongoing, Progressing     Problem: Cardiac Output Decreased (Heart Failure)  Goal: Optimal Cardiac Output  Outcome: Ongoing, Progressing     Problem: Fluid Imbalance (Heart Failure)  Goal: Fluid Balance  Outcome: Ongoing, Progressing     Problem: Functional Ability Impaired (Heart Failure)  Goal: Optimal Functional Ability  Outcome: Ongoing, Progressing     Problem: Oral Intake Inadequate (Heart Failure)  Goal: Optimal Nutrition Intake  Outcome: Ongoing, Progressing     Problem: Respiratory Compromise (Heart Failure)  Goal: Effective Oxygenation and Ventilation  Outcome: Ongoing, Progressing     Problem: Sleep Disordered Breathing (Heart Failure)  Goal:  Effective Breathing Pattern During Sleep  Outcome: Ongoing, Progressing   Goal Outcome Evaluation:  Plan of Care Reviewed With: patient  Progress: no change  Outcome Summary: Pt in ED awaiting admittance to floor. Pt presented to ED with increase difficulty breathing and found to be in acute on chronic heart failure. Vitals currently stable, will continue to monitor.

## 2020-12-09 NOTE — PLAN OF CARE
Goal Outcome Evaluation:  Plan of Care Reviewed With: patient  Progress: no change  Outcome Summary: Patient had an echocardiogram this morning - results pending. Patient is now a falls risk due to unsteady gait. Patient states he uses a walker or cane at home.

## 2020-12-09 NOTE — PROGRESS NOTES
"Pharmacy dosing service  Anticoagulant  Warfarin     Subjective:    Cali Santoro is a 85 y.o.male being continued on warfarin for atrial fibrillation.    INR Goal: 2 - 3  Home medication?:  Yes, warfarin 3 mg daily  Bridge Therapy Present?:  No  Interacting Medications Evaluation (New/Present/Discontinued): None new   Additional Contributing Factors: HF exacerbation (may increase INR)      Assessment/Plan:    INR slightly subtherapeutic on admission. Patient did not take dose on 12/8 prior to admission. Home regimen of 3 mg daily continued with dose given at ~0030 this AM. INR slightly decreased today likely d/t late dose. As patient already received dose this morning, will continue home regimen today and not bolus. If INR remains low tomorrow, patient may need small bolus dose.     Continue to monitor and adjust based on INR.         Date 12/8 12/9          INR 1.78/1.85 1.68          Dose 3 mg (early 12/9 AM) 3 mg              Objective:  [Ht: 170.2 cm (67\"); Wt: 73.5 kg (162 lb); BMI: Body mass index is 25.37 kg/m².]    Lab Results   Component Value Date    ALBUMIN 3.90 12/08/2020     Lab Results   Component Value Date    INR 1.68 (L) 12/09/2020    INR 1.78 (L) 12/08/2020    INR 1.85 (L) 12/08/2020    PROTIME 18.0 (L) 12/09/2020    PROTIME 19.1 (L) 12/08/2020    PROTIME 19.8 12/08/2020     Lab Results   Component Value Date    HGB 12.5 (L) 12/09/2020    HGB 12.2 (L) 12/08/2020    HGB 11.5 (L) 11/02/2020     Lab Results   Component Value Date    HCT 38.5 12/09/2020    HCT 36.6 (L) 12/08/2020    HCT 35.7 (L) 11/02/2020       Gayatri Higgins, PharmD  12/09/20 12:18 EST     "

## 2020-12-09 NOTE — PROGRESS NOTES
Discharge Planning Assessment   [place]     Patient Name: Cali Santoro  MRN: [id number]  Today's Date: 12/9/2020    Admit Date: 12/8/2020    Discharge Needs Assessment     Row Name 12/09/20 1510       Living Environment    Lives With  spouse    Name(s) of Who Lives With Patient  Liya    Current Living Arrangements  home/apartment/condo    Primary Care Provided by  self    Provides Primary Care For  no one    Family Caregiver if Needed  spouse    Able to Return to Prior Arrangements  yes       Resource/Environmental Concerns    Resource/Environmental Concerns  none       Transition Planning    Patient/Family Anticipates Transition to  home with family    Patient/Family Anticipated Services at Transition  none    Transportation Anticipated  family or friend will provide       Discharge Needs Assessment    Readmission Within the Last 30 Days  no previous admission in last 30 days    Equipment Currently Used at Home  cane, straight    Concerns to be Addressed  denies needs/concerns at this time    Concerns Comments  Current O2 pnc.    Anticipated Changes Related to Illness  none    Discharge Coordination/Progress  DC Plan: Return home with spouse. No home O2.        Discharge Plan     Row Name 12/09/20 1512       Plan    Plan  DC Plan: Return home with spouse. No home O2.    Plan Comments  Current O2 2L pnc. ECHO pending.        Continued Care and Services - Admitted Since 12/8/2020    Coordination has not been started for this encounter.         Demographic Summary     Row Name 12/09/20 1509       General Information    Admission Type  observation    Arrived From  emergency department    Required Notices Provided  Observation Status Notice    Referral Source  admission list    Reason for Consult  discharge planning    Preferred Language  English     Used During This Interaction  no    General Information Comments  Spoke with pt in room.        Met with patient in room wearing PPE: mask, goggles.       Maintained distance greater than six feet and spent less than 15 minutes in the room.               Bayron Andrews RN

## 2020-12-09 NOTE — H&P
"      [facility] Medicine Services      Patient Name: Cali Santoro  : 1935  MRN: [id number]  Primary Care Physician: Yusef Sosa Jr., MD  Date of admission: 2020    Patient Care Team:  Yusef Sosa Jr., MD as PCP - General  Yusef Sosa Jr., MD as PCP - Family Medicine  Luis Paulino MD as Consulting Physician (Cardiology)          Subjective   History Present Illness     Chief Complaint:   Chief Complaint   Patient presents with   • Shortness of Breath         Mr. Santoro is a 85 y.o. male with past medical history of diabetes, hyperlipidemia, cardiomyopathy, hypertension, CAD, COPD, A. fib, CKD and CHF who presents to [facility] complaining of dyspnea.  Patient reports that his difficulty breathing has been present for \"quite a while\".  He notes some associated orthopnea as well as TRIMBLE which includes dyspnea with walking even short distances from room to room on level ground.  He reports that when his pain is at its most profound he will occasionally experience a substernal burning sensation but denies any consistent chest pain.  Over the past year and a half patient reports he is lost approximately 70 pounds unintentionally.  He denies any cough, fever, changes in bowel or bladder habits, nausea or vomiting, sensation of tachycardia or palpitations, peripheral edema, syncope or near syncope.  Compliance with all prescribed medical therapies is reported and patient reports he does not smoke tobacco or drink.    In the ED patient found to have lab significant for troponin of 0.018, proBNP: 2850.0, creatinine: 1.8, BUN: 30, BUN/creatinine ratio: 13.8, INR: 1.5, PT: 19.8. Remainder of CBC and CMP was generally unremarkable. Chest x-ray shows interstitial thickening in both lungs more confluent disease in the right lower lobe noted is worrisome for right basilar pneumonia and superimposed mild generalized interstitial edema with stable " cardiomegaly. EKG shows paced rhythm at 70. Respiratory viral panel including for COVID-19 was negative.    History of Present Illness    Review of Systems   Constitution: Negative.   HENT: Negative.    Eyes: Negative.    Cardiovascular: Positive for chest pain, dyspnea on exertion, orthopnea and paroxysmal nocturnal dyspnea. Negative for irregular heartbeat, leg swelling, near-syncope, palpitations and syncope.   Respiratory: Positive for shortness of breath. Negative for cough and sputum production.    Endocrine: Negative.    Skin: Negative.    Musculoskeletal: Negative.    Gastrointestinal: Negative.    Genitourinary: Negative.    Neurological: Negative.    Psychiatric/Behavioral: Negative.    All other systems reviewed and are negative.          Personal History     Past Medical History:   Past Medical History:   Diagnosis Date   • Arrhythmia    • Bradycardia    • Cardiomyopathy (CMS/HCC)    • Cataract    • COPD (chronic obstructive pulmonary disease) (CMS/HCC)    • Coronary artery disease    • Diabetes mellitus, type 2 (CMS/HCC)    • Emphysema, unspecified (CMS/HCC)    • Hx of sick sinus syndrome     S/P Biventricular Pacemaker   • Hypertension    • Paroxysmal atrial fibrillation (CMS/HCC)    • Sleep apnea    • Ventricular arrhythmia        Surgical History:      Past Surgical History:   Procedure Laterality Date   • CARDIAC CATHETERIZATION  02/04/2015   • CARDIAC CATHETERIZATION  04/25/2016   • CAROTID ENDARTERECTOMY     • CORONARY ANGIOPLASTY  1997   • LARYNGOSCOPY     • LUNG SURGERY  2008    Lung resection   • PACEMAKER IMPLANTATION  04/25/2016    Dual chamber; [employer]   • TRACHEOSTOMY             Family History: family history includes Breast cancer in his sister; Colon cancer in his sister; Diabetes in an other family member; Heart disease in his brother and mother; Hypertension in his mother; Stroke in his father. Otherwise pertinent FHx was reviewed and unremarkable.     Social History:   reports that he has quit smoking. His smoking use included cigarettes. He has never used smokeless tobacco. He reports current alcohol use. He reports that he does not use drugs.      Medications:  Prior to Admission medications    Medication Sig Start Date End Date Taking? Authorizing Provider   Accu-Chek Helga Plus test strip Use as instructed to check blood sugars once daily 10/13/20   Jhonatan Mart MD   Accu-Chek Softclix Lancets lancets Use to check blood sugar once daily 7/10/20 7/10/21  Jhonatan Mart MD   acetaminophen (TYLENOL) 500 MG tablet Take 500 mg by mouth Every 6 (Six) Hours As Needed for Mild Pain .    ProviderAstrid MD   amLODIPine (NORVASC) 10 MG tablet Take 1 tablet by mouth. 3/10/16   Astrid Gonzalez MD   aspirin 81 MG tablet Take 81 mg by mouth Daily. 3/7/14   Astrid Gonzalez MD   Blood Glucose Monitoring Suppl (ACCU-CHEK HELGA PLUS) w/Device kit USE TO CHECK BLOOD SUGARS ONCE DAILY 7/10/20   Jhonatan Mart MD   calcitriol (ROCALTROL) 0.25 MCG capsule Take 0.25 mcg by mouth 3 (Three) Times a Week. Mon, Wed, Fri    Astrid Gonzalez MD   carvedilol (COREG) 12.5 MG tablet TAKE 1 TABLET TWICE DAILY 11/20/20   Luis Paulino MD   digoxin (LANOXIN) 125 MCG tablet Take 1 tablet by mouth Every Other Day. 11/3/20   Yusef Sosa Jr., MD   furosemide (LASIX) 40 MG tablet TAKE 1 TABLET EVERY DAY 6/19/20   Liliam Goode APRN   glimepiride (AMARYL) 2 MG tablet TAKE 1 TABLET TWICE DAILY 8/20/20   Jhonatan Mart MD   Insulin Glargine (Lantus SoloStar) 100 UNIT/ML injection pen Inject 10 units sq at bedtime 11/5/20   Jhonatan Mart MD   Insulin Pen Needle (Advocate Insulin Pen Needles) 31G X 5 MM misc Use once a day for insulin inejction 11/11/20   Jhonatan Mart MD   lisinopril (PRINIVIL,ZESTRIL) 2.5 MG tablet Take 1 tablet by mouth.    Astrid Gonzalez MD   mirtazapine (REMERON) 15 MG tablet TAKE 1 TABLET BY MOUTH EVERY NIGHT. 6/19/20   Liliam Goode,  APRN   simvastatin (ZOCOR) 40 MG tablet 1 tablet p.o. at bedtime. 1/7/20   Jhonatan Mart MD   SITagliptin-metFORMIN HCl ER (Janumet XR)  MG tablet Take 1 tablet by mouth Daily. 4/21/20   Jhonatan Mart MD   theophylline (THEODUR) 300 MG 12 hr tablet Take 300 mg by mouth Daily. 4/8/19   ProviderAstrid MD   warfarin (COUMADIN) 3 MG tablet Take 3 mg by mouth Daily.    Provider, MD Astrid       Allergies:    Allergies   Allergen Reactions   • Penicillins Itching       Objective   Objective     Vital Signs  Temp:  [97.4 °F (36.3 °C)] 97.4 °F (36.3 °C)  Heart Rate:  [70-72] 72  Resp:  [14-15] 15  BP: (134)/(75) 134/75  SpO2:  [92 %-94 %] 94 %  on  Flow (L/min):  [2] 2;   Device (Oxygen Therapy): nasal cannula  Body mass index is 25.9 kg/m².    Physical Exam  Vitals signs reviewed.   Constitutional:       Appearance: Normal appearance. He is normal weight.   HENT:      Head: Normocephalic and atraumatic.      Right Ear: External ear normal.      Left Ear: External ear normal.      Nose: Nose normal.      Mouth/Throat:      Mouth: Mucous membranes are moist.   Eyes:      Extraocular Movements: Extraocular movements intact.   Neck:      Musculoskeletal: Normal range of motion.   Cardiovascular:      Rate and Rhythm: Normal rate and regular rhythm.      Pulses: Normal pulses.      Heart sounds: Normal heart sounds.   Pulmonary:      Effort: Pulmonary effort is normal. No respiratory distress.      Breath sounds: Rhonchi present.   Abdominal:      General: Bowel sounds are normal. There is no distension.      Tenderness: There is no abdominal tenderness.   Musculoskeletal: Normal range of motion.      Right lower leg: No edema.      Left lower leg: No edema.   Skin:     General: Skin is warm and dry.   Neurological:      General: No focal deficit present.      Mental Status: He is alert and oriented to person, place, and time.   Psychiatric:         Mood and Affect: Mood normal.         Behavior: Behavior  normal.         Thought Content: Thought content normal.         Judgment: Judgment normal.         Results Review:  I have personally reviewed most recent cardiac tracings, lab results and radiology images and interpretations and agree with findings, most notably: Troponin, proBNP, CBC, CMP, PT/INR, chest x-ray, EKG and respiratory viral panel.    Results from last 7 days   Lab Units 12/08/20  1653 12/08/20  1652   WBC 10*3/mm3 8.00  --    HEMOGLOBIN g/dL 12.2*  --    HEMATOCRIT % 36.6*  --    PLATELETS 10*3/mm3 272  --    INR   --  1.85*     Results from last 7 days   Lab Units 12/08/20  1652   SODIUM mmol/L 138   POTASSIUM mmol/L 4.8   CHLORIDE mmol/L 102   CO2 mmol/L 25.0   BUN mg/dL 30*   CREATININE mg/dL 2.18*   GLUCOSE mg/dL 117*   CALCIUM mg/dL 9.3   ALT (SGPT) U/L 6   AST (SGOT) U/L 12   TROPONIN T ng/mL 0.018   PROBNP pg/mL 2,850.0*     Estimated Creatinine Clearance: 26.8 mL/min (A) (by C-G formula based on SCr of 2.18 mg/dL (H)).  Brief Urine Lab Results  (Last result in the past 365 days)      Color   Clarity   Blood   Leuk Est   Nitrite   Protein   CREAT   Urine HCG        07/06/20 0856             112.7             Microbiology Results (last 10 days)     Procedure Component Value - Date/Time    Respiratory Panel PCR w/COVID-19(SARS-CoV-2) LOBO/MARTHA/TOMAS/PAD/COR/MAD/TITA In-House, NP Swab in UTM/VTM, 3-4 HR TAT - Swab, Nasopharynx [[id number]]  (Normal) Collected: 12/08/20 1634    Lab Status: Final result Specimen: Swab from Nasopharynx Updated: 12/08/20 1831     ADENOVIRUS, PCR Not Detected     Coronavirus 229E Not Detected     Coronavirus HKU1 Not Detected     Coronavirus NL63 Not Detected     Coronavirus OC43 Not Detected     COVID19 Not Detected     Human Metapneumovirus Not Detected     Human Rhinovirus/Enterovirus Not Detected     Influenza A PCR Not Detected     Influenza B PCR Not Detected     Parainfluenza Virus 1 Not Detected     Parainfluenza Virus 2 Not Detected     Parainfluenza Virus 3 Not  Detected     Parainfluenza Virus 4 Not Detected     RSV, PCR Not Detected     Bordetella pertussis pcr Not Detected     Bordetella parapertussis PCR Not Detected     Chlamydophila pneumoniae PCR Not Detected     Mycoplasma pneumo by PCR Not Detected    Narrative:      Fact sheet for providers: https://docs.[employer]/wp-content/uploads/IGB1217-6262-XJ8.1-EUA-Provider-Fact-Sheet-3.pdf    Fact sheet for patients: https://docs.[employer]/wp-content/uploads/QRP4123-3734-TV6.1-EUA-Patient-Fact-Sheet-1.pdf    Test performed by PCR.          ECG/EMG Results (most recent)     Procedure Component Value Units Date/Time    ECG 12 Lead [[id number]] Collected: 12/08/20 1523     Updated: 12/08/20 1533     QT Interval 424 ms     Narrative:      HEART RATE= 70  bpm  RR Interval= 856  ms  DE Interval=   ms  P Horizontal Axis= -5  deg  P Front Axis=   deg  QRSD Interval= 180  ms  QT Interval= 424  ms  QRS Axis= 0  deg  T Wave Axis=   deg  - ABNORMAL ECG -  Afib/flutter and ventricular-paced rhythm  When compared with ECG of 27-Jun-2019 12:11:54,  No significant change  Electronically Signed By:   Date and Time of Study: 2020-12-08 15:23:53          Results for orders placed in visit on 02/06/08   SCANNED VASCULAR STUDIES       Results for orders placed during the hospital encounter of 08/09/19   Adult Transthoracic Echo Complete W/ Cont if Necessary Per Protocol    Narrative · Left ventricular wall thickness is consistent with mild-to-moderate   concentric hypertrophy.  · Estimated EF = 60%.  · Left ventricular systolic function is normal.  · Right ventricular cavity is borderline dilated.  · Left atrial cavity size is mild-to-moderately dilated.  · There is calcification of the aortic valve.  · Mild aortic valve regurgitation is present.  · Mild mitral valve regurgitation is present  · There is a moderate (1-2cm) pericardial effusion.  · The following left ventricular wall segments are hypokinetic: basal   anterolateral, basal  inferolateral, basal inferoseptal and basal   inferoseptal.          Xr Chest 1 View    Result Date: 12/8/2020  Interstitial thickening in both lungs, with more confluent disease in the right lower lobe. FINDINGS are worrisome for right basilar pneumonia and superimposed mild generalized interstitial edema.  Stable cardiomegaly.  Electronically Signed By-Alexandra Preston MD On:12/8/2020 4:48 PM This report was finalized on [id number] by  Alexandra Preston MD.        Estimated Creatinine Clearance: 26.8 mL/min (A) (by C-G formula based on SCr of 2.18 mg/dL (H)).    Assessment/Plan   Assessment/Plan       Active Hospital Problems    Diagnosis  POA   • **Acute on chronic heart failure with preserved ejection fraction (CMS/HCC) [I50.33]  Yes     Priority: High   • Ischemic cardiomyopathy [I25.5]  Yes     Priority: High   • Atrial fibrillation (CMS/HCC) [I48.91] [I48.91]  Yes     Priority: High   • 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) [E11.39]  Yes     Priority: Medium   • Stage 3 chronic kidney disease [N18.30]  Yes     Priority: Medium   • Essential hypertension [I10]  Yes     Priority: Medium   • Coronary artery disease of native artery of native heart with stable angina pectoris (CMS/HCC) [I25.118]  Yes     Priority: Medium   • Chronic obstructive pulmonary disease (CMS/HCC) [J44.9]  Yes     Priority: Medium   • Mixed hyperlipidemia [E78.2]  Yes     Priority: Low      Resolved Hospital Problems   No resolved problems to display.     Acute on chronic heart failure with preserved ejection fraction and history of ischemic cardiomyopathy  -Patient presents with prolonged history of worsening dyspnea including dyspnea on exertion and orthopnea  -proBNP: 2850.0  -Chest x-ray shows interstitial thickening in both lungs more confluent disease in the right lower lobe noted is worrisome for right basilar pneumonia and superimposed mild generalized interstitial edema with stable  cardiomegaly.   -EKG shows paced rhythm at 70.   -Respiratory viral panel including for COVID-19 was negative.  -Patient given Nitropaste and 40 mg Lasix IV in the ED, continue IV Lasix with close monitoring of kidney function  -Monitor daily weight and I's and O's  -2 g sodium diet  -Echocardiogram ordered    Acute on chronic kidney injury  - Creatinine: 2.18, BUN: 30, eGFR: 29  -Avoid nephrotoxic medication and IV dye unless urgently needed  -Monitor closely especially while diuresing  -Patient may benefit from nephrology consult    Atrial fibrillation  -EKG shows paced rhythm at 70-  -Continue Coreg and warfarin with pharmacy to dose  -Continue cardiac monitoring    CAD  -Continue statin, aspirin and beta-blocker  -Continue cardiac monitoring    Hypertension  -Well controlled with a blood pressure on admission of 134/74  - Continue amlodipine and Coreg  - Monitor while admitted    Diabetes mellitus  -Well controlled with glucose of 117 on admission  - Check A1c  - Hold glimepiride and Janumet  -Basal and sliding scale insulin  -diabetic diet  -monitor AC and HS    COPD  -DuoNeb and theophylline  -Check theophylline level    Hyperlipidemia  -Continue statin            VTE Prophylaxis -warfarin with pharmacy to dose  Mechanical Order History:     None      Pharmalogical Order History:     None          CODE STATUS: Full  There are no questions and answers to display.         I discussed the patient's findings and my recommendations with patient.      Signature:Electronically signed by Justice Aden PA-C, 12/09/20, 12:53 AM EST.    [facility] Team

## 2020-12-09 NOTE — PROGRESS NOTES
"      [facility] Medicine Services  INPATIENT PROGRESS NOTE  233/1   Hospitalist Team  LOS 0 days      Patient Care Team:  Yusef Sosa Jr., MD as PCP - General  Yusef Sosa Jr., MD as PCP - Family Medicine  Luis Paulino MD as Consulting Physician (Cardiology)      Patient was examined with relevant and adequate PPE keeping in mind the current coronavirus pandemic.    Chief Complaint / Subjective  Chief Complaint   Patient presents with   • Shortness of Breath       HPI (4 hpi elements or status of 3 chronic)  Mr. Santoro is a 85 y.o. male with past medical history of diabetes, hyperlipidemia, cardiomyopathy, hypertension, CAD, COPD, A. fib, CKD and CHF who presents to [facility] complaining of dyspnea.  Patient reports that his difficulty breathing has been present for \"quite a while\".  He notes some associated orthopnea as well as TRIMBLE which includes dyspnea with walking even short distances from room to room on level ground.  He reports that when his pain is at its most profound he will occasionally experience a substernal burning sensation but denies any consistent chest pain.  Over the past year and a half patient reports he is lost approximately 70 pounds unintentionally.  He denies any cough, fever, changes in bowel or bladder habits, nausea or vomiting, sensation of tachycardia or palpitations, peripheral edema, syncope or near syncope.  Compliance with all prescribed medical therapies is reported and patient reports he does not smoke tobacco or drink.     In the ED patient found to have lab significant for troponin of 0.018, proBNP: 2850.0, creatinine: 1.8, BUN: 30, BUN/creatinine ratio: 13.8, INR: 1.5, PT: 19.8. Remainder of CBC and CMP was generally unremarkable. Chest x-ray shows interstitial thickening in both lungs more confluent disease in the right lower lobe noted is worrisome for right basilar pneumonia and superimposed mild generalized interstitial " edema with stable cardiomegaly. EKG shows paced rhythm at 70. Respiratory viral panel including for COVID-19 was negative.    Former smoker.  Not on home oxygen        History taken from: patient    ROS  Constitution: Negative.   HENT: Negative.    Eyes: Negative.    Cardiovascular: Positive for chest pain, dyspnea on exertion, orthopnea and paroxysmal nocturnal dyspnea. Negative for irregular heartbeat, leg swelling, near-syncope, palpitations and syncope.   Respiratory: Positive for shortness of breath. Negative for cough and sputum production.    Endocrine: Negative.    Skin: Negative.    Musculoskeletal: Negative.    Gastrointestinal: Negative.    Genitourinary: Negative.    Neurological: Negative.    Psychiatric/Behavioral: Negative.    All other systems reviewed and are negative.          Family History   Problem Relation Age of Onset   • Heart disease Mother    • Hypertension Mother    • Stroke Father    • Breast cancer Sister    • Colon cancer Sister    • Heart disease Brother    • Diabetes Other        Past Medical History:   Diagnosis Date   • Arrhythmia    • Bradycardia    • Cardiomyopathy (CMS/HCC)    • Cataract    • COPD (chronic obstructive pulmonary disease) (CMS/HCC)    • Coronary artery disease    • Diabetes mellitus, type 2 (CMS/HCC)    • Emphysema, unspecified (CMS/HCC)    • Hx of sick sinus syndrome     S/P Biventricular Pacemaker   • Hypertension    • Paroxysmal atrial fibrillation (CMS/HCC)    • Sleep apnea    • Ventricular arrhythmia        Social History     Socioeconomic History   • Marital status:      Spouse name: Not on file   • Number of children: Not on file   • Years of education: Not on file   • Highest education level: Not on file   Occupational History   • Occupation: Retired   Social Needs   • Financial resource strain: Patient refused   • Food insecurity     Worry: Patient refused     Inability: Patient refused   • Transportation needs     Medical: Patient refused      Non-medical: Patient refused   Tobacco Use   • Smoking status: Former Smoker     Types: Cigarettes   • Smokeless tobacco: Never Used   Substance and Sexual Activity   • Alcohol use: Yes     Comment: occ   • Drug use: No   • Sexual activity: Defer       Prior to Admission medications    Medication Sig Start Date End Date Taking? Authorizing Provider   acetaminophen (TYLENOL) 500 MG tablet Take 500 mg by mouth Every 6 (Six) Hours As Needed for Mild Pain .   Yes Astrid Gonzalez MD   aspirin 81 MG tablet Take 81 mg by mouth Every Night. 3/7/14  Yes Astrid Gonzalez MD   calcitriol (ROCALTROL) 0.25 MCG capsule Take 0.25 mcg by mouth 3 (Three) Times a Week. Mon, Wed, Fri   Yes Astrid Gonzalez MD   carvedilol (COREG) 12.5 MG tablet TAKE 1 TABLET TWICE DAILY  Patient taking differently: Take 6.25 mg by mouth Every Morning. 11/20/20  Yes Luis Paulino MD   carvedilol (COREG) 12.5 MG tablet Take 12.5 mg by mouth Every Night.   Yes Astrid Gonzalez MD   digoxin (LANOXIN) 125 MCG tablet Take 1 tablet by mouth Every Other Day. 11/3/20  Yes Yusef Sosa Jr., MD   furosemide (LASIX) 40 MG tablet TAKE 1 TABLET EVERY DAY  Patient taking differently: Take 20 mg by mouth Daily. 6/19/20  Yes Liliam Goode APRN   glimepiride (AMARYL) 2 MG tablet TAKE 1 TABLET TWICE DAILY 8/20/20  Yes Jhonatan Mart MD   Insulin Glargine (Lantus SoloStar) 100 UNIT/ML injection pen Inject 10 units sq at bedtime  Patient taking differently: Inject 10 Units under the skin into the appropriate area as directed Daily With Dinner. 11/5/20  Yes Jhonatan Mart MD   Magnesium 250 MG tablet Take 1 tablet by mouth Daily.   Yes Astrid Gonzalez MD   melatonin 5 MG tablet tablet Take 5 mg by mouth Every Night.   Yes Astrid Gonzalez MD   mirtazapine (REMERON) 15 MG tablet TAKE 1 TABLET BY MOUTH EVERY NIGHT. 6/19/20  Yes Liliam Goode APRN   simvastatin (ZOCOR) 40 MG tablet 1 tablet p.o. at bedtime.  Patient  "taking differently: Take 40 mg by mouth Every Night. 1/7/20  Yes Jhonatan Mart MD   SITagliptin-metFORMIN HCl ER (Janumet XR)  MG tablet Take 1 tablet by mouth Daily.  Patient taking differently: Take 1 tablet by mouth Daily With Lunch. 4/21/20  Yes Jhonatan Mart MD   theophylline (THEODUR) 300 MG 12 hr tablet Take 300 mg by mouth Every Night. 4/8/19  Yes ProviderAstrid MD   warfarin (COUMADIN) 3 MG tablet Take 3 mg by mouth Daily Before Lunch.   Yes Provider, MD Astrid        Objective    Physical Exam     Vital Signs  Temp:  [97.3 °F (36.3 °C)-97.7 °F (36.5 °C)] 97.3 °F (36.3 °C)  Heart Rate:  [65-78] 70  Resp:  [14-20] 20  BP: (108-171)/(61-81) 143/81    Oxygen Therapy  SpO2: 93 %  Pulse Oximetry Type: Continuous  Device (Oxygen Therapy): nasal cannula  Flow (L/min): 2  Flowsheet Rows      First Filed Value   Admission Height  170.2 cm (67\") Documented at 12/08/2020 1514   Admission Weight  75 kg (165 lb 5.5 oz) Documented at 12/08/2020 1514        Weight change:    Intake & Output (last 3 days)       12/06 0701 - 12/07 0700 12/07 0701 - 12/08 0700 12/08 0701 - 12/09 0700 12/09 0701 - 12/10 0700    P.O.    700    Total Intake(mL/kg)    700 (9.5)    Net    +700                Lines, Drains & Airways    Active LDAs     Name:   Placement date:   Placement time:   Site:   Days:    Peripheral IV 12/08/20 2314 Right Antecubital   12/08/20 2314    Antecubital   less than 1                Physical Exam:    Physical Exam  Vitals signs and nursing note reviewed.   Constitutional:       General: He is not in acute distress.     Appearance: Normal appearance. He is well-developed. He is not ill-appearing, toxic-appearing or diaphoretic.   HENT:      Head: Normocephalic and atraumatic.      Right Ear: Ear canal and external ear normal.      Left Ear: Ear canal and external ear normal.      Nose: Nose normal. No congestion or rhinorrhea.      Mouth/Throat:      Mouth: Mucous membranes are moist.      " Pharynx: No oropharyngeal exudate.   Eyes:      General: No scleral icterus.        Right eye: No discharge.         Left eye: No discharge.      Extraocular Movements: Extraocular movements intact.      Conjunctiva/sclera: Conjunctivae normal.      Pupils: Pupils are equal, round, and reactive to light.   Neck:      Musculoskeletal: Normal range of motion and neck supple. No neck rigidity or muscular tenderness.      Thyroid: No thyromegaly.      Vascular: No carotid bruit or JVD.      Trachea: No tracheal deviation.   Cardiovascular:      Rate and Rhythm: Normal rate and regular rhythm.      Pulses: Normal pulses.      Heart sounds: Normal heart sounds. No murmur. No friction rub. No gallop.    Pulmonary:      Effort: Pulmonary effort is normal. No respiratory distress.      Breath sounds: Normal breath sounds. No stridor. No wheezing, rhonchi or rales.   Chest:      Chest wall: No tenderness.   Abdominal:      General: Bowel sounds are normal. There is no distension.      Palpations: Abdomen is soft. There is no mass.      Tenderness: There is no abdominal tenderness. There is no guarding or rebound.      Hernia: No hernia is present.   Musculoskeletal: Normal range of motion.         General: No swelling, tenderness, deformity or signs of injury.      Right lower leg: No edema.      Left lower leg: No edema.   Lymphadenopathy:      Cervical: No cervical adenopathy.   Skin:     General: Skin is warm and dry.      Coloration: Skin is not jaundiced or pale.      Findings: No bruising, erythema or rash.   Neurological:      General: No focal deficit present.      Mental Status: He is alert and oriented to person, place, and time. Mental status is at baseline.      Cranial Nerves: No cranial nerve deficit.      Sensory: No sensory deficit.      Motor: No weakness or abnormal muscle tone.      Coordination: Coordination normal.   Psychiatric:         Mood and Affect: Mood normal.         Behavior: Behavior normal.           Thought Content: Thought content normal.         Judgment: Judgment normal.               PT Recommendation and Plan             Procedures:        Assessment/Plan with Problem wise       Active Hospital Problems    Diagnosis  POA   • **Acute on chronic heart failure with preserved ejection fraction (CMS/[facility]) [I50.33]  Yes     Priority: High   • Acute-on-chronic kidney injury (CMS/[facility]) [N17.9, N18.9]  Yes   • Type 2 diabetes mellitus with other diabetic ophthalmic complication (CMS/[facility]) [E11.39]  Yes   • Mixed hyperlipidemia [E78.2]  Yes   • Essential hypertension [I10]  Yes   • Coronary artery disease of native artery of native heart with stable angina pectoris (CMS/[facility]) [I25.118]  Yes   • Chronic obstructive pulmonary disease (CMS/[facility]) [J44.9]  Yes   • Atrial fibrillation (CMS/HCC) [I48.91] [I48.91]  Yes      Resolved Hospital Problems   No resolved problems to display.        Estimated Creatinine Clearance: 29.9 mL/min (A) (by C-G formula based on SCr of 1.91 mg/dL (H)).    Code Status and Medical Interventions:   Ordered at: 12/08/20 1931     Code Status:    CPR     Medical Interventions (Level of Support Prior to Arrest):    Full       MEDICAL DECISION MAKING COMPLEXITY BY PROBLEM:       CHF:  Diuresis-loop diuretics  Spironolactone if tolerated  Thiazides if tolerated  ACE inhibitor if tolerated  Beta-blocker  Check echocardiogram      Diabetes mellitus:  Monitor blood sugars  Basal bolus insulin  Sliding scale as needed  Diabetic education as needed  Supportive treatment  Nursing do not hold long-acting insulin without orders       Renal failure/insufficiency/injury:  Hydration  Supportive treatment  Cut down or hold ACE inhibitors  Avoid nephrotoxic medications   Address diuretics        Hyperlipidemia:  Check lipid panel  Statins if indicated  Add Ezetimibe if appropriate  No role for omega-3 demonstrated.    Hypertension:  Continue home medications   Options include-  beta-blockers  Calcium channel  blockers  ACE inhibitor  Vasodilators  Low-dose diuretics  PRN medications have not been shown to affect outcomes-to be avoided        Atrial fibrillation:   Rate control  Beta-blockers  Calcium channel blockers  Digoxin  +/-Amiodarone  Anticoagulation  Close monitoring        Care coordination with nursing for current care 12/09/20 5:15 PM EST.  Hold digoxin level comes down    Plan for disposition:            Continued Care and Services - Admitted Since 12/8/2020    Coordination has not been started for this encounter.        Historical & Objective Data     Results Review:    I reviewed the patient's new lab and radiology results. 12/09/20     Results from last 7 days   Lab Units 12/09/20  0346 12/08/20  1653   WBC 10*3/mm3 10.50 8.00   HEMOGLOBIN g/dL 12.5* 12.2*   HEMATOCRIT % 38.5 36.6*   MCV fL 102.1* 102.0*   MCH pg 33.2* 33.9*   MPV fL 7.2 6.7   RDW % 18.0* 17.8*   PLATELETS 10*3/mm3 265 272     Results from last 7 days   Lab Units 12/09/20  0346 12/08/20  1652   SODIUM mmol/L 138 138   POTASSIUM mmol/L 4.1 4.8   CHLORIDE mmol/L 98 102   CO2 mmol/L 28.0 25.0   BUN mg/dL 28* 30*   CREATININE mg/dL 1.91* 2.18*   CALCIUM mg/dL 9.6 9.3   MAGNESIUM mg/dL 2.2  --    BILIRUBIN mg/dL  --  0.4   ALK PHOS U/L  --  75   ALT (SGPT) U/L  --  6   AST (SGOT) U/L  --  12   GLUCOSE mg/dL 124* 117*     Inflammatory Biomarkers        Invalid input(s): ESR, D-DIMER QUANTITATIVE,  PROCALCITONIN,    No results found for: PHOS  No results found for: HGBA1C  Lab Results   Component Value Date    CHOL 103 12/09/2020    TRIG 166 (H) 12/09/2020    HDL 40 12/09/2020    LDL 35 12/09/2020     No results found for: LIPASE  Results from last 7 days   Lab Units 12/09/20  0346 12/08/20  2349 12/08/20  1652   INR  1.68* 1.78* 1.85*           No results found for: INTRAOP, PREDX, FINALDX, COMDX  COVID19   Date Value Ref Range Status   12/08/2020 Not Detected Not Detected - Ref. Range Final        Microbiology Results (last 10 days)      Procedure Component Value - Date/Time    Respiratory Panel PCR w/COVID-19(SARS-CoV-2) LOBO/MARTHA/TOMAS/PAD/COR/MAD/TITA In-House, NP Swab in UTM/VTM, 3-4 HR TAT - Swab, Nasopharynx [[id number]]  (Normal) Collected: 12/08/20 1634    Lab Status: Final result Specimen: Swab from Nasopharynx Updated: 12/08/20 1831     ADENOVIRUS, PCR Not Detected     Coronavirus 229E Not Detected     Coronavirus HKU1 Not Detected     Coronavirus NL63 Not Detected     Coronavirus OC43 Not Detected     COVID19 Not Detected     Human Metapneumovirus Not Detected     Human Rhinovirus/Enterovirus Not Detected     Influenza A PCR Not Detected     Influenza B PCR Not Detected     Parainfluenza Virus 1 Not Detected     Parainfluenza Virus 2 Not Detected     Parainfluenza Virus 3 Not Detected     Parainfluenza Virus 4 Not Detected     RSV, PCR Not Detected     Bordetella pertussis pcr Not Detected     Bordetella parapertussis PCR Not Detected     Chlamydophila pneumoniae PCR Not Detected     Mycoplasma pneumo by PCR Not Detected    Narrative:      Fact sheet for providers: https://docs.[facility]/wp-content/uploads/AOE5788-0637-LT6.1-EUA-Provider-Fact-Sheet-3.pdf    Fact sheet for patients: https://docs.[facility]/wp-content/uploads/SID9376-5651-MZ9.1-EUA-Patient-Fact-Sheet-1.pdf    Test performed by PCR.          ECG/EMG Results (most recent)     Procedure Component Value Units Date/Time    ECG 12 Lead [[id number]] Collected: 12/08/20 1523     Updated: 12/08/20 1533     QT Interval 424 ms     Narrative:      HEART RATE= 70  bpm  RR Interval= 856  ms  AL Interval=   ms  P Horizontal Axis= -5  deg  P Front Axis=   deg  QRSD Interval= 180  ms  QT Interval= 424  ms  QRS Axis= 0  deg  T Wave Axis=   deg  - ABNORMAL ECG -  Afib/flutter and ventricular-paced rhythm  When compared with ECG of 27-Jun-2019 12:11:54,  No significant change  Electronically Signed By:   Date and Time of Study: 2020-12-08 15:23:53    Adult Transthoracic Echo Complete W/ Cont  if Necessary Per Protocol [[id number]] Collected: 12/09/20 1117     Updated: 12/09/20 1706     BSA 1.8 m^2      RVIDd 3.5 cm      IVSd 1.4 cm      IVSs 2.1 cm      LVIDd 3.0 cm      LVIDs 2.1 cm      LVPWd 1.2 cm      BH CV ECHO MAURO - LVPWS 1.9 cm      IVS/LVPW 1.1     FS 30.6 %      EDV(Teich) 34.6 ml      ESV(Teich) 13.9 ml      EF(Teich) 59.7 %      EDV(cubed) 26.6 ml      ESV(cubed) 8.9 ml      EF(cubed) 66.6 %      % IVS thick 54.4 %      % LVPW thick 62.9 %      LV mass(C)d 119.1 grams      LV mass(C)dI 64.4 grams/m^2      LV mass(C)s 182.1 grams      LV mass(C)sI 98.5 grams/m^2      SV(Teich) 20.7 ml      SI(Teich) 11.2 ml/m^2      SV(cubed) 17.7 ml      SI(cubed) 9.6 ml/m^2      Ao root diam 3.2 cm      Ao root area 7.9 cm^2      ACS 1.3 cm      asc Aorta Diam 3.0 cm      LVOT diam 1.9 cm      LVOT area 2.9 cm^2      EDV(MOD-sp4) 60.8 ml      ESV(MOD-sp4) 23.6 ml      EF(MOD-sp4) 61.2 %      SV(MOD-sp4) 37.3 ml      SI(MOD-sp4) 20.1 ml/m^2      Ao root area (BSA corrected) 1.7     LV Patel Vol (BSA corrected) 32.9 ml/m^2      LV Sys Vol (BSA corrected) 12.7 ml/m^2      MV E max mariam 84.7 cm/sec      MV A max mariam 0.44 cm/sec      MV E/A 193.8     MV V2 max 98.6 cm/sec      MV max PG 3.9 mmHg      MV V2 mean 55.8 cm/sec      MV mean PG 1.5 mmHg      MV V2 VTI 16.6 cm      MVA(VTI) 2.9 cm^2      MV dec slope 464.5 cm/sec^2      MV dec time 0.18 sec      Ao pk mariam 154.7 cm/sec      Ao max PG 9.7 mmHg      Ao max PG (full) 5.9 mmHg      Ao V2 mean 111.7 cm/sec      Ao mean PG 5.6 mmHg      Ao mean PG (full) 3.7 mmHg      Ao V2 VTI 25.1 cm      VALENCIA(I,A) 1.9 cm^2      VALENCIA(I,D) 1.9 cm^2      VALENCIA(V,A) 1.9 cm^2      VALENCIA(V,D) 1.9 cm^2      LV V1 max PG 3.8 mmHg      LV V1 mean PG 1.9 mmHg      LV V1 max 97.5 cm/sec      LV V1 mean 64.8 cm/sec      LV V1 VTI 16.6 cm      SV(Ao) 197.8 ml      SI(Ao) 107.0 ml/m^2      SV(LVOT) 48.8 ml      SI(LVOT) 26.4 ml/m^2      PA V2 max 82.7 cm/sec      PA max PG 2.7 mmHg      PA max  PG (full) 1.0 mmHg      PA V2 mean 57.1 cm/sec      PA mean PG 1.4 mmHg      PA mean PG (full) 0.63 mmHg      PA V2 VTI 12.5 cm      PA acc time 0.08 sec      RV V1 max PG 1.7 mmHg      RV V1 mean PG 0.81 mmHg      RV V1 max 65.7 cm/sec      RV V1 mean 41.8 cm/sec      RV V1 VTI 10.6 cm      TR max mariam 271.2 cm/sec      RVSP(TR) 32.4 mmHg      RAP systole 3.0 mmHg      PA pr(Accel) 43.2 mmHg       CV ECHO MAURO - BZI_BMI 25.4 kilograms/m^2       CV ECHO MAURO - BSA(HAYCOCK) 1.9 m^2       CV ECHO MAURO - BZI_METRIC_WEIGHT 73.5 kg       CV ECHO MAURO - BZI_METRIC_HEIGHT 170.2 cm      Target HR (85%) 116 bpm      Max. Pred. HR (100%) 136 bpm      EF(MOD-bp) 61.0 %      LA dimension(2D) 4.6 cm      Echo EF Estimated 60 %     Narrative:      · Left ventricular wall thickness is consistent with septal asymmetric   hypertrophy.  · Estimated left ventricular EF = 60% Left ventricular systolic function   is normal.  · The right ventricular cavity is borderline dilated.  · The right atrial cavity is borderline dilated.  · Mild aortic valve stenosis is present.  · Estimated right ventricular systolic pressure from tricuspid   regurgitation is normal (<35 mmHg).  · There is a small (<1cm) pericardial effusion adjacent to the left   ventricle.             Results for orders placed in visit on 02/06/08   SCANNED VASCULAR STUDIES       Results for orders placed during the hospital encounter of 12/08/20   Adult Transthoracic Echo Complete W/ Cont if Necessary Per Protocol    Narrative · Left ventricular wall thickness is consistent with septal asymmetric   hypertrophy.  · Estimated left ventricular EF = 60% Left ventricular systolic function   is normal.  · The right ventricular cavity is borderline dilated.  · The right atrial cavity is borderline dilated.  · Mild aortic valve stenosis is present.  · Estimated right ventricular systolic pressure from tricuspid   regurgitation is normal (<35 mmHg).  · There is a small (<1cm)  pericardial effusion adjacent to the left   ventricle.          Xr Chest 1 View    Result Date: 12/8/2020  Interstitial thickening in both lungs, with more confluent disease in the right lower lobe. FINDINGS are worrisome for right basilar pneumonia and superimposed mild generalized interstitial edema.  Stable cardiomegaly.  Electronically Signed By-Alexandra Preston MD On:12/8/2020 4:48 PM This report was finalized on [id number] by  Alexandra Preston MD.      I personally reviewed patient's x-ray films and my findings are: 0    I personally reviewed patient's EKG strip and my findings are: 0    Medication Review:   I have reviewed the patient's current medication list 12/09/20     Scheduled Meds  aspirin, 81 mg, Oral, Daily  atorvastatin, 20 mg, Oral, Daily  calcitriol, 0.25 mcg, Oral, Once per day on Mon Wed Fri  carvedilol, 12.5 mg, Oral, Daily With Dinner  carvedilol, 6.25 mg, Oral, Daily With Breakfast  furosemide, 20 mg, Intravenous, TID  insulin glargine, 10 Units, Subcutaneous, Nightly  insulin lispro, 0-14 Units, Subcutaneous, TID AC  magnesium oxide, 200 mg, Oral, Daily  mirtazapine, 15 mg, Oral, Nightly  sodium chloride, 10 mL, Intravenous, Q12H  spironolactone, 25 mg, Oral, Daily  theophylline, 300 mg, Oral, Nightly  warfarin, 3 mg, Oral, Daily        Meds Infusions  Pharmacy to dose warfarin,         DVT prophylaxis  Mechanical Order History:     None      Pharmalogical Order History:      Ordered     Dose Route Frequency Stop    12/09/20 1225  warfarin (COUMADIN) tablet 3 mg     Question:  Target INR  Answer:  2 - 3    3 mg PO Daily Warfarin --    12/09/20 0020  warfarin (COUMADIN) tablet 3 mg     Question:  Target INR  Answer:  2 - 3    3 mg PO Once 12/09/20 0037    12/08/20 2332  Pharmacy to dose warfarin     Question:  Target INR  Answer:  2 - 3    -- XX Continuous PRN --    12/08/20 2332  Pharmacy to dose warfarin  Status:  Discontinued     Question:  Target INR  Answer:  2 - 3    -- XX Continuous  PRN 12/09/20 1219                Meds PRN  •  acetaminophen **OR** acetaminophen **OR** acetaminophen  •  dextrose  •  dextrose  •  glucagon (human recombinant)  •  insulin lispro **AND** insulin lispro  •  melatonin  •  nitroglycerin  •  ondansetron **OR** ondansetron  •  Pharmacy to dose warfarin  •  [COMPLETED] Insert peripheral IV **AND** sodium chloride  •  sodium chloride      Chavez Fierro MD  12/09/20  17:26 EST

## 2020-12-10 PROBLEM — R06.00 DYSPNEA: Status: ACTIVE | Noted: 2020-01-01

## 2020-12-10 NOTE — PLAN OF CARE
Goal Outcome Evaluation:  Plan of Care Reviewed With: patient  Progress: no change       Patient is resting comfortably today, patient has been pleasant today. Will continue to assess.

## 2020-12-10 NOTE — CONSULTS
Referring Provider: Dr. TREY Fierro  Reason for Consultation: Renal insufficiency    Chief complaint . Shortness of breath    History of present illness:    Patient is 85 years old  male with history of CKD 3, hypertension, diabetes, COPD, A. Fib came to the hospital with shortness of breath with minimal exertion.  Patient had some orthopnea but no PND.  Patient had cough but no expectoration.  Denies any fever, chills, nausea, vomiting, diarrhea, hematuria or dysuria.  His creatinine was around 1.9 mg/DL on last follow-up visit with me as outpatient.  His creatinine now is 2.2 mg/DL.  Patient has previous history of NSAID use but not recently.Patient has been receiving diuretics in the form of IV Lasix and spironolactone For Fluid overload. patient is feeling better this morning.  His dyspnea is improving    History  Past Medical History:   Diagnosis Date   • Arrhythmia    • Bradycardia    • Cardiomyopathy (CMS/HCC)    • Cataract    • COPD (chronic obstructive pulmonary disease) (CMS/HCC)    • Coronary artery disease    • Diabetes mellitus, type 2 (CMS/HCC)    • Emphysema, unspecified (CMS/HCC)    • Hx of sick sinus syndrome     S/P Biventricular Pacemaker   • Hypertension    • Paroxysmal atrial fibrillation (CMS/HCC)    • Sleep apnea    • Ventricular arrhythmia      Past Surgical History:   Procedure Laterality Date   • CARDIAC CATHETERIZATION  02/04/2015   • CARDIAC CATHETERIZATION  04/25/2016   • CAROTID ENDARTERECTOMY     • CORONARY ANGIOPLASTY  1997   • LARYNGOSCOPY     • LUNG SURGERY  2008    Lung resection   • PACEMAKER IMPLANTATION  04/25/2016    Dual chamber; [employer]   • TRACHEOSTOMY       Social History     Tobacco Use   • Smoking status: Former Smoker     Types: Cigarettes   • Smokeless tobacco: Never Used   Substance Use Topics   • Alcohol use: Yes     Comment: occ   • Drug use: No     Family History   Problem Relation Age of Onset   • Heart disease Mother    • Hypertension Mother    •  Stroke Father    • Breast cancer Sister    • Colon cancer Sister    • Heart disease Brother    • Diabetes Other        Review of Systems  ROS  All systems reviewed, negative except as mentioned in HPI  Objective     Vital Signs  Temp:  [97.3 °F (36.3 °C)-97.7 °F (36.5 °C)] 97.7 °F (36.5 °C)  Heart Rate:  [67-78] 69  Resp:  [16-20] 16  BP: (108-143)/(61-81) 133/73    No intake/output data recorded.  I/O last 3 completed shifts:  In: 1180 [P.O.:1180]  Out: 250 [Urine:250]    Physical Exam:  Physical Exam  General.  Awake, alert  Head.  Atraumatic, normocephalic  Neck.  Supple  ENT.  Oral mucosa moist  Respiratory.  Few scattered wheezes  Cardiovascular. Irregular rhythm  Abdomen.  Soft, nontender  Extremities.  No edema  Results Review:   I reviewed the patient's new clinical results.    Lab Results   Component Value Date    CALCIUM 9.8 12/10/2020     Results from last 7 days   Lab Units 12/10/20  0428 12/09/20  0346 12/08/20  1653 12/08/20  1652   MAGNESIUM mg/dL 2.3 2.2  --   --    SODIUM mmol/L 135* 138  --  138   POTASSIUM mmol/L 4.4 4.1  --  4.8   CHLORIDE mmol/L 98 98  --  102   CO2 mmol/L 27.0 28.0  --  25.0   BUN mg/dL 39* 28*  --  30*   CREATININE mg/dL 2.21* 1.91*  --  2.18*   GLUCOSE mg/dL 166* 124*  --  117*   CALCIUM mg/dL 9.8 9.6  --  9.3   WBC 10*3/mm3 9.70 10.50 8.00  --    HEMOGLOBIN g/dL 12.4* 12.5* 12.2*  --    PLATELETS 10*3/mm3 259 265 272  --    ALT (SGPT) U/L  --   --   --  6   AST (SGOT) U/L  --   --   --  12     Lab Results   Component Value Date    CKTOTAL 36 02/24/2020    TROPONINI 0.030 06/28/2019    TROPONINT 0.025 12/08/2020     Estimated Creatinine Clearance: 26.2 mL/min (A) (by C-G formula based on SCr of 2.21 mg/dL (H)).No results found for: URICACID    Brief Urine Lab Results  (Last result in the past 365 days)      Color   Clarity   Blood   Leuk Est   Nitrite   Protein   CREAT   Urine HCG        07/06/20 0856             112.7             Prior to Admission medications    Medication  Sig Start Date End Date Taking? Authorizing Provider   acetaminophen (TYLENOL) 500 MG tablet Take 500 mg by mouth Every 6 (Six) Hours As Needed for Mild Pain .   Yes Astrid Gonzalez MD   aspirin 81 MG tablet Take 81 mg by mouth Every Night. 3/7/14  Yes Astrid Gonzalez MD   calcitriol (ROCALTROL) 0.25 MCG capsule Take 0.25 mcg by mouth 3 (Three) Times a Week. Mon, Wed, Fri   Yes Astrid Gonzalez MD   carvedilol (COREG) 12.5 MG tablet TAKE 1 TABLET TWICE DAILY  Patient taking differently: Take 6.25 mg by mouth Every Morning. 11/20/20  Yes Luis Paulino MD   carvedilol (COREG) 12.5 MG tablet Take 12.5 mg by mouth Every Night.   Yes Astrid Gonzalez MD   digoxin (LANOXIN) 125 MCG tablet Take 1 tablet by mouth Every Other Day. 11/3/20  Yes Yusef Sosa Jr., MD   furosemide (LASIX) 40 MG tablet TAKE 1 TABLET EVERY DAY  Patient taking differently: Take 20 mg by mouth Daily. 6/19/20  Yes Liliam Goode APRN   glimepiride (AMARYL) 2 MG tablet TAKE 1 TABLET TWICE DAILY 8/20/20  Yes Jhonatan Mart MD   Insulin Glargine (Lantus SoloStar) 100 UNIT/ML injection pen Inject 10 units sq at bedtime  Patient taking differently: Inject 10 Units under the skin into the appropriate area as directed Daily With Dinner. 11/5/20  Yes Jhonatan Mart MD   Magnesium 250 MG tablet Take 1 tablet by mouth Daily.   Yes Astrid Gonzalez MD   melatonin 5 MG tablet tablet Take 5 mg by mouth Every Night.   Yes Astrid Gonzalez MD   mirtazapine (REMERON) 15 MG tablet TAKE 1 TABLET BY MOUTH EVERY NIGHT. 6/19/20  Yes Liliam Goode APRN   simvastatin (ZOCOR) 40 MG tablet 1 tablet p.o. at bedtime.  Patient taking differently: Take 40 mg by mouth Every Night. 1/7/20  Yes Jhonatan Mart MD   SITagliptin-metFORMIN HCl ER (Janumet XR)  MG tablet Take 1 tablet by mouth Daily.  Patient taking differently: Take 1 tablet by mouth Daily With Lunch. 4/21/20  Yes Jhonatan Mart MD   theophylline  (THEODUR) 300 MG 12 hr tablet Take 300 mg by mouth Every Night. 4/8/19  Yes Provider, MD Astrid   warfarin (COUMADIN) 3 MG tablet Take 3 mg by mouth Daily Before Lunch.   Yes Provider, MD Astrid       aspirin, 81 mg, Oral, Daily  atorvastatin, 20 mg, Oral, Daily  calcitriol, 0.25 mcg, Oral, Once per day on Mon Wed Fri  carvedilol, 12.5 mg, Oral, Daily With Dinner  carvedilol, 6.25 mg, Oral, Daily With Breakfast  furosemide, 20 mg, Intravenous, TID  insulin glargine, 10 Units, Subcutaneous, Nightly  insulin lispro, 0-14 Units, Subcutaneous, TID AC  magnesium oxide, 200 mg, Oral, Daily  mirtazapine, 15 mg, Oral, Nightly  sodium chloride, 10 mL, Intravenous, Q12H  spironolactone, 25 mg, Oral, Daily  theophylline, 300 mg, Oral, Nightly  warfarin, 2 mg, Oral, Once  warfarin, 3 mg, Oral, Daily      Pharmacy to dose warfarin,         Assessment/Plan       1.Chronic kidney disease stage III  Secondary to hypertension, diabetes and previous NSAIDs use  His creatinine is slightly higher than his baseline, most likely due to diuretics  Electrolytes, volume status okay.  I will change Lasix to oral  I will follow renal function    2.Hypervolemia  Symptomatically improving with diuresis  Change Lasix to oral    3.Hypertension with CKD 3  Blood pressure well controlled.  Continue current antihypertensives    I discussed the patients findings and my recommendations with patient and nursing staff    Cam Yan MD  12/10/20  09:55 EST

## 2020-12-10 NOTE — PLAN OF CARE
"Goal Outcome Evaluation:  Plan of Care Reviewed With: patient  Progress: no change    Patient had several episodes of confusion and irritability.  Patient tried getting out of bed by hisself and was agitated about the bed alarm sounding.  Took off his heart monitor saying 'theres too many wires on me and I aint wearing it never, its too sticky, and im not wearing it\"  Patient was asking where he was and where his wife was at, he was easily redirected and reoriented.  Vss, will continue to monitor.  "

## 2020-12-11 NOTE — PROGRESS NOTES
"Pharmacy dosing service  Anticoagulant  Warfarin     Subjective:    Cali Santoro is a 85 y.o.male being continued on warfarin for atrial fibrillation.    INR Goal: 2 - 3  Home medication?:  Yes, warfarin 3 mg daily  Bridge Therapy Present?:  No  Interacting Medications Evaluation (New/Present/Discontinued): None new   Additional Contributing Factors: HF exacerbation (may increase INR)      Assessment/Plan:    INR trending up. Will continue 3mg dose.     Continue to monitor and adjust based on INR.         Date 12/8 12/9 12/10 12/11        INR 1.78/1.85 1.68 1.65 1.93        Dose 3 mg (early 12/9 AM) 3 mg 5 mg 3mg            Objective:  [Ht: 170.2 cm (67\"); Wt: 75.2 kg (165 lb 12.6 oz); BMI: Body mass index is 25.97 kg/m².]    Lab Results   Component Value Date    ALBUMIN 3.90 12/08/2020     Lab Results   Component Value Date    INR 1.93 (L) 12/11/2020    INR 1.65 (L) 12/10/2020    INR 1.68 (L) 12/09/2020    PROTIME 20.6 12/11/2020    PROTIME 17.7 (L) 12/10/2020    PROTIME 18.0 (L) 12/09/2020     Lab Results   Component Value Date    HGB 12.2 (L) 12/11/2020    HGB 12.4 (L) 12/10/2020    HGB 12.5 (L) 12/09/2020     Lab Results   Component Value Date    HCT 36.9 (L) 12/11/2020    HCT 37.3 (L) 12/10/2020    HCT 38.5 12/09/2020       Linh Pedro, PharmD  12/11/20 13:30 EST     "

## 2020-12-11 NOTE — PLAN OF CARE
Goal Outcome Evaluation:  Plan of Care Reviewed With: patient, spouse  Progress: no change  Outcome Summary: Patient kidney function elevated this morning. Diuretics on hold. IV hydration started. ABTs ordered. If kidney function improves, patient will likely discharge in the morning. Will need a 6 minute walk before going home.

## 2020-12-11 NOTE — PROGRESS NOTES
"      [facility] Medicine Services  INPATIENT PROGRESS NOTE  233/1   Hospitalist Team  LOS 0 days      Patient Care Team:  Yusef Sosa Jr., MD as PCP - General  Yusef Sosa Jr., MD as PCP - Family Medicine  Luis Paulino MD as Consulting Physician (Cardiology)      Patient was examined with relevant and adequate PPE keeping in mind the current coronavirus pandemic.    Chief Complaint / Subjective  Chief Complaint   Patient presents with   • Shortness of Breath       HPI (4 hpi elements or status of 3 chronic)  Mr. Santoro is a 85 y.o. male with past medical history of diabetes, hyperlipidemia, cardiomyopathy, hypertension, CAD, COPD, A. fib, CKD and CHF who presents to [facility] complaining of dyspnea.  Patient reports that his difficulty breathing has been present for \"quite a while\".  He notes some associated orthopnea as well as TRIMBLE which includes dyspnea with walking even short distances from room to room on level ground.  He reports that when his pain is at its most profound he will occasionally experience a substernal burning sensation but denies any consistent chest pain.  Over the past year and a half patient reports he is lost approximately 70 pounds unintentionally.  He denies any cough, fever, changes in bowel or bladder habits, nausea or vomiting, sensation of tachycardia or palpitations, peripheral edema, syncope or near syncope.  Compliance with all prescribed medical therapies is reported and patient reports he does not smoke tobacco or drink.     In the ED patient found to have lab significant for troponin of 0.018, proBNP: 2850.0, creatinine: 1.8, BUN: 30, BUN/creatinine ratio: 13.8, INR: 1.5, PT: 19.8. Remainder of CBC and CMP was generally unremarkable. Chest x-ray shows interstitial thickening in both lungs more confluent disease in the right lower lobe noted is worrisome for right basilar pneumonia and superimposed mild generalized interstitial " edema with stable cardiomegaly. EKG shows paced rhythm at 70. Respiratory viral panel including for COVID-19 was negative.    Former smoker.  Not on home oxygen letter    Feels subjectively better    Shortness of Breath          History taken from: patient    Review of Systems   Constitution: Negative.   HENT: Negative.    Eyes: Negative.    Cardiovascular: Negative.    Respiratory: Positive for shortness of breath.    Endocrine: Negative.    Hematologic/Lymphatic: Negative.    Skin: Negative.    Musculoskeletal: Negative.    Gastrointestinal: Negative.    Genitourinary: Negative.    Neurological: Negative.    Psychiatric/Behavioral: Negative.    Allergic/Immunologic: Negative.    All other systems reviewed and are negative.            Family History   Problem Relation Age of Onset   • Heart disease Mother    • Hypertension Mother    • Stroke Father    • Breast cancer Sister    • Colon cancer Sister    • Heart disease Brother    • Diabetes Other        Past Medical History:   Diagnosis Date   • Arrhythmia    • Bradycardia    • Cardiomyopathy (CMS/HCC)    • Cataract    • COPD (chronic obstructive pulmonary disease) (CMS/HCC)    • Coronary artery disease    • Diabetes mellitus, type 2 (CMS/HCC)    • Emphysema, unspecified (CMS/HCC)    • Hx of sick sinus syndrome     S/P Biventricular Pacemaker   • Hypertension    • Paroxysmal atrial fibrillation (CMS/HCC)    • Sleep apnea    • Ventricular arrhythmia        Social History     Socioeconomic History   • Marital status:      Spouse name: Not on file   • Number of children: Not on file   • Years of education: Not on file   • Highest education level: Not on file   Occupational History   • Occupation: Retired   Social Needs   • Financial resource strain: Patient refused   • Food insecurity     Worry: Patient refused     Inability: Patient refused   • Transportation needs     Medical: Patient refused     Non-medical: Patient refused   Tobacco Use   • Smoking status:  Former Smoker     Types: Cigarettes   • Smokeless tobacco: Never Used   Substance and Sexual Activity   • Alcohol use: Yes     Comment: occ   • Drug use: No   • Sexual activity: Defer       Prior to Admission medications    Medication Sig Start Date End Date Taking? Authorizing Provider   acetaminophen (TYLENOL) 500 MG tablet Take 500 mg by mouth Every 6 (Six) Hours As Needed for Mild Pain .   Yes Astrid Gonzalez MD   aspirin 81 MG tablet Take 81 mg by mouth Every Night. 3/7/14  Yes Astrid Gonzalez MD   calcitriol (ROCALTROL) 0.25 MCG capsule Take 0.25 mcg by mouth 3 (Three) Times a Week. Mon, Wed, Fri   Yes Astrid Gonzalez MD   carvedilol (COREG) 12.5 MG tablet TAKE 1 TABLET TWICE DAILY  Patient taking differently: Take 6.25 mg by mouth Every Morning. 11/20/20  Yes Luis Paulino MD   carvedilol (COREG) 12.5 MG tablet Take 12.5 mg by mouth Every Night.   Yes Astrid Gonzalez MD   digoxin (LANOXIN) 125 MCG tablet Take 1 tablet by mouth Every Other Day. 11/3/20  Yes Yusef Sosa Jr., MD   furosemide (LASIX) 40 MG tablet TAKE 1 TABLET EVERY DAY  Patient taking differently: Take 20 mg by mouth Daily. 6/19/20  Yes Liliam Goode APRN   glimepiride (AMARYL) 2 MG tablet TAKE 1 TABLET TWICE DAILY 8/20/20  Yes Jhonatan Mart MD   Insulin Glargine (Lantus SoloStar) 100 UNIT/ML injection pen Inject 10 units sq at bedtime  Patient taking differently: Inject 10 Units under the skin into the appropriate area as directed Daily With Dinner. 11/5/20  Yes Jhonatan Mart MD   Magnesium 250 MG tablet Take 1 tablet by mouth Daily.   Yes Astrid Gonzalez MD   melatonin 5 MG tablet tablet Take 5 mg by mouth Every Night.   Yes Astrid Gonzalez MD   mirtazapine (REMERON) 15 MG tablet TAKE 1 TABLET BY MOUTH EVERY NIGHT. 6/19/20  Yes Liliam Goode APRN   simvastatin (ZOCOR) 40 MG tablet 1 tablet p.o. at bedtime.  Patient taking differently: Take 40 mg by mouth Every Night. 1/7/20  Yes  "Jhonatan Mart MD   SITagliptin-metFORMIN HCl ER (Janumet XR)  MG tablet Take 1 tablet by mouth Daily.  Patient taking differently: Take 1 tablet by mouth Daily With Lunch. 4/21/20  Yes Jhonatan Mart MD   theophylline (THEODUR) 300 MG 12 hr tablet Take 300 mg by mouth Every Night. 4/8/19  Yes ProviderAstrid MD   warfarin (COUMADIN) 3 MG tablet Take 3 mg by mouth Daily Before Lunch.   Yes Provider, MD Astrid        Objective    Physical Exam     Vital Signs  Temp:  [97.5 °F (36.4 °C)-98 °F (36.7 °C)] 98 °F (36.7 °C)  Heart Rate:  [67-72] 69  Resp:  [16-20] 20  BP: (106-156)/(66-75) 156/71    Oxygen Therapy  SpO2: 95 %  Pulse Oximetry Type: Continuous  Device (Oxygen Therapy): nasal cannula  Flow (L/min): 3  Flowsheet Rows      First Filed Value   Admission Height  170.2 cm (67\") Documented at 12/08/2020 1514   Admission Weight  75 kg (165 lb 5.5 oz) Documented at 12/08/2020 1514        Weight change: -1.517 kg (-3 lb 5.5 oz)   Intake & Output (last 3 days)       12/08 0701 - 12/09 0700 12/09 0701 - 12/10 0700 12/10 0701 - 12/11 0700    P.O.  1180 600    Total Intake(mL/kg)  1180 (15.8) 600 (8.1)    Urine (mL/kg/hr)  250 (0.1) 750 (0.8)    Stool   0    Total Output  250 750    Net  +930 -150           Urine Unmeasured Occurrence  3 x     Stool Unmeasured Occurrence   1 x        Lines, Drains & Airways    Active LDAs     Name:   Placement date:   Placement time:   Site:   Days:    Peripheral IV 12/08/20 2314 Right Antecubital   12/08/20 2314    Antecubital   less than 1                Physical Exam:    Physical Exam  Vitals signs and nursing note reviewed.   Constitutional:       General: He is not in acute distress.     Appearance: Normal appearance. He is well-developed. He is not ill-appearing, toxic-appearing or diaphoretic.   HENT:      Head: Normocephalic and atraumatic.      Right Ear: Ear canal and external ear normal.      Left Ear: Ear canal and external ear normal.      Nose: Nose " normal. No congestion or rhinorrhea.      Mouth/Throat:      Mouth: Mucous membranes are moist.      Pharynx: No oropharyngeal exudate.   Eyes:      General: No scleral icterus.        Right eye: No discharge.         Left eye: No discharge.      Extraocular Movements: Extraocular movements intact.      Conjunctiva/sclera: Conjunctivae normal.      Pupils: Pupils are equal, round, and reactive to light.   Neck:      Musculoskeletal: Normal range of motion and neck supple. No neck rigidity or muscular tenderness.      Thyroid: No thyromegaly.      Vascular: No carotid bruit or JVD.      Trachea: No tracheal deviation.   Cardiovascular:      Rate and Rhythm: Normal rate and regular rhythm.      Pulses: Normal pulses.      Heart sounds: Normal heart sounds. No murmur. No friction rub. No gallop.    Pulmonary:      Effort: Pulmonary effort is normal. No respiratory distress.      Breath sounds: Normal breath sounds. No stridor. No wheezing, rhonchi or rales.   Chest:      Chest wall: No tenderness.   Abdominal:      General: Bowel sounds are normal. There is no distension.      Palpations: Abdomen is soft. There is no mass.      Tenderness: There is no abdominal tenderness. There is no guarding or rebound.      Hernia: No hernia is present.   Musculoskeletal: Normal range of motion.         General: No swelling, tenderness, deformity or signs of injury.      Right lower leg: No edema.      Left lower leg: No edema.   Lymphadenopathy:      Cervical: No cervical adenopathy.   Skin:     General: Skin is warm and dry.      Coloration: Skin is not jaundiced or pale.      Findings: No bruising, erythema or rash.   Neurological:      General: No focal deficit present.      Mental Status: He is alert and oriented to person, place, and time. Mental status is at baseline.      Cranial Nerves: No cranial nerve deficit.      Sensory: No sensory deficit.      Motor: No weakness or abnormal muscle tone.      Coordination:  Coordination normal.   Psychiatric:         Mood and Affect: Mood normal.         Behavior: Behavior normal.         Thought Content: Thought content normal.         Judgment: Judgment normal.     Reviewed, no change in above data from the prior day.          PT Recommendation and Plan             Procedures:        Assessment/Plan with Problem wise       Active Hospital Problems    Diagnosis  POA   • **Acute on chronic heart failure with preserved ejection fraction (CMS/[facility]) [I50.33]  Yes     Priority: High   • Dyspnea [R06.00]  Yes   • Acute-on-chronic kidney injury (CMS/[facility]) [N17.9, N18.9]  Yes   • Type 2 diabetes mellitus with other diabetic ophthalmic complication (CMS/[facility]) [E11.39]  Yes   • Mixed hyperlipidemia [E78.2]  Yes   • Essential hypertension [I10]  Yes   • Coronary artery disease of native artery of native heart with stable angina pectoris (CMS/[facility]) [I25.118]  Yes   • Chronic obstructive pulmonary disease (CMS/[facility]) [J44.9]  Yes   • Atrial fibrillation (CMS/HCC) [I48.91] [I48.91]  Yes      Resolved Hospital Problems   No resolved problems to display.        Estimated Creatinine Clearance: 26.2 mL/min (A) (by C-G formula based on SCr of 2.21 mg/dL (H)).    Code Status and Medical Interventions:   Ordered at: 12/08/20 1931     Code Status:    CPR     Medical Interventions (Level of Support Prior to Arrest):    Full       MEDICAL DECISION MAKING COMPLEXITY BY PROBLEM:       CHF:  Diuresis-loop diuretics  Spironolactone if tolerated  Thiazides if tolerated  ACE inhibitor if tolerated  Beta-blocker  Check echocardiogram      Diabetes mellitus:  Monitor blood sugars  Basal bolus insulin  Sliding scale as needed  Diabetic education as needed  Supportive treatment  Nursing do not hold long-acting insulin without orders       Renal failure/insufficiency/injury:  Hydration  Supportive treatment  Cut down or hold ACE inhibitors  Avoid nephrotoxic medications   Address diuretics        Hyperlipidemia:  Check lipid  panel  Statins if indicated  Add Ezetimibe if appropriate  No role for omega-3 demonstrated.    Hypertension:  Continue home medications   Options include-  beta-blockers  Calcium channel blockers  ACE inhibitor  Vasodilators  Low-dose diuretics  PRN medications have not been shown to affect outcomes-to be avoided        Atrial fibrillation:   Rate control  Beta-blockers  Calcium channel blockers  Digoxin  +/-Amiodarone  Anticoagulation  Close monitoring        Care coordination with nursing for current care 12/09/20 5:15 PM EST.  Hold digoxin level comes down    Appreciate nephrology input    Plan for disposition:            Continued Care and Services - Admitted Since 12/8/2020    Coordination has not been started for this encounter.        Historical & Objective Data     Results Review:    I reviewed the patient's new lab and radiology results. 12/10/20     Results from last 7 days   Lab Units 12/10/20  0428 12/09/20  0346 12/08/20  1653   WBC 10*3/mm3 9.70 10.50 8.00   HEMOGLOBIN g/dL 12.4* 12.5* 12.2*   HEMATOCRIT % 37.3* 38.5 36.6*   MCV fL 100.5* 102.1* 102.0*   MCH pg 33.4* 33.2* 33.9*   MPV fL 7.0 7.2 6.7   RDW % 17.9* 18.0* 17.8*   PLATELETS 10*3/mm3 259 265 272     Results from last 7 days   Lab Units 12/10/20  0428 12/09/20  0346 12/08/20  1652   SODIUM mmol/L 135* 138 138   POTASSIUM mmol/L 4.4 4.1 4.8   CHLORIDE mmol/L 98 98 102   CO2 mmol/L 27.0 28.0 25.0   BUN mg/dL 39* 28* 30*   CREATININE mg/dL 2.21* 1.91* 2.18*   CALCIUM mg/dL 9.8 9.6 9.3   MAGNESIUM mg/dL 2.3 2.2  --    BILIRUBIN mg/dL  --   --  0.4   ALK PHOS U/L  --   --  75   ALT (SGPT) U/L  --   --  6   AST (SGOT) U/L  --   --  12   GLUCOSE mg/dL 166* 124* 117*     Inflammatory Biomarkers        Invalid input(s): ESR, D-DIMER QUANTITATIVE,  PROCALCITONIN,    No results found for: PHOS  Hemoglobin A1C   Date Value Ref Range Status   12/09/2020 6.50 (H) 4.80 - 5.60 % Final     Lab Results   Component Value Date    CHOL 103 12/09/2020    TRIG  166 (H) 12/09/2020    HDL 40 12/09/2020    LDL 35 12/09/2020     No results found for: LIPASE  Results from last 7 days   Lab Units 12/10/20  0428 12/09/20  0346 12/08/20  2349   INR  1.65* 1.68* 1.78*           No results found for: INTRAOP, PREDX, FINALDX, COMDX  COVID19   Date Value Ref Range Status   12/08/2020 Not Detected Not Detected - Ref. Range Final        Microbiology Results (last 10 days)     Procedure Component Value - Date/Time    Legionella Antigen, Urine - Urine, Urine, Clean Catch [[id number]]  (Normal) Collected: 12/09/20 1717    Lab Status: Final result Specimen: Urine, Clean Catch Updated: 12/09/20 1844     LEGIONELLA ANTIGEN, URINE Negative    S. Pneumo Ag Urine or CSF - Urine, Urine, Clean Catch [[id number]]  (Abnormal) Collected: 12/09/20 1717    Lab Status: Final result Specimen: Urine, Clean Catch Updated: 12/09/20 1844     Strep Pneumo Ag Positive    Respiratory Panel PCR w/COVID-19(SARS-CoV-2) LOBO/MARTHA/TOMAS/PAD/COR/MAD/TITA In-House, NP Swab in UTM/VTM, 3-4 HR TAT - Swab, Nasopharynx [[id number]]  (Normal) Collected: 12/08/20 1634    Lab Status: Final result Specimen: Swab from Nasopharynx Updated: 12/08/20 1831     ADENOVIRUS, PCR Not Detected     Coronavirus 229E Not Detected     Coronavirus HKU1 Not Detected     Coronavirus NL63 Not Detected     Coronavirus OC43 Not Detected     COVID19 Not Detected     Human Metapneumovirus Not Detected     Human Rhinovirus/Enterovirus Not Detected     Influenza A PCR Not Detected     Influenza B PCR Not Detected     Parainfluenza Virus 1 Not Detected     Parainfluenza Virus 2 Not Detected     Parainfluenza Virus 3 Not Detected     Parainfluenza Virus 4 Not Detected     RSV, PCR Not Detected     Bordetella pertussis pcr Not Detected     Bordetella parapertussis PCR Not Detected     Chlamydophila pneumoniae PCR Not Detected     Mycoplasma pneumo by PCR Not Detected    Narrative:      Fact sheet for providers:  https://docs.[employer]/wp-content/uploads/MDC8403-1868-PQ8.1-EUA-Provider-Fact-Sheet-3.pdf    Fact sheet for patients: https://docs.[employer]/wp-content/uploads/YYS9598-7659-JH1.1-EUA-Patient-Fact-Sheet-1.pdf    Test performed by PCR.          ECG/EMG Results (most recent)     Procedure Component Value Units Date/Time    ECG 12 Lead [[id number]] Collected: 12/08/20 1523     Updated: 12/08/20 1533     QT Interval 424 ms     Narrative:      HEART RATE= 70  bpm  RR Interval= 856  ms  WV Interval=   ms  P Horizontal Axis= -5  deg  P Front Axis=   deg  QRSD Interval= 180  ms  QT Interval= 424  ms  QRS Axis= 0  deg  T Wave Axis=   deg  - ABNORMAL ECG -  Afib/flutter and ventricular-paced rhythm  When compared with ECG of 27-Jun-2019 12:11:54,  No significant change  Electronically Signed By:   Date and Time of Study: 2020-12-08 15:23:53    Adult Transthoracic Echo Complete W/ Cont if Necessary Per Protocol [[id number]] Collected: 12/09/20 1117     Updated: 12/09/20 1706     BSA 1.8 m^2      RVIDd 3.5 cm      IVSd 1.4 cm      IVSs 2.1 cm      LVIDd 3.0 cm      LVIDs 2.1 cm      LVPWd 1.2 cm      BH CV ECHO MAURO - LVPWS 1.9 cm      IVS/LVPW 1.1     FS 30.6 %      EDV(Teich) 34.6 ml      ESV(Teich) 13.9 ml      EF(Teich) 59.7 %      EDV(cubed) 26.6 ml      ESV(cubed) 8.9 ml      EF(cubed) 66.6 %      % IVS thick 54.4 %      % LVPW thick 62.9 %      LV mass(C)d 119.1 grams      LV mass(C)dI 64.4 grams/m^2      LV mass(C)s 182.1 grams      LV mass(C)sI 98.5 grams/m^2      SV(Teich) 20.7 ml      SI(Teich) 11.2 ml/m^2      SV(cubed) 17.7 ml      SI(cubed) 9.6 ml/m^2      Ao root diam 3.2 cm      Ao root area 7.9 cm^2      ACS 1.3 cm      asc Aorta Diam 3.0 cm      LVOT diam 1.9 cm      LVOT area 2.9 cm^2      EDV(MOD-sp4) 60.8 ml      ESV(MOD-sp4) 23.6 ml      EF(MOD-sp4) 61.2 %      SV(MOD-sp4) 37.3 ml      SI(MOD-sp4) 20.1 ml/m^2      Ao root area (BSA corrected) 1.7     LV Patel Vol (BSA corrected) 32.9 ml/m^2      LV  Sys Vol (BSA corrected) 12.7 ml/m^2      MV E max mariam 84.7 cm/sec      MV A max mariam 0.44 cm/sec      MV E/A 193.8     MV V2 max 98.6 cm/sec      MV max PG 3.9 mmHg      MV V2 mean 55.8 cm/sec      MV mean PG 1.5 mmHg      MV V2 VTI 16.6 cm      MVA(VTI) 2.9 cm^2      MV dec slope 464.5 cm/sec^2      MV dec time 0.18 sec      Ao pk mariam 154.7 cm/sec      Ao max PG 9.7 mmHg      Ao max PG (full) 5.9 mmHg      Ao V2 mean 111.7 cm/sec      Ao mean PG 5.6 mmHg      Ao mean PG (full) 3.7 mmHg      Ao V2 VTI 25.1 cm      VALENCIA(I,A) 1.9 cm^2      VALENCIA(I,D) 1.9 cm^2      VALENCIA(V,A) 1.9 cm^2      VALENCIA(V,D) 1.9 cm^2      LV V1 max PG 3.8 mmHg      LV V1 mean PG 1.9 mmHg      LV V1 max 97.5 cm/sec      LV V1 mean 64.8 cm/sec      LV V1 VTI 16.6 cm      SV(Ao) 197.8 ml      SI(Ao) 107.0 ml/m^2      SV(LVOT) 48.8 ml      SI(LVOT) 26.4 ml/m^2      PA V2 max 82.7 cm/sec      PA max PG 2.7 mmHg      PA max PG (full) 1.0 mmHg      PA V2 mean 57.1 cm/sec      PA mean PG 1.4 mmHg      PA mean PG (full) 0.63 mmHg      PA V2 VTI 12.5 cm      PA acc time 0.08 sec      RV V1 max PG 1.7 mmHg      RV V1 mean PG 0.81 mmHg      RV V1 max 65.7 cm/sec      RV V1 mean 41.8 cm/sec      RV V1 VTI 10.6 cm      TR max mariam 271.2 cm/sec      RVSP(TR) 32.4 mmHg      RAP systole 3.0 mmHg      PA pr(Accel) 43.2 mmHg       CV ECHO MAURO - BZI_BMI 25.4 kilograms/m^2       CV ECHO MAURO - BSA(HAYCOCK) 1.9 m^2       CV ECHO MAURO - BZI_METRIC_WEIGHT 73.5 kg      BH CV ECHO MAURO - BZI_METRIC_HEIGHT 170.2 cm      Target HR (85%) 116 bpm      Max. Pred. HR (100%) 136 bpm      EF(MOD-bp) 61.0 %      LA dimension(2D) 4.6 cm      Echo EF Estimated 60 %     Narrative:      · Left ventricular wall thickness is consistent with septal asymmetric   hypertrophy.  · Estimated left ventricular EF = 60% Left ventricular systolic function   is normal.  · The right ventricular cavity is borderline dilated.  · The right atrial cavity is borderline dilated.  · Mild aortic valve  stenosis is present.  · Estimated right ventricular systolic pressure from tricuspid   regurgitation is normal (<35 mmHg).  · There is a small (<1cm) pericardial effusion adjacent to the left   ventricle.             Results for orders placed in visit on 02/06/08   SCANNED VASCULAR STUDIES       Results for orders placed during the hospital encounter of 12/08/20   Adult Transthoracic Echo Complete W/ Cont if Necessary Per Protocol    Narrative · Left ventricular wall thickness is consistent with septal asymmetric   hypertrophy.  · Estimated left ventricular EF = 60% Left ventricular systolic function   is normal.  · The right ventricular cavity is borderline dilated.  · The right atrial cavity is borderline dilated.  · Mild aortic valve stenosis is present.  · Estimated right ventricular systolic pressure from tricuspid   regurgitation is normal (<35 mmHg).  · There is a small (<1cm) pericardial effusion adjacent to the left   ventricle.          Ct Chest Without Contrast    Result Date: 12/9/2020  1. There is a small right pleural effusion. Minor right basal lung density is consistent with atelectasis. 2. There is scarring in the right lung. There is a radiographically stable 8 mm right lung nodule. 3. 5 mm groundglass nodule has developed in the left lower lobe. By Fleischner criteria, no CT follow-up is necessary. 2. Atherosclerosis including coronary artery calcification.. 5. Small pericardial effusion, slightly increased from prior. Mild cardiomegaly. 6. Also noted: Emphysema, chronic pancreatitis, cholelithiasis, right thyroid enlargement.  Electronically Signed By-Elvi Saenz MD On:12/9/2020 9:31 PM This report was finalized on [id number] by  Elvi Saenz MD.    Xr Chest 1 View    Result Date: 12/8/2020  Interstitial thickening in both lungs, with more confluent disease in the right lower lobe. FINDINGS are worrisome for right basilar pneumonia and superimposed mild generalized interstitial edema.   Stable cardiomegaly.  Electronically Signed By-Alexandra Preston MD On:12/8/2020 4:48 PM This report was finalized on [id number] by  Alexandra Preston MD.      I personally reviewed patient's x-ray films and my findings are: 0    I personally reviewed patient's EKG strip and my findings are: 0    Medication Review:   I have reviewed the patient's current medication list 12/10/20     Scheduled Meds  aspirin, 81 mg, Oral, Daily  atorvastatin, 20 mg, Oral, Daily  calcitriol, 0.25 mcg, Oral, Once per day on Mon Wed Fri  carvedilol, 12.5 mg, Oral, Daily With Dinner  carvedilol, 6.25 mg, Oral, Daily With Breakfast  furosemide, 40 mg, Oral, BID  insulin glargine, 10 Units, Subcutaneous, Nightly  insulin lispro, 0-14 Units, Subcutaneous, TID AC  magnesium oxide, 200 mg, Oral, Daily  mirtazapine, 15 mg, Oral, Nightly  sodium chloride, 10 mL, Intravenous, Q12H  spironolactone, 25 mg, Oral, Daily  theophylline, 300 mg, Oral, Nightly  warfarin, 3 mg, Oral, Daily        Meds Infusions  Pharmacy to dose warfarin,         DVT prophylaxis  Mechanical Order History:     None      Pharmalogical Order History:      Ordered     Dose Route Frequency Stop    12/09/20 1225  warfarin (COUMADIN) tablet 3 mg     Question:  Target INR  Answer:  2 - 3    3 mg PO Daily Warfarin --    12/09/20 0020  warfarin (COUMADIN) tablet 3 mg     Question:  Target INR  Answer:  2 - 3    3 mg PO Once 12/09/20 0037    12/08/20 2332  Pharmacy to dose warfarin     Question:  Target INR  Answer:  2 - 3    -- XX Continuous PRN --    12/08/20 2332  Pharmacy to dose warfarin  Status:  Discontinued     Question:  Target INR  Answer:  2 - 3    -- XX Continuous PRN 12/09/20 1219                Meds PRN  •  acetaminophen **OR** acetaminophen **OR** acetaminophen  •  dextrose  •  dextrose  •  glucagon (human recombinant)  •  insulin lispro **AND** insulin lispro  •  melatonin  •  nitroglycerin  •  ondansetron **OR** ondansetron  •  Pharmacy to dose warfarin  •   [COMPLETED] Insert peripheral IV **AND** sodium chloride  •  sodium chloride      Chavez Fierro MD  12/10/20  19:33 EST

## 2020-12-11 NOTE — PROGRESS NOTES
Continued Stay Note  ADY Traore     Patient Name: Cali Santoro  MRN: [id number]  Today's Date: 12/11/2020    Admit Date: 12/8/2020    Discharge Plan     Row Name 12/11/20 1629       Plan    Plan  DC Plan: Return home with spouse. May need 6 min walk at DC.    Plan Comments  Worsening renal function. Current O2 3Lpnc.        Chart review only.             Bayron Andrews RN

## 2020-12-11 NOTE — PROGRESS NOTES
"      [facility] Services  INPATIENT PROGRESS NOTE  233/1   Hospitalist Team  LOS 1 days      Patient Care Team:  Yusef Sosa Jr., MD as PCP - General  Yusef Sosa Jr., MD as PCP - Family Medicine  Luis Paulino MD as Consulting Physician (Cardiology)      Patient was examined with relevant and adequate PPE keeping in mind the current coronavirus pandemic.    Chief Complaint / Subjective  Chief Complaint   Patient presents with   • Shortness of Breath       HPI (4 hpi elements or status of 3 chronic)  Mr. Santoro is a 85 y.o. male with past medical history of diabetes, hyperlipidemia, cardiomyopathy, hypertension, CAD, COPD, A. fib, CKD and CHF who presents to [facility] complaining of dyspnea.  Patient reports that his difficulty breathing has been present for \"quite a while\".  He notes some associated orthopnea as well as TRIMBLE which includes dyspnea with walking even short distances from room to room on level ground.  He reports that when his pain is at its most profound he will occasionally experience a substernal burning sensation but denies any consistent chest pain.  Over the past year and a half patient reports he is lost approximately 70 pounds unintentionally.  He denies any cough, fever, changes in bowel or bladder habits, nausea or vomiting, sensation of tachycardia or palpitations, peripheral edema, syncope or near syncope.  Compliance with all prescribed medical therapies is reported and patient reports he does not smoke tobacco or drink.     In the ED patient found to have lab significant for troponin of 0.018, proBNP: 2850.0, creatinine: 1.8, BUN: 30, BUN/creatinine ratio: 13.8, INR: 1.5, PT: 19.8. Remainder of CBC and CMP was generally unremarkable. Chest x-ray shows interstitial thickening in both lungs more confluent disease in the right lower lobe noted is worrisome for right basilar pneumonia and superimposed mild generalized interstitial " edema with stable cardiomegaly. EKG shows paced rhythm at 70. Respiratory viral panel including for COVID-19 was negative.    Former smoker.  Not on home oxygen letter    Feels subjectively better    Shortness of Breath          History taken from: patient    Review of Systems   Constitution: Negative.   HENT: Negative.    Eyes: Negative.    Cardiovascular: Negative.    Respiratory: Positive for shortness of breath.    Endocrine: Negative.    Hematologic/Lymphatic: Negative.    Skin: Negative.    Musculoskeletal: Negative.    Gastrointestinal: Negative.    Genitourinary: Negative.    Neurological: Negative.    Psychiatric/Behavioral: Negative.    Allergic/Immunologic: Negative.    All other systems reviewed and are negative.            Family History   Problem Relation Age of Onset   • Heart disease Mother    • Hypertension Mother    • Stroke Father    • Breast cancer Sister    • Colon cancer Sister    • Heart disease Brother    • Diabetes Other        Past Medical History:   Diagnosis Date   • Arrhythmia    • Bradycardia    • Cardiomyopathy (CMS/HCC)    • Cataract    • COPD (chronic obstructive pulmonary disease) (CMS/HCC)    • Coronary artery disease    • Diabetes mellitus, type 2 (CMS/HCC)    • Emphysema, unspecified (CMS/HCC)    • Hx of sick sinus syndrome     S/P Biventricular Pacemaker   • Hypertension    • Paroxysmal atrial fibrillation (CMS/HCC)    • Sleep apnea    • Ventricular arrhythmia        Social History     Socioeconomic History   • Marital status:      Spouse name: Not on file   • Number of children: Not on file   • Years of education: Not on file   • Highest education level: Not on file   Occupational History   • Occupation: Retired   Social Needs   • Financial resource strain: Patient refused   • Food insecurity     Worry: Patient refused     Inability: Patient refused   • Transportation needs     Medical: Patient refused     Non-medical: Patient refused   Tobacco Use   • Smoking status:  Former Smoker     Types: Cigarettes   • Smokeless tobacco: Never Used   Substance and Sexual Activity   • Alcohol use: Yes     Comment: occ   • Drug use: No   • Sexual activity: Defer       Prior to Admission medications    Medication Sig Start Date End Date Taking? Authorizing Provider   acetaminophen (TYLENOL) 500 MG tablet Take 500 mg by mouth Every 6 (Six) Hours As Needed for Mild Pain .   Yes Astrid Gonzalez MD   aspirin 81 MG tablet Take 81 mg by mouth Every Night. 3/7/14  Yes Astrid Gonzalez MD   calcitriol (ROCALTROL) 0.25 MCG capsule Take 0.25 mcg by mouth 3 (Three) Times a Week. Mon, Wed, Fri   Yes Astrid Gonzalez MD   carvedilol (COREG) 12.5 MG tablet TAKE 1 TABLET TWICE DAILY  Patient taking differently: Take 6.25 mg by mouth Every Morning. 11/20/20  Yes Luis Paulino MD   carvedilol (COREG) 12.5 MG tablet Take 12.5 mg by mouth Every Night.   Yes Astrid Gonzalez MD   digoxin (LANOXIN) 125 MCG tablet Take 1 tablet by mouth Every Other Day. 11/3/20  Yes Yusef Sosa Jr., MD   furosemide (LASIX) 40 MG tablet TAKE 1 TABLET EVERY DAY  Patient taking differently: Take 20 mg by mouth Daily. 6/19/20  Yes Liliam Goode APRN   glimepiride (AMARYL) 2 MG tablet TAKE 1 TABLET TWICE DAILY 8/20/20  Yes Jhonatan Mart MD   Insulin Glargine (Lantus SoloStar) 100 UNIT/ML injection pen Inject 10 units sq at bedtime  Patient taking differently: Inject 10 Units under the skin into the appropriate area as directed Daily With Dinner. 11/5/20  Yes Jhonatan Mart MD   Magnesium 250 MG tablet Take 1 tablet by mouth Daily.   Yes Astrid Gonzalez MD   melatonin 5 MG tablet tablet Take 5 mg by mouth Every Night.   Yes Astrid Gonzalez MD   mirtazapine (REMERON) 15 MG tablet TAKE 1 TABLET BY MOUTH EVERY NIGHT. 6/19/20  Yes Liliam Goode APRN   simvastatin (ZOCOR) 40 MG tablet 1 tablet p.o. at bedtime.  Patient taking differently: Take 40 mg by mouth Every Night. 1/7/20  Yes  "Jhonatan Mart MD   SITagliptin-metFORMIN HCl ER (Janumet XR)  MG tablet Take 1 tablet by mouth Daily.  Patient taking differently: Take 1 tablet by mouth Daily With Lunch. 4/21/20  Yes Jhonatan Mart MD   theophylline (THEODUR) 300 MG 12 hr tablet Take 300 mg by mouth Every Night. 4/8/19  Yes ProviderAstrid MD   warfarin (COUMADIN) 3 MG tablet Take 3 mg by mouth Daily Before Lunch.   Yes Provider, MD Astrid        Objective    Physical Exam     Vital Signs  Temp:  [97.2 °F (36.2 °C)-98 °F (36.7 °C)] 97.2 °F (36.2 °C)  Heart Rate:  [69-72] 70  Resp:  [18-20] 18  BP: (106-156)/(59-76) 123/73    Oxygen Therapy  SpO2: 100 %  Pulse Oximetry Type: Intermittent  Device (Oxygen Therapy): nasal cannula  Flow (L/min): 3  Flowsheet Rows      First Filed Value   Admission Height  170.2 cm (67\") Documented at 12/08/2020 1514   Admission Weight  75 kg (165 lb 5.5 oz) Documented at 12/08/2020 1514        Weight change: 1.717 kg (3 lb 12.6 oz)   Intake & Output (last 3 days)       12/08 0701 - 12/09 0700 12/09 0701 - 12/10 0700 12/10 0701 - 12/11 0700 12/11 0701 - 12/12 0700    P.O.  1180 600 240    Total Intake(mL/kg)  1180 (15.8) 600 (8) 240 (3.2)    Urine (mL/kg/hr)  250 (0.1) 1250 (0.7) 300 (0.6)    Stool   0     Total Output  250 1250 300    Net  +930 -650 -60            Urine Unmeasured Occurrence  3 x 4 x 1 x    Stool Unmeasured Occurrence   1 x         Lines, Drains & Airways    Active LDAs     Name:   Placement date:   Placement time:   Site:   Days:    Peripheral IV 12/08/20 2314 Right Antecubital   12/08/20 2314    Antecubital   less than 1                Physical Exam:    Physical Exam  Vitals signs and nursing note reviewed.   Constitutional:       General: He is not in acute distress.     Appearance: Normal appearance. He is well-developed. He is not ill-appearing, toxic-appearing or diaphoretic.   HENT:      Head: Normocephalic and atraumatic.      Right Ear: Ear canal and external ear " normal.      Left Ear: Ear canal and external ear normal.      Nose: Nose normal. No congestion or rhinorrhea.      Mouth/Throat:      Mouth: Mucous membranes are moist.      Pharynx: No oropharyngeal exudate.   Eyes:      General: No scleral icterus.        Right eye: No discharge.         Left eye: No discharge.      Extraocular Movements: Extraocular movements intact.      Conjunctiva/sclera: Conjunctivae normal.      Pupils: Pupils are equal, round, and reactive to light.   Neck:      Musculoskeletal: Normal range of motion and neck supple. No neck rigidity or muscular tenderness.      Thyroid: No thyromegaly.      Vascular: No carotid bruit or JVD.      Trachea: No tracheal deviation.   Cardiovascular:      Rate and Rhythm: Normal rate and regular rhythm.      Pulses: Normal pulses.      Heart sounds: Normal heart sounds. No murmur. No friction rub. No gallop.    Pulmonary:      Effort: Pulmonary effort is normal. No respiratory distress.      Breath sounds: Normal breath sounds. No stridor. No wheezing, rhonchi or rales.   Chest:      Chest wall: No tenderness.   Abdominal:      General: Bowel sounds are normal. There is no distension.      Palpations: Abdomen is soft. There is no mass.      Tenderness: There is no abdominal tenderness. There is no guarding or rebound.      Hernia: No hernia is present.   Musculoskeletal: Normal range of motion.         General: No swelling, tenderness, deformity or signs of injury.      Right lower leg: No edema.      Left lower leg: No edema.   Lymphadenopathy:      Cervical: No cervical adenopathy.   Skin:     General: Skin is warm and dry.      Coloration: Skin is not jaundiced or pale.      Findings: No bruising, erythema or rash.   Neurological:      General: No focal deficit present.      Mental Status: He is alert and oriented to person, place, and time. Mental status is at baseline.      Cranial Nerves: No cranial nerve deficit.      Sensory: No sensory deficit.       Motor: No weakness or abnormal muscle tone.      Coordination: Coordination normal.   Psychiatric:         Mood and Affect: Mood normal.         Behavior: Behavior normal.         Thought Content: Thought content normal.         Judgment: Judgment normal.     Reviewed, no change in above data from the prior day.          PT Recommendation and Plan             Procedures:        Assessment/Plan with Problem wise       Active Hospital Problems    Diagnosis  POA   • **Acute on chronic heart failure with preserved ejection fraction (CMS/HCC) [I50.33]  Yes     Priority: High   • Dyspnea [R06.00]  Yes   • Acute-on-chronic kidney injury (CMS/[facility]) [N17.9, N18.9]  Yes   • Type 2 diabetes mellitus with other diabetic ophthalmic complication (CMS/[facility]) [E11.39]  Yes   • Mixed hyperlipidemia [E78.2]  Yes   • Essential hypertension [I10]  Yes   • Coronary artery disease of native artery of native heart with stable angina pectoris (CMS/[facility]) [I25.118]  Yes   • Chronic obstructive pulmonary disease (CMS/[facility]) [J44.9]  Yes   • Atrial fibrillation (CMS/[facility]) [I48.91] [I48.91]  Yes      Resolved Hospital Problems   No resolved problems to display.        Estimated Creatinine Clearance: 20 mL/min (A) (by C-G formula based on SCr of 2.93 mg/dL (H)).    Code Status and Medical Interventions:   Ordered at: 12/08/20 1931     Code Status:    CPR     Medical Interventions (Level of Support Prior to Arrest):    Full       MEDICAL DECISION MAKING COMPLEXITY BY PROBLEM:       CHF:  Diuresis-loop diuretics  Spironolactone if tolerated  Thiazides if tolerated  ACE inhibitor if tolerated  Beta-blocker  Check echocardiogram      Diabetes mellitus:  Monitor blood sugars  Basal bolus insulin  Sliding scale as needed  Diabetic education as needed  Supportive treatment  Nursing do not hold long-acting insulin without orders       Renal failure/insufficiency/injury:  Hydration  Supportive treatment  Cut down or hold ACE inhibitors  Avoid nephrotoxic  medications   Address diuretics        Hyperlipidemia:  Check lipid panel  Statins if indicated  Add Ezetimibe if appropriate  No role for omega-3 demonstrated.    Hypertension:  Continue home medications   Options include-  beta-blockers  Calcium channel blockers  ACE inhibitor  Vasodilators  Low-dose diuretics  PRN medications have not been shown to affect outcomes-to be avoided        Atrial fibrillation:   Rate control  Beta-blockers  Calcium channel blockers  Digoxin  +/-Amiodarone  Anticoagulation  Close monitoring        Care coordination with nursing for current care 12/09/20 5:15 PM EST.  Hold digoxin level comes down    Appreciate nephrology input    Noted worsening creatinine.  Hold diuretics and discharge till tomorrow.  CT noted.  No evidence of chanel pneumonia.  Urine strep antigen positive.  Empiric antibiotic      Plan for disposition:            Continued Care and Services - Admitted Since 12/8/2020    Coordination has not been started for this encounter.        Historical & Objective Data     Results Review:    I reviewed the patient's new lab and radiology results. 12/11/20     Results from last 7 days   Lab Units 12/11/20  0444 12/10/20  0428 12/09/20  0346   WBC 10*3/mm3 9.70 9.70 10.50   HEMOGLOBIN g/dL 12.2* 12.4* 12.5*   HEMATOCRIT % 36.9* 37.3* 38.5   MCV fL 101.5* 100.5* 102.1*   MCH pg 33.7* 33.4* 33.2*   MPV fL 7.1 7.0 7.2   RDW % 17.9* 17.9* 18.0*   PLATELETS 10*3/mm3 254 259 265     Results from last 7 days   Lab Units 12/11/20  0444 12/10/20  0428 12/09/20  0346 12/08/20  1652   SODIUM mmol/L 136 135* 138 138   POTASSIUM mmol/L 4.4 4.4 4.1 4.8   CHLORIDE mmol/L 95* 98 98 102   CO2 mmol/L 28.0 27.0 28.0 25.0   BUN mg/dL 49* 39* 28* 30*   CREATININE mg/dL 2.93* 2.21* 1.91* 2.18*   CALCIUM mg/dL 9.3 9.8 9.6 9.3   MAGNESIUM mg/dL 2.3 2.3 2.2  --    BILIRUBIN mg/dL  --   --   --  0.4   ALK PHOS U/L  --   --   --  75   ALT (SGPT) U/L  --   --   --  6   AST (SGOT) U/L  --   --   --  12      GLUCOSE mg/dL 164* 166* 124* 117*     Inflammatory Biomarkers        Invalid input(s): ESR, D-DIMER QUANTITATIVE,  PROCALCITONIN,    No results found for: PHOS  Hemoglobin A1C   Date Value Ref Range Status   12/09/2020 6.50 (H) 4.80 - 5.60 % Final     Lab Results   Component Value Date    CHOL 103 12/09/2020    TRIG 166 (H) 12/09/2020    HDL 40 12/09/2020    LDL 35 12/09/2020     No results found for: LIPASE  Results from last 7 days   Lab Units 12/11/20  0444 12/10/20  0428 12/09/20  0346   INR  1.93* 1.65* 1.68*           No results found for: INTRAOP, PREDX, FINALDX, COMDX  COVID19   Date Value Ref Range Status   12/08/2020 Not Detected Not Detected - Ref. Range Final        Microbiology Results (last 10 days)     Procedure Component Value - Date/Time    Legionella Antigen, Urine - Urine, Urine, Clean Catch [[id number]]  (Normal) Collected: 12/09/20 1717    Lab Status: Final result Specimen: Urine, Clean Catch Updated: 12/09/20 1844     LEGIONELLA ANTIGEN, URINE Negative    S. Pneumo Ag Urine or CSF - Urine, Urine, Clean Catch [[id number]]  (Abnormal) Collected: 12/09/20 1717    Lab Status: Final result Specimen: Urine, Clean Catch Updated: 12/09/20 1844     Strep Pneumo Ag Positive    Respiratory Panel PCR w/COVID-19(SARS-CoV-2) LOBO/MARTHA/TOMAS/PAD/COR/MAD/TITA In-House, NP Swab in UTM/VTM, 3-4 HR TAT - Swab, Nasopharynx [[id number]]  (Normal) Collected: 12/08/20 1634    Lab Status: Final result Specimen: Swab from Nasopharynx Updated: 12/08/20 1831     ADENOVIRUS, PCR Not Detected     Coronavirus 229E Not Detected     Coronavirus HKU1 Not Detected     Coronavirus NL63 Not Detected     Coronavirus OC43 Not Detected     COVID19 Not Detected     Human Metapneumovirus Not Detected     Human Rhinovirus/Enterovirus Not Detected     Influenza A PCR Not Detected     Influenza B PCR Not Detected     Parainfluenza Virus 1 Not Detected     Parainfluenza Virus 2 Not Detected     Parainfluenza Virus 3 Not Detected      Parainfluenza Virus 4 Not Detected     RSV, PCR Not Detected     Bordetella pertussis pcr Not Detected     Bordetella parapertussis PCR Not Detected     Chlamydophila pneumoniae PCR Not Detected     Mycoplasma pneumo by PCR Not Detected    Narrative:      Fact sheet for providers: https://docs.[employer]/wp-content/uploads/HDV1539-9255-OU3.1-EUA-Provider-Fact-Sheet-3.pdf    Fact sheet for patients: https://docs.[employer]/wp-content/uploads/OIC5966-3294-FM2.1-EUA-Patient-Fact-Sheet-1.pdf    Test performed by PCR.          ECG/EMG Results (most recent)     Procedure Component Value Units Date/Time    ECG 12 Lead [[id number]] Collected: 12/08/20 1523     Updated: 12/08/20 1533     QT Interval 424 ms     Narrative:      HEART RATE= 70  bpm  RR Interval= 856  ms  NM Interval=   ms  P Horizontal Axis= -5  deg  P Front Axis=   deg  QRSD Interval= 180  ms  QT Interval= 424  ms  QRS Axis= 0  deg  T Wave Axis=   deg  - ABNORMAL ECG -  Afib/flutter and ventricular-paced rhythm  When compared with ECG of 27-Jun-2019 12:11:54,  No significant change  Electronically Signed By:   Date and Time of Study: 2020-12-08 15:23:53    Adult Transthoracic Echo Complete W/ Cont if Necessary Per Protocol [[id number]] Collected: 12/09/20 1117     Updated: 12/09/20 1706     BSA 1.8 m^2      RVIDd 3.5 cm      IVSd 1.4 cm      IVSs 2.1 cm      LVIDd 3.0 cm      LVIDs 2.1 cm      LVPWd 1.2 cm      BH CV ECHO MAURO - LVPWS 1.9 cm      IVS/LVPW 1.1     FS 30.6 %      EDV(Teich) 34.6 ml      ESV(Teich) 13.9 ml      EF(Teich) 59.7 %      EDV(cubed) 26.6 ml      ESV(cubed) 8.9 ml      EF(cubed) 66.6 %      % IVS thick 54.4 %      % LVPW thick 62.9 %      LV mass(C)d 119.1 grams      LV mass(C)dI 64.4 grams/m^2      LV mass(C)s 182.1 grams      LV mass(C)sI 98.5 grams/m^2      SV(Teich) 20.7 ml      SI(Teich) 11.2 ml/m^2      SV(cubed) 17.7 ml      SI(cubed) 9.6 ml/m^2      Ao root diam 3.2 cm      Ao root area 7.9 cm^2      ACS 1.3 cm      asc  Aorta Diam 3.0 cm      LVOT diam 1.9 cm      LVOT area 2.9 cm^2      EDV(MOD-sp4) 60.8 ml      ESV(MOD-sp4) 23.6 ml      EF(MOD-sp4) 61.2 %      SV(MOD-sp4) 37.3 ml      SI(MOD-sp4) 20.1 ml/m^2      Ao root area (BSA corrected) 1.7     LV Patel Vol (BSA corrected) 32.9 ml/m^2      LV Sys Vol (BSA corrected) 12.7 ml/m^2      MV E max mariam 84.7 cm/sec      MV A max mariam 0.44 cm/sec      MV E/A 193.8     MV V2 max 98.6 cm/sec      MV max PG 3.9 mmHg      MV V2 mean 55.8 cm/sec      MV mean PG 1.5 mmHg      MV V2 VTI 16.6 cm      MVA(VTI) 2.9 cm^2      MV dec slope 464.5 cm/sec^2      MV dec time 0.18 sec      Ao pk mariam 154.7 cm/sec      Ao max PG 9.7 mmHg      Ao max PG (full) 5.9 mmHg      Ao V2 mean 111.7 cm/sec      Ao mean PG 5.6 mmHg      Ao mean PG (full) 3.7 mmHg      Ao V2 VTI 25.1 cm      VALENCIA(I,A) 1.9 cm^2      VALENCIA(I,D) 1.9 cm^2      VALENCIA(V,A) 1.9 cm^2      VALENCIA(V,D) 1.9 cm^2      LV V1 max PG 3.8 mmHg      LV V1 mean PG 1.9 mmHg      LV V1 max 97.5 cm/sec      LV V1 mean 64.8 cm/sec      LV V1 VTI 16.6 cm      SV(Ao) 197.8 ml      SI(Ao) 107.0 ml/m^2      SV(LVOT) 48.8 ml      SI(LVOT) 26.4 ml/m^2      PA V2 max 82.7 cm/sec      PA max PG 2.7 mmHg      PA max PG (full) 1.0 mmHg      PA V2 mean 57.1 cm/sec      PA mean PG 1.4 mmHg      PA mean PG (full) 0.63 mmHg      PA V2 VTI 12.5 cm      PA acc time 0.08 sec      RV V1 max PG 1.7 mmHg      RV V1 mean PG 0.81 mmHg      RV V1 max 65.7 cm/sec      RV V1 mean 41.8 cm/sec      RV V1 VTI 10.6 cm      TR max mariam 271.2 cm/sec      RVSP(TR) 32.4 mmHg      RAP systole 3.0 mmHg      PA pr(Accel) 43.2 mmHg       CV ECHO MAURO - BZI_BMI 25.4 kilograms/m^2       CV ECHO MAURO - BSA(HAYCOCK) 1.9 m^2       CV ECHO MAURO - BZI_METRIC_WEIGHT 73.5 kg       CV ECHO MAURO - BZI_METRIC_HEIGHT 170.2 cm      Target HR (85%) 116 bpm      Max. Pred. HR (100%) 136 bpm      EF(MOD-bp) 61.0 %      LA dimension(2D) 4.6 cm      Echo EF Estimated 60 %     Narrative:      · Left ventricular  wall thickness is consistent with septal asymmetric   hypertrophy.  · Estimated left ventricular EF = 60% Left ventricular systolic function   is normal.  · The right ventricular cavity is borderline dilated.  · The right atrial cavity is borderline dilated.  · Mild aortic valve stenosis is present.  · Estimated right ventricular systolic pressure from tricuspid   regurgitation is normal (<35 mmHg).  · There is a small (<1cm) pericardial effusion adjacent to the left   ventricle.             Results for orders placed in visit on 02/06/08   SCANNED VASCULAR STUDIES       Results for orders placed during the hospital encounter of 12/08/20   Adult Transthoracic Echo Complete W/ Cont if Necessary Per Protocol    Narrative · Left ventricular wall thickness is consistent with septal asymmetric   hypertrophy.  · Estimated left ventricular EF = 60% Left ventricular systolic function   is normal.  · The right ventricular cavity is borderline dilated.  · The right atrial cavity is borderline dilated.  · Mild aortic valve stenosis is present.  · Estimated right ventricular systolic pressure from tricuspid   regurgitation is normal (<35 mmHg).  · There is a small (<1cm) pericardial effusion adjacent to the left   ventricle.          Ct Chest Without Contrast    Result Date: 12/9/2020  1. There is a small right pleural effusion. Minor right basal lung density is consistent with atelectasis. 2. There is scarring in the right lung. There is a radiographically stable 8 mm right lung nodule. 3. 5 mm groundglass nodule has developed in the left lower lobe. By Fleischner criteria, no CT follow-up is necessary. 2. Atherosclerosis including coronary artery calcification.. 5. Small pericardial effusion, slightly increased from prior. Mild cardiomegaly. 6. Also noted: Emphysema, chronic pancreatitis, cholelithiasis, right thyroid enlargement.  Electronically Signed By-Elvi Saenz MD On:12/9/2020 9:31 PM This report was finalized on  [id number] by  Elvi Saenz MD.    Xr Chest 1 View    Result Date: 12/8/2020  Interstitial thickening in both lungs, with more confluent disease in the right lower lobe. FINDINGS are worrisome for right basilar pneumonia and superimposed mild generalized interstitial edema.  Stable cardiomegaly.  Electronically Signed By-Alexandra Preston MD On:12/8/2020 4:48 PM This report was finalized on [id number] by  Alexandra Preston MD.      I personally reviewed patient's x-ray films and my findings are: 0    I personally reviewed patient's EKG strip and my findings are: 0    Medication Review:   I have reviewed the patient's current medication list 12/11/20     Scheduled Meds  aspirin, 81 mg, Oral, Daily  atorvastatin, 20 mg, Oral, Daily  calcitriol, 0.25 mcg, Oral, Once per day on Mon Wed Fri  carvedilol, 12.5 mg, Oral, Daily With Dinner  carvedilol, 6.25 mg, Oral, Daily With Breakfast  insulin glargine, 10 Units, Subcutaneous, Nightly  insulin lispro, 0-14 Units, Subcutaneous, TID AC  magnesium oxide, 200 mg, Oral, Daily  mirtazapine, 15 mg, Oral, Nightly  sodium chloride, 10 mL, Intravenous, Q12H  theophylline, 300 mg, Oral, Nightly  warfarin, 3 mg, Oral, Daily        Meds Infusions  Pharmacy to dose warfarin,   sodium chloride, 50 mL/hr, Last Rate: 50 mL/hr (12/11/20 0815)        DVT prophylaxis  Mechanical Order History:     None      Pharmalogical Order History:      Ordered     Dose Route Frequency Stop    12/09/20 1225  warfarin (COUMADIN) tablet 3 mg     Question:  Target INR  Answer:  2 - 3    3 mg PO Daily Warfarin --    12/09/20 0020  warfarin (COUMADIN) tablet 3 mg     Question:  Target INR  Answer:  2 - 3    3 mg PO Once 12/09/20 0037    12/08/20 2332  Pharmacy to dose warfarin     Question:  Target INR  Answer:  2 - 3    -- XX Continuous PRN --    12/08/20 2332  Pharmacy to dose warfarin  Status:  Discontinued     Question:  Target INR  Answer:  2 - 3    -- XX Continuous PRN 12/09/20 1219                 Meds PRN  •  acetaminophen **OR** acetaminophen **OR** acetaminophen  •  dextrose  •  dextrose  •  glucagon (human recombinant)  •  insulin lispro **AND** insulin lispro  •  melatonin  •  nitroglycerin  •  ondansetron **OR** ondansetron  •  Pharmacy to dose warfarin  •  [COMPLETED] Insert peripheral IV **AND** sodium chloride  •  sodium chloride      Chavez Fierro MD  12/11/20  13:11 EST

## 2020-12-11 NOTE — PLAN OF CARE
Goal Outcome Evaluation:  Plan of Care Reviewed With: patient  Progress: no change    Pt appeared restless in bed, tried sitting in recliner chair for sleep. Tylenol given x2 for right shoulder pain.  Some slight confusion during the shift, but no irritable episodes.  Vss, no voice complaints, no s/s of distress noted at this time, will continue to montior.

## 2020-12-11 NOTE — PROGRESS NOTES
"   LOS: 1 day    Patient Care Team:  Yusef Sosa Jr., MD as PCP - General  Yusef Sosa Jr., MD as PCP - Family Medicine  Luis Paulino MD as Consulting Physician (Cardiology)      Subjective     Patient was seen and examined this morning  Patient feeling tired and somewhat dizzy  Dyspnea improving    Objective     Vital Sign Min/Max for last 24 hours  Temp:  [97.4 °F (36.3 °C)-98 °F (36.7 °C)] 97.4 °F (36.3 °C)  Heart Rate:  [69-72] 70  Resp:  [18-20] 18  BP: (106-156)/(59-76) 117/63                       Flowsheet Rows      First Filed Value   Admission Height  170.2 cm (67\") Documented at 12/08/2020 1514   Admission Weight  75 kg (165 lb 5.5 oz) Documented at 12/08/2020 1514          I/O this shift:  In: 240 [P.O.:240]  Out: 100 [Urine:100]  I/O last 3 completed shifts:  In: 1080 [P.O.:1080]  Out: 1500 [Urine:1500]    Physical Exam:  Physical Exam    General.  Awake, alert  Head.  Atraumatic, normocephalic  Neck.  Supple  ENT.  Oral mucosa moist  Respiratory. CTA b/l  Cardiovascular. Irregular rhythm  Abdomen.  Soft, nontender  Extremities.  No edema     LABS:  Lab Results   Component Value Date    CALCIUM 9.3 12/11/2020     Results from last 7 days   Lab Units 12/11/20  0444 12/10/20  0428 12/09/20  0346  12/08/20  1652   MAGNESIUM mg/dL 2.3 2.3 2.2  --   --    SODIUM mmol/L 136 135* 138  --  138   POTASSIUM mmol/L 4.4 4.4 4.1  --  4.8   CHLORIDE mmol/L 95* 98 98  --  102   CO2 mmol/L 28.0 27.0 28.0  --  25.0   BUN mg/dL 49* 39* 28*  --  30*   CREATININE mg/dL 2.93* 2.21* 1.91*  --  2.18*   GLUCOSE mg/dL 164* 166* 124*  --  117*   CALCIUM mg/dL 9.3 9.8 9.6  --  9.3   WBC 10*3/mm3 9.70 9.70 10.50   < >  --    HEMOGLOBIN g/dL 12.2* 12.4* 12.5*   < >  --    PLATELETS 10*3/mm3 254 259 265   < >  --    ALT (SGPT) U/L  --   --   --   --  6   AST (SGOT) U/L  --   --   --   --  12    < > = values in this interval not displayed.     Lab Results   Component Value Date    CKTOTAL 36 02/24/2020    " TROPONINI 0.030 06/28/2019    TROPONINT 0.025 12/08/2020     Estimated Creatinine Clearance: 20 mL/min (A) (by C-G formula based on SCr of 2.93 mg/dL (H)).      Brief Urine Lab Results  (Last result in the past 365 days)      Color   Clarity   Blood   Leuk Est   Nitrite   Protein   CREAT   Urine HCG        07/06/20 0856             112.7           WEIGHTS:     Wt Readings from Last 1 Encounters:   12/11/20 0310 75.2 kg (165 lb 12.6 oz)   12/10/20 0346 74.5 kg (164 lb 3.9 oz)   12/09/20 1216 73.5 kg (162 lb)   12/08/20 2300 73.2 kg (161 lb 6 oz)   12/08/20 1514 75 kg (165 lb 5.5 oz)       aspirin, 81 mg, Oral, Daily  atorvastatin, 20 mg, Oral, Daily  calcitriol, 0.25 mcg, Oral, Once per day on Mon Wed Fri  carvedilol, 12.5 mg, Oral, Daily With Dinner  carvedilol, 6.25 mg, Oral, Daily With Breakfast  furosemide, 40 mg, Oral, BID  insulin glargine, 10 Units, Subcutaneous, Nightly  insulin lispro, 0-14 Units, Subcutaneous, TID AC  magnesium oxide, 200 mg, Oral, Daily  mirtazapine, 15 mg, Oral, Nightly  sodium chloride, 10 mL, Intravenous, Q12H  spironolactone, 25 mg, Oral, Daily  theophylline, 300 mg, Oral, Nightly  warfarin, 3 mg, Oral, Daily      Pharmacy to dose warfarin,   sodium chloride, 50 mL/hr, Last Rate: 50 mL/hr (12/11/20 0815)        Assessment/Plan       1.ZACKERY on Chronic kidney disease stage III  Patient's creatinine increased to 2.9 mg/DL this morning, Most likely due to diuretics  Electrolytes okay.  I will hold Lasix and spironolactone and provide gentle IV hydration     2.Hypervolemia  Improved  Stopped Lasix and Spironolactone due to worsened renal function  Can restart Lasix at low-dose tomorrow at lower dose if creatinine continue to improve     3.Hypertension with CKD 3  Blood pressure well controlled.  Continue current antihypertensives      Cam Yan MD  12/11/20  10:52 EST

## 2020-12-12 NOTE — PLAN OF CARE
Goal Outcome Evaluation:  Plan of Care Reviewed With: patient  Progress: no change  Outcome Summary: pt has had no complaints overnight. Pt to d/c today if renal function improves. pt will need a 6 minute walk prior to d/c. will continue to monitor pt

## 2020-12-12 NOTE — PROGRESS NOTES
"Pharmacy dosing service  Anticoagulant  Warfarin     Subjective:    Cali Santoro is a 85 y.o.male being continued on warfarin for atrial fibrillation.    INR Goal: 2 - 3  Home medication?:  Yes, warfarin 3 mg daily  Bridge Therapy Present?:  No  Interacting Medications Evaluation (New/Present/Discontinued): None new   Additional Contributing Factors: HF exacerbation (may increase INR)      Assessment/Plan:    INR now therapeutic. Up overnight, possibly d/t 5mg dose 12/10.  Will continue home dosing of 3mg daily.    Continue to monitor and adjust based on INR.         Date 12/8 12/9 12/10 12/11 12/12       INR 1.78/1.85 1.68 1.65 1.93 2.24       Dose 3 mg (early 12/9 AM) 3 mg 5 mg 3 mg 3 mg           Objective:  [Ht: 170.2 cm (67\"); Wt: 73.9 kg (162 lb 14.7 oz); BMI: Body mass index is 25.52 kg/m².]    Lab Results   Component Value Date    ALBUMIN 3.90 12/08/2020     Lab Results   Component Value Date    INR 2.24 12/12/2020    INR 1.93 (L) 12/11/2020    INR 1.65 (L) 12/10/2020    PROTIME 23.7 12/12/2020    PROTIME 20.6 12/11/2020    PROTIME 17.7 (L) 12/10/2020     Lab Results   Component Value Date    HGB 11.2 (L) 12/12/2020    HGB 12.2 (L) 12/11/2020    HGB 12.4 (L) 12/10/2020     Lab Results   Component Value Date    HCT 34.1 (L) 12/12/2020    HCT 36.9 (L) 12/11/2020    HCT 37.3 (L) 12/10/2020       Ely Hernandez, PharmD, BCPS  12/12/20 17:07 EST   "

## 2020-12-12 NOTE — PROGRESS NOTES
"[facility] Progress Note         LOS: 2 days    Patient Care Team:  Yusef Sosa Jr., MD as PCP - General  Yusef Sosa Jr., MD as PCP - Family Medicine  Luis Paulino MD as Consulting Physician (Cardiology)      Subjective     No chest pain; thirsty; notes a dry mouth  Dyspnea is significantly better, absent at rest, no orthopnea    Objective     Vital Sign Min/Max for last 24 hours  Temp:  [97.5 °F (36.4 °C)-98 °F (36.7 °C)] 97.5 °F (36.4 °C)  Heart Rate:  [70-72] 71  Resp:  [18] 18  BP: (114-146)/(67-73) 146/73                       Flowsheet Rows      First Filed Value   Admission Height  170.2 cm (67\") Documented at 12/08/2020 1514   Admission Weight  75 kg (165 lb 5.5 oz) Documented at 12/08/2020 1514          I/O this shift:  In: 360 [P.O.:360]  Out: 300 [Urine:300]  I/O last 3 completed shifts:  In: 480 [P.O.:480]  Out: 1100 [Urine:1100]    Physical Exam:  Physical Exam      General.  Awake, alert  Head.  Atraumatic, normocephalic, nose and ears normal, on nasal cannula o2  Neck.  Supple with a flat jvp  ENT.  Oral mucosa dry  Respiratory. Clear no wheezes or dullness  Cardiovascular. Irregular rhythm no mgr  Abdomen.  Soft, nontender  Extremities.  No edema  Skin warm dry     LABS:  Lab Results   Component Value Date    CALCIUM 8.6 12/12/2020     Results from last 7 days   Lab Units 12/12/20  0244 12/11/20  0444 12/10/20  0428  12/08/20  1652   MAGNESIUM mg/dL 2.4 2.3 2.3   < >  --    SODIUM mmol/L 136 136 135*   < > 138   POTASSIUM mmol/L 4.5 4.4 4.4   < > 4.8   CHLORIDE mmol/L 101 95* 98   < > 102   CO2 mmol/L 25.0 28.0 27.0   < > 25.0   BUN mg/dL 52* 49* 39*   < > 30*   CREATININE mg/dL 3.04* 2.93* 2.21*   < > 2.18*   GLUCOSE mg/dL 172* 164* 166*   < > 117*   CALCIUM mg/dL 8.6 9.3 9.8   < > 9.3   WBC 10*3/mm3 8.40 9.70 9.70   < >  --    HEMOGLOBIN g/dL 11.2* 12.2* 12.4*   < >  --    PLATELETS 10*3/mm3 237 254 259   < >  --    ALT (SGPT) U/L  --   --   --   --  6   AST (SGOT) U/L  " --   --   --   --  12    < > = values in this interval not displayed.     Lab Results   Component Value Date    CKTOTAL 36 02/24/2020    TROPONINI 0.030 06/28/2019    TROPONINT 0.025 12/08/2020     Estimated Creatinine Clearance: 18.9 mL/min (A) (by C-G formula based on SCr of 3.04 mg/dL (H)).      Brief Urine Lab Results  (Last result in the past 365 days)      Color   Clarity   Blood   Leuk Est   Nitrite   Protein   CREAT   Urine HCG        07/06/20 0856             112.7           WEIGHTS:     Wt Readings from Last 1 Encounters:   12/12/20 0449 73.9 kg (162 lb 14.7 oz)   12/11/20 0310 75.2 kg (165 lb 12.6 oz)   12/10/20 0346 74.5 kg (164 lb 3.9 oz)   12/09/20 1216 73.5 kg (162 lb)   12/08/20 2300 73.2 kg (161 lb 6 oz)   12/08/20 1514 75 kg (165 lb 5.5 oz)       aspirin, 81 mg, Oral, Daily  atorvastatin, 20 mg, Oral, Daily  calcitriol, 0.25 mcg, Oral, Once per day on Mon Wed Fri  carvedilol, 12.5 mg, Oral, Daily With Dinner  carvedilol, 6.25 mg, Oral, Daily With Breakfast  cefTRIAXone, 1 g, Intravenous, Q24H  insulin glargine, 10 Units, Subcutaneous, Nightly  insulin lispro, 0-14 Units, Subcutaneous, TID AC  magnesium oxide, 200 mg, Oral, Daily  mirtazapine, 15 mg, Oral, Nightly  sodium chloride, 10 mL, Intravenous, Q12H  theophylline, 300 mg, Oral, Nightly  warfarin, 3 mg, Oral, Daily      Pharmacy to dose warfarin,         Assessment/Plan       1.ZACKERY on Chronic kidney disease stage III  Creatinine continues to rise, d/w pt, his wife, and dr briggs, I would like to see creatinine stop rising before discharge; getting alittle ivf today, hold lasix, resume at lower dose once creatinine stops worsening     2.hypovolemia-diuretics on hold     3.Hypertension with CKD 3  Blood pressure well controlled.  Continue current antihypertensives      Jim Mandel MD  12/12/20  14:40 EST

## 2020-12-12 NOTE — PROGRESS NOTES
"      [facility] Medicine Services  INPATIENT PROGRESS NOTE  233/1   Hospitalist Team  LOS 2 days      Patient Care Team:  Yusef Sosa Jr., MD as PCP - General  Yusef Sosa Jr., MD as PCP - Family Medicine  Luis Paulino MD as Consulting Physician (Cardiology)      Patient was examined with relevant and adequate PPE keeping in mind the current coronavirus pandemic.    Chief Complaint / Subjective  Chief Complaint   Patient presents with   • Shortness of Breath       HPI (4 hpi elements or status of 3 chronic)  Mr. Santoro is a 85 y.o. male with past medical history of diabetes, hyperlipidemia, cardiomyopathy, hypertension, CAD, COPD, A. fib, CKD and CHF who presents to [facility] complaining of dyspnea.  Patient reports that his difficulty breathing has been present for \"quite a while\".  He notes some associated orthopnea as well as TRIMBLE which includes dyspnea with walking even short distances from room to room on level ground.  He reports that when his pain is at its most profound he will occasionally experience a substernal burning sensation but denies any consistent chest pain.  Over the past year and a half patient reports he is lost approximately 70 pounds unintentionally.  He denies any cough, fever, changes in bowel or bladder habits, nausea or vomiting, sensation of tachycardia or palpitations, peripheral edema, syncope or near syncope.  Compliance with all prescribed medical therapies is reported and patient reports he does not smoke tobacco or drink.     In the ED patient found to have lab significant for troponin of 0.018, proBNP: 2850.0, creatinine: 1.8, BUN: 30, BUN/creatinine ratio: 13.8, INR: 1.5, PT: 19.8. Remainder of CBC and CMP was generally unremarkable. Chest x-ray shows interstitial thickening in both lungs more confluent disease in the right lower lobe noted is worrisome for right basilar pneumonia and superimposed mild generalized interstitial " edema with stable cardiomegaly. EKG shows paced rhythm at 70. Respiratory viral panel including for COVID-19 was negative.    Former smoker.  Not on home oxygen letter    Feels subjectively better    Hypoxic this morning    Shortness of Breath          History taken from: patient    Review of Systems   Constitution: Negative.   HENT: Negative.    Eyes: Negative.    Cardiovascular: Negative.    Respiratory: Positive for shortness of breath.    Endocrine: Negative.    Hematologic/Lymphatic: Negative.    Skin: Negative.    Musculoskeletal: Negative.    Gastrointestinal: Negative.    Genitourinary: Negative.    Neurological: Negative.    Psychiatric/Behavioral: Negative.    Allergic/Immunologic: Negative.    All other systems reviewed and are negative.    Noted        Family History   Problem Relation Age of Onset   • Heart disease Mother    • Hypertension Mother    • Stroke Father    • Breast cancer Sister    • Colon cancer Sister    • Heart disease Brother    • Diabetes Other        Past Medical History:   Diagnosis Date   • Arrhythmia    • Bradycardia    • Cardiomyopathy (CMS/HCC)    • Cataract    • COPD (chronic obstructive pulmonary disease) (CMS/HCC)    • Coronary artery disease    • Diabetes mellitus, type 2 (CMS/HCC)    • Emphysema, unspecified (CMS/HCC)    • Hx of sick sinus syndrome     S/P Biventricular Pacemaker   • Hypertension    • Paroxysmal atrial fibrillation (CMS/HCC)    • Sleep apnea    • Ventricular arrhythmia        Social History     Socioeconomic History   • Marital status:      Spouse name: Not on file   • Number of children: Not on file   • Years of education: Not on file   • Highest education level: Not on file   Occupational History   • Occupation: Retired   Social Needs   • Financial resource strain: Patient refused   • Food insecurity     Worry: Patient refused     Inability: Patient refused   • Transportation needs     Medical: Patient refused     Non-medical: Patient refused      Tobacco Use   • Smoking status: Former Smoker     Types: Cigarettes   • Smokeless tobacco: Never Used   Substance and Sexual Activity   • Alcohol use: Yes     Comment: occ   • Drug use: No   • Sexual activity: Defer       Prior to Admission medications    Medication Sig Start Date End Date Taking? Authorizing Provider   acetaminophen (TYLENOL) 500 MG tablet Take 500 mg by mouth Every 6 (Six) Hours As Needed for Mild Pain .   Yes Astrid Gonzalez MD   aspirin 81 MG tablet Take 81 mg by mouth Every Night. 3/7/14  Yes Astrid Gonzalez MD   calcitriol (ROCALTROL) 0.25 MCG capsule Take 0.25 mcg by mouth 3 (Three) Times a Week. Mon, Wed, Fri   Yes Astrid Gonzalez MD   carvedilol (COREG) 12.5 MG tablet TAKE 1 TABLET TWICE DAILY  Patient taking differently: Take 6.25 mg by mouth Every Morning. 11/20/20  Yes Luis Paulino MD   carvedilol (COREG) 12.5 MG tablet Take 12.5 mg by mouth Every Night.   Yes Astrid Gonzalez MD   digoxin (LANOXIN) 125 MCG tablet Take 1 tablet by mouth Every Other Day. 11/3/20  Yes Yusef Sosa Jr., MD   furosemide (LASIX) 40 MG tablet TAKE 1 TABLET EVERY DAY  Patient taking differently: Take 20 mg by mouth Daily. 6/19/20  Yes Liliam Goode APRN   glimepiride (AMARYL) 2 MG tablet TAKE 1 TABLET TWICE DAILY 8/20/20  Yes Jhonatan Mart MD   Insulin Glargine (Lantus SoloStar) 100 UNIT/ML injection pen Inject 10 units sq at bedtime  Patient taking differently: Inject 10 Units under the skin into the appropriate area as directed Daily With Dinner. 11/5/20  Yes Jhonatan Mart MD   Magnesium 250 MG tablet Take 1 tablet by mouth Daily.   Yes Astrid Gonzalez MD   melatonin 5 MG tablet tablet Take 5 mg by mouth Every Night.   Yes Astrid Gonzalez MD   mirtazapine (REMERON) 15 MG tablet TAKE 1 TABLET BY MOUTH EVERY NIGHT. 6/19/20  Yes Liliam Goode APRN   simvastatin (ZOCOR) 40 MG tablet 1 tablet p.o. at bedtime.  Patient taking differently: Take 40 mg  "by mouth Every Night. 1/7/20  Yes Jhonatan Mart MD   SITagliptin-metFORMIN HCl ER (Janumet XR)  MG tablet Take 1 tablet by mouth Daily.  Patient taking differently: Take 1 tablet by mouth Daily With Lunch. 4/21/20  Yes Jhonatan Mart MD   theophylline (THEODUR) 300 MG 12 hr tablet Take 300 mg by mouth Every Night. 4/8/19  Yes ProviderAstrid MD   warfarin (COUMADIN) 3 MG tablet Take 3 mg by mouth Daily Before Lunch.   Yes Provider, MD Astrid        Objective    Physical Exam     Vital Signs  Temp:  [97.5 °F (36.4 °C)-98 °F (36.7 °C)] 97.5 °F (36.4 °C)  Heart Rate:  [70-72] 71  Resp:  [18] 18  BP: (114-146)/(67-73) 146/73    Oxygen Therapy  SpO2: 96 %  Pulse Oximetry Type: Continuous  Device (Oxygen Therapy): nasal cannula  Flow (L/min): 3  Flowsheet Rows      First Filed Value   Admission Height  170.2 cm (67\") Documented at 12/08/2020 1514   Admission Weight  75 kg (165 lb 5.5 oz) Documented at 12/08/2020 1514        Weight change: -1.3 kg (-2 lb 13.9 oz)   Intake & Output (last 3 days)       12/09 0701 - 12/10 0700 12/10 0701 - 12/11 0700 12/11 0701 - 12/12 0700 12/12 0701 - 12/13 0700    P.O. 1180 600 480 360    Total Intake(mL/kg) 1180 (15.8) 600 (8) 480 (6.5) 360 (4.9)    Urine (mL/kg/hr) 250 (0.1) 1250 (0.7) 600 (0.3) 300 (0.6)    Stool  0      Total Output 250 1250 600 300    Net +930 -650 -120 +60            Urine Unmeasured Occurrence 3 x 4 x 5 x     Stool Unmeasured Occurrence  1 x          Lines, Drains & Airways    Active LDAs     Name:   Placement date:   Placement time:   Site:   Days:    Peripheral IV 12/08/20 2314 Right Antecubital   12/08/20 2314    Antecubital   less than 1                Physical Exam:    Physical Exam  Vitals signs and nursing note reviewed.   Constitutional:       General: He is not in acute distress.     Appearance: Normal appearance. He is well-developed. He is not ill-appearing, toxic-appearing or diaphoretic.   HENT:      Head: Normocephalic and " atraumatic.      Right Ear: Ear canal and external ear normal.      Left Ear: Ear canal and external ear normal.      Nose: Nose normal. No congestion or rhinorrhea.      Mouth/Throat:      Mouth: Mucous membranes are moist.      Pharynx: No oropharyngeal exudate.   Eyes:      General: No scleral icterus.        Right eye: No discharge.         Left eye: No discharge.      Extraocular Movements: Extraocular movements intact.      Conjunctiva/sclera: Conjunctivae normal.      Pupils: Pupils are equal, round, and reactive to light.   Neck:      Musculoskeletal: Normal range of motion and neck supple. No neck rigidity or muscular tenderness.      Thyroid: No thyromegaly.      Vascular: No carotid bruit or JVD.      Trachea: No tracheal deviation.   Cardiovascular:      Rate and Rhythm: Normal rate and regular rhythm.      Pulses: Normal pulses.      Heart sounds: Normal heart sounds. No murmur. No friction rub. No gallop.    Pulmonary:      Effort: Pulmonary effort is normal. No respiratory distress.      Breath sounds: Normal breath sounds. No stridor. No wheezing, rhonchi or rales.   Chest:      Chest wall: No tenderness.   Abdominal:      General: Bowel sounds are normal. There is no distension.      Palpations: Abdomen is soft. There is no mass.      Tenderness: There is no abdominal tenderness. There is no guarding or rebound.      Hernia: No hernia is present.   Musculoskeletal: Normal range of motion.         General: No swelling, tenderness, deformity or signs of injury.      Right lower leg: No edema.      Left lower leg: No edema.   Lymphadenopathy:      Cervical: No cervical adenopathy.   Skin:     General: Skin is warm and dry.      Coloration: Skin is not jaundiced or pale.      Findings: No bruising, erythema or rash.   Neurological:      General: No focal deficit present.      Mental Status: He is alert and oriented to person, place, and time. Mental status is at baseline.      Cranial Nerves: No cranial  nerve deficit.      Sensory: No sensory deficit.      Motor: No weakness or abnormal muscle tone.      Coordination: Coordination normal.   Psychiatric:         Mood and Affect: Mood normal.         Behavior: Behavior normal.         Thought Content: Thought content normal.         Judgment: Judgment normal.     Noted        PT Recommendation and Plan             Procedures:        Assessment/Plan with Problem wise       Active Hospital Problems    Diagnosis  POA   • **Acute on chronic heart failure with preserved ejection fraction (CMS/[facility]) [I50.33]  Yes     Priority: High   • Dyspnea [R06.00]  Yes   • Acute-on-chronic kidney injury (CMS/[facility]) [N17.9, N18.9]  Yes   • Type 2 diabetes mellitus with other diabetic ophthalmic complication (CMS/[facility]) [E11.39]  Yes   • Mixed hyperlipidemia [E78.2]  Yes   • Essential hypertension [I10]  Yes   • Coronary artery disease of native artery of native heart with stable angina pectoris (CMS/[facility]) [I25.118]  Yes   • Chronic obstructive pulmonary disease (CMS/[facility]) [J44.9]  Yes   • Atrial fibrillation (CMS/[facility]) [I48.91] [I48.91]  Yes      Resolved Hospital Problems   No resolved problems to display.        Estimated Creatinine Clearance: 18.9 mL/min (A) (by C-G formula based on SCr of 3.04 mg/dL (H)).    Code Status and Medical Interventions:   Ordered at: 12/08/20 1931     Code Status:    CPR     Medical Interventions (Level of Support Prior to Arrest):    Full       MEDICAL DECISION MAKING COMPLEXITY BY PROBLEM:       CHF:  Diuresis-loop diuretics  Spironolactone if tolerated  Thiazides if tolerated  ACE inhibitor if tolerated  Beta-blocker  Check echocardiogram      Diabetes mellitus:  Monitor blood sugars  Basal bolus insulin  Sliding scale as needed  Diabetic education as needed  Supportive treatment  Nursing do not hold long-acting insulin without orders       Renal failure/insufficiency/injury:  Hydration  Supportive treatment  Cut down or hold ACE inhibitors  Avoid nephrotoxic  medications   Address diuretics        Hyperlipidemia:  Check lipid panel  Statins if indicated  Add Ezetimibe if appropriate  No role for omega-3 demonstrated.    Hypertension:  Continue home medications   Options include-  beta-blockers  Calcium channel blockers  ACE inhibitor  Vasodilators  Low-dose diuretics  PRN medications have not been shown to affect outcomes-to be avoided        Atrial fibrillation:   Rate control  Beta-blockers  Calcium channel blockers  Digoxin  +/-Amiodarone  Anticoagulation  Close monitoring        Care coordination with nursing for current care 12/09/20 5:15 PM EST.  Hold digoxin level comes down    Appreciate nephrology input    Noted worsening creatinine.  Hold diuretics and discharge till tomorrow.  CT noted.  No evidence of chanel pneumonia.  Urine strep antigen positive.  Empiric antibiotic    Worsening creatinine  Care coordination with nursing and for now.  Discussed with patient and wife at bedside.  EM 35 minutes, more than 50% spent on care coordination      Plan for disposition:            Continued Care and Services - Admitted Since 12/8/2020    Coordination has not been started for this encounter.        Historical & Objective Data     Results Review:    I reviewed the patient's new lab and radiology results. 12/12/20     Results from last 7 days   Lab Units 12/12/20  0244 12/11/20  0444 12/10/20  0428   WBC 10*3/mm3 8.40 9.70 9.70   HEMOGLOBIN g/dL 11.2* 12.2* 12.4*   HEMATOCRIT % 34.1* 36.9* 37.3*   MCV fL 100.9* 101.5* 100.5*   MCH pg 33.0 33.7* 33.4*   MPV fL 6.9 7.1 7.0   RDW % 17.8* 17.9* 17.9*   PLATELETS 10*3/mm3 237 254 259     Results from last 7 days   Lab Units 12/12/20  0244 12/11/20 0444 12/10/20  0428  12/08/20  1652   SODIUM mmol/L 136 136 135*   < > 138   POTASSIUM mmol/L 4.5 4.4 4.4   < > 4.8   CHLORIDE mmol/L 101 95* 98   < > 102   CO2 mmol/L 25.0 28.0 27.0   < > 25.0   BUN mg/dL 52* 49* 39*   < > 30*   CREATININE mg/dL 3.04* 2.93* 2.21*   < > 2.18*      CALCIUM mg/dL 8.6 9.3 9.8   < > 9.3   MAGNESIUM mg/dL 2.4 2.3 2.3   < >  --    BILIRUBIN mg/dL  --   --   --   --  0.4   ALK PHOS U/L  --   --   --   --  75   ALT (SGPT) U/L  --   --   --   --  6   AST (SGOT) U/L  --   --   --   --  12   GLUCOSE mg/dL 172* 164* 166*   < > 117*    < > = values in this interval not displayed.     Inflammatory Biomarkers        Invalid input(s): ESR, D-DIMER QUANTITATIVE,  PROCALCITONIN,    No results found for: PHOS  No results found for: HGBA1C  Lab Results   Component Value Date    CHOL 103 12/09/2020    TRIG 166 (H) 12/09/2020    HDL 40 12/09/2020    LDL 35 12/09/2020     No results found for: LIPASE  Results from last 7 days   Lab Units 12/12/20  0244 12/11/20  0444 12/10/20  0428   INR  2.24 1.93* 1.65*           No results found for: INTRAOP, PREDX, FINALDX, COMDX  COVID19   Date Value Ref Range Status   12/08/2020 Not Detected Not Detected - Ref. Range Final        Microbiology Results (last 10 days)     Procedure Component Value - Date/Time    Legionella Antigen, Urine - Urine, Urine, Clean Catch [[id number]]  (Normal) Collected: 12/09/20 1717    Lab Status: Final result Specimen: Urine, Clean Catch Updated: 12/09/20 1844     LEGIONELLA ANTIGEN, URINE Negative    S. Pneumo Ag Urine or CSF - Urine, Urine, Clean Catch [[id number]]  (Abnormal) Collected: 12/09/20 1717    Lab Status: Final result Specimen: Urine, Clean Catch Updated: 12/09/20 1844     Strep Pneumo Ag Positive    Respiratory Panel PCR w/COVID-19(SARS-CoV-2) LOBO/MARTHA/TOMAS/PAD/COR/MAD/TITA In-House, NP Swab in UTM/VTM, 3-4 HR TAT - Swab, Nasopharynx [[id number]]  (Normal) Collected: 12/08/20 1634    Lab Status: Final result Specimen: Swab from Nasopharynx Updated: 12/08/20 1831     ADENOVIRUS, PCR Not Detected     Coronavirus 229E Not Detected     Coronavirus HKU1 Not Detected     Coronavirus NL63 Not Detected     Coronavirus OC43 Not Detected     COVID19 Not Detected     Human Metapneumovirus Not Detected     Human  Rhinovirus/Enterovirus Not Detected     Influenza A PCR Not Detected     Influenza B PCR Not Detected     Parainfluenza Virus 1 Not Detected     Parainfluenza Virus 2 Not Detected     Parainfluenza Virus 3 Not Detected     Parainfluenza Virus 4 Not Detected     RSV, PCR Not Detected     Bordetella pertussis pcr Not Detected     Bordetella parapertussis PCR Not Detected     Chlamydophila pneumoniae PCR Not Detected     Mycoplasma pneumo by PCR Not Detected    Narrative:      Fact sheet for providers: https://docs.[employer]/wp-content/uploads/TZF8541-7495-AN2.1-EUA-Provider-Fact-Sheet-3.pdf    Fact sheet for patients: https://docs.[employer]/wp-content/uploads/IPO5014-0208-KW1.1-EUA-Patient-Fact-Sheet-1.pdf    Test performed by PCR.          ECG/EMG Results (most recent)     Procedure Component Value Units Date/Time    ECG 12 Lead [[id number]] Collected: 12/08/20 1523     Updated: 12/08/20 1533     QT Interval 424 ms     Narrative:      HEART RATE= 70  bpm  RR Interval= 856  ms  IA Interval=   ms  P Horizontal Axis= -5  deg  P Front Axis=   deg  QRSD Interval= 180  ms  QT Interval= 424  ms  QRS Axis= 0  deg  T Wave Axis=   deg  - ABNORMAL ECG -  Afib/flutter and ventricular-paced rhythm  When compared with ECG of 27-Jun-2019 12:11:54,  No significant change  Electronically Signed By:   Date and Time of Study: 2020-12-08 15:23:53    Adult Transthoracic Echo Complete W/ Cont if Necessary Per Protocol [[id number]] Collected: 12/09/20 1117     Updated: 12/09/20 1706     BSA 1.8 m^2      RVIDd 3.5 cm      IVSd 1.4 cm      IVSs 2.1 cm      LVIDd 3.0 cm      LVIDs 2.1 cm      LVPWd 1.2 cm      BH CV ECHO MAURO - LVPWS 1.9 cm      IVS/LVPW 1.1     FS 30.6 %      EDV(Teich) 34.6 ml      ESV(Teich) 13.9 ml      EF(Teich) 59.7 %      EDV(cubed) 26.6 ml      ESV(cubed) 8.9 ml      EF(cubed) 66.6 %      % IVS thick 54.4 %      % LVPW thick 62.9 %      LV mass(C)d 119.1 grams      LV mass(C)dI 64.4 grams/m^2      LV mass(C)s  182.1 grams      LV mass(C)sI 98.5 grams/m^2      SV(Teich) 20.7 ml      SI(Teich) 11.2 ml/m^2      SV(cubed) 17.7 ml      SI(cubed) 9.6 ml/m^2      Ao root diam 3.2 cm      Ao root area 7.9 cm^2      ACS 1.3 cm      asc Aorta Diam 3.0 cm      LVOT diam 1.9 cm      LVOT area 2.9 cm^2      EDV(MOD-sp4) 60.8 ml      ESV(MOD-sp4) 23.6 ml      EF(MOD-sp4) 61.2 %      SV(MOD-sp4) 37.3 ml      SI(MOD-sp4) 20.1 ml/m^2      Ao root area (BSA corrected) 1.7     LV Patel Vol (BSA corrected) 32.9 ml/m^2      LV Sys Vol (BSA corrected) 12.7 ml/m^2      MV E max mariam 84.7 cm/sec      MV A max mariam 0.44 cm/sec      MV E/A 193.8     MV V2 max 98.6 cm/sec      MV max PG 3.9 mmHg      MV V2 mean 55.8 cm/sec      MV mean PG 1.5 mmHg      MV V2 VTI 16.6 cm      MVA(VTI) 2.9 cm^2      MV dec slope 464.5 cm/sec^2      MV dec time 0.18 sec      Ao pk mariam 154.7 cm/sec      Ao max PG 9.7 mmHg      Ao max PG (full) 5.9 mmHg      Ao V2 mean 111.7 cm/sec      Ao mean PG 5.6 mmHg      Ao mean PG (full) 3.7 mmHg      Ao V2 VTI 25.1 cm      VALENCIA(I,A) 1.9 cm^2      VALENCIA(I,D) 1.9 cm^2      VALENCIA(V,A) 1.9 cm^2      VALENCIA(V,D) 1.9 cm^2      LV V1 max PG 3.8 mmHg      LV V1 mean PG 1.9 mmHg      LV V1 max 97.5 cm/sec      LV V1 mean 64.8 cm/sec      LV V1 VTI 16.6 cm      SV(Ao) 197.8 ml      SI(Ao) 107.0 ml/m^2      SV(LVOT) 48.8 ml      SI(LVOT) 26.4 ml/m^2      PA V2 max 82.7 cm/sec      PA max PG 2.7 mmHg      PA max PG (full) 1.0 mmHg      PA V2 mean 57.1 cm/sec      PA mean PG 1.4 mmHg      PA mean PG (full) 0.63 mmHg      PA V2 VTI 12.5 cm      PA acc time 0.08 sec      RV V1 max PG 1.7 mmHg      RV V1 mean PG 0.81 mmHg      RV V1 max 65.7 cm/sec      RV V1 mean 41.8 cm/sec      RV V1 VTI 10.6 cm      TR max mariam 271.2 cm/sec      RVSP(TR) 32.4 mmHg      RAP systole 3.0 mmHg      PA pr(Accel) 43.2 mmHg       CV ECHO MAURO - BZI_BMI 25.4 kilograms/m^2       CV ECHO MAURO - BSA(HAYCOCK) 1.9 m^2       CV ECHO MAURO - BZI_METRIC_WEIGHT 73.5 kg       CV  ECHO MAURO - BZI_METRIC_HEIGHT 170.2 cm      Target HR (85%) 116 bpm      Max. Pred. HR (100%) 136 bpm      EF(MOD-bp) 61.0 %      LA dimension(2D) 4.6 cm      Echo EF Estimated 60 %     Narrative:      · Left ventricular wall thickness is consistent with septal asymmetric   hypertrophy.  · Estimated left ventricular EF = 60% Left ventricular systolic function   is normal.  · The right ventricular cavity is borderline dilated.  · The right atrial cavity is borderline dilated.  · Mild aortic valve stenosis is present.  · Estimated right ventricular systolic pressure from tricuspid   regurgitation is normal (<35 mmHg).  · There is a small (<1cm) pericardial effusion adjacent to the left   ventricle.             Results for orders placed in visit on 02/06/08   SCANNED VASCULAR STUDIES       Results for orders placed during the hospital encounter of 12/08/20   Adult Transthoracic Echo Complete W/ Cont if Necessary Per Protocol    Narrative · Left ventricular wall thickness is consistent with septal asymmetric   hypertrophy.  · Estimated left ventricular EF = 60% Left ventricular systolic function   is normal.  · The right ventricular cavity is borderline dilated.  · The right atrial cavity is borderline dilated.  · Mild aortic valve stenosis is present.  · Estimated right ventricular systolic pressure from tricuspid   regurgitation is normal (<35 mmHg).  · There is a small (<1cm) pericardial effusion adjacent to the left   ventricle.          Ct Chest Without Contrast    Result Date: 12/9/2020  1. There is a small right pleural effusion. Minor right basal lung density is consistent with atelectasis. 2. There is scarring in the right lung. There is a radiographically stable 8 mm right lung nodule. 3. 5 mm groundglass nodule has developed in the left lower lobe. By Fleischner criteria, no CT follow-up is necessary. 2. Atherosclerosis including coronary artery calcification.. 5. Small pericardial effusion, slightly  increased from prior. Mild cardiomegaly. 6. Also noted: Emphysema, chronic pancreatitis, cholelithiasis, right thyroid enlargement.  Electronically Signed By-Elvi Saenz MD On:12/9/2020 9:31 PM This report was finalized on [id number] by  Elvi Saenz MD.    Xr Chest 1 View    Result Date: 12/8/2020  Interstitial thickening in both lungs, with more confluent disease in the right lower lobe. FINDINGS are worrisome for right basilar pneumonia and superimposed mild generalized interstitial edema.  Stable cardiomegaly.  Electronically Signed By-Alexandra Preston MD On:12/8/2020 4:48 PM This report was finalized on [id number] by  Alexandra Preston MD.      I personally reviewed patient's x-ray films and my findings are: 0    I personally reviewed patient's EKG strip and my findings are: 0    Medication Review:   I have reviewed the patient's current medication list 12/12/20     Scheduled Meds  aspirin, 81 mg, Oral, Daily  atorvastatin, 20 mg, Oral, Daily  calcitriol, 0.25 mcg, Oral, Once per day on Mon Wed Fri  carvedilol, 12.5 mg, Oral, Daily With Dinner  carvedilol, 6.25 mg, Oral, Daily With Breakfast  cefTRIAXone, 1 g, Intravenous, Q24H  insulin glargine, 10 Units, Subcutaneous, Nightly  insulin lispro, 0-14 Units, Subcutaneous, TID AC  magnesium oxide, 200 mg, Oral, Daily  mirtazapine, 15 mg, Oral, Nightly  sodium chloride, 10 mL, Intravenous, Q12H  theophylline, 300 mg, Oral, Nightly  warfarin, 3 mg, Oral, Daily        Meds Infusions  Pharmacy to dose warfarin,         DVT prophylaxis  Mechanical Order History:     None      Pharmalogical Order History:      Ordered     Dose Route Frequency Stop    12/09/20 1225  warfarin (COUMADIN) tablet 3 mg     Question:  Target INR  Answer:  2 - 3    3 mg PO Daily Warfarin --    12/09/20 0020  warfarin (COUMADIN) tablet 3 mg     Question:  Target INR  Answer:  2 - 3    3 mg PO Once 12/09/20 0037    12/08/20 2332  Pharmacy to dose warfarin     Question:  Target INR  Answer:   2 - 3    -- XX Continuous PRN --    12/08/20 2332  Pharmacy to dose warfarin  Status:  Discontinued     Question:  Target INR  Answer:  2 - 3    -- XX Continuous PRN 12/09/20 1219                Meds PRN  •  acetaminophen **OR** acetaminophen **OR** acetaminophen  •  dextrose  •  dextrose  •  glucagon (human recombinant)  •  insulin lispro **AND** insulin lispro  •  melatonin  •  nitroglycerin  •  ondansetron **OR** ondansetron  •  Pharmacy to dose warfarin  •  [COMPLETED] Insert peripheral IV **AND** sodium chloride  •  sodium chloride      Chavez Fierro MD  12/12/20  14:17 EST

## 2020-12-12 NOTE — PLAN OF CARE
Goal Outcome Evaluation:  Plan of Care Reviewed With: patient  Progress: no change   Patient feels good and ready to go, however his kidney function is not much better.  Patient will be staying with us today.  No issues noted at this time, continue to monitor.

## 2020-12-13 NOTE — PLAN OF CARE
Goal Outcome Evaluation:  Plan of Care Reviewed With: patient  Progress: no change  Outcome Summary: pt has had no complaints overnight. per nephrology, creat needs to stop increasing before pt can d/c. will continue to monitor

## 2020-12-13 NOTE — PROGRESS NOTES
"      [facility] Services  INPATIENT PROGRESS NOTE  233/1   Hospitalist Team  LOS 3 days      Patient Care Team:  Yusef Sosa Jr., MD as PCP - General  Yusef Sosa Jr., MD as PCP - Family Medicine  Luis Paulino MD as Consulting Physician (Cardiology)      Patient was examined with relevant and adequate PPE keeping in mind the current coronavirus pandemic.    Chief Complaint / Subjective  Chief Complaint   Patient presents with   • Shortness of Breath       HPI (4 hpi elements or status of 3 chronic)  Mr. Santoro is a 85 y.o. male with past medical history of diabetes, hyperlipidemia, cardiomyopathy, hypertension, CAD, COPD, A. fib, CKD and CHF who presents to [facility] complaining of dyspnea.  Patient reports that his difficulty breathing has been present for \"quite a while\".  He notes some associated orthopnea as well as TRIMBLE which includes dyspnea with walking even short distances from room to room on level ground.  He reports that when his pain is at its most profound he will occasionally experience a substernal burning sensation but denies any consistent chest pain.  Over the past year and a half patient reports he is lost approximately 70 pounds unintentionally.  He denies any cough, fever, changes in bowel or bladder habits, nausea or vomiting, sensation of tachycardia or palpitations, peripheral edema, syncope or near syncope.  Compliance with all prescribed medical therapies is reported and patient reports he does not smoke tobacco or drink.     In the ED patient found to have lab significant for troponin of 0.018, proBNP: 2850.0, creatinine: 1.8, BUN: 30, BUN/creatinine ratio: 13.8, INR: 1.5, PT: 19.8. Remainder of CBC and CMP was generally unremarkable. Chest x-ray shows interstitial thickening in both lungs more confluent disease in the right lower lobe noted is worrisome for right basilar pneumonia and superimposed mild generalized interstitial " edema with stable cardiomegaly. EKG shows paced rhythm at 70. Respiratory viral panel including for COVID-19 was negative.    Former smoker.  Not on home oxygen letter    Feels subjectively better    Hypoxic this morning    Oxygenation better 12/13/20, 2:18 PM EST      Shortness of Breath          History taken from: patient    Review of Systems   Constitution: Negative.   HENT: Negative.    Eyes: Negative.    Cardiovascular: Negative.    Respiratory: Positive for shortness of breath.    Endocrine: Negative.    Hematologic/Lymphatic: Negative.    Skin: Negative.    Musculoskeletal: Negative.    Gastrointestinal: Negative.    Genitourinary: Negative.    Neurological: Negative.    Psychiatric/Behavioral: Negative.    Allergic/Immunologic: Negative.    All other systems reviewed and are negative.    Reviewed, no change in above data from the prior day.      Family History   Problem Relation Age of Onset   • Heart disease Mother    • Hypertension Mother    • Stroke Father    • Breast cancer Sister    • Colon cancer Sister    • Heart disease Brother    • Diabetes Other        Past Medical History:   Diagnosis Date   • Arrhythmia    • Bradycardia    • Cardiomyopathy (CMS/HCC)    • Cataract    • COPD (chronic obstructive pulmonary disease) (CMS/HCC)    • Coronary artery disease    • Diabetes mellitus, type 2 (CMS/HCC)    • Emphysema, unspecified (CMS/HCC)    • Hx of sick sinus syndrome     S/P Biventricular Pacemaker   • Hypertension    • Paroxysmal atrial fibrillation (CMS/HCC)    • Sleep apnea    • Ventricular arrhythmia        Social History     Socioeconomic History   • Marital status:      Spouse name: Not on file   • Number of children: Not on file   • Years of education: Not on file   • Highest education level: Not on file   Occupational History   • Occupation: Retired   Social Needs   • Financial resource strain: Patient refused   • Food insecurity     Worry: Patient refused     Inability: Patient refused      • Transportation needs     Medical: Patient refused     Non-medical: Patient refused   Tobacco Use   • Smoking status: Former Smoker     Types: Cigarettes   • Smokeless tobacco: Never Used   Substance and Sexual Activity   • Alcohol use: Yes     Comment: occ   • Drug use: No   • Sexual activity: Defer       Prior to Admission medications    Medication Sig Start Date End Date Taking? Authorizing Provider   acetaminophen (TYLENOL) 500 MG tablet Take 500 mg by mouth Every 6 (Six) Hours As Needed for Mild Pain .   Yes Astrid Gonzalez MD   aspirin 81 MG tablet Take 81 mg by mouth Every Night. 3/7/14  Yes Astrid Gonzalez MD   calcitriol (ROCALTROL) 0.25 MCG capsule Take 0.25 mcg by mouth 3 (Three) Times a Week. Mon, Wed, Fri   Yes Astrid Gonzalez MD   carvedilol (COREG) 12.5 MG tablet TAKE 1 TABLET TWICE DAILY  Patient taking differently: Take 6.25 mg by mouth Every Morning. 11/20/20  Yes Luis Paulino MD   carvedilol (COREG) 12.5 MG tablet Take 12.5 mg by mouth Every Night.   Yes Astrid Gonzalez MD   digoxin (LANOXIN) 125 MCG tablet Take 1 tablet by mouth Every Other Day. 11/3/20  Yes Yusef Sosa Jr., MD   furosemide (LASIX) 40 MG tablet TAKE 1 TABLET EVERY DAY  Patient taking differently: Take 20 mg by mouth Daily. 6/19/20  Yes Liliam Goode APRN   glimepiride (AMARYL) 2 MG tablet TAKE 1 TABLET TWICE DAILY 8/20/20  Yes Jhonatan Mart MD   Insulin Glargine (Lantus SoloStar) 100 UNIT/ML injection pen Inject 10 units sq at bedtime  Patient taking differently: Inject 10 Units under the skin into the appropriate area as directed Daily With Dinner. 11/5/20  Yes Jhonatan Mart MD   Magnesium 250 MG tablet Take 1 tablet by mouth Daily.   Yes Astrid Gonzalez MD   melatonin 5 MG tablet tablet Take 5 mg by mouth Every Night.   Yes Astrid Gonzalez MD   mirtazapine (REMERON) 15 MG tablet TAKE 1 TABLET BY MOUTH EVERY NIGHT. 6/19/20  Yes Liliam Goode APRN   simvastatin  "(ZOCOR) 40 MG tablet 1 tablet p.o. at bedtime.  Patient taking differently: Take 40 mg by mouth Every Night. 1/7/20  Yes Jhonatan Mart MD   SITagliptin-metFORMIN HCl ER (Janumet XR)  MG tablet Take 1 tablet by mouth Daily.  Patient taking differently: Take 1 tablet by mouth Daily With Lunch. 4/21/20  Yes Jhonatan Mart MD   theophylline (THEODUR) 300 MG 12 hr tablet Take 300 mg by mouth Every Night. 4/8/19  Yes ProviderAstrid MD   warfarin (COUMADIN) 3 MG tablet Take 3 mg by mouth Daily Before Lunch.   Yes Provider, MD Astrid        Objective    Physical Exam     Vital Signs  Temp:  [97.4 °F (36.3 °C)-98 °F (36.7 °C)] 97.4 °F (36.3 °C)  Heart Rate:  [65-70] 65  Resp:  [18] 18  BP: (114-136)/(62-77) 114/62    Oxygen Therapy  SpO2: 99 %  Pulse Oximetry Type: Continuous  Device (Oxygen Therapy): nasal cannula  Flow (L/min): 3  Flowsheet Rows      First Filed Value   Admission Height  170.2 cm (67\") Documented at 12/08/2020 1514   Admission Weight  75 kg (165 lb 5.5 oz) Documented at 12/08/2020 1514        Weight change: 0.3 kg (10.6 oz)   Intake & Output (last 3 days)       12/10 0701 - 12/11 0700 12/11 0701 - 12/12 0700 12/12 0701 - 12/13 0700 12/13 0701 - 12/14 0700    P.O. 600 480 840 360    Total Intake(mL/kg) 600 (8) 480 (6.5) 840 (11.3) 360 (4.9)    Urine (mL/kg/hr) 1250 (0.7) 600 (0.3) 850 (0.5) 500 (0.9)    Stool 0       Total Output 1250 600 850 500    Net -650 -120 -10 -140            Urine Unmeasured Occurrence 4 x 5 x      Stool Unmeasured Occurrence 1 x           Lines, Drains & Airways    Active LDAs     Name:   Placement date:   Placement time:   Site:   Days:    Peripheral IV 12/08/20 2314 Right Antecubital   12/08/20    2314    Antecubital   less than 1                Physical Exam:    Physical Exam  Vitals signs and nursing note reviewed.   Constitutional:       General: He is not in acute distress.     Appearance: Normal appearance. He is well-developed. He is not ill-appearing, " toxic-appearing or diaphoretic.   HENT:      Head: Normocephalic and atraumatic.      Right Ear: Ear canal and external ear normal.      Left Ear: Ear canal and external ear normal.      Nose: Nose normal. No congestion or rhinorrhea.      Mouth/Throat:      Mouth: Mucous membranes are moist.      Pharynx: No oropharyngeal exudate.   Eyes:      General: No scleral icterus.        Right eye: No discharge.         Left eye: No discharge.      Extraocular Movements: Extraocular movements intact.      Conjunctiva/sclera: Conjunctivae normal.      Pupils: Pupils are equal, round, and reactive to light.   Neck:      Musculoskeletal: Normal range of motion and neck supple. No neck rigidity or muscular tenderness.      Thyroid: No thyromegaly.      Vascular: No carotid bruit or JVD.      Trachea: No tracheal deviation.   Cardiovascular:      Rate and Rhythm: Normal rate and regular rhythm.      Pulses: Normal pulses.      Heart sounds: Normal heart sounds. No murmur. No friction rub. No gallop.    Pulmonary:      Effort: Pulmonary effort is normal. No respiratory distress.      Breath sounds: Normal breath sounds. No stridor. No wheezing, rhonchi or rales.   Chest:      Chest wall: No tenderness.   Abdominal:      General: Bowel sounds are normal. There is no distension.      Palpations: Abdomen is soft. There is no mass.      Tenderness: There is no abdominal tenderness. There is no guarding or rebound.      Hernia: No hernia is present.   Musculoskeletal: Normal range of motion.         General: No swelling, tenderness, deformity or signs of injury.      Right lower leg: No edema.      Left lower leg: No edema.   Lymphadenopathy:      Cervical: No cervical adenopathy.   Skin:     General: Skin is warm and dry.      Coloration: Skin is not jaundiced or pale.      Findings: No bruising, erythema or rash.   Neurological:      General: No focal deficit present.      Mental Status: He is alert and oriented to person, place,  and time. Mental status is at baseline.      Cranial Nerves: No cranial nerve deficit.      Sensory: No sensory deficit.      Motor: No weakness or abnormal muscle tone.      Coordination: Coordination normal.   Psychiatric:         Mood and Affect: Mood normal.         Behavior: Behavior normal.         Thought Content: Thought content normal.         Judgment: Judgment normal.       Noted        PT Recommendation and Plan             Procedures:        Assessment/Plan with Problem wise       Active Hospital Problems    Diagnosis  POA   • **Acute on chronic heart failure with preserved ejection fraction (CMS/[facility]) [I50.33]  Yes     Priority: High   • Dyspnea [R06.00]  Yes   • Acute-on-chronic kidney injury (CMS/[facility]) [N17.9, N18.9]  Yes   • Type 2 diabetes mellitus with other diabetic ophthalmic complication (CMS/[facility]) [E11.39]  Yes   • Mixed hyperlipidemia [E78.2]  Yes   • Essential hypertension [I10]  Yes   • Coronary artery disease of native artery of native heart with stable angina pectoris (CMS/[facility]) [I25.118]  Yes   • Chronic obstructive pulmonary disease (CMS/[facility]) [J44.9]  Yes   • Atrial fibrillation (CMS/[facility]) [I48.91] [I48.91]  Yes      Resolved Hospital Problems   No resolved problems to display.        Estimated Creatinine Clearance: 23.9 mL/min (A) (by C-G formula based on SCr of 2.41 mg/dL (H)).    Code Status and Medical Interventions:   Ordered at: 12/08/20 1931     Code Status:    CPR     Medical Interventions (Level of Support Prior to Arrest):    Full       MEDICAL DECISION MAKING COMPLEXITY BY PROBLEM:       CHF:  Diuresis-loop diuretics  Spironolactone if tolerated  Thiazides if tolerated  ACE inhibitor if tolerated  Beta-blocker  Check echocardiogram      Diabetes mellitus:  Monitor blood sugars  Basal bolus insulin  Sliding scale as needed  Diabetic education as needed  Supportive treatment  Nursing do not hold long-acting insulin without orders       Renal  failure/insufficiency/injury:  Hydration  Supportive treatment  Cut down or hold ACE inhibitors  Avoid nephrotoxic medications   Address diuretics        Hyperlipidemia:  Check lipid panel  Statins if indicated  Add Ezetimibe if appropriate  No role for omega-3 demonstrated.    Hypertension:  Continue home medications   Options include-  beta-blockers  Calcium channel blockers  ACE inhibitor  Vasodilators  Low-dose diuretics  PRN medications have not been shown to affect outcomes-to be avoided        Atrial fibrillation:   Rate control  Beta-blockers  Calcium channel blockers  Digoxin  +/-Amiodarone  Anticoagulation  Close monitoring        Care coordination with nursing for current care 12/09/20 5:15 PM EST.  Hold digoxin level comes down    Appreciate nephrology input    Noted worsening creatinine.  Hold diuretics and discharge till tomorrow.  CT noted.  No evidence of chanel pneumonia.  Urine strep antigen positive.  Empiric antibiotic    Worsening creatinine  Care coordination with nursing and for now.  Discussed with patient and wife at bedside.  EM 35 minutes, more than 50% spent on care coordination    Care coordination with Dr. Mandel regarding diuretics.  Patient not on any oxygen at home.  Hopefully discharge in the morning.  Creatinine improving      Plan for disposition:            Continued Care and Services - Admitted Since 12/8/2020    Coordination has not been started for this encounter.        Historical & Objective Data     Results Review:    I reviewed the patient's new lab and radiology results. 12/13/20     Results from last 7 days   Lab Units 12/13/20  0418 12/12/20 0244 12/11/20  0444   WBC 10*3/mm3 7.50 8.40 9.70   HEMOGLOBIN g/dL 11.6* 11.2* 12.2*   HEMATOCRIT % 35.4* 34.1* 36.9*   MCV fL 102.4* 100.9* 101.5*   MCH pg 33.6* 33.0 33.7*   MPV fL 7.4 6.9 7.1   RDW % 17.7* 17.8* 17.9*   PLATELETS 10*3/mm3 243 237 254     Results from last 7 days   Lab Units 12/13/20  0418 12/12/20  0244  12/11/20  0444  12/08/20  1652   SODIUM mmol/L 136 136 136   < > 138   POTASSIUM mmol/L 5.1 4.5 4.4   < > 4.8   CHLORIDE mmol/L 101 101 95*   < > 102   CO2 mmol/L 25.0 25.0 28.0   < > 25.0   BUN mg/dL 44* 52* 49*   < > 30*   CREATININE mg/dL 2.41* 3.04* 2.93*   < > 2.18*   CALCIUM mg/dL 9.1 8.6 9.3   < > 9.3   MAGNESIUM mg/dL 2.5* 2.4 2.3   < >  --    BILIRUBIN mg/dL  --   --   --   --  0.4   ALK PHOS U/L  --   --   --   --  75   ALT (SGPT) U/L  --   --   --   --  6   AST (SGOT) U/L  --   --   --   --  12   GLUCOSE mg/dL 164* 172* 164*   < > 117*    < > = values in this interval not displayed.     Inflammatory Biomarkers        Invalid input(s): ESR, D-DIMER QUANTITATIVE,  PROCALCITONIN,    No results found for: PHOS  No results found for: HGBA1C  Lab Results   Component Value Date    CHOL 103 12/09/2020    TRIG 166 (H) 12/09/2020    HDL 40 12/09/2020    LDL 35 12/09/2020     No results found for: LIPASE  Results from last 7 days   Lab Units 12/13/20  0418 12/12/20  0244 12/11/20  0444   INR  2.03 2.24 1.93*           No results found for: INTRAOP, PREDX, FINALDX, COMDX  COVID19   Date Value Ref Range Status   12/08/2020 Not Detected Not Detected - Ref. Range Final        Microbiology Results (last 10 days)     Procedure Component Value - Date/Time    Legionella Antigen, Urine - Urine, Urine, Clean Catch [[id number]]  (Normal) Collected: 12/09/20 1717    Lab Status: Final result Specimen: Urine, Clean Catch Updated: 12/09/20 1844     LEGIONELLA ANTIGEN, URINE Negative    S. Pneumo Ag Urine or CSF - Urine, Urine, Clean Catch [[id number]]  (Abnormal) Collected: 12/09/20 1717    Lab Status: Final result Specimen: Urine, Clean Catch Updated: 12/09/20 1844     Strep Pneumo Ag Positive    Respiratory Panel PCR w/COVID-19(SARS-CoV-2) LOBO/MARTHA/TOMAS/PAD/COR/MAD/TITA In-House, NP Swab in UTM/VTM, 3-4 HR TAT - Swab, Nasopharynx [[id number]]  (Normal) Collected: 12/08/20 1634    Lab Status: Final result Specimen: Swab from  Nasopharynx Updated: 12/08/20 1831     ADENOVIRUS, PCR Not Detected     Coronavirus 229E Not Detected     Coronavirus HKU1 Not Detected     Coronavirus NL63 Not Detected     Coronavirus OC43 Not Detected     COVID19 Not Detected     Human Metapneumovirus Not Detected     Human Rhinovirus/Enterovirus Not Detected     Influenza A PCR Not Detected     Influenza B PCR Not Detected     Parainfluenza Virus 1 Not Detected     Parainfluenza Virus 2 Not Detected     Parainfluenza Virus 3 Not Detected     Parainfluenza Virus 4 Not Detected     RSV, PCR Not Detected     Bordetella pertussis pcr Not Detected     Bordetella parapertussis PCR Not Detected     Chlamydophila pneumoniae PCR Not Detected     Mycoplasma pneumo by PCR Not Detected    Narrative:      Fact sheet for providers: https://docs.[employer]/wp-content/uploads/MFY9064-6842-LD3.1-EUA-Provider-Fact-Sheet-3.pdf    Fact sheet for patients: https://docs.[employer]/wp-content/uploads/LUI1262-9534-WZ2.1-EUA-Patient-Fact-Sheet-1.pdf    Test performed by PCR.          ECG/EMG Results (most recent)     Procedure Component Value Units Date/Time    ECG 12 Lead [[id number]] Collected: 12/08/20 1523     Updated: 12/08/20 1533     QT Interval 424 ms     Narrative:      HEART RATE= 70  bpm  RR Interval= 856  ms  OK Interval=   ms  P Horizontal Axis= -5  deg  P Front Axis=   deg  QRSD Interval= 180  ms  QT Interval= 424  ms  QRS Axis= 0  deg  T Wave Axis=   deg  - ABNORMAL ECG -  Afib/flutter and ventricular-paced rhythm  When compared with ECG of 27-Jun-2019 12:11:54,  No significant change  Electronically Signed By:   Date and Time of Study: 2020-12-08 15:23:53    Adult Transthoracic Echo Complete W/ Cont if Necessary Per Protocol [[id number]] Collected: 12/09/20 1117     Updated: 12/09/20 1706     BSA 1.8 m^2      RVIDd 3.5 cm      IVSd 1.4 cm      IVSs 2.1 cm      LVIDd 3.0 cm      LVIDs 2.1 cm      LVPWd 1.2 cm      BH CV ECHO MAURO - LVPWS 1.9 cm      IVS/LVPW 1.1      FS 30.6 %      EDV(Teich) 34.6 ml      ESV(Teich) 13.9 ml      EF(Teich) 59.7 %      EDV(cubed) 26.6 ml      ESV(cubed) 8.9 ml      EF(cubed) 66.6 %      % IVS thick 54.4 %      % LVPW thick 62.9 %      LV mass(C)d 119.1 grams      LV mass(C)dI 64.4 grams/m^2      LV mass(C)s 182.1 grams      LV mass(C)sI 98.5 grams/m^2      SV(Teich) 20.7 ml      SI(Teich) 11.2 ml/m^2      SV(cubed) 17.7 ml      SI(cubed) 9.6 ml/m^2      Ao root diam 3.2 cm      Ao root area 7.9 cm^2      ACS 1.3 cm      asc Aorta Diam 3.0 cm      LVOT diam 1.9 cm      LVOT area 2.9 cm^2      EDV(MOD-sp4) 60.8 ml      ESV(MOD-sp4) 23.6 ml      EF(MOD-sp4) 61.2 %      SV(MOD-sp4) 37.3 ml      SI(MOD-sp4) 20.1 ml/m^2      Ao root area (BSA corrected) 1.7     LV Patel Vol (BSA corrected) 32.9 ml/m^2      LV Sys Vol (BSA corrected) 12.7 ml/m^2      MV E max mariam 84.7 cm/sec      MV A max mariam 0.44 cm/sec      MV E/A 193.8     MV V2 max 98.6 cm/sec      MV max PG 3.9 mmHg      MV V2 mean 55.8 cm/sec      MV mean PG 1.5 mmHg      MV V2 VTI 16.6 cm      MVA(VTI) 2.9 cm^2      MV dec slope 464.5 cm/sec^2      MV dec time 0.18 sec      Ao pk mariam 154.7 cm/sec      Ao max PG 9.7 mmHg      Ao max PG (full) 5.9 mmHg      Ao V2 mean 111.7 cm/sec      Ao mean PG 5.6 mmHg      Ao mean PG (full) 3.7 mmHg      Ao V2 VTI 25.1 cm      VALENCIA(I,A) 1.9 cm^2      VALENCIA(I,D) 1.9 cm^2      VALENCIA(V,A) 1.9 cm^2      VALENCIA(V,D) 1.9 cm^2      LV V1 max PG 3.8 mmHg      LV V1 mean PG 1.9 mmHg      LV V1 max 97.5 cm/sec      LV V1 mean 64.8 cm/sec      LV V1 VTI 16.6 cm      SV(Ao) 197.8 ml      SI(Ao) 107.0 ml/m^2      SV(LVOT) 48.8 ml      SI(LVOT) 26.4 ml/m^2      PA V2 max 82.7 cm/sec      PA max PG 2.7 mmHg      PA max PG (full) 1.0 mmHg      PA V2 mean 57.1 cm/sec      PA mean PG 1.4 mmHg      PA mean PG (full) 0.63 mmHg      PA V2 VTI 12.5 cm      PA acc time 0.08 sec      RV V1 max PG 1.7 mmHg      RV V1 mean PG 0.81 mmHg      RV V1 max 65.7 cm/sec      RV V1 mean 41.8 cm/sec       RV V1 VTI 10.6 cm      TR max mariam 271.2 cm/sec      RVSP(TR) 32.4 mmHg      RAP systole 3.0 mmHg      PA pr(Accel) 43.2 mmHg       CV ECHO MAURO - BZI_BMI 25.4 kilograms/m^2       CV ECHO MAURO - BSA(HAYCOCK) 1.9 m^2       CV ECHO MAURO - BZI_METRIC_WEIGHT 73.5 kg       CV ECHO MAURO - BZI_METRIC_HEIGHT 170.2 cm      Target HR (85%) 116 bpm      Max. Pred. HR (100%) 136 bpm      EF(MOD-bp) 61.0 %      LA dimension(2D) 4.6 cm      Echo EF Estimated 60 %     Narrative:      · Left ventricular wall thickness is consistent with septal asymmetric   hypertrophy.  · Estimated left ventricular EF = 60% Left ventricular systolic function   is normal.  · The right ventricular cavity is borderline dilated.  · The right atrial cavity is borderline dilated.  · Mild aortic valve stenosis is present.  · Estimated right ventricular systolic pressure from tricuspid   regurgitation is normal (<35 mmHg).  · There is a small (<1cm) pericardial effusion adjacent to the left   ventricle.             Results for orders placed in visit on 02/06/08   SCANNED VASCULAR STUDIES       Results for orders placed during the hospital encounter of 12/08/20   Adult Transthoracic Echo Complete W/ Cont if Necessary Per Protocol    Narrative · Left ventricular wall thickness is consistent with septal asymmetric   hypertrophy.  · Estimated left ventricular EF = 60% Left ventricular systolic function   is normal.  · The right ventricular cavity is borderline dilated.  · The right atrial cavity is borderline dilated.  · Mild aortic valve stenosis is present.  · Estimated right ventricular systolic pressure from tricuspid   regurgitation is normal (<35 mmHg).  · There is a small (<1cm) pericardial effusion adjacent to the left   ventricle.          Ct Chest Without Contrast    Result Date: 12/9/2020  1. There is a small right pleural effusion. Minor right basal lung density is consistent with atelectasis. 2. There is scarring in the right lung. There is  a radiographically stable 8 mm right lung nodule. 3. 5 mm groundglass nodule has developed in the left lower lobe. By Fleischner criteria, no CT follow-up is necessary. 2. Atherosclerosis including coronary artery calcification.. 5. Small pericardial effusion, slightly increased from prior. Mild cardiomegaly. 6. Also noted: Emphysema, chronic pancreatitis, cholelithiasis, right thyroid enlargement.  Electronically Signed By-Elvi Saenz MD On:12/9/2020 9:31 PM This report was finalized on [id number] by  Elvi Saenz MD.    Xr Chest 1 View    Result Date: 12/8/2020  Interstitial thickening in both lungs, with more confluent disease in the right lower lobe. FINDINGS are worrisome for right basilar pneumonia and superimposed mild generalized interstitial edema.  Stable cardiomegaly.  Electronically Signed By-Alexandra Preston MD On:12/8/2020 4:48 PM This report was finalized on [id number] by  Alexandra Preston MD.      I personally reviewed patient's x-ray films and my findings are: 0    I personally reviewed patient's EKG strip and my findings are: 0    Medication Review:   I have reviewed the patient's current medication list 12/13/20     Scheduled Meds  aspirin, 81 mg, Oral, Daily  atorvastatin, 20 mg, Oral, Daily  calcitriol, 0.25 mcg, Oral, Once per day on Mon Wed Fri  carvedilol, 12.5 mg, Oral, Daily With Dinner  carvedilol, 6.25 mg, Oral, Daily With Breakfast  cefTRIAXone, 1 g, Intravenous, Q24H  furosemide, 40 mg, Oral, BID  insulin glargine, 10 Units, Subcutaneous, Nightly  insulin lispro, 0-14 Units, Subcutaneous, TID AC  magnesium oxide, 200 mg, Oral, Daily  mirtazapine, 15 mg, Oral, Nightly  sodium chloride, 10 mL, Intravenous, Q12H  theophylline, 300 mg, Oral, Nightly  [START ON 12/14/2020] warfarin, 3.5 mg, Oral, Daily  warfarin, 4 mg, Oral, Once        Meds Infusions  Pharmacy to dose warfarin,         DVT prophylaxis  Mechanical Order History:     None      Pharmalogical Order History:      Ordered      Dose Route Frequency Stop    12/09/20 1225  warfarin (COUMADIN) tablet 3 mg     Question:  Target INR  Answer:  2 - 3    3 mg PO Daily Warfarin --    12/09/20 0020  warfarin (COUMADIN) tablet 3 mg     Question:  Target INR  Answer:  2 - 3    3 mg PO Once 12/09/20 0037    12/08/20 2332  Pharmacy to dose warfarin     Question:  Target INR  Answer:  2 - 3    -- XX Continuous PRN --    12/08/20 2332  Pharmacy to dose warfarin  Status:  Discontinued     Question:  Target INR  Answer:  2 - 3    -- XX Continuous PRN 12/09/20 1219                Meds PRN  •  acetaminophen **OR** acetaminophen **OR** acetaminophen  •  dextrose  •  dextrose  •  glucagon (human recombinant)  •  insulin lispro **AND** insulin lispro  •  melatonin  •  nitroglycerin  •  ondansetron **OR** ondansetron  •  Pharmacy to dose warfarin  •  [COMPLETED] Insert peripheral IV **AND** sodium chloride  •  sodium chloride      Chavez Fierro MD  12/13/20  14:18 EST

## 2020-12-13 NOTE — PROGRESS NOTES
"Pharmacy dosing service  Anticoagulant  Warfarin     Subjective:    Cali Santoro is a 85 y.o.male being continued on warfarin for atrial fibrillation.  PMH:    INR Goal: 2 - 3  Home medication?:  Yes, warfarin 3 mg daily  Bridge Therapy Present?:  No  Interacting Medications Evaluation (New/Present/Discontinued): ceftriaxone x5d 12/11-15  Additional Contributing Factors: HF exacerbation (may increase INR)      Assessment/Plan:    INR now therapeutic. Given subtherapeutic INR on admission and response to 5mg dose 12/10, will give 4mg today to ensure INR remains therapeutic, then 3.5mg daily thereafter (~17% increase from home dose).    Continue to monitor and adjust based on INR.         Date 12/8 12/9 12/10 12/11 12/12 12/13      INR 1.78/1.85 1.68 1.65 1.93 2.24 2.03      Dose 3 mg (early 12/9 AM) 3 mg 5 mg 3 mg 3 mg 4mg          Objective:  [Ht: 170.2 cm (67\"); Wt: 74.2 kg (163 lb 9.3 oz); BMI: Body mass index is 25.62 kg/m².]    Lab Results   Component Value Date    ALBUMIN 3.90 12/08/2020     Lab Results   Component Value Date    INR 2.03 12/13/2020    INR 2.24 12/12/2020    INR 1.93 (L) 12/11/2020    PROTIME 21.6 12/13/2020    PROTIME 23.7 12/12/2020    PROTIME 20.6 12/11/2020     Lab Results   Component Value Date    HGB 11.6 (L) 12/13/2020    HGB 11.2 (L) 12/12/2020    HGB 12.2 (L) 12/11/2020     Lab Results   Component Value Date    HCT 35.4 (L) 12/13/2020    HCT 34.1 (L) 12/12/2020    HCT 36.9 (L) 12/11/2020       Ely Hernandez, PharmD, BCPS  12/13/20 09:56 EST   "

## 2020-12-13 NOTE — PROGRESS NOTES
"[facility] Progress Note         LOS: 3 days    Patient Care Team:  Yusef Sosa Jr., MD as PCP - General  Yusef Sosa Jr., MD as PCP - Family Medicine  Luis Paulino MD as Consulting Physician (Cardiology)      Subjective     Feels better, up in chair eating pot roast, no soa cp n/v or diarrhea, politely wants to go home    Objective     Vital Sign Min/Max for last 24 hours  Temp:  [97.4 °F (36.3 °C)-98 °F (36.7 °C)] 97.4 °F (36.3 °C)  Heart Rate:  [65-70] 65  Resp:  [18] 18  BP: (114-136)/(62-77) 114/62                       Flowsheet Rows      First Filed Value   Admission Height  170.2 cm (67\") Documented at 12/08/2020 1514   Admission Weight  75 kg (165 lb 5.5 oz) Documented at 12/08/2020 1514          I/O this shift:  In: 360 [P.O.:360]  Out: 500 [Urine:500]  I/O last 3 completed shifts:  In: 1080 [P.O.:1080]  Out: 1050 [Urine:1050]    Physical Exam:  Physical Exam      General.  Awake, alert  Head.  Atraumatic, normocephalic, nose and ears normal, on nasal cannula o2  Neck.  Supple with a flat jvp  ENT.  Oral mucosa moist today  Respiratory. Clear no wheezes or dullness  Cardiovascular. Irregular rhythm no mgr  Abdomen.  Soft, nontender  Extremities.  No edema  Skin warm dry     LABS:  Lab Results   Component Value Date    CALCIUM 9.1 12/13/2020     Results from last 7 days   Lab Units 12/13/20  0418 12/12/20  0244 12/11/20  0444  12/08/20  1652   MAGNESIUM mg/dL 2.5* 2.4 2.3   < >  --    SODIUM mmol/L 136 136 136   < > 138   POTASSIUM mmol/L 5.1 4.5 4.4   < > 4.8   CHLORIDE mmol/L 101 101 95*   < > 102   CO2 mmol/L 25.0 25.0 28.0   < > 25.0   BUN mg/dL 44* 52* 49*   < > 30*   CREATININE mg/dL 2.41* 3.04* 2.93*   < > 2.18*   GLUCOSE mg/dL 164* 172* 164*   < > 117*   CALCIUM mg/dL 9.1 8.6 9.3   < > 9.3   WBC 10*3/mm3 7.50 8.40 9.70   < >  --    HEMOGLOBIN g/dL 11.6* 11.2* 12.2*   < >  --    PLATELETS 10*3/mm3 243 237 254   < >  --    ALT (SGPT) U/L  --   --   --   --  6   AST (SGOT) U/L "  --   --   --   --  12    < > = values in this interval not displayed.     Lab Results   Component Value Date    CKTOTAL 36 02/24/2020    TROPONINI 0.030 06/28/2019    TROPONINT 0.025 12/08/2020     Estimated Creatinine Clearance: 23.9 mL/min (A) (by C-G formula based on SCr of 2.41 mg/dL (H)).      Brief Urine Lab Results  (Last result in the past 365 days)      Color   Clarity   Blood   Leuk Est   Nitrite   Protein   CREAT   Urine HCG        07/06/20 0856             112.7           WEIGHTS:     Wt Readings from Last 1 Encounters:   12/13/20 0402 74.2 kg (163 lb 9.3 oz)   12/12/20 0449 73.9 kg (162 lb 14.7 oz)   12/11/20 0310 75.2 kg (165 lb 12.6 oz)   12/10/20 0346 74.5 kg (164 lb 3.9 oz)   12/09/20 1216 73.5 kg (162 lb)   12/08/20 2300 73.2 kg (161 lb 6 oz)   12/08/20 1514 75 kg (165 lb 5.5 oz)       aspirin, 81 mg, Oral, Daily  atorvastatin, 20 mg, Oral, Daily  calcitriol, 0.25 mcg, Oral, Once per day on Mon Wed Fri  carvedilol, 12.5 mg, Oral, Daily With Dinner  carvedilol, 6.25 mg, Oral, Daily With Breakfast  cefTRIAXone, 1 g, Intravenous, Q24H  furosemide, 40 mg, Oral, BID  insulin glargine, 10 Units, Subcutaneous, Nightly  insulin lispro, 0-14 Units, Subcutaneous, TID AC  magnesium oxide, 200 mg, Oral, Daily  mirtazapine, 15 mg, Oral, Nightly  sodium chloride, 10 mL, Intravenous, Q12H  theophylline, 300 mg, Oral, Nightly  [START ON 12/14/2020] warfarin, 3.5 mg, Oral, Daily  warfarin, 4 mg, Oral, Once      Pharmacy to dose warfarin,         Assessment/Plan       1.ZACKERY on Chronic kidney disease stage III  Creatinine better today, would resume lasix but increase to 40mg po bid at discharge and f/u with dr rodriguez later this week     2.hypovolemia-much better, resume lasix at 40mg bid    3.Hypertension with CKD 3  Blood pressure well controlled.  Continue current antihypertensives      Jim Mandel MD  12/13/20  15:05 EST

## 2020-12-14 NOTE — THERAPY EVALUATION
Patient Name: Cali Santoro  : 1935    MRN: [id number]                              Today's Date: 2020       Admit Date: 2020    Visit Dx:     ICD-10-CM ICD-9-CM   1. Dyspnea, unspecified type  R06.00 786.09   2. Chest pain, unspecified type  R07.9 786.50   3. Congestive heart failure, unspecified HF chronicity, unspecified heart failure type (CMS/[facility])  I50.9 428.0   4. Acute on chronic heart failure with preserved ejection fraction (CMS/[facility])  I50.33 428.23     Patient Active Problem List   Diagnosis   • Atrial fibrillation (CMS/[facility]) [I48.91]   • Arrhythmia, ventricular   • Body mass index (BMI) of 27.0-27.9 in adult   • Chronic obstructive pulmonary disease (CMS/[facility])   • Coronary artery disease of native artery of native heart with stable angina pectoris (CMS/[facility])   • Essential hypertension   • Presence of cardiac pacemaker   • Sleep apnea   • Type 2 diabetes mellitus with hyperglycemia, without long-term current use of insulin (CMS/[facility])   • Vitamin D deficiency   • Ischemic cardiomyopathy   • Stage 3 chronic kidney disease   • Mixed hyperlipidemia   • Acute respiratory infection   • Type 2 diabetes mellitus with other diabetic ophthalmic complication (CMS/[facility])   • Falls   • Acute on chronic heart failure with preserved ejection fraction (CMS/[facility])   • Acute-on-chronic kidney injury (CMS/[facility])   • Dyspnea     Past Medical History:   Diagnosis Date   • Arrhythmia    • Bradycardia    • Cardiomyopathy (CMS/[facility])    • Cataract    • COPD (chronic obstructive pulmonary disease) (CMS/[facility])    • Coronary artery disease    • Diabetes mellitus, type 2 (CMS/[facility])    • Emphysema, unspecified (CMS/[facility])    • Hx of sick sinus syndrome     S/P Biventricular Pacemaker   • Hypertension    • Paroxysmal atrial fibrillation (CMS/[facility])    • Sleep apnea    • Ventricular arrhythmia      Past Surgical History:   Procedure Laterality Date   • CARDIAC CATHETERIZATION  2015   • CARDIAC CATHETERIZATION  2016   •  CAROTID ENDARTERECTOMY     • CORONARY ANGIOPLASTY  1997   • LARYNGOSCOPY     • LUNG SURGERY  2008    Lung resection   • PACEMAKER IMPLANTATION  04/25/2016    Dual chamber; [employer]   • TRACHEOSTOMY       General Information     Row Name 12/14/20 1252          Physical Therapy Time and Intention    Document Type  evaluation  -     Mode of Treatment  individual therapy;physical therapy  -     Row Name 12/14/20 1252          General Information    Patient Profile Reviewed  yes  -EL     Prior Level of Function  independent:;all household mobility;yard work;ADL's;driving  -[facility] Name 12/14/20 1252          Living Environment    Lives With  spouse  -[facility] Name 12/14/20 1252          Home Main Entrance    Number of Stairs, Main Entrance  two  -EL     Stair Railings, Main Entrance  railings safe and in good condition  -     Row Name 12/14/20 1252          Stairs Within Home, Primary    Number of Stairs, Within Home, Primary  none  -     Row Name 12/14/20 1252          Cognition    Orientation Status (Cognition)  oriented x 4  -     Row Name 12/14/20 1252          Safety Issues, Functional Mobility    Impairments Affecting Function (Mobility)  balance;strength;shortness of breath  -       User Key  (r) = Recorded By, (t) = Taken By, (c) = Cosigned By    Initials Name Provider Type    EL William Ventura PT Physical Therapist        Mobility     Row Name 12/14/20 1254          Bed Mobility    Bed Mobility  bed mobility (all) activities  -     All Activities, [place] (Bed Mobility)  modified independence  -     Assistive Device (Bed Mobility)  bed rails  -     Row Name 12/14/20 1254          Bed-Chair Transfer    Bed-Chair [place] (Transfers)  standby assist  -[facility] Name 12/14/20 1254          Sit-Stand Transfer    Sit-Stand [place] (Transfers)  standby assist  -[facility] Name 12/14/20 1254          Gait/Stairs (Locomotion)    [place] Level (Gait)  standby assist  -      Distance in Feet (Gait)  60  -EL     Deviations/Abnormal Patterns (Gait)  stride length decreased  -EL     Bilateral Gait Deviations  forward flexed posture  -EL       User Key  (r) = Recorded By, (t) = Taken By, (c) = Cosigned By    Initials Name Provider Type    William Bojorquez, PT Physical Therapist        Obj/Interventions     Row Name 12/14/20 1259          Range of Motion Comprehensive    General Range of Motion  bilateral lower extremity ROM WFL  -EL     Row Name 12/14/20 1259          Strength Comprehensive (MMT)    General Manual Muscle Testing (MMT) Assessment  lower extremity strength deficits identified  -EL     Comment, General Manual Muscle Testing (MMT) Assessment  BLE hip flexion 3/5 remainder 4-/5 gross  -EL     Row Name 12/14/20 1259          Balance    Balance Assessment  sitting static balance;standing static balance;standing dynamic balance  -EL     Static Sitting Balance  WFL  -EL     Static Standing Balance  WFL  -EL     Dynamic Standing Balance  mild impairment  -EL     Row Name 12/14/20 1259          Sensory Assessment (Somatosensory)    Sensory Assessment (Somatosensory)  sensation intact  -EL       User Key  (r) = Recorded By, (t) = Taken By, (c) = Cosigned By    Initials Name Provider Type    William Bojorquez, PT Physical Therapist        Goals/Plan     Row Name 12/14/20 1314          Transfer Goal 1 (PT)    Activity/Assistive Device (Transfer Goal 1, PT)  transfers, all  -EL     [place] Level/Cues Needed (Transfer Goal 1, PT)  modified independence  -EL     Time Frame (Transfer Goal 1, PT)  long term goal (LTG);2 weeks  -     Row Name 12/14/20 1314          Gait Training Goal 1 (PT)    Activity/Assistive Device (Gait Training Goal 1, PT)  gait (walking locomotion)  -EL     [place] Level (Gait Training Goal 1, PT)  independent  -EL     Distance (Gait Training Goal 1, PT)  150  -EL     Time Frame (Gait Training Goal 1, PT)  long term goal (LTG);2 weeks  -     Row Name 12/14/20  "1314          Stairs Goal 1 (PT)    Activity/Assistive Device (Stairs Goal 1, PT)  stairs, all skills  -EL     [place] Level/Cues Needed (Stairs Goal 1, PT)  modified independence  -EL     Number of Stairs (Stairs Goal 1, PT)  2  -EL     Time Frame (Stairs Goal 1, PT)  long term goal (LTG);2 weeks  -EL       User Key  (r) = Recorded By, (t) = Taken By, (c) = Cosigned By    Initials Name Provider Type    William Bojorquez, PT Physical Therapist        Clinical Impression     Row Name 12/14/20 1300          Plan of Care Review    Plan of Care Reviewed With  patient;spouse  -EL     Progress  improving  -EL     Outcome Summary  Pt is an 84 YO M admitted with acute on chonic HF, and ZACKERY on CKD. Pt reports being typically indpendent at home performing yard work and able to maintain household duties. Pt reports not using an AD typically but has had approx 5 falls in the last 6 months with no injuries. Pt attributes falls to legs being weak and \"giving out\". This date pt demonstrates good functional moblity, able to stand and ambulate with SBA wihtout AD, with no LOB. Pt with minor SOA following ambulation and O2 at 85%, recovered above 90% with supplemental O2 almost immediately. Pt did demonstrate significant weakness in BLE. Recommendation is return home with family and HHPT to address defecits. PPE worn includes gloves and mask wtih goggles.  -     Row Name 12/14/20 1300          Therapy Assessment/Plan (PT)    Rehab Potential (PT)  good, to achieve stated therapy goals  -EL     Criteria for Skilled Interventions Met (PT)  yes  -EL     Predicted Duration of Therapy Intervention (PT)  Until d/c.  -     Row Name 12/14/20 1300          Vital Signs    O2 Delivery Pre Treatment  supplemental O2  -EL     Intra SpO2 (%)  85  -EL     O2 Delivery Intra Treatment  room air  -EL     Post SpO2 (%)  98  -EL     O2 Delivery Post Treatment  supplemental O2  -EL     Pre Patient Position  Supine  -EL     Intra Patient Position  " Standing  -EL     Post Patient Position  Sitting  -EL     Row Name 12/14/20 1300          Positioning and Restraints    Pre-Treatment Position  in bed  -EL     Post Treatment Position  chair  -EL       User Key  (r) = Recorded By, (t) = Taken By, (c) = Cosigned By    Initials Name Provider Type    William Bojorquez, MONIQUE Physical Therapist        Outcome Measures     Row Name 12/14/20 1314          How much help from another person do you currently need...    Turning from your back to your side while in flat bed without using bedrails?  4  -EL     Moving from lying on back to sitting on the side of a flat bed without bedrails?  4  -EL     Moving to and from a bed to a chair (including a wheelchair)?  4  -EL     Standing up from a chair using your arms (e.g., wheelchair, bedside chair)?  4  -EL     Climbing 3-5 steps with a railing?  3  -EL     To walk in hospital room?  4  -EL     AM-PAC 6 Clicks Score (PT)  23  -EL     Row Name 12/14/20 1314          Functional Assessment    Outcome Measure Options  AM-PAC 6 Clicks Basic Mobility (PT)  -EL       User Key  (r) = Recorded By, (t) = Taken By, (c) = Cosigned By    Initials Name Provider Type    William Bojorquez PT Physical Therapist        Physical Therapy Education                 Title: PT OT SLP Therapies (Done)     Topic: Physical Therapy (Done)     Point: Mobility training (Done)     Learning Progress Summary           Patient Acceptance, E,TB, VU by FOREST at 12/14/2020 1316    Acceptance, E, VU by VICKI at 12/13/2020 1022    Acceptance, E, VU by VICKI at 12/12/2020 1111    Acceptance, E,TB, VU by PS at 12/9/2020 1552                   Point: Home exercise program (Resolved)     Learning Progress Summary           Patient Acceptance, E, VU by VICKI at 12/13/2020 1022    Acceptance, E, VU by VICKI at 12/12/2020 1111    Acceptance, E,TB, VU by PS at 12/9/2020 1552                   Point: Body mechanics (Resolved)     Learning Progress Summary           Patient Acceptance, E, VU by VICKI at  "12/13/2020 1022    Acceptance, E, VU by JW at 12/12/2020 1111    Acceptance, E,TB, VU by PS at 12/9/2020 1552                   Point: Precautions (Done)     Learning Progress Summary           Patient Acceptance, E,TB, VU by EL at 12/14/2020 1316    Acceptance, E, VU by JW at 12/13/2020 1022    Acceptance, E, VU by JW at 12/12/2020 1111    Acceptance, E,TB, VU by PS at 12/9/2020 1552                               User Key     Initials Effective Dates Name Provider Type Discipline     06/23/20 -  William Ventura, PT Physical Therapist PT     06/24/19 -  Gianna Chino LPN Licensed Nurse Nurse    PS 10/12/20 -  Lor Mcginnis RN Registered Nurse Nurse              PT Recommendation and Plan  Planned Therapy Interventions (PT): balance training, neuromuscular re-education, bed mobility training, gait training, patient/family education, transfer training, strengthening  Plan of Care Reviewed With: patient, spouse  Progress: improving  Outcome Summary: Pt is an 84 YO M admitted with acute on chonic HF, and ZACKERY on CKD. Pt reports being typically indpendent at home performing yard work and able to maintain household duties. Pt reports not using an AD typically but has had approx 5 falls in the last 6 months with no injuries. Pt attributes falls to legs being weak and \"giving out\". This date pt demonstrates good functional moblity, able to stand and ambulate with SBA wihtout AD, with no LOB. Pt with minor SOA following ambulation and O2 at 85%, recovered above 90% with supplemental O2 almost immediately. Pt did demonstrate significant weakness in BLE. Recommendation is return home with family and HHPT to address defecits. PPE worn includes gloves and mask wtih goggles.     Time Calculation:   PT Charges     Row Name 12/14/20 1317             Time Calculation    Start Time  1016  -EL      Stop Time  1038  -EL      Time Calculation (min)  22 min  -EL      PT Received On  12/14/20  -EL      PT - Next Appointment  " 12/16/20  -FOREST      PT Goal Re-Cert Due Date  12/28/20  -FOREST        User Key  (r) = Recorded By, (t) = Taken By, (c) = Cosigned By    Initials Name Provider Type    William Bojorquez PT Physical Therapist        Therapy Charges for Today     Code Description Service Date Service Provider Modifiers Qty    [id number] HC PT EVAL MOD COMPLEXITY 4 12/14/2020 William Ventura, PT GP 1          PT G-Codes  Outcome Measure Options: AM-PAC 6 Clicks Basic Mobility (PT)  AM-PAC 6 Clicks Score (PT): 23    William Ventura PT  12/14/2020

## 2020-12-14 NOTE — DISCHARGE PLACEMENT REQUEST
"Ernesto Santoro (85 y.o. Male)     Date of Birth Social Security Number Address Home Phone MRN    1935  [id number] [address] [phone] [id number]    [employer] Marital Status          Anabaptist        Admission Date Admission Type Admitting Provider Attending Provider Department, Room/Bed    20 Emergency Rebecca Martin MD Piwko, Radomir, MD [facility] 2B MEDICAL INPATIENT, 233/1    Discharge Date Discharge Disposition Discharge Destination         [place] [facility] [place]ending Provider: Rebecca Martin MD    Allergies: Penicillins    Isolation: None   Infection: None   Code Status: CPR    Ht: 170.2 cm (67\")   Wt: 74.2 kg (163 lb 9.3 oz)    Admission Cmt: None   Principal Problem: Acute on chronic heart failure with preserved ejection fraction (CMS/HCC) [I50.33]                 Active Insurance as of 2020     Primary Coverage     Payor Plan Insurance Group Employer/Plan Group    HUMANA MEDICARE REPLACEMENT HUMANA MEDICARE REPLACEMENT [id number]     Payor Plan Address Payor Plan Phone Number Payor Plan Fax Number Effective Dates    [address] [address] [phone]  2018 - None Entered    [facility] [id number]       Subscriber Name Subscriber Birth Date Member ID       ERNESTO SANTORO 1935 [id number]                 Emergency Contacts      (Rel.) Home Phone Work Phone Mobile Phone    WILDER SANTORO \"RENETTA\" (Spouse) [phone] -- [phone]               History & Physical      Justice Aden PA-C at 20 1904     Attestation signed by Chavez Fierro MD at 20 1714        Agree with above mentioned except my evaluation, assessment, plan and treatment supercedes that of MEG or PA.        Electronically signed by Chavez Fierro MD, 20, 5:14 PM EST.                            [facility] Medicine Services      Patient Name: Ernesto Santoro  : 1935  MRN: [id number]  Primary Care Physician: " "Yusef Sosa Jr., MD  Date of admission: 12/8/2020    Patient Care Team:  Yusef Sosa Jr., MD as PCP - General  Yusef Sosa Jr., MD as PCP - Family Medicine  Luis Paulino MD as Consulting Physician (Cardiology)          Subjective   History Present Illness     Chief Complaint:   Chief Complaint   Patient presents with   • Shortness of Breath         Mr. Santoro is a 85 y.o. male with past medical history of diabetes, hyperlipidemia, cardiomyopathy, hypertension, CAD, COPD, A. fib, CKD and CHF who [facility] dyspnea.  Patient reports that his difficulty breathing has been present for \"quite a while\".  He notes some associated orthopnea as well as TRIMBLE which includes dyspnea with walking even short distances from room to room on level ground.  He reports that when his pain is at its most profound he will occasionally experience a substernal burning sensation but denies any consistent chest pain.  Over the past year and a half patient reports he is lost approximately 70 pounds unintentionally.  He denies any cough, fever, changes in bowel or bladder habits, nausea or vomiting, sensation of tachycardia or palpitations, peripheral edema, syncope or near syncope.  Compliance with all prescribed medical therapies is reported and patient reports he does not smoke tobacco or drink.    In the ED patient found to have lab significant for troponin of 0.018, proBNP: 2850.0, creatinine: 1.8, BUN: 30, BUN/creatinine ratio: 13.8, INR: 1.5, PT: 19.8. Remainder of CBC and CMP was generally unremarkable. Chest x-ray shows interstitial thickening in both lungs more confluent disease in the right lower lobe noted is worrisome for right basilar pneumonia and superimposed mild generalized interstitial edema with stable cardiomegaly. EKG shows paced rhythm at 70. Respiratory viral panel including for COVID-19 was negative.    History of Present Illness    Review of Systems   "   Constitution: Negative.   HENT: Negative.    Eyes: Negative.    Cardiovascular: Positive for chest pain, dyspnea on exertion, orthopnea and paroxysmal nocturnal dyspnea. Negative for irregular heartbeat, leg swelling, near-syncope, palpitations and syncope.   Respiratory: Positive for shortness of breath. Negative for cough and sputum production.    Endocrine: Negative.    Skin: Negative.    Musculoskeletal: Negative.    Gastrointestinal: Negative.    Genitourinary: Negative.    Neurological: Negative.    Psychiatric/Behavioral: Negative.    All other systems reviewed and are negative.          Personal History     Past Medical History:   Past Medical History:   Diagnosis Date   • Arrhythmia    • Bradycardia    • Cardiomyopathy (CMS/HCC)    • Cataract    • COPD (chronic obstructive pulmonary disease) (CMS/HCC)    • Coronary artery disease    • Diabetes mellitus, type 2 (CMS/HCC)    • Emphysema, unspecified (CMS/HCC)    • Hx of sick sinus syndrome     S/P Biventricular Pacemaker   • Hypertension    • Paroxysmal atrial fibrillation (CMS/HCC)    • Sleep apnea    • Ventricular arrhythmia        Surgical History:      Past Surgical History:   Procedure Laterality Date   • CARDIAC CATHETERIZATION  02/04/2015   • CARDIAC CATHETERIZATION  04/25/2016   • CAROTID ENDARTERECTOMY     • CORONARY ANGIOPLASTY  1997   • LARYNGOSCOPY     • LUNG SURGERY  2008    Lung resection   • PACEMAKER IMPLANTATION  04/25/2016    Dual chamber; [email or web address]   • TRACHEOSTOMY             Family History: family history includes Breast cancer in his sister; Colon cancer in his sister; Diabetes in an other family member; Heart disease in his brother and mother; Hypertension in his mother; Stroke in his father. Otherwise pertinent FHx was reviewed and unremarkable.     Social History:  reports that he has quit smoking. His smoking use included cigarettes. He has never used smokeless tobacco. He reports current alcohol use. He reports that he  does not use drugs.      Medications:  Prior to Admission medications    Medication Sig Start Date End Date Taking? Authorizing Provider   Accu-Chek Helga Plus test strip Use as instructed to check blood sugars once daily 10/13/20   Jhonatan Mart MD   Accu-Chek Softclix Lancets lancets Use to check blood sugar once daily 7/10/20 7/10/21  Jhonatan Mart MD   acetaminophen (TYLENOL) 500 MG tablet Take 500 mg by mouth Every 6 (Six) Hours As Needed for Mild Pain .    Astrid Gonzalez MD   amLODIPine (NORVASC) 10 MG tablet Take 1 tablet by mouth. 3/10/16   Astrid Gonzalez MD   aspirin 81 MG tablet Take 81 mg by mouth Daily. 3/7/14   Astrid Gonzalez MD   Blood Glucose Monitoring Suppl (ACCU-CHEK HELGA PLUS) w/Device kit USE TO CHECK BLOOD SUGARS ONCE DAILY 7/10/20   Jhonatan Mart MD   calcitriol (ROCALTROL) 0.25 MCG capsule Take 0.25 mcg by mouth 3 (Three) Times a Week. Mon, Wed, Fri    Astrid Gonzalez MD   carvedilol (COREG) 12.5 MG tablet TAKE 1 TABLET TWICE DAILY 11/20/20   Luis Paulino MD   digoxin (LANOXIN) 125 MCG tablet Take 1 tablet by mouth Every Other Day. 11/3/20   Yusef Sosa Jr., MD   furosemide (LASIX) 40 MG tablet TAKE 1 TABLET EVERY DAY 6/19/20   Liliam Goode APRN   glimepiride (AMARYL) 2 MG tablet TAKE 1 TABLET TWICE DAILY 8/20/20   Jhonatan Mart MD   Insulin Glargine (Lantus SoloStar) 100 UNIT/ML injection pen Inject 10 units sq at bedtime 11/5/20   Jhonatan Mart MD   Insulin Pen Needle (Advocate Insulin Pen Needles) 31G X 5 MM misc Use once a day for insulin inejction 11/11/20   Jhonatan Mart MD   lisinopril (PRINIVIL,ZESTRIL) 2.5 MG tablet Take 1 tablet by mouth.    Astrid Gonzalez MD   mirtazapine (REMERON) 15 MG tablet TAKE 1 TABLET BY MOUTH EVERY NIGHT. 6/19/20   Goode, Liliam K, APRN   simvastatin (ZOCOR) 40 MG tablet 1 tablet p.o. at bedtime. 1/7/20   Jhonatan Mart MD   SITagliptin-metFORMIN HCl ER (Janumet XR)  MG tablet  Take 1 tablet by mouth Daily. 4/21/20   Jhonatan Mart MD   theophylline (THEODUR) 300 MG 12 hr tablet Take 300 mg by mouth Daily. 4/8/19   ProviderAstrid MD   warfarin (COUMADIN) 3 MG tablet Take 3 mg by mouth Daily.    Provider, MD Astrid       Allergies:    Allergies   Allergen Reactions   • Penicillins Itching       Objective   Objective     Vital Signs  Temp:  [97.4 °F (36.3 °C)] 97.4 °F (36.3 °C)  Heart Rate:  [70-72] 72  Resp:  [14-15] 15  BP: (134)/(75) 134/75  SpO2:  [92 %-94 %] 94 %  on  Flow (L/min):  [2] 2;   Device (Oxygen Therapy): nasal cannula  Body mass index is 25.9 kg/m².    Physical Exam  Vitals signs reviewed.   Constitutional:       Appearance: Normal appearance. He is normal weight.   HENT:      Head: Normocephalic and atraumatic.      Right Ear: External ear normal.      Left Ear: External ear normal.      Nose: Nose normal.      Mouth/Throat:      Mouth: Mucous membranes are moist.   Eyes:      Extraocular Movements: Extraocular movements intact.   Neck:      Musculoskeletal: Normal range of motion.   Cardiovascular:      Rate and Rhythm: Normal rate and regular rhythm.      Pulses: Normal pulses.      Heart sounds: Normal heart sounds.   Pulmonary:      Effort: Pulmonary effort is normal. No respiratory distress.      Breath sounds: Rhonchi present.   Abdominal:      General: Bowel sounds are normal. There is no distension.      Tenderness: There is no abdominal tenderness.   Musculoskeletal: Normal range of motion.      Right lower leg: No edema.      Left lower leg: No edema.   Skin:     General: Skin is warm and dry.   Neurological:      General: No focal deficit present.      Mental Status: He is alert and oriented to person, place, and time.   Psychiatric:         Mood and Affect: Mood normal.         Behavior: Behavior normal.         Thought Content: Thought content normal.         Judgment: Judgment normal.         Results Review:  I have personally reviewed most recent  cardiac tracings, lab results and radiology images and interpretations and agree with findings, most notably: Troponin, proBNP, CBC, CMP, PT/INR, chest x-ray, EKG and respiratory viral panel.    Results from last 7 days   Lab Units 12/08/20  1653 12/08/20  1652   WBC 10*3/mm3 8.00  --    HEMOGLOBIN g/dL 12.2*  --    HEMATOCRIT % 36.6*  --    PLATELETS 10*3/mm3 272  --    INR   --  1.85*     Results from last 7 days   Lab Units 12/08/20  1652   SODIUM mmol/L 138   POTASSIUM mmol/L 4.8   CHLORIDE mmol/L 102   CO2 mmol/L 25.0   BUN mg/dL 30*   CREATININE mg/dL 2.18*   GLUCOSE mg/dL 117*   CALCIUM mg/dL 9.3   ALT (SGPT) U/L 6   AST (SGOT) U/L 12   TROPONIN T ng/mL 0.018   PROBNP pg/mL 2,850.0*     Estimated Creatinine Clearance: 26.8 mL/min (A) (by C-G formula based on SCr of 2.18 mg/dL (H)).  Brief Urine Lab Results  (Last result in the past 365 days)      Color   Clarity   Blood   Leuk Est   Nitrite   Protein   CREAT   Urine HCG        07/06/20 0856             112.7             Microbiology Results (last 10 days)     Procedure Component Value - Date/Time    Respiratory Panel PCR w/COVID-19(SARS-CoV-2) LOBO/MARTHA/TOMAS/PAD/COR/MAD/TITA In-House, NP Swab in UTM/VTM, 3-4 HR TAT - Swab, Nasopharynx [[id number]]  (Normal) Collected: 12/08/20 1634    Lab Status: Final result Specimen: Swab from Nasopharynx Updated: 12/08/20 1831     ADENOVIRUS, PCR Not Detected     Coronavirus 229E Not Detected     Coronavirus HKU1 Not Detected     Coronavirus NL63 Not Detected     Coronavirus OC43 Not Detected     COVID19 Not Detected     Human Metapneumovirus Not Detected     Human Rhinovirus/Enterovirus Not Detected     Influenza A PCR Not Detected     Influenza B PCR Not Detected     Parainfluenza Virus 1 Not Detected     Parainfluenza Virus 2 Not Detected     Parainfluenza Virus 3 Not Detected     Parainfluenza Virus 4 Not Detected     RSV, PCR Not Detected     Bordetella pertussis pcr Not Detected     Bordetella parapertussis PCR Not  Detected     Chlamydophila pneumoniae PCR Not Detected     Mycoplasma pneumo by PCR Not Detected    Narrative:      Fact sheet for providers: https://docs.BDS.com.au/wp-content/uploads/YKZ4817-6766-KR7.1-EUA-Provider-Fact-Sheet-3.pdf    Fact sheet for patients: https://docs.BDS.com.au/wp-content/uploads/ETZ5098-8721-EH4.1-EUA-Patient-Fact-Sheet-1.pdf    Test performed by PCR.          ECG/EMG Results (most recent)     Procedure Component Value Units Date/Time    ECG 12 Lead [[id number]] Collected: 12/08/20 1523     Updated: 12/08/20 1533     QT Interval 424 ms     Narrative:      HEART RATE= 70  bpm  RR Interval= 856  ms  LA Interval=   ms  P Horizontal Axis= -5  deg  P Front Axis=   deg  QRSD Interval= 180  ms  QT Interval= 424  ms  QRS Axis= 0  deg  T Wave Axis=   deg  - ABNORMAL ECG -  Afib/flutter and ventricular-paced rhythm  When compared with ECG of 27-Jun-2019 12:11:54,  No significant change  Electronically Signed By:   Date and Time of Study: 2020-12-08 15:23:53          Results for orders placed in visit on 02/06/08   SCANNED VASCULAR STUDIES       Results for orders placed during the hospital encounter of 08/09/19   Adult Transthoracic Echo Complete W/ Cont if Necessary Per Protocol    Narrative · Left ventricular wall thickness is consistent with mild-to-moderate   concentric hypertrophy.  · Estimated EF = 60%.  · Left ventricular systolic function is normal.  · Right ventricular cavity is borderline dilated.  · Left atrial cavity size is mild-to-moderately dilated.  · There is calcification of the aortic valve.  · Mild aortic valve regurgitation is present.  · Mild mitral valve regurgitation is present  · There is a moderate (1-2cm) pericardial effusion.  · The following left ventricular wall segments are hypokinetic: basal   anterolateral, basal inferolateral, basal inferoseptal and basal   inferoseptal.          Xr Chest 1 View    Result Date: 12/8/2020  Interstitial thickening in both lungs,  with more confluent disease in the right lower lobe. FINDINGS are worrisome for right basilar pneumonia and superimposed mild generalized interstitial edema.  Stable cardiomegaly.  Electronically Signed By-Alexandra Preston MD On:12/8/2020 4:48 PM This report was finalized on [id number] by  Alexandra Preston MD.        Estimated Creatinine Clearance: 26.8 mL/min (A) (by C-G formula based on SCr of 2.18 mg/dL (H)).    Assessment/Plan   Assessment/Plan       Active Hospital Problems    Diagnosis  POA   • **Acute on chronic heart failure with preserved ejection fraction (CMS/HCC) [I50.33]  Yes     Priority: High   • Ischemic cardiomyopathy [I25.5]  Yes     Priority: High   • Atrial fibrillation (CMS/HCC) [I48.91] [I48.91]  Yes     Priority: High   • 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) [E11.39]  Yes     Priority: Medium   • Stage 3 chronic kidney disease [N18.30]  Yes     Priority: Medium   • Essential hypertension [I10]  Yes     Priority: Medium   • Coronary artery disease of native artery of native heart with stable angina pectoris (CMS/HCC) [I25.118]  Yes     Priority: Medium   • Chronic obstructive pulmonary disease (CMS/HCC) [J44.9]  Yes     Priority: Medium   • Mixed hyperlipidemia [E78.2]  Yes     Priority: Low      Resolved Hospital Problems   No resolved problems to display.     Acute on chronic heart failure with preserved ejection fraction and history of ischemic cardiomyopathy  -Patient presents with prolonged history of worsening dyspnea including dyspnea on exertion and orthopnea  -proBNP: 2850.0  -Chest x-ray shows interstitial thickening in both lungs more confluent disease in the right lower lobe noted is worrisome for right basilar pneumonia and superimposed mild generalized interstitial edema with stable cardiomegaly.   -EKG shows paced rhythm at 70.   -Respiratory viral panel including for COVID-19 was negative.  -Patient given Nitropaste and 40 mg Lasix IV in  the ED, continue IV Lasix with close monitoring of kidney function  -Monitor daily weight and I's and O's  -2 g sodium diet  -Echocardiogram ordered    Acute on chronic kidney injury  - Creatinine: 2.18, BUN: 30, eGFR: 29  -Avoid nephrotoxic medication and IV dye unless urgently needed  -Monitor closely especially while diuresing  -Patient may benefit from nephrology consult    Atrial fibrillation  -EKG shows paced rhythm at 70-  -Continue Coreg and warfarin with pharmacy to dose  -Continue cardiac monitoring    CAD  -Continue statin, aspirin and beta-blocker  -Continue cardiac monitoring    Hypertension  -Well controlled with a blood pressure on admission of 134/74  - Continue amlodipine and Coreg  - Monitor while admitted    Diabetes mellitus  -Well controlled with glucose of 117 on admission  - Check A1c  - Hold glimepiride and Janumet  -Basal and sliding scale insulin  -diabetic diet  -monitor AC and HS    COPD  -DuoNeb and theophylline  -Check theophylline level    Hyperlipidemia  -Continue statin            VTE Prophylaxis -warfarin with pharmacy to dose  Mechanical Order History:     None      Pharmalogical Order History:     None          CODE STATUS: Full  There are no questions and answers to display.         I discussed the patient's findings and my recommendations with patient.      Signature:Electronically signed by Justice Aden PA-C, 12/09/20, 12:53 AM EST.    [facility] Hospitalist Team          Electronically signed by Chavez Fierro MD at 12/09/20 1715         Current Facility-Administered Medications   Medication Dose Route Frequency Provider Last Rate Last Admin   • acetaminophen (TYLENOL) tablet 650 mg  650 mg Oral Q4H PRN Justice Aden PA-C   650 mg at 12/14/20 0248    Or   • acetaminophen (TYLENOL) 160 MG/5ML solution 650 mg  650 mg Oral Q4H PRN Justice Aden PA-C        Or   • acetaminophen (TYLENOL) suppository 650 mg  650 mg Rectal Q4H PRN Justice Aden PA-C          • aspirin EC tablet 81 mg  81 mg Oral Daily Justice Aden PA-C   81 mg at 12/14/20 0907   • atorvastatin (LIPITOR) tablet 20 mg  20 mg Oral Daily Justice Aden PA-C   20 mg at 12/14/20 0908   • calcitriol (ROCALTROL) capsule 0.25 mcg  0.25 mcg Oral Once per day on Mon Wed Fri Justice Aden PA-C   0.25 mcg at 12/14/20 0938   • carvedilol (COREG) tablet 12.5 mg  12.5 mg Oral Daily With Dinner Justice Aden PA-C   12.5 mg at 12/13/20 1702   • carvedilol (COREG) tablet 6.25 mg  6.25 mg Oral Daily With Breakfast Justice Aden PA-C   6.25 mg at 12/14/20 0908   • cefTRIAXone (ROCEPHIN) 1 g in sodium chloride 0.9 % 100 mL IVPB  1 g Intravenous Q24H Chavez Fierro  mL/hr at 12/13/20 1547 1 g at 12/13/20 1547   • dextrose (D50W) 25 g/ 50mL Intravenous Solution 25 g  25 g Intravenous Q15 Min PRN Justice Aden PA-C       • dextrose (GLUTOSE) oral gel 15 g  15 g Oral Q15 Min PRN Justice Aden PA-C       • furosemide (LASIX) tablet 40 mg  40 mg Oral BID Jim Mandel MD   40 mg at 12/14/20 0908   • glucagon (human recombinant) (GLUCAGEN DIAGNOSTIC) injection 1 mg  1 mg Subcutaneous Q15 Min PRN Justice Aden PA-C       • insulin glargine (LANTUS) injection 10 Units  10 Units Subcutaneous Nightly Justice Aden PA-C   10 Units at 12/13/20 2046   • insulin lispro (ADMELOG) injection 0-14 Units  0-14 Units Subcutaneous TID AC Justice Aden PA-C   3 Units at 12/14/20 1202    And   • insulin lispro (ADMELOG) injection 0-14 Units  0-14 Units Subcutaneous PRN Justice Aden PA-C   5 Units at 12/13/20 2045   • magnesium oxide (MAGOX) tablet 200 mg  200 mg Oral Daily Justice Aden PA-C   200 mg at 12/14/20 0907   • melatonin tablet 5 mg  5 mg Oral Nightly PRN Justice Aden PA-C   5 mg at 12/13/20 2227   • mirtazapine (REMERON) tablet 15 mg  15 mg Oral Nightly Justice Aden PA-C   15 mg at 12/13/20 2045   • nitroglycerin (NITROSTAT) SL  tablet 0.4 mg  0.4 mg Sublingual Q5 Min PRN Justice Aden PA-C       • ondansetron (ZOFRAN) tablet 4 mg  4 mg Oral Q6H PRN Justice Aden PA-C        Or   • ondansetron (ZOFRAN) injection 4 mg  4 mg Intravenous Q6H PRN Justice Aden PA-C       • Pharmacy to dose warfarin   Does not apply Continuous PRN Justice Aden PA-C       • sodium chloride 0.9 % flush 10 mL  10 mL Intravenous PRN Justice Aden PA-C       • sodium chloride 0.9 % flush 10 mL  10 mL Intravenous Q12H Justice Aden PA-C   10 mL at 12/14/20 0907   • sodium chloride 0.9 % flush 10 mL  10 mL Intravenous PRN Justice Aden PA-C       • theophylline (THEODUR) 12 hr tablet 300 mg  300 mg Oral Nightly Justice Aden PA-C   300 mg at 12/13/20 2045   • warfarin (COUMADIN) tablet 3.5 mg  3.5 mg Oral Daily Chavez Fierro MD

## 2020-12-14 NOTE — DISCHARGE PLACEMENT REQUEST
"Ernesto Santoro (85 y.o. Male)     Date of Birth Social Security Number Address Home Phone MRN    1935  [id number] [address] [phone] [id number]    [employer]oo Marital Status          Moravian        Admission Date Admission Type Admitting Provider Attending Provider Department, Room/Bed    20 Emergency Rebecca Martin MD Piwko, Radomir, MD [facility] 2B MEDICAL INPATIENT, 233/1    Discharge Date Discharge Disposition Discharge Destination         [place] [facility] [place]              Attending Provider: Rebecca Martin MD    Allergies: Penicillins    Isolation: None   Infection: None   Code Status: CPR    Ht: 170.2 cm (67\")   Wt: 74.2 kg (163 lb 9.3 oz)    Admission Cmt: None   Principal Problem: Acute on chronic heart failure with preserved ejection fraction (CMS/HCC) [I50.33]                 Active Insurance as of 2020     Primary Coverage     Payor Plan Insurance Group Employer/Plan Group    HUMANA MEDICARE REPLACEMENT HUMANA MEDICARE REPLACEMENT [id number]     Payor Plan Address Payor Plan Phone Number Payor Plan Fax Number Effective Dates    [address] [address] [phone]  2018 - None Entered    [facility] [address]       Subscriber Name Subscriber Birth Date Member ID       ERNESTO SANTORO 1935 [id number]                 Emergency Contacts      (Rel.) Home Phone Work Phone Mobile Phone    WILDER SANTORO \"RENETTA\" (Spouse) [phone] -- [phone]               History & Physical      Justice Aden PA-C at 20 1904     Attestation signed by Chavez Fierro MD at 20 1714        Agree with above mentioned except my evaluation, assessment, plan and treatment supercedes that of MEG or PA.        Electronically signed by Chavez Fierro MD, 20, 5:14 PM EST.                            [facility] Medicine Services      Patient Name: Ernesto Santoro  : 1935  MRN: [id number]  Primary Care Physician: " "Yusef Sosa Jr., MD  Date of admission: 12/8/2020    Patient Care Team:  Yusef Sosa Jr., MD as PCP - General  Yusef Sosa Jr., MD as PCP - Family Medicine  Luis Paulino MD as Consulting Physician (Cardiology)          Subjective   History Present Illness     Chief Complaint:   Chief Complaint   Patient presents with   • Shortness of Breath         Mr. Santoro is a 85 y.o. male with past medical history of diabetes, hyperlipidemia, cardiomyopathy, hypertension, CAD, COPD, A. fib, CKD and CHF who presents to [facility] complaining of dyspnea.  Patient reports that his difficulty breathing has been present for \"quite a while\".  He notes some associated orthopnea as well as TRIMBLE which includes dyspnea with walking even short distances from room to room on level ground.  He reports that when his pain is at its most profound he will occasionally experience a substernal burning sensation but denies any consistent chest pain.  Over the past year and a half patient reports he is lost approximately 70 pounds unintentionally.  He denies any cough, fever, changes in bowel or bladder habits, nausea or vomiting, sensation of tachycardia or palpitations, peripheral edema, syncope or near syncope.  Compliance with all prescribed medical therapies is reported and patient reports he does not smoke tobacco or drink.    In the ED patient found to have lab significant for troponin of 0.018, proBNP: 2850.0, creatinine: 1.8, BUN: 30, BUN/creatinine ratio: 13.8, INR: 1.5, PT: 19.8. Remainder of CBC and CMP was generally unremarkable. Chest x-ray shows interstitial thickening in both lungs more confluent disease in the right lower lobe noted is worrisome for right basilar pneumonia and superimposed mild generalized interstitial edema with stable cardiomegaly. EKG shows paced rhythm at 70. Respiratory viral panel including for COVID-19 was negative.    History of Present Illness    Review of Systems   "   Constitution: Negative.   HENT: Negative.    Eyes: Negative.    Cardiovascular: Positive for chest pain, dyspnea on exertion, orthopnea and paroxysmal nocturnal dyspnea. Negative for irregular heartbeat, leg swelling, near-syncope, palpitations and syncope.   Respiratory: Positive for shortness of breath. Negative for cough and sputum production.    Endocrine: Negative.    Skin: Negative.    Musculoskeletal: Negative.    Gastrointestinal: Negative.    Genitourinary: Negative.    Neurological: Negative.    Psychiatric/Behavioral: Negative.    All other systems reviewed and are negative.          Personal History     Past Medical History:   Past Medical History:   Diagnosis Date   • Arrhythmia    • Bradycardia    • Cardiomyopathy (CMS/HCC)    • Cataract    • COPD (chronic obstructive pulmonary disease) (CMS/HCC)    • Coronary artery disease    • Diabetes mellitus, type 2 (CMS/HCC)    • Emphysema, unspecified (CMS/HCC)    • Hx of sick sinus syndrome     S/P Biventricular Pacemaker   • Hypertension    • Paroxysmal atrial fibrillation (CMS/HCC)    • Sleep apnea    • Ventricular arrhythmia        Surgical History:      Past Surgical History:   Procedure Laterality Date   • CARDIAC CATHETERIZATION  02/04/2015   • CARDIAC CATHETERIZATION  04/25/2016   • CAROTID ENDARTERECTOMY     • CORONARY ANGIOPLASTY  1997   • LARYNGOSCOPY     • LUNG SURGERY  2008    Lung resection   • PACEMAKER IMPLANTATION  04/25/2016    Dual chamber; [employer]   • TRACHEOSTOMY             Family History: family history includes Breast cancer in his sister; Colon cancer in his sister; Diabetes in an other family member; Heart disease in his brother and mother; Hypertension in his mother; Stroke in his father. Otherwise pertinent FHx was reviewed and unremarkable.     Social History:  reports that he has quit smoking. His smoking use included cigarettes. He has never used smokeless tobacco. He reports current alcohol use. He reports that he  does not use drugs.      Medications:  Prior to Admission medications    Medication Sig Start Date End Date Taking? Authorizing Provider   Accu-Chek Helga Plus test strip Use as instructed to check blood sugars once daily 10/13/20   Jhonatan Mart MD   Accu-Chek Softclix Lancets lancets Use to check blood sugar once daily 7/10/20 7/10/21  Jhonatan Mart MD   acetaminophen (TYLENOL) 500 MG tablet Take 500 mg by mouth Every 6 (Six) Hours As Needed for Mild Pain .    Astrid Gonzalez MD   amLODIPine (NORVASC) 10 MG tablet Take 1 tablet by mouth. 3/10/16   Astrid Gonzalez MD   aspirin 81 MG tablet Take 81 mg by mouth Daily. 3/7/14   Astrid Gonzalez MD   Blood Glucose Monitoring Suppl (ACCU-CHEK HELGA PLUS) w/Device kit USE TO CHECK BLOOD SUGARS ONCE DAILY 7/10/20   Jhonatan Mart MD   calcitriol (ROCALTROL) 0.25 MCG capsule Take 0.25 mcg by mouth 3 (Three) Times a Week. Mon, Wed, Fri    Astrid Gonzalez MD   carvedilol (COREG) 12.5 MG tablet TAKE 1 TABLET TWICE DAILY 11/20/20   Luis Paulino MD   digoxin (LANOXIN) 125 MCG tablet Take 1 tablet by mouth Every Other Day. 11/3/20   Yusef Sosa Jr., MD   furosemide (LASIX) 40 MG tablet TAKE 1 TABLET EVERY DAY 6/19/20   Liliam Goode APRN   glimepiride (AMARYL) 2 MG tablet TAKE 1 TABLET TWICE DAILY 8/20/20   Jhonatan Mart MD   Insulin Glargine (Lantus SoloStar) 100 UNIT/ML injection pen Inject 10 units sq at bedtime 11/5/20   Jhonatan Mart MD   Insulin Pen Needle (Advocate Insulin Pen Needles) 31G X 5 MM misc Use once a day for insulin inejction 11/11/20   Jhonatan Mart MD   lisinopril (PRINIVIL,ZESTRIL) 2.5 MG tablet Take 1 tablet by mouth.    Astrid Gonzalez MD   mirtazapine (REMERON) 15 MG tablet TAKE 1 TABLET BY MOUTH EVERY NIGHT. 6/19/20   Goode, Liliam K, APRN   simvastatin (ZOCOR) 40 MG tablet 1 tablet p.o. at bedtime. 1/7/20   Jhonatan Mart MD   SITagliptin-metFORMIN HCl ER (Janumet XR)  MG tablet  Take 1 tablet by mouth Daily. 4/21/20   Jhonatan Mart MD   theophylline (THEODUR) 300 MG 12 hr tablet Take 300 mg by mouth Daily. 4/8/19   ProviderAstrid MD   warfarin (COUMADIN) 3 MG tablet Take 3 mg by mouth Daily.    Provider, MD Astrid       Allergies:    Allergies   Allergen Reactions   • Penicillins Itching       Objective   Objective     Vital Signs  Temp:  [97.4 °F (36.3 °C)] 97.4 °F (36.3 °C)  Heart Rate:  [70-72] 72  Resp:  [14-15] 15  BP: (134)/(75) 134/75  SpO2:  [92 %-94 %] 94 %  on  Flow (L/min):  [2] 2;   Device (Oxygen Therapy): nasal cannula  Body mass index is 25.9 kg/m².    Physical Exam  Vitals signs reviewed.   Constitutional:       Appearance: Normal appearance. He is normal weight.   HENT:      Head: Normocephalic and atraumatic.      Right Ear: External ear normal.      Left Ear: External ear normal.      Nose: Nose normal.      Mouth/Throat:      Mouth: Mucous membranes are moist.   Eyes:      Extraocular Movements: Extraocular movements intact.   Neck:      Musculoskeletal: Normal range of motion.   Cardiovascular:      Rate and Rhythm: Normal rate and regular rhythm.      Pulses: Normal pulses.      Heart sounds: Normal heart sounds.   Pulmonary:      Effort: Pulmonary effort is normal. No respiratory distress.      Breath sounds: Rhonchi present.   Abdominal:      General: Bowel sounds are normal. There is no distension.      Tenderness: There is no abdominal tenderness.   Musculoskeletal: Normal range of motion.      Right lower leg: No edema.      Left lower leg: No edema.   Skin:     General: Skin is warm and dry.   Neurological:      General: No focal deficit present.      Mental Status: He is alert and oriented to person, place, and time.   Psychiatric:         Mood and Affect: Mood normal.         Behavior: Behavior normal.         Thought Content: Thought content normal.         Judgment: Judgment normal.         Results Review:  I have personally reviewed most recent  cardiac tracings, lab results and radiology images and interpretations and agree with findings, most notably: Troponin, proBNP, CBC, CMP, PT/INR, chest x-ray, EKG and respiratory viral panel.    Results from last 7 days   Lab Units 12/08/20  1653 12/08/20  1652   WBC 10*3/mm3 8.00  --    HEMOGLOBIN g/dL 12.2*  --    HEMATOCRIT % 36.6*  --    PLATELETS 10*3/mm3 272  --    INR   --  1.85*     Results from last 7 days   Lab Units 12/08/20  1652   SODIUM mmol/L 138   POTASSIUM mmol/L 4.8   CHLORIDE mmol/L 102   CO2 mmol/L 25.0   BUN mg/dL 30*   CREATININE mg/dL 2.18*   GLUCOSE mg/dL 117*   CALCIUM mg/dL 9.3   ALT (SGPT) U/L 6   AST (SGOT) U/L 12   TROPONIN T ng/mL 0.018   PROBNP pg/mL 2,850.0*     Estimated Creatinine Clearance: 26.8 mL/min (A) (by C-G formula based on SCr of 2.18 mg/dL (H)).  Brief Urine Lab Results  (Last result in the past 365 days)      Color   Clarity   Blood   Leuk Est   Nitrite   Protein   CREAT   Urine HCG        07/06/20 0856             112.7             Microbiology Results (last 10 days)     Procedure Component Value - Date/Time    Respiratory Panel PCR w/COVID-19(SARS-CoV-2) LOBO/MARTHA/TOMAS/PAD/COR/MAD/TITA In-House, NP Swab in UTM/VTM, 3-4 HR TAT - Swab, Nasopharynx [[id number]]  (Normal) Collected: 12/08/20 1634    Lab Status: Final result Specimen: Swab from Nasopharynx Updated: 12/08/20 1831     ADENOVIRUS, PCR Not Detected     Coronavirus 229E Not Detected     Coronavirus HKU1 Not Detected     Coronavirus NL63 Not Detected     Coronavirus OC43 Not Detected     COVID19 Not Detected     Human Metapneumovirus Not Detected     Human Rhinovirus/Enterovirus Not Detected     Influenza A PCR Not Detected     Influenza B PCR Not Detected     Parainfluenza Virus 1 Not Detected     Parainfluenza Virus 2 Not Detected     Parainfluenza Virus 3 Not Detected     Parainfluenza Virus 4 Not Detected     RSV, PCR Not Detected     Bordetella pertussis pcr Not Detected     Bordetella parapertussis PCR Not  Detected     Chlamydophila pneumoniae PCR Not Detected     Mycoplasma pneumo by PCR Not Detected    Narrative:      Fact sheet for providers: https://docs.[employer]/wp-content/uploads/EHP9966-3962-JB0.1-EUA-Provider-Fact-Sheet-3.pdf    Fact sheet for patients: https://docs.[employer]/wp-content/uploads/PCL1902-9808-BT3.1-EUA-Patient-Fact-Sheet-1.pdf    Test performed by PCR.          ECG/EMG Results (most recent)     Procedure Component Value Units Date/Time    ECG 12 Lead [[id number]] Collected: 12/08/20 1523     Updated: 12/08/20 1533     QT Interval 424 ms     Narrative:      HEART RATE= 70  bpm  RR Interval= 856  ms  DE Interval=   ms  P Horizontal Axis= -5  deg  P Front Axis=   deg  QRSD Interval= 180  ms  QT Interval= 424  ms  QRS Axis= 0  deg  T Wave Axis=   deg  - ABNORMAL ECG -  Afib/flutter and ventricular-paced rhythm  When compared with ECG of 27-Jun-2019 12:11:54,  No significant change  Electronically Signed By:   Date and Time of Study: 2020-12-08 15:23:53          Results for orders placed in visit on 02/06/08   SCANNED VASCULAR STUDIES       Results for orders placed during the hospital encounter of 08/09/19   Adult Transthoracic Echo Complete W/ Cont if Necessary Per Protocol    Narrative · Left ventricular wall thickness is consistent with mild-to-moderate   concentric hypertrophy.  · Estimated EF = 60%.  · Left ventricular systolic function is normal.  · Right ventricular cavity is borderline dilated.  · Left atrial cavity size is mild-to-moderately dilated.  · There is calcification of the aortic valve.  · Mild aortic valve regurgitation is present.  · Mild mitral valve regurgitation is present  · There is a moderate (1-2cm) pericardial effusion.  · The following left ventricular wall segments are hypokinetic: basal   anterolateral, basal inferolateral, basal inferoseptal and basal   inferoseptal.          Xr Chest 1 View    Result Date: 12/8/2020  Interstitial thickening in both lungs,  with more confluent disease in the right lower lobe. FINDINGS are worrisome for right basilar pneumonia and superimposed mild generalized interstitial edema.  Stable cardiomegaly.  Electronically Signed By-Alexandra Preston MD On:12/8/2020 4:48 PM This report was finalized on [id number] by  Alexandra Preston MD.        Estimated Creatinine Clearance: 26.8 mL/min (A) (by C-G formula based on SCr of 2.18 mg/dL (H)).    Assessment/Plan   Assessment/Plan       Active Hospital Problems    Diagnosis  POA   • **Acute on chronic heart failure with preserved ejection fraction (CMS/HCC) [I50.33]  Yes     Priority: High   • Ischemic cardiomyopathy [I25.5]  Yes     Priority: High   • Atrial fibrillation (CMS/HCC) [I48.91] [I48.91]  Yes     Priority: High   • 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) [E11.39]  Yes     Priority: Medium   • Stage 3 chronic kidney disease [N18.30]  Yes     Priority: Medium   • Essential hypertension [I10]  Yes     Priority: Medium   • Coronary artery disease of native artery of native heart with stable angina pectoris (CMS/HCC) [I25.118]  Yes     Priority: Medium   • Chronic obstructive pulmonary disease (CMS/HCC) [J44.9]  Yes     Priority: Medium   • Mixed hyperlipidemia [E78.2]  Yes     Priority: Low      Resolved Hospital Problems   No resolved problems to display.     Acute on chronic heart failure with preserved ejection fraction and history of ischemic cardiomyopathy  -Patient presents with prolonged history of worsening dyspnea including dyspnea on exertion and orthopnea  -proBNP: 2850.0  -Chest x-ray shows interstitial thickening in both lungs more confluent disease in the right lower lobe noted is worrisome for right basilar pneumonia and superimposed mild generalized interstitial edema with stable cardiomegaly.   -EKG shows paced rhythm at 70.   -Respiratory viral panel including for COVID-19 was negative.  -Patient given Nitropaste and 40 mg Lasix IV in  the ED, continue IV Lasix with close monitoring of kidney function  -Monitor daily weight and I's and O's  -2 g sodium diet  -Echocardiogram ordered    Acute on chronic kidney injury  - Creatinine: 2.18, BUN: 30, eGFR: 29  -Avoid nephrotoxic medication and IV dye unless urgently needed  -Monitor closely especially while diuresing  -Patient may benefit from nephrology consult    Atrial fibrillation  -EKG shows paced rhythm at 70-  -Continue Coreg and warfarin with pharmacy to dose  -Continue cardiac monitoring    CAD  -Continue statin, aspirin and beta-blocker  -Continue cardiac monitoring    Hypertension  -Well controlled with a blood pressure on admission of 134/74  - Continue amlodipine and Coreg  - Monitor while admitted    Diabetes mellitus  -Well controlled with glucose of 117 on admission  - Check A1c  - Hold glimepiride and Janumet  -Basal and sliding scale insulin  -diabetic diet  -monitor AC and HS    COPD  -DuoNeb and theophylline  -Check theophylline level    Hyperlipidemia  -Continue statin            VTE Prophylaxis -warfarin with pharmacy to dose  Mechanical Order History:     None      Pharmalogical Order History:     None          CODE STATUS: Full  There are no questions and answers to display.         I discussed the patient's findings and my recommendations with patient.      Signature:Electronically signed by Justice Aden PA-C, 12/09/20, 12:53 AM EST.    [facility]ist Team          Electronically signed by Chavez Fierro MD at 12/09/20 9524

## 2020-12-14 NOTE — PROGRESS NOTES
"Pharmacy dosing service  Anticoagulant  Warfarin     Subjective:    Cali Santoro is a 85 y.o.male being continued on warfarin for atrial fibrillation.  PMH:    INR Goal: 2 - 3  Home medication?:  Yes, warfarin 3 mg daily  Bridge Therapy Present?:  No  Interacting Medications Evaluation (New/Present/Discontinued): ceftriaxone x5d 12/11-15  Additional Contributing Factors: HF exacerbation (may increase INR)      Assessment/Plan:    Will continue with plan from yesterday for warfarin 3.5 mg daily to start today.     Continue to monitor and adjust based on INR.         Date 12/8 12/9 12/10 12/11 12/12 12/13 12/14     INR 1.78/1.85 1.68 1.65 1.93 2.24 2.03 2.01     Dose 3 mg (early 12/9 AM) 3 mg 5 mg 3 mg 3 mg 4mg 3.5mg         Objective:  [Ht: 170.2 cm (67\"); Wt: 74.2 kg (163 lb 9.3 oz); BMI: Body mass index is 25.62 kg/m².]    Lab Results   Component Value Date    ALBUMIN 3.90 12/08/2020     Lab Results   Component Value Date    INR 2.01 12/14/2020    INR 2.03 12/13/2020    INR 2.24 12/12/2020    PROTIME 21.4 12/14/2020    PROTIME 21.6 12/13/2020    PROTIME 23.7 12/12/2020     Lab Results   Component Value Date    HGB 11.5 (L) 12/14/2020    HGB 11.6 (L) 12/13/2020    HGB 11.2 (L) 12/12/2020     Lab Results   Component Value Date    HCT 34.4 (L) 12/14/2020    HCT 35.4 (L) 12/13/2020    HCT 34.1 (L) 12/12/2020       Destiny Nicole, PharmD, BCPS  12/14/20 12:36 EST     "

## 2020-12-14 NOTE — TELEPHONE ENCOUNTER
PATIENT D/C FROM [facility] 12/14/2020. HAD FLUID AROUND HEART. DR. MADDOX DID NOT SEE PATIENT WHILE THERE, BUT TOLD TO F/U..... HAS APT 1/25/2021 WITH DR. MADDOX AND ANNETTE. WIFE ASKING IF THAT APT IS OK, OR IF HE NEEDS SEEN SOONER?

## 2020-12-14 NOTE — PROGRESS NOTES
Continued Stay Note  ADY Traore     Patient Name: Cali Santoro  MRN: [id number]  Today's Date: 12/14/2020    Admit Date: 12/8/2020    Discharge Plan     Row Name 12/14/20 1059       Plan    Plan  DC Plan: Home resendiz's for Home O2, orderd.        Chart review only.       Expected Discharge Date and Time     Expected Discharge Date Expected Discharge Time    Dec 13, 2020             Bayron Andrews RN

## 2020-12-14 NOTE — PLAN OF CARE
Goal Outcome Evaluation:  Plan of Care Reviewed With: patient  Progress: improving   Patient is feeling better and anxious to go home. Kidney function has improved today with hopes of being discharged tomorrow if continues to improve. Continue to monitor.

## 2020-12-14 NOTE — PLAN OF CARE
"Goal Outcome Evaluation:  Plan of Care Reviewed With: patient, spouse  Progress: improving  Outcome Summary: Pt is an 86 YO M admitted with acute on chonic HF, and ZACKERY on CKD. Pt reports being typically indpendent at home performing yard work and able to maintain household duties. Pt reports not using an AD typically but has had approx 5 falls in the last 6 months with no injuries. Pt attributes falls to legs being weak and \"giving out\". This date pt demonstrates good functional moblity, able to stand and ambulate with SBA wihtout AD, with no LOB. Pt with minor SOA following ambulation and O2 at 85%, recovered above 90% with supplemental O2 almost immediately. Pt did demonstrate significant weakness in BLE. Recommendation is return home with family and HHPT to address defecits. PPE worn includes gloves and mask wtih goggles.  "

## 2020-12-14 NOTE — PROGRESS NOTES
02 sat 82% on room air at rest.  Placed on 02 at 2 liters, sa02 increased to 88%, increased 02 to 3 liters, Sa02 increased to 3 94%.

## 2020-12-14 NOTE — DISCHARGE SUMMARY
"  Date of Admission: 12/8/2020    Date of Discharge:  12/14/2020    Length of stay:  LOS: 4 days     Hospital Course  Chief Complaint:   Chief Complaint   Patient presents with   • Shortness of Breath            Mr. Santoro is a 85 y.o. male with past medical history of diabetes, hyperlipidemia, cardiomyopathy, hypertension, CAD, COPD, A. fib, CKD and CHF who presents to [facility] complaining of dyspnea.  Patient reports that his difficulty breathing has been present for \"quite a while\".  He notes some associated orthopnea as well as TRIMBLE which includes dyspnea with walking even short distances from room to room on level ground.  He reports that when his pain is at its most profound he will occasionally experience a substernal burning sensation but denies any consistent chest pain.  Over the past year and a half patient reports he is lost approximately 70 pounds unintentionally.  He denies any cough, fever, changes in bowel or bladder habits, nausea or vomiting, sensation of tachycardia or palpitations, peripheral edema, syncope or near syncope.  Compliance with all prescribed medical therapies is reported and patient reports he does not smoke tobacco or drink.     In the ED patient found to have lab significant for troponin of 0.018, proBNP: 2850.0, creatinine: 1.8, BUN: 30, BUN/creatinine ratio: 13.8, INR: 1.5, PT: 19.8. Remainder of CBC and CMP was generally unremarkable. Chest x-ray shows interstitial thickening in both lungs more confluent disease in the right lower lobe noted is worrisome for right basilar pneumonia and superimposed mild generalized interstitial edema with stable cardiomegaly. EKG shows paced rhythm at 70. Respiratory viral panel including for COVID-19 was negative.      Physical Exam   Constitutional:  oriented to person, place, and time. No distress.   HENT:   Head: Normocephalic and atraumatic.   Eyes: Conjunctivae and EOM are normal. Pupils are equal, round, and reactive to light.  "   Neck: No JVD present. No thyromegaly present.   Cardiovascular: Normal rate, regular rhythm, normal heart sounds and intact distal pulses. Exam reveals no gallop and no friction rub.   No murmur heard.  Pulmonary/Chest: Effort normal and breath sounds normal. No stridor. No respiratory distress.  has no wheezes.  has no rales.  exhibits no tenderness.   Abdominal: Soft. Bowel sounds are normal.  no distension and no mass. There is no tenderness. There is no rebound and no guarding. No hernia.   Musculoskeletal: Normal range of motion.   Lymphadenopathy:     no cervical adenopathy.   Neurological:  alert and oriented to person, place, and time. No cranial nerve deficit or sensory deficit. exhibits normal muscle tone.   Skin: No rash noted.  not diaphoretic.   Psychiatric:  normal mood and affect.   Vitals reviewed.      Hospital course and problem list  Patient was admitted with above-mentioned presentation as per HPI.  He was admitted on the diagnosis of CHF.  He was started on IV diuretics.  His CT chest showed a small pleural effusion and a right basilar lung density consistent with atelectasis also some scarring in the right lung and a stable 8 mm lung nodule.  There is a 5 mm groundglass nodule in the left lower lobe noted as well small pericardial effusion.  Otherwise consistent findings of emphysema chronic pancreatitis cholelithiasis and right thyroid enlargement.  He was started on diuretics nephrology was consulted as patient has history of CKD.  He diuresed fairly well.  His creatinine remained stable at 2.5.  On discharge he was switched over to p.o. diuretics at a increased dose of 40 p.o. milligrams of Lasix daily.  He will follow up with Dr. Anguiano his primary nephrologist within a week for follow-up.  Overall volume status is improved however he remains on supplemental oxygen.  We did a home O2 evaluation and he did qualify.  He was also treated with antibiotics possibility of a infectious component  his strep pneumo antigen test did come back positive.  He was then ceftriaxone while here and he will be discharged on a course of Omnicef at home.  Overall he says he feels better he wants to go home.  The above-mentioned CT findings of nodules as well as an enlarged thyroid will be followed up by his primary care physician this was discussed with him and he verbalized understanding and agreed with the plan.  He will also follow-up with a pulmonologist as an outpatient for follow-up of these findings.  Otherwise he will continue on his home medications.    Discharge condition stable, discharged home follow-up with PCP, pulmonologist, nephrologist as an outpatient.        Pertinent Test Results:     Lab Results (last 48 hours)     Procedure Component Value Units Date/Time    POC Glucose Once [[id number]]  (Abnormal) Collected: 12/14/20 0702    Specimen: Blood Updated: 12/14/20 0703     Glucose 182 mg/dL      Comment: Serial Number: [id number]:  [id number]       Basic Metabolic Panel [[id number]]  (Abnormal) Collected: 12/14/20 0419    Specimen: Blood Updated: 12/14/20 0536     Glucose 223 mg/dL      BUN 48 mg/dL      Creatinine 2.40 mg/dL      Sodium 136 mmol/L      Potassium 4.9 mmol/L      Chloride 99 mmol/L      CO2 27.0 mmol/L      Calcium 8.9 mg/dL      eGFR Non African Amer 26 mL/min/1.73      BUN/Creatinine Ratio 20.0     Anion Gap 10.0 mmol/L     Narrative:      GFR Normal >60  Chronic Kidney Disease <60  Kidney Failure <15      Magnesium [[id number]]  (Normal) Collected: 12/14/20 0419    Specimen: Blood Updated: 12/14/20 0536     Magnesium 2.3 mg/dL     Protime-INR [[id number]]  (Normal) Collected: 12/14/20 0419    Specimen: Blood Updated: 12/14/20 0526     Protime 21.4 Seconds      INR 2.01    CBC & Differential [[id number]]  (Abnormal) Collected: 12/14/20 0419    Specimen: Blood Updated: 12/14/20 0512    Narrative:      The following orders were created for panel order CBC & Differential.  Procedure                                Abnormality         Status                     ---------                               -----------         ------                     CBC Auto Differential[[id number]]        Abnormal            Final result                 Please view results for these tests on the individual orders.    CBC Auto Differential [[id number]]  (Abnormal) Collected: 12/14/20 0419    Specimen: Blood Updated: 12/14/20 0512     WBC 8.20 10*3/mm3      RBC 3.36 10*6/mm3      Hemoglobin 11.5 g/dL      Hematocrit 34.4 %      .3 fL      MCH 34.1 pg      MCHC 33.4 g/dL      RDW 17.8 %      RDW-SD 63.9 fl      MPV 7.3 fL      Platelets 243 10*3/mm3      Neutrophil % 48.5 %      Lymphocyte % 26.5 %      Monocyte % 10.4 %      Eosinophil % 13.5 %      Basophil % 1.1 %      Neutrophils, Absolute 3.90 10*3/mm3      Lymphocytes, Absolute 2.20 10*3/mm3      Monocytes, Absolute 0.90 10*3/mm3      Eosinophils, Absolute 1.10 10*3/mm3      Basophils, Absolute 0.10 10*3/mm3      nRBC 0.1 /100 WBC     POC Glucose Once [[id number]]  (Abnormal) Collected: 12/13/20 1944    Specimen: Blood Updated: 12/13/20 1945     Glucose 223 mg/dL      Comment: Serial Number: [id number]:  [id number]       POC Glucose Once [[id number]]  (Abnormal) Collected: 12/13/20 1643    Specimen: Blood Updated: 12/13/20 1647     Glucose 226 mg/dL      Comment: Serial Number: [id number]:  [id number]       POC Glucose Once [[id number]]  (Abnormal) Collected: 12/13/20 1143    Specimen: Blood Updated: 12/13/20 1149     Glucose 264 mg/dL      Comment: Serial Number: [id number]:  [id number]       POC Glucose Once [[id number]]  (Abnormal) Collected: 12/13/20 0711    Specimen: Blood Updated: 12/13/20 0713     Glucose 179 mg/dL      Comment: Serial Number: [id number]:  [id number]       Basic Metabolic Panel [[id number]]  (Abnormal) Collected: 12/13/20 0418    Specimen: Blood Updated: 12/13/20 0551     Glucose 164 mg/dL      BUN 44 mg/dL       Creatinine 2.41 mg/dL      Sodium 136 mmol/L      Potassium 5.1 mmol/L      Chloride 101 mmol/L      CO2 25.0 mmol/L      Calcium 9.1 mg/dL      eGFR Non African Amer 26 mL/min/1.73      BUN/Creatinine Ratio 18.3     Anion Gap 10.0 mmol/L     Narrative:      GFR Normal >60  Chronic Kidney Disease <60  Kidney Failure <15      Magnesium [[id number]]  (Abnormal) Collected: 12/13/20 0418    Specimen: Blood Updated: 12/13/20 0551     Magnesium 2.5 mg/dL     Protime-INR [[id number]]  (Normal) Collected: 12/13/20 0418    Specimen: Blood Updated: 12/13/20 0524     Protime 21.6 Seconds      INR 2.03    CBC & Differential [[id number]]  (Abnormal) Collected: 12/13/20 0418    Specimen: Blood Updated: 12/13/20 0516    Narrative:      The following orders were created for panel order CBC & Differential.  Procedure                               Abnormality         Status                     ---------                               -----------         ------                     CBC Auto Differential[[id number]]        Abnormal            Final result                 Please view results for these tests on the individual orders.    CBC Auto Differential [[id number]]  (Abnormal) Collected: 12/13/20 0418    Specimen: Blood Updated: 12/13/20 0516     WBC 7.50 10*3/mm3      RBC 3.46 10*6/mm3      Hemoglobin 11.6 g/dL      Hematocrit 35.4 %      .4 fL      MCH 33.6 pg      MCHC 32.9 g/dL      RDW 17.7 %      RDW-SD 63.0 fl      MPV 7.4 fL      Platelets 243 10*3/mm3      Neutrophil % 42.0 %      Lymphocyte % 31.6 %      Monocyte % 11.8 %      Eosinophil % 13.9 %      Basophil % 0.7 %      Neutrophils, Absolute 3.10 10*3/mm3      Lymphocytes, Absolute 2.40 10*3/mm3      Monocytes, Absolute 0.90 10*3/mm3      Eosinophils, Absolute 1.00 10*3/mm3      Basophils, Absolute 0.10 10*3/mm3      nRBC 0.0 /100 WBC     POC Glucose Once [[id number]]  (Abnormal) Collected: 12/12/20 2116    Specimen: Blood Updated: 12/12/20 2118     Glucose 239 mg/dL         Comment: Serial Number: [id number]:  [id number]       POC Glucose Once [[id number]]  (Abnormal) Collected: 12/12/20 1619    Specimen: Blood Updated: 12/12/20 1621     Glucose 149 mg/dL      Comment: Serial Number: [id number]:  [id number]       POC Glucose Once [[id number]]  (Abnormal) Collected: 12/12/20 1128    Specimen: Blood Updated: 12/12/20 1139     Glucose 210 mg/dL      Comment: Serial Number: [id number]:  [id number]               Results for orders placed during the hospital encounter of 12/08/20   Adult Transthoracic Echo Complete W/ Cont if Necessary Per Protocol    Narrative · Left ventricular wall thickness is consistent with septal asymmetric   hypertrophy.  · Estimated left ventricular EF = 60% Left ventricular systolic function   is normal.  · The right ventricular cavity is borderline dilated.  · The right atrial cavity is borderline dilated.  · Mild aortic valve stenosis is present.  · Estimated right ventricular systolic pressure from tricuspid   regurgitation is normal (<35 mmHg).  · There is a small (<1cm) pericardial effusion adjacent to the left   ventricle.          Imaging Results (All)     Procedure Component Value Units Date/Time    CT Chest Without Contrast [[id number]] Collected: 12/09/20 2121     Updated: 12/09/20 2133    Narrative:      CT CHEST WO CONTRAST-     Date of Exam: 12/9/2020 7:01 PM     Indication: Pneumonia, effusion or abscess suspected, xray done;  R06.00-Dyspnea, unspecified; R07.9-Chest pain, unspecified; I50.9-Heart  failure, unspecified.     Comparison: CT chest 12/10/2014, portable chest 12/08/2020     Technique: Serial and axial CT images of the chest were obtained.  Reconstructions in the coronal and sagittal planes were performed.   Automated exposure control and iterative reconstruction methods were  used.     FINDINGS:     The right thyroid lobe is enlarged and extends into the upper  mediastinum. It contains a hypodense area measuring about  1.5 cm,  obscured by artifact and grossly similar to the prior exam. There is  atherosclerotic calcification including in the aorta and coronary  arteries. There is mild cardiomegaly. There is a small pericardial  effusion, 10 mm maximal thickness, slightly increased from the prior.  Patient has a transvenous pacer.     A few mediastinal nodes are very slightly larger than on the prior exam.  There are not abnormally enlarged by CT criteria. The largest is a  subcarinal node with transverse diameter of 10 mm. Hilar evaluation is  suboptimal without contrast.     There is a small right pleural effusion which has developed since the  prior exam. The lungs are emphysematous. There are areas of scarring in  the right upper lobe. There is a 8 mm right lower lobe nodule on image  52 which is unchanged. There is minor right lower lobe lung opacity  which is likely atelectasis. On the left, there is a 5 mm lower lobe  groundglass nodule that is not previously identifiable.     Limited abdominal imaging shows a poorly demonstrated nodular area in  the anteromedial right kidney which is unchanged. There is evidence of  cholelithiasis. There are pancreatic calcifications. There is stable  left adrenal nodule.     Bone windows show degenerative spurring of the thoracic spine.       Impression:      1. There is a small right pleural effusion. Minor right basal lung  density is consistent with atelectasis.  2. There is scarring in the right lung. There is a radiographically  stable 8 mm right lung nodule.  3. 5 mm groundglass nodule has developed in the left lower lobe. By  Fleischner criteria, no CT follow-up is necessary.  2. Atherosclerosis including coronary artery calcification..  5. Small pericardial effusion, slightly increased from prior. Mild  cardiomegaly.  6. Also noted: Emphysema, chronic pancreatitis, cholelithiasis, right  thyroid enlargement.     Electronically Signed By-Elvi Saenz MD On:12/9/2020 9:31 PM  This  "report was finalized on [id number] by  Elvi Saenz MD.    XR Chest 1 View [[id number]] Collected: 12/08/20 1646     Updated: 12/08/20 1650    Narrative:      DATE OF EXAM:  12/8/2020 4:27 PM     PROCEDURE:  XR CHEST 1 VW-     INDICATIONS:  soa       COMPARISON:  PA and lateral chest 11/02/2020.     TECHNIQUE:   Single radiographic view of the chest was obtained.     FINDINGS:  Moderate cardiomegaly appears stable. Pacemaker/defibrillator appears  unchanged.  Interstitial thickening is present in both lungs,, with more confluent  alveolar disease in the right lower lobe no pleural effusion or  pneumothorax or acute osseous abnormalities are identified.             Impression:      Interstitial thickening in both lungs, with more confluent disease in  the right lower lobe. FINDINGS are worrisome for right basilar pneumonia  and superimposed mild generalized interstitial edema.     Stable cardiomegaly.     Electronically Signed By-Alexandra Preston MD On:12/8/2020 4:48 PM  This report was finalized on [id number] by  Alexandra Preston MD.            Vital Signs  Visit Vitals  /75 (BP Location: Left arm, Patient Position: Lying)   Pulse 71   Temp 98.1 °F (36.7 °C) (Oral)   Resp 18   Ht 170.2 cm (67\")   Wt 74.2 kg (163 lb 9.3 oz)   SpO2 97%   BMI 25.62 kg/m²       Physical Exam:  Physical Exam      Discharge Medications     Discharge Medications      New Medications      Instructions Start Date   cefdinir 300 MG capsule  Commonly known as: OMNICEF   300 mg, Oral, Daily         Changes to Medications      Instructions Start Date   carvedilol 12.5 MG tablet  Commonly known as: COREG  What changed: Another medication with the same name was changed. Make sure you understand how and when to take each.   12.5 mg, Oral, Nightly      carvedilol 12.5 MG tablet  Commonly known as: COREG  What changed:   · how much to take  · when to take this   TAKE 1 TABLET TWICE DAILY      furosemide 40 MG tablet  Commonly known as: " LASIX  What changed: when to take this   40 mg, Oral, 2 Times Daily      Lantus SoloStar 100 UNIT/ML injection pen  Generic drug: Insulin Glargine  What changed:   · how much to take  · how to take this  · when to take this  · additional instructions   Inject 10 units sq at bedtime      simvastatin 40 MG tablet  Commonly known as: ZOCOR  What changed:   · how much to take  · how to take this  · when to take this  · additional instructions   1 tablet p.o. at bedtime.      SITagliptin-metFORMIN HCl ER  MG tablet  Commonly known as: Janumet XR  What changed: when to take this   1 tablet, Oral, Daily         Continue These Medications      Instructions Start Date   acetaminophen 500 MG tablet  Commonly known as: TYLENOL   500 mg, Oral, Every 6 Hours PRN      aspirin 81 MG tablet   81 mg, Oral, Nightly      calcitriol 0.25 MCG capsule  Commonly known as: ROCALTROL   0.25 mcg, Oral, 3 Times Weekly, Mon, Wed, Fri       digoxin 125 MCG tablet  Commonly known as: LANOXIN   125 mcg, Oral, Every Other Day      glimepiride 2 MG tablet  Commonly known as: AMARYL   TAKE 1 TABLET TWICE DAILY      Magnesium 250 MG tablet   1 tablet, Oral, Daily      melatonin 5 MG tablet tablet   5 mg, Oral, Nightly      mirtazapine 15 MG tablet  Commonly known as: REMERON   15 mg, Oral, Nightly      theophylline 300 MG 12 hr tablet  Commonly known as: THEODUR   300 mg, Oral, Nightly      warfarin 3 MG tablet  Commonly known as: COUMADIN   3 mg, Oral, Daily Before Lunch             Discharge Diet:   Diet Instructions     Diet: Consistent Carbohydrate, Cardiac      Discharge Diet:  Consistent Carbohydrate  Cardiac             Activity at Discharge:   Activity Instructions     Activity as Tolerated            Follow-up Appointments  Future Appointments   Date Time Provider Department Center   12/18/2020 10:45 AM [facility]   12/23/2020  2:45 PM Yusef Sosa Jr., MD [facility]   12/30/2020   7:50 AM [facility]  [facility] None   1/8/2021  9:00 AM Jhonatan Mart MD [facility] None   [facility]25[facility]20[facility]  [facility]   1/25/2021  9:40 AM Luis Paulino MD [facility] NA     Additional Instructions for the Follow-ups that You Need to Schedule     Discharge Follow-up with Specialty: pulmonology in one week, nephrology one week   As directed      Specialty: pulmonology in one week, nephrology one week                   Rebecca Martin MD  12/14/20  11:04 EST    Time: Discharge 38 min

## 2020-12-14 NOTE — PROGRESS NOTES
"[facility] Progress Note         LOS: 4 days    Patient Care Team:  Yusef Sosa Jr., MD as PCP - General  Yusef Sosa Jr., MD as PCP - Family Medicine  Luis Paulino MD as Consulting Physician (Cardiology)      Subjective     Feels well.    No soa cp n/v or diarrhea,  wants to go home    Objective     Vital Sign Min/Max for last 24 hours  Temp:  [97.4 °F (36.3 °C)-98.1 °F (36.7 °C)] 98.1 °F (36.7 °C)  Heart Rate:  [65-73] 71  Resp:  [16-18] 18  BP: (113-123)/(62-75) 121/75                       Flowsheet Rows      First Filed Value   Admission Height  170.2 cm (67\") Documented at 12/08/2020 1514   Admission Weight  75 kg (165 lb 5.5 oz) Documented at 12/08/2020 1514          I/O this shift:  In: 480 [P.O.:480]  Out: 100 [Urine:100]  I/O last 3 completed shifts:  In: 840 [P.O.:840]  Out: 1425 [Urine:1425]    Physical Exam:  Physical Exam    General.  Awake, alert  Head.  Atraumatic, normocephalic, nose and ears normal.  Neck.  Supple. No JVD  ENT.  Oral mucosa moist today  Respiratory. Clear no wheezes or dullness  Cardiovascular. Irregular rhythm no mgr  Abdomen.  Soft, nontender  Extremities.  No edema  Skin warm dry     LABS:  Lab Results   Component Value Date    CALCIUM 8.9 12/14/2020     Results from last 7 days   Lab Units 12/14/20  0419 12/13/20  0418 12/12/20  0244  12/08/20  1652   MAGNESIUM mg/dL 2.3 2.5* 2.4   < >  --    SODIUM mmol/L 136 136 136   < > 138   POTASSIUM mmol/L 4.9 5.1 4.5   < > 4.8   CHLORIDE mmol/L 99 101 101   < > 102   CO2 mmol/L 27.0 25.0 25.0   < > 25.0   BUN mg/dL 48* 44* 52*   < > 30*   CREATININE mg/dL 2.40* 2.41* 3.04*   < > 2.18*   GLUCOSE mg/dL 223* 164* 172*   < > 117*   CALCIUM mg/dL 8.9 9.1 8.6   < > 9.3   WBC 10*3/mm3 8.20 7.50 8.40   < >  --    HEMOGLOBIN g/dL 11.5* 11.6* 11.2*   < >  --    PLATELETS 10*3/mm3 243 243 237   < >  --    ALT (SGPT) U/L  --   --   --   --  6   AST (SGOT) U/L  --   --   --   --  12    < > = values in this interval not " displayed.     Lab Results   Component Value Date    CKTOTAL 36 02/24/2020    TROPONINI 0.030 06/28/2019    TROPONINT 0.025 12/08/2020     Estimated Creatinine Clearance: 24 mL/min (A) (by C-G formula based on SCr of 2.4 mg/dL (H)).      Brief Urine Lab Results  (Last result in the past 365 days)      Color   Clarity   Blood   Leuk Est   Nitrite   Protein   CREAT   Urine HCG        07/06/20 0856             112.7           WEIGHTS:     Wt Readings from Last 1 Encounters:   12/13/20 0402 74.2 kg (163 lb 9.3 oz)   12/12/20 0449 73.9 kg (162 lb 14.7 oz)   12/11/20 0310 75.2 kg (165 lb 12.6 oz)   12/10/20 0346 74.5 kg (164 lb 3.9 oz)   12/09/20 1216 73.5 kg (162 lb)   12/08/20 2300 73.2 kg (161 lb 6 oz)   12/08/20 1514 75 kg (165 lb 5.5 oz)       aspirin, 81 mg, Oral, Daily  atorvastatin, 20 mg, Oral, Daily  calcitriol, 0.25 mcg, Oral, Once per day on Mon Wed Fri  carvedilol, 12.5 mg, Oral, Daily With Dinner  carvedilol, 6.25 mg, Oral, Daily With Breakfast  cefTRIAXone, 1 g, Intravenous, Q24H  furosemide, 40 mg, Oral, BID  insulin glargine, 10 Units, Subcutaneous, Nightly  insulin lispro, 0-14 Units, Subcutaneous, TID AC  magnesium oxide, 200 mg, Oral, Daily  mirtazapine, 15 mg, Oral, Nightly  sodium chloride, 10 mL, Intravenous, Q12H  theophylline, 300 mg, Oral, Nightly  warfarin, 3.5 mg, Oral, Daily      Pharmacy to dose warfarin,         Assessment/Plan       1.ZACKERY on Chronic kidney disease stage III  Creatinine - stable.  f/u with dr Yan in 1 week     2.hypovolemia-much better, r lasix at 40mg bid    3.Hypertension with CKD 3  Blood pressure well controlled.  Continue current antihypertensives      Maximo Ty MD  12/14/20  09:20 EST

## 2020-12-14 NOTE — PROGRESS NOTES
Continued Stay Note   [place]     Patient Name: Cali Santoro  MRN: [id number]  Today's Date: 12/14/2020    Admit Date: 12/8/2020    Discharge Plan     Row Name 12/14/20 1233       Plan    Plan  DC Plan: Home with spouse and [facility], pending acceptance and [facility] for home O2.    Row Name 12/14/20 1059       Plan    Plan  DC Plan: Home [facility] for Home O2, orderd.        Met with patient in room wearing PPE: mask, .      Maintained distance greater than six feet and spent less than 15 minutes in the room.      Spouse gave HH choice in room.     Expected Discharge Date and Time     Expected Discharge Date Expected Discharge Time    Dec 13, 2020             Bayron Andrews RN

## 2020-12-14 NOTE — PLAN OF CARE
Goal Outcome Evaluation:  Patient did not rest much during the night due to frequent urination. Will continue to monitor.

## 2020-12-15 NOTE — OUTREACH NOTE
"Call Center TCM Note      Responses   [facility] patient discharged from?  [facility]   Does the patient have one of the following disease processes/diagnoses(primary or secondary)?  CHF   TCM attempt successful?  Yes   Call start time  1055   Call end time  1102   Discharge diagnosis  Acute on chronic heart failure with preserved ejection fraction    Person spoke with today (if not patient) and relationship  Wife   Meds reviewed with patient/caregiver?  Yes   Is the patient having any side effects they believe may be caused by any medication additions or changes?  No   Does the patient have all medications ordered at discharge?  Yes   Is the patient taking all medications as directed (includes completed medication regime)?  Yes   Does the patient have a primary care provider?   Yes   Does the patient have an appointment with their PCP within 7 days of discharge?  Yes   Has the patient kept scheduled appointments due by today?  N/A   What is the Home health agency?   [facility]   Has home health visited the patient within 72 hours of discharge?  Call prior to 72 hours   Home health comments  States they are coming today   What DME was ordered?  [place] [facility]   Has all DME been delivered?  Yes   Pulse Ox monitoring  Intermittent   Pulse Ox device source  Patient   O2 Sat comments  States he would not wear oxygen last night and sats were 68%.  Asked if she was sure this was not his heart rate.  States \"no\".  States after 3L of oxygen put on it came up to 86% and now it is 96%.  Explained the importance of him staying on oxygen and not to drop below 92%   O2 Sat: education provided  Sat levels, Monitoring frequency, When to seek care   O2 Sat education comments  Educated to keep sats at 92% and above.  If drops below with oxygen and at rest he needs to go to ER.   Psychosocial issues?  No   Did the patient receive a copy of their discharge instructions?  Yes   Nursing interventions  Reviewed instructions with patient "   What is the patient's perception of their health status since discharge?  Same   Nursing interventions  Nurse provided patient education   Is the patient weighing daily?  No   Does the patient have scales?  Yes   Daily weight interventions  Education provided on importance of daily weight   Is the patient able to teach back Heart Failure diet management?  Yes   Is the patient able to teach back Heart Failure Zones?  Yes   Is the patient able to teach back signs and symptoms of worsening condition? (i.e. weight gain, shortness of air, etc.)  Yes   If the patient is a current smoker, are they able to teach back resources for cessation?  Not a smoker   Is the patient/caregiver able to teach back the hierarchy of who to call/visit for symptoms/problems? PCP, Specialist, Home health nurse, Urgent Care, ED, 911  Yes   Additional teach back comments  States he does not weigh daily but they will start this.  Educated that if he should have a 2lb weight gain or greater in one day to contact his cardiologist.  If more than 5lbs in a week to also contact them.  Voiced her understanding and will start him doing this.     TCM call completed?  Yes   Wrap up additional comments  Pt to keep oxygen on at all times and to weight self daily.           Anastacia Baxter LPN    12/15/2020, 11:14 EST

## 2020-12-15 NOTE — OUTREACH NOTE
Prep Survey      Responses   [facility] patient discharged from?  [facility]   Is LACE score < 7 ?  No   Eligibility  [facility]   Date of Admission  12/08/20   Date of Discharge  12/14/20   Discharge Disposition  Home-Health Care Sv   Discharge diagnosis  Acute on chronic heart failure with preserved ejection fraction    Does the patient have one of the following disease processes/diagnoses(primary or secondary)?  CHF   Does the patient have Home health ordered?  Yes   What is the [facility] health agency?   [facility]   Is there a DME ordered?  Yes   What DME was ordered?  [place] [facility]   Prep survey completed?  Yes          Stella Lima RN

## 2020-12-15 NOTE — PROGRESS NOTES
Case Management Discharge Note      Final [facility]         Selected Continued Care - Discharged on 12/14/2020 Admission date: 12/8/2020 - Discharge disposition: Home-Health Care Svc    Destination    No services have been selected for the patient.              Durable Medical Equipment Coordination complete    Service Provider Selected Services Address Phone Fax Patient Preferred    [facility]  Durable Medical Equipment [address], [place] [address] [phone] --          Dialysis/Infusion    No services have been selected for the patient.              Home Medical Care Coordination complete    Service Provider Selected Services Address Phone Fax Patient Preferred    [facility] [place] [address] [phone] --                       Final Discharge Disposition Code: 06 - home with home health care

## 2020-12-21 NOTE — OUTREACH NOTE
CHF Week 2 Survey      Responses   [facility] patient discharged from?  [facility]   Does the patient have one of the following disease processes/diagnoses(primary or secondary)?  CHF   Week 2 attempt successful?  Yes   Call start time  1717   Call end time  1726   Discharge diagnosis  Acute on chronic heart failure with preserved ejection fraction    Is patient permission given to speak with other caregiver?  Yes   List who call center can speak with  wife   Person spoke with today (if not patient) and relationship  Wife   Meds reviewed with patient/caregiver?  Yes   Is the patient taking all medications as directed (includes completed medication regime)?  Yes   Medication comments  Pt c/o having >12 trips to the bathroom during the night. He is exhausted & so is wife. HH told them they could take the pm dose of lasix earlier in afternoon to try and avoid multiple urination trips during sleep time. They plan to take around 4pm to see if that helps.    Has the patient kept scheduled appointments due by today?  N/A   What is the Home health agency?   [facility]   Has home health visited the patient within 72 hours of discharge?  Yes   What DME was ordered?  [place] [facility]   Pulse Ox monitoring  Intermittent   Pulse Ox device source  Patient   O2 Sat comments  on RA the pt drops to 70's. O2 @3L 98%.    Psychosocial issues?  No   Comments  Per wife, the pt is imroving. Pt does much better when he wears the O2 per report.    What is the patient's perception of their health status since discharge?  Improving   Is the patient weighing daily?  No   Daily weight interventions  Education provided on importance of daily weight   Is the patient able to teach back Heart Failure Zones?  Yes   Is the patient able to teach back signs and symptoms of worsening condition? (i.e. weight gain, shortness of air, etc.)  Yes   CHF Week 2 call completed?  Yes          Ashley Bryant RN

## 2020-12-23 PROBLEM — R09.02 HYPOXEMIA: Status: ACTIVE | Noted: 2020-01-01
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
## 2020-12-23 PROBLEM — R06.00 DYSPNEA: Status: RESOLVED | Noted: 2020-01-01 | Resolved: 2020-01-01

## 2020-12-23 PROBLEM — I25.5 ISCHEMIC CARDIOMYOPATHY: Status: RESOLVED | Noted: 2019-07-03 | Resolved: 2020-01-01

## 2020-12-23 PROBLEM — J13 PNEUMONIA DUE TO STREPTOCOCCUS PNEUMONIAE (HCC): Status: ACTIVE | Noted: 2020-02-07

## 2020-12-23 NOTE — PROGRESS NOTES
Subjective   Cali Santoro is a 85 y.o. male.     Chief Complaint   Patient presents with   • Shortness of Breath     started a month and a half ago       HPI  Chief complaint: Shortness of breath pneumonia COPD heart failure atrial fibrillation    The patient is an 85-year-old white male comes in for hospital follow-up.    Patient recently went to the emergency room because of increased shortness of breath that developed over the previous several weeks.  Patient was evaluated in the emergency room and subsequently admitted.  Patient initially was felt to have acute on chronic heart failure with normal ejection fraction.  Patient was diuresed.    Evaluation also revealed the patient may have pneumonia.  Patient's urine pneumococcal antigen was positive.  The patient was treated appropriately for pneumococcal pneumonia with antibiotics.    Patient was also found to be hypoxemic.  Specific cause for his hypoxemia was not found.  Patient was treated with oxygen was discharged home on oxygen.  Prior to being admitted to the hospital the patient had not been on oxygen.  Patient's wife states that as long as he is on oxygen his O2 sats are okay but if he stops his oxygen or he does not use it his O2 sats dropped into the 70s.    With diuresis the patient sustained worsening renal function.  His baseline creatinine is around 1.9.  Creatinine went up to 2.4.  On discharge his creatinine was 2.4.  Patient's been on Lasix 40 mg twice a day.  I recommended increasing the Lasix to 60 mg daily.  With decreasing his Lasix his renal function improved but is not was not at baseline when he was discharged home.    Patient does have a history of coronary artery disease.  The patient is on aspirin and Coreg.  Patient denied chest pain orthopnea or PND.  Patient had an echocardiogram while in the hospital which revealed normal left ventricular ejection fraction.  It did reveal diastolic dysfunction of the heart.  It did reveal normal  "rhexis ventricular systolic pressures.    Patient also has history atrial fibrillation.  Is on Coreg Lanoxin and Coumadin.  He denied episodes of rapid or slow heart rhythm heart palpitations.    Patient also has a history of COPD.  He has not had pulmonary functions done recently.  Patient is on theophylline.  Patient does require oxygen as outlined above.        The following portions of the patient's history were reviewed and updated as appropriate: allergies, current medications, past family history, past medical history, past social history, past surgical history and problem list.    Review of Systems    Objective     /75 (BP Location: Left arm, Patient Position: Sitting, Cuff Size: Adult)   Pulse 70   Temp 97.1 °F (36.2 °C) (Infrared)   Resp 22   Ht 170.2 cm (67\")   Wt 73.9 kg (163 lb)   SpO2 100% Comment: 4 liters of oxygen  BMI 25.53 kg/m²     Physical Exam  Vitals signs and nursing note reviewed.   Constitutional:       Appearance: He is well-developed and normal weight.   HENT:      Head: Normocephalic and atraumatic.      Nose: Nose normal.      Mouth/Throat:      Mouth: Mucous membranes are moist.      Pharynx: Oropharynx is clear.   Eyes:      Conjunctiva/sclera: Conjunctivae normal.      Pupils: Pupils are equal, round, and reactive to light.   Neck:      Musculoskeletal: Normal range of motion and neck supple.   Cardiovascular:      Rate and Rhythm: Normal rate and regular rhythm.      Heart sounds: Normal heart sounds.      Comments: No JVD  Pulmonary:      Effort: Pulmonary effort is normal.      Breath sounds: Normal breath sounds.   Abdominal:      General: Abdomen is flat. Bowel sounds are normal.      Palpations: Abdomen is soft.   Musculoskeletal: Normal range of motion.      Right lower leg: No edema.      Left lower leg: No edema.   Skin:     General: Skin is warm and dry.   Neurological:      Mental Status: He is alert and oriented to person, place, and time.   Psychiatric:    "      Behavior: Behavior normal.         Thought Content: Thought content normal.         Judgment: Judgment normal.           Assessment/Plan   Diagnoses and all orders for this visit:    1. Pneumonia due to Streptococcus pneumoniae, unspecified laterality, unspecified part of lung (CMS/[facility]) (Primary)  -     XR Chest 2 View; Future  -     Full Pulmonary Function Test With Bronchodilator; Future    2. Essential hypertension  -     CBC & Differential; Future  -     Basic Metabolic Panel; Future    3. Chronic obstructive pulmonary disease, unspecified COPD type (CMS/[facility])  -     Full Pulmonary Function Test With Bronchodilator; Future    4. Hypoxemia    5. Acute on chronic heart failure with preserved ejection fraction (CMS/[facility])    6. Permanent atrial fibrillation (CMS/[facility])    7. Coronary artery disease of native artery of native heart with stable angina pectoris (CMS/[facility])    Other orders  -     furosemide (LASIX) 40 MG tablet; Take 1 tablet by mouth in the am and 1/2 tablet by mouth in the pm  Dispense: 60 tablet; Refill: 0      Patient Instructions   Decrease the dose of the lasix to 60 mgm a day.    Have the follow up labs and the xray and the PFTs done.    Follow up in the office in 2 weeks.    Further care will depend on the test results and how you progress.      Yusef Sosa Jr., MD    12/23/20

## 2020-12-23 NOTE — PATIENT INSTRUCTIONS
Decrease the dose of the lasix to 60 mgm a day.    Have the follow up labs and the xray and the PFTs done.    Follow up in the office in 2 weeks.    Further care will depend on the test results and how you progress.

## 2021-01-01 ENCOUNTER — APPOINTMENT (OUTPATIENT)
Dept: CT IMAGING | Facility: HOSPITAL | Age: 86
End: 2021-01-01

## 2021-01-01 ENCOUNTER — OFFICE VISIT (OUTPATIENT)
Dept: FAMILY MEDICINE CLINIC | Facility: CLINIC | Age: 86
End: 2021-01-01

## 2021-01-01 ENCOUNTER — OFFICE VISIT (OUTPATIENT)
Dept: CARDIOLOGY | Facility: CLINIC | Age: 86
End: 2021-01-01

## 2021-01-01 ENCOUNTER — TRANSCRIBE ORDERS (OUTPATIENT)
Dept: CARDIAC REHAB | Facility: HOSPITAL | Age: 86
End: 2021-01-01

## 2021-01-01 ENCOUNTER — APPOINTMENT (OUTPATIENT)
Dept: GENERAL RADIOLOGY | Facility: HOSPITAL | Age: 86
End: 2021-01-01

## 2021-01-01 ENCOUNTER — HOSPITAL ENCOUNTER (OUTPATIENT)
Dept: GENERAL RADIOLOGY | Facility: HOSPITAL | Age: 86
Discharge: HOME | End: 2021-03-02

## 2021-01-01 ENCOUNTER — APPOINTMENT (OUTPATIENT)
Dept: RESPIRATORY THERAPY | Facility: HOSPITAL | Age: 86
End: 2021-01-01

## 2021-01-01 ENCOUNTER — HOSPITAL ENCOUNTER (OUTPATIENT)
Dept: SLEEP MEDICINE | Facility: HOSPITAL | Age: 86
Discharge: HOME | End: 2021-04-04

## 2021-01-01 ENCOUNTER — APPOINTMENT (OUTPATIENT)
Dept: CARDIAC REHAB | Facility: HOSPITAL | Age: 86
End: 2021-01-01

## 2021-01-01 ENCOUNTER — CLINICAL SUPPORT NO REQUIREMENTS (OUTPATIENT)
Dept: CARDIOLOGY | Facility: CLINIC | Age: 86
End: 2021-01-01

## 2021-01-01 ENCOUNTER — TELEPHONE (OUTPATIENT)
Dept: FAMILY MEDICINE CLINIC | Facility: CLINIC | Age: 86
End: 2021-01-01

## 2021-01-01 ENCOUNTER — TRANSCRIBE ORDERS (OUTPATIENT)
Dept: LAB | Facility: HOSPITAL | Age: 86
End: 2021-01-01

## 2021-01-01 ENCOUNTER — TRANSITIONAL CARE MANAGEMENT TELEPHONE ENCOUNTER (OUTPATIENT)
Dept: CALL CENTER | Facility: HOSPITAL | Age: 86
End: 2021-01-01

## 2021-01-01 ENCOUNTER — HOSPITAL ENCOUNTER (INPATIENT)
Facility: HOSPITAL | Age: 86
LOS: 4 days | Discharge: HOSPICE | End: 2021-03-18
Attending: INTERNAL MEDICINE | Admitting: HOSPITALIST

## 2021-01-01 ENCOUNTER — LAB (OUTPATIENT)
Dept: LAB | Facility: HOSPITAL | Age: 86
End: 2021-01-01

## 2021-01-01 ENCOUNTER — READMISSION MANAGEMENT (OUTPATIENT)
Dept: CALL CENTER | Facility: HOSPITAL | Age: 86
End: 2021-01-01

## 2021-01-01 ENCOUNTER — TELEPHONE (OUTPATIENT)
Dept: ENDOCRINOLOGY | Facility: CLINIC | Age: 86
End: 2021-01-01

## 2021-01-01 ENCOUNTER — APPOINTMENT (OUTPATIENT)
Dept: CARDIOLOGY | Facility: HOSPITAL | Age: 86
End: 2021-01-01

## 2021-01-01 ENCOUNTER — TELEPHONE (OUTPATIENT)
Dept: CARDIAC REHAB | Facility: HOSPITAL | Age: 86
End: 2021-01-01

## 2021-01-01 ENCOUNTER — TELEPHONE (OUTPATIENT)
Dept: CARDIOLOGY | Facility: CLINIC | Age: 86
End: 2021-01-01

## 2021-01-01 ENCOUNTER — TRANSCRIBE ORDERS (OUTPATIENT)
Dept: PULMONOLOGY | Facility: HOSPITAL | Age: 86
End: 2021-01-01

## 2021-01-01 ENCOUNTER — HOSPITAL ENCOUNTER (INPATIENT)
Facility: HOSPITAL | Age: 86
LOS: 5 days | Discharge: HOME HEALTH | End: 2021-01-16
Attending: EMERGENCY MEDICINE | Admitting: INTERNAL MEDICINE

## 2021-01-01 ENCOUNTER — OFFICE VISIT (OUTPATIENT)
Dept: ENDOCRINOLOGY | Facility: CLINIC | Age: 86
End: 2021-01-01

## 2021-01-01 ENCOUNTER — ANTICOAGULATION VISIT (OUTPATIENT)
Dept: CARDIOLOGY | Facility: CLINIC | Age: 86
End: 2021-01-01

## 2021-01-01 ENCOUNTER — HOSPITAL ENCOUNTER (OUTPATIENT)
Dept: SLEEP MEDICINE | Facility: HOSPITAL | Age: 86
Discharge: HOME | End: 2021-03-09
Admitting: INTERNAL MEDICINE

## 2021-01-01 VITALS
SYSTOLIC BLOOD PRESSURE: 107 MMHG | RESPIRATION RATE: 18 BRPM | WEIGHT: 179.23 LBS | HEART RATE: 81 BPM | BODY MASS INDEX: 28.13 KG/M2 | HEIGHT: 67 IN | TEMPERATURE: 97.9 F | DIASTOLIC BLOOD PRESSURE: 68 MMHG | OXYGEN SATURATION: 96 %

## 2021-01-01 VITALS
WEIGHT: 171 LBS | DIASTOLIC BLOOD PRESSURE: 66 MMHG | WEIGHT: 169.09 LBS | HEART RATE: 63 BPM | HEIGHT: 67 IN | BODY MASS INDEX: 26.54 KG/M2 | RESPIRATION RATE: 18 BRPM | TEMPERATURE: 97.8 F | BODY MASS INDEX: 26.78 KG/M2 | DIASTOLIC BLOOD PRESSURE: 63 MMHG | OXYGEN SATURATION: 95 % | HEART RATE: 82 BPM | SYSTOLIC BLOOD PRESSURE: 115 MMHG | SYSTOLIC BLOOD PRESSURE: 123 MMHG

## 2021-01-01 VITALS
TEMPERATURE: 97.4 F | BODY MASS INDEX: 26.84 KG/M2 | SYSTOLIC BLOOD PRESSURE: 115 MMHG | HEIGHT: 67 IN | WEIGHT: 171 LBS | DIASTOLIC BLOOD PRESSURE: 63 MMHG | HEART RATE: 82 BPM

## 2021-01-01 VITALS
RESPIRATION RATE: 18 BRPM | WEIGHT: 171 LBS | DIASTOLIC BLOOD PRESSURE: 85 MMHG | OXYGEN SATURATION: 92 % | BODY MASS INDEX: 26.84 KG/M2 | TEMPERATURE: 96.8 F | HEIGHT: 67 IN | HEART RATE: 75 BPM | SYSTOLIC BLOOD PRESSURE: 148 MMHG

## 2021-01-01 VITALS
SYSTOLIC BLOOD PRESSURE: 118 MMHG | OXYGEN SATURATION: 68 % | TEMPERATURE: 96.9 F | HEIGHT: 67 IN | BODY MASS INDEX: 26.68 KG/M2 | WEIGHT: 170 LBS | HEART RATE: 70 BPM | DIASTOLIC BLOOD PRESSURE: 68 MMHG

## 2021-01-01 VITALS
RESPIRATION RATE: 18 BRPM | BODY MASS INDEX: 26.97 KG/M2 | DIASTOLIC BLOOD PRESSURE: 73 MMHG | HEART RATE: 70 BPM | TEMPERATURE: 96.9 F | WEIGHT: 171.8 LBS | SYSTOLIC BLOOD PRESSURE: 128 MMHG | HEIGHT: 67 IN | OXYGEN SATURATION: 94 %

## 2021-01-01 VITALS
BODY MASS INDEX: 28.22 KG/M2 | HEIGHT: 67 IN | WEIGHT: 179.8 LBS | TEMPERATURE: 97.1 F | RESPIRATION RATE: 16 BRPM | DIASTOLIC BLOOD PRESSURE: 87 MMHG | SYSTOLIC BLOOD PRESSURE: 157 MMHG | OXYGEN SATURATION: 93 % | HEART RATE: 70 BPM

## 2021-01-01 VITALS — BODY MASS INDEX: 26.85 KG/M2 | HEIGHT: 67 IN | WEIGHT: 171.08 LBS

## 2021-01-01 DIAGNOSIS — D63.1 ANEMIA OF CHRONIC RENAL FAILURE, UNSPECIFIED CKD STAGE: ICD-10-CM

## 2021-01-01 DIAGNOSIS — N18.30 STAGE 3 CHRONIC KIDNEY DISEASE, UNSPECIFIED WHETHER STAGE 3A OR 3B CKD (HCC): ICD-10-CM

## 2021-01-01 DIAGNOSIS — N18.9 ANEMIA OF CHRONIC RENAL FAILURE, UNSPECIFIED CKD STAGE: ICD-10-CM

## 2021-01-01 DIAGNOSIS — I50.33 ACUTE ON CHRONIC HEART FAILURE WITH PRESERVED EJECTION FRACTION (HCC): Chronic | ICD-10-CM

## 2021-01-01 DIAGNOSIS — J18.9 HCAP (HEALTHCARE-ASSOCIATED PNEUMONIA): Chronic | ICD-10-CM

## 2021-01-01 DIAGNOSIS — I48.21 PERMANENT ATRIAL FIBRILLATION (HCC): Chronic | ICD-10-CM

## 2021-01-01 DIAGNOSIS — J90 BILATERAL PLEURAL EFFUSION: ICD-10-CM

## 2021-01-01 DIAGNOSIS — D63.1 ANEMIA DUE TO STAGE 3 CHRONIC KIDNEY DISEASE, UNSPECIFIED WHETHER STAGE 3A OR 3B CKD (HCC): Chronic | ICD-10-CM

## 2021-01-01 DIAGNOSIS — J13 PNEUMONIA DUE TO STREPTOCOCCUS PNEUMONIAE, UNSPECIFIED LATERALITY, UNSPECIFIED PART OF LUNG (HCC): Primary | ICD-10-CM

## 2021-01-01 DIAGNOSIS — E11.65 TYPE 2 DIABETES MELLITUS WITH HYPERGLYCEMIA, WITHOUT LONG-TERM CURRENT USE OF INSULIN (HCC): Chronic | ICD-10-CM

## 2021-01-01 DIAGNOSIS — N18.30 STAGE 3 CHRONIC KIDNEY DISEASE, UNSPECIFIED WHETHER STAGE 3A OR 3B CKD (HCC): Primary | ICD-10-CM

## 2021-01-01 DIAGNOSIS — E11.65 TYPE 2 DIABETES MELLITUS WITH HYPERGLYCEMIA, WITHOUT LONG-TERM CURRENT USE OF INSULIN (HCC): ICD-10-CM

## 2021-01-01 DIAGNOSIS — J44.9 CHRONIC OBSTRUCTIVE PULMONARY DISEASE, UNSPECIFIED COPD TYPE (HCC): Chronic | ICD-10-CM

## 2021-01-01 DIAGNOSIS — Z95.0 PRESENCE OF CARDIAC PACEMAKER: Primary | ICD-10-CM

## 2021-01-01 DIAGNOSIS — N18.32 STAGE 3B CHRONIC KIDNEY DISEASE (HCC): ICD-10-CM

## 2021-01-01 DIAGNOSIS — E78.2 MIXED HYPERLIPIDEMIA: ICD-10-CM

## 2021-01-01 DIAGNOSIS — J44.9 CHRONIC OBSTRUCTIVE PULMONARY DISEASE, UNSPECIFIED COPD TYPE (HCC): Primary | ICD-10-CM

## 2021-01-01 DIAGNOSIS — J44.9 CHRONIC OBSTRUCTIVE PULMONARY DISEASE, UNSPECIFIED COPD TYPE (HCC): ICD-10-CM

## 2021-01-01 DIAGNOSIS — G47.33 OSA (OBSTRUCTIVE SLEEP APNEA): Primary | ICD-10-CM

## 2021-01-01 DIAGNOSIS — I48.21 PERMANENT ATRIAL FIBRILLATION (HCC): ICD-10-CM

## 2021-01-01 DIAGNOSIS — Z95.0 PRESENCE OF CARDIAC PACEMAKER: ICD-10-CM

## 2021-01-01 DIAGNOSIS — F41.9 ANXIETY: ICD-10-CM

## 2021-01-01 DIAGNOSIS — I50.9 ACUTE ON CHRONIC CONGESTIVE HEART FAILURE, UNSPECIFIED HEART FAILURE TYPE (HCC): ICD-10-CM

## 2021-01-01 DIAGNOSIS — I48.21 PERMANENT ATRIAL FIBRILLATION (HCC): Primary | ICD-10-CM

## 2021-01-01 DIAGNOSIS — I10 ESSENTIAL HYPERTENSION: Chronic | ICD-10-CM

## 2021-01-01 DIAGNOSIS — J44.9 CHRONIC OBSTRUCTIVE PULMONARY DISEASE, UNSPECIFIED COPD TYPE (HCC): Primary | Chronic | ICD-10-CM

## 2021-01-01 DIAGNOSIS — E78.2 MIXED HYPERLIPIDEMIA: Chronic | ICD-10-CM

## 2021-01-01 DIAGNOSIS — R09.02 HYPOXEMIA: ICD-10-CM

## 2021-01-01 DIAGNOSIS — Z51.5 HOSPICE CARE: ICD-10-CM

## 2021-01-01 DIAGNOSIS — G47.30 SLEEP APNEA, UNSPECIFIED TYPE: ICD-10-CM

## 2021-01-01 DIAGNOSIS — G47.33 OSA (OBSTRUCTIVE SLEEP APNEA): ICD-10-CM

## 2021-01-01 DIAGNOSIS — J42 CHRONIC BRONCHITIS, UNSPECIFIED CHRONIC BRONCHITIS TYPE (HCC): ICD-10-CM

## 2021-01-01 DIAGNOSIS — I25.118 CORONARY ARTERY DISEASE OF NATIVE ARTERY OF NATIVE HEART WITH STABLE ANGINA PECTORIS (HCC): Chronic | ICD-10-CM

## 2021-01-01 DIAGNOSIS — I50.33 ACUTE ON CHRONIC HEART FAILURE WITH PRESERVED EJECTION FRACTION (HCC): ICD-10-CM

## 2021-01-01 DIAGNOSIS — N17.9 ACUTE KIDNEY INJURY (HCC): ICD-10-CM

## 2021-01-01 DIAGNOSIS — I10 ESSENTIAL HYPERTENSION: ICD-10-CM

## 2021-01-01 DIAGNOSIS — J18.9 HCAP (HEALTHCARE-ASSOCIATED PNEUMONIA): Primary | Chronic | ICD-10-CM

## 2021-01-01 DIAGNOSIS — E11.65 TYPE 2 DIABETES MELLITUS WITH HYPERGLYCEMIA, WITHOUT LONG-TERM CURRENT USE OF INSULIN (HCC): Primary | ICD-10-CM

## 2021-01-01 DIAGNOSIS — I25.118 CORONARY ARTERY DISEASE OF NATIVE ARTERY OF NATIVE HEART WITH STABLE ANGINA PECTORIS (HCC): ICD-10-CM

## 2021-01-01 DIAGNOSIS — R73.9 HYPERGLYCEMIA: ICD-10-CM

## 2021-01-01 DIAGNOSIS — A41.9 SEPSIS, DUE TO UNSPECIFIED ORGANISM, UNSPECIFIED WHETHER ACUTE ORGAN DYSFUNCTION PRESENT (HCC): ICD-10-CM

## 2021-01-01 DIAGNOSIS — J18.9 PNEUMONIA DUE TO INFECTIOUS ORGANISM, UNSPECIFIED LATERALITY, UNSPECIFIED PART OF LUNG: ICD-10-CM

## 2021-01-01 DIAGNOSIS — N18.30 ACUTE RENAL FAILURE SUPERIMPOSED ON STAGE 3 CHRONIC KIDNEY DISEASE, UNSPECIFIED ACUTE RENAL FAILURE TYPE, UNSPECIFIED WHETHER STAGE 3A OR 3B CKD (HCC): ICD-10-CM

## 2021-01-01 DIAGNOSIS — N17.9 ACUTE RENAL FAILURE SUPERIMPOSED ON STAGE 3 CHRONIC KIDNEY DISEASE, UNSPECIFIED ACUTE RENAL FAILURE TYPE, UNSPECIFIED WHETHER STAGE 3A OR 3B CKD (HCC): ICD-10-CM

## 2021-01-01 DIAGNOSIS — Z01.818 OTHER SPECIFIED PRE-OPERATIVE EXAMINATION: Primary | ICD-10-CM

## 2021-01-01 DIAGNOSIS — R06.00 DYSPNEA, UNSPECIFIED TYPE: ICD-10-CM

## 2021-01-01 DIAGNOSIS — R06.02 SHORTNESS OF BREATH: Primary | ICD-10-CM

## 2021-01-01 DIAGNOSIS — Z01.818 OTHER SPECIFIED PRE-OPERATIVE EXAMINATION: ICD-10-CM

## 2021-01-01 DIAGNOSIS — J43.9 PULMONARY EMPHYSEMA, UNSPECIFIED EMPHYSEMA TYPE (HCC): Primary | ICD-10-CM

## 2021-01-01 DIAGNOSIS — G47.30 SLEEP APNEA, UNSPECIFIED TYPE: Chronic | ICD-10-CM

## 2021-01-01 DIAGNOSIS — N18.30 ANEMIA DUE TO STAGE 3 CHRONIC KIDNEY DISEASE, UNSPECIFIED WHETHER STAGE 3A OR 3B CKD (HCC): Chronic | ICD-10-CM

## 2021-01-01 LAB
ALBUMIN SERPL-MCNC: 3.5 G/DL (ref 3.5–5.2)
ALBUMIN SERPL-MCNC: 3.5 G/DL (ref 3.5–5.2)
ALBUMIN SERPL-MCNC: 3.8 G/DL (ref 3.5–5.2)
ALBUMIN SERPL-MCNC: 3.9 G/DL (ref 3.5–5.2)
ALBUMIN SERPL-MCNC: 3.9 G/DL (ref 3.5–5.2)
ALBUMIN SERPL-MCNC: 4 G/DL (ref 3.5–5.2)
ALBUMIN/GLOB SERPL: 1.3 G/DL
ALBUMIN/GLOB SERPL: 1.3 G/DL
ALBUMIN/GLOB SERPL: 1.6 G/DL
ALP SERPL-CCNC: 75 U/L (ref 39–117)
ALP SERPL-CCNC: 77 U/L (ref 39–117)
ALP SERPL-CCNC: 79 U/L (ref 39–117)
ALT SERPL W P-5'-P-CCNC: 17 U/L (ref 1–41)
ALT SERPL W P-5'-P-CCNC: 17 U/L (ref 1–41)
ALT SERPL W P-5'-P-CCNC: 49 U/L (ref 1–41)
ANION GAP SERPL CALCULATED.3IONS-SCNC: 10 MMOL/L (ref 5–15)
ANION GAP SERPL CALCULATED.3IONS-SCNC: 11 MMOL/L (ref 5–15)
ANION GAP SERPL CALCULATED.3IONS-SCNC: 11 MMOL/L (ref 5–15)
ANION GAP SERPL CALCULATED.3IONS-SCNC: 11.9 MMOL/L (ref 5–15)
ANION GAP SERPL CALCULATED.3IONS-SCNC: 12 MMOL/L (ref 5–15)
ANION GAP SERPL CALCULATED.3IONS-SCNC: 13 MMOL/L (ref 5–15)
ANION GAP SERPL CALCULATED.3IONS-SCNC: 13 MMOL/L (ref 5–15)
ANION GAP SERPL CALCULATED.3IONS-SCNC: 15 MMOL/L (ref 5–15)
ANION GAP SERPL CALCULATED.3IONS-SCNC: 9 MMOL/L (ref 5–15)
ANISOCYTOSIS BLD QL: NORMAL
APTT PPP: 27.9 SECONDS (ref 24–31)
APTT PPP: 28.6 SECONDS (ref 24–31)
ARTERIAL PATENCY WRIST A: POSITIVE
ARTERIAL PATENCY WRIST A: POSITIVE
AST SERPL-CCNC: 13 U/L (ref 1–40)
AST SERPL-CCNC: 18 U/L (ref 1–40)
AST SERPL-CCNC: 70 U/L (ref 1–40)
ATMOSPHERIC PRESS: ABNORMAL MM[HG]
ATMOSPHERIC PRESS: ABNORMAL MM[HG]
B PARAPERT DNA SPEC QL NAA+PROBE: NOT DETECTED
B PERT DNA SPEC QL NAA+PROBE: NOT DETECTED
BACTERIA SPEC AEROBE CULT: NORMAL
BASE EXCESS BLDA CALC-SCNC: -1.1 MMOL/L (ref 0–3)
BASE EXCESS BLDA CALC-SCNC: 2.5 MMOL/L (ref 0–3)
BASOPHILS # BLD AUTO: 0 10*3/MM3 (ref 0–0.2)
BASOPHILS # BLD AUTO: 0.07 10*3/MM3 (ref 0–0.2)
BASOPHILS # BLD AUTO: 0.09 10*3/MM3 (ref 0–0.2)
BASOPHILS # BLD AUTO: 0.1 10*3/MM3 (ref 0–0.2)
BASOPHILS NFR BLD AUTO: 0 % (ref 0–1.5)
BASOPHILS NFR BLD AUTO: 0.1 % (ref 0–1.5)
BASOPHILS NFR BLD AUTO: 0.1 % (ref 0–1.5)
BASOPHILS NFR BLD AUTO: 0.3 % (ref 0–1.5)
BASOPHILS NFR BLD AUTO: 0.4 % (ref 0–1.5)
BASOPHILS NFR BLD AUTO: 0.5 % (ref 0–1.5)
BASOPHILS NFR BLD AUTO: 0.6 % (ref 0–1.5)
BASOPHILS NFR BLD AUTO: 0.6 % (ref 0–1.5)
BASOPHILS NFR BLD AUTO: 0.7 % (ref 0–1.5)
BASOPHILS NFR BLD AUTO: 0.7 % (ref 0–1.5)
BASOPHILS NFR BLD AUTO: 0.8 % (ref 0–1.5)
BASOPHILS NFR BLD AUTO: 0.8 % (ref 0–1.5)
BASOPHILS NFR BLD AUTO: 0.9 % (ref 0–1.5)
BDY SITE: ABNORMAL
BDY SITE: ABNORMAL
BH CV ECHO MEAS - ACS: 1.1 CM
BH CV ECHO MEAS - AO MAX PG (FULL): 14.3 MMHG
BH CV ECHO MEAS - AO MAX PG: 16.8 MMHG
BH CV ECHO MEAS - AO MEAN PG (FULL): 7.2 MMHG
BH CV ECHO MEAS - AO MEAN PG: 8.6 MMHG
BH CV ECHO MEAS - AO ROOT AREA (BSA CORRECTED): 1.5
BH CV ECHO MEAS - AO ROOT AREA: 6.6 CM^2
BH CV ECHO MEAS - AO ROOT DIAM: 2.9 CM
BH CV ECHO MEAS - AO V2 MAX: 204.9 CM/SEC
BH CV ECHO MEAS - AO V2 MEAN: 133.6 CM/SEC
BH CV ECHO MEAS - AO V2 VTI: 39.5 CM
BH CV ECHO MEAS - ASC AORTA: 2.8 CM
BH CV ECHO MEAS - AVA(I,A): 0.74 CM^2
BH CV ECHO MEAS - AVA(I,D): 0.74 CM^2
BH CV ECHO MEAS - AVA(V,A): 0.75 CM^2
BH CV ECHO MEAS - AVA(V,D): 0.75 CM^2
BH CV ECHO MEAS - BSA(HAYCOCK): 2 M^2
BH CV ECHO MEAS - BSA: 1.9 M^2
BH CV ECHO MEAS - BZI_BMI: 28 KILOGRAMS/M^2
BH CV ECHO MEAS - BZI_METRIC_HEIGHT: 170.2 CM
BH CV ECHO MEAS - BZI_METRIC_WEIGHT: 81.2 KG
BH CV ECHO MEAS - EDV(CUBED): 39.7 ML
BH CV ECHO MEAS - EDV(MOD-SP4): 56 ML
BH CV ECHO MEAS - EDV(TEICH): 47.8 ML
BH CV ECHO MEAS - EF(CUBED): 80.4 %
BH CV ECHO MEAS - EF(MOD-SP4): 69.7 %
BH CV ECHO MEAS - EF(TEICH): 74 %
BH CV ECHO MEAS - ESV(CUBED): 7.8 ML
BH CV ECHO MEAS - ESV(MOD-SP4): 17 ML
BH CV ECHO MEAS - ESV(TEICH): 12.4 ML
BH CV ECHO MEAS - FS: 41.9 %
BH CV ECHO MEAS - IVS/LVPW: 0.76
BH CV ECHO MEAS - IVSD: 1.5 CM
BH CV ECHO MEAS - LA DIMENSION(2D): 5 CM
BH CV ECHO MEAS - LV DIASTOLIC VOL/BSA (35-75): 29 ML/M^2
BH CV ECHO MEAS - LV MASS(C)D: 235.2 GRAMS
BH CV ECHO MEAS - LV MASS(C)DI: 121.9 GRAMS/M^2
BH CV ECHO MEAS - LV MAX PG: 2.5 MMHG
BH CV ECHO MEAS - LV MEAN PG: 1.4 MMHG
BH CV ECHO MEAS - LV SYSTOLIC VOL/BSA (12-30): 8.8 ML/M^2
BH CV ECHO MEAS - LV V1 MAX: 79.8 CM/SEC
BH CV ECHO MEAS - LV V1 MEAN: 53.6 CM/SEC
BH CV ECHO MEAS - LV V1 VTI: 15.1 CM
BH CV ECHO MEAS - LVIDD: 3.4 CM
BH CV ECHO MEAS - LVIDS: 2 CM
BH CV ECHO MEAS - LVOT AREA: 1.9 CM^2
BH CV ECHO MEAS - LVOT DIAM: 1.6 CM
BH CV ECHO MEAS - LVPWD: 2 CM
BH CV ECHO MEAS - MR MAX PG: 18 MMHG
BH CV ECHO MEAS - MR MAX VEL: 212 CM/SEC
BH CV ECHO MEAS - MV DEC TIME: 0.22 SEC
BH CV ECHO MEAS - MV E MAX VEL: 117.1 CM/SEC
BH CV ECHO MEAS - MV MAX PG: 5.9 MMHG
BH CV ECHO MEAS - MV MEAN PG: 1.8 MMHG
BH CV ECHO MEAS - MV V2 MAX: 121.5 CM/SEC
BH CV ECHO MEAS - MV V2 MEAN: 58.4 CM/SEC
BH CV ECHO MEAS - MV V2 VTI: 30.7 CM
BH CV ECHO MEAS - MVA(VTI): 0.95 CM^2
BH CV ECHO MEAS - PA ACC TIME: 0.11 SEC
BH CV ECHO MEAS - PA MAX PG (FULL): 1.6 MMHG
BH CV ECHO MEAS - PA MAX PG: 2.8 MMHG
BH CV ECHO MEAS - PA PR(ACCEL): 31.2 MMHG
BH CV ECHO MEAS - PA V2 MAX: 83.5 CM/SEC
BH CV ECHO MEAS - RAP SYSTOLE: 3 MMHG
BH CV ECHO MEAS - RV MAX PG: 1.2 MMHG
BH CV ECHO MEAS - RV MEAN PG: 0.58 MMHG
BH CV ECHO MEAS - RV V1 MAX: 53.8 CM/SEC
BH CV ECHO MEAS - RV V1 MEAN: 35.5 CM/SEC
BH CV ECHO MEAS - RV V1 VTI: 11.8 CM
BH CV ECHO MEAS - RVDD: 4 CM
BH CV ECHO MEAS - RVSP: 22.1 MMHG
BH CV ECHO MEAS - SI(AO): 135.2 ML/M^2
BH CV ECHO MEAS - SI(CUBED): 16.5 ML/M^2
BH CV ECHO MEAS - SI(LVOT): 15.1 ML/M^2
BH CV ECHO MEAS - SI(MOD-SP4): 20.2 ML/M^2
BH CV ECHO MEAS - SI(TEICH): 18.3 ML/M^2
BH CV ECHO MEAS - SV(AO): 260.7 ML
BH CV ECHO MEAS - SV(CUBED): 31.9 ML
BH CV ECHO MEAS - SV(LVOT): 29.1 ML
BH CV ECHO MEAS - SV(MOD-SP4): 39 ML
BH CV ECHO MEAS - SV(TEICH): 35.4 ML
BH CV ECHO MEAS - TR MAX VEL: 214.4 CM/SEC
BILIRUB SERPL-MCNC: 0.4 MG/DL (ref 0–1.2)
BILIRUB SERPL-MCNC: 0.4 MG/DL (ref 0–1.2)
BILIRUB SERPL-MCNC: 0.5 MG/DL (ref 0–1.2)
BILIRUB UR QL STRIP: NEGATIVE
BUN SERPL-MCNC: 26 MG/DL (ref 8–23)
BUN SERPL-MCNC: 28 MG/DL (ref 8–23)
BUN SERPL-MCNC: 29 MG/DL (ref 8–23)
BUN SERPL-MCNC: 29 MG/DL (ref 8–23)
BUN SERPL-MCNC: 32 MG/DL (ref 8–23)
BUN SERPL-MCNC: 33 MG/DL (ref 8–23)
BUN SERPL-MCNC: 39 MG/DL (ref 8–23)
BUN SERPL-MCNC: 40 MG/DL (ref 8–23)
BUN SERPL-MCNC: 45 MG/DL (ref 8–23)
BUN SERPL-MCNC: 47 MG/DL (ref 8–23)
BUN SERPL-MCNC: 59 MG/DL (ref 8–23)
BUN SERPL-MCNC: 59 MG/DL (ref 8–23)
BUN SERPL-MCNC: 66 MG/DL (ref 8–23)
BUN SERPL-MCNC: 66 MG/DL (ref 8–23)
BUN SERPL-MCNC: 68 MG/DL (ref 8–23)
BUN/CREAT SERPL: 12.4 (ref 7–25)
BUN/CREAT SERPL: 12.7 (ref 7–25)
BUN/CREAT SERPL: 13.6 (ref 7–25)
BUN/CREAT SERPL: 13.8 (ref 7–25)
BUN/CREAT SERPL: 14.8 (ref 7–25)
BUN/CREAT SERPL: 15.6 (ref 7–25)
BUN/CREAT SERPL: 16 (ref 7–25)
BUN/CREAT SERPL: 20.4 (ref 7–25)
BUN/CREAT SERPL: 20.5 (ref 7–25)
BUN/CREAT SERPL: 20.5 (ref 7–25)
BUN/CREAT SERPL: 21.1 (ref 7–25)
BUN/CREAT SERPL: 21.3 (ref 7–25)
BUN/CREAT SERPL: 21.8 (ref 7–25)
BUN/CREAT SERPL: 24.3 (ref 7–25)
BUN/CREAT SERPL: 26.3 (ref 7–25)
C PNEUM DNA NPH QL NAA+NON-PROBE: NOT DETECTED
CALCIUM SPEC-SCNC: 8.5 MG/DL (ref 8.6–10.5)
CALCIUM SPEC-SCNC: 8.5 MG/DL (ref 8.6–10.5)
CALCIUM SPEC-SCNC: 8.6 MG/DL (ref 8.6–10.5)
CALCIUM SPEC-SCNC: 9 MG/DL (ref 8.6–10.5)
CALCIUM SPEC-SCNC: 9 MG/DL (ref 8.6–10.5)
CALCIUM SPEC-SCNC: 9.1 MG/DL (ref 8.6–10.5)
CALCIUM SPEC-SCNC: 9.2 MG/DL (ref 8.6–10.5)
CALCIUM SPEC-SCNC: 9.3 MG/DL (ref 8.6–10.5)
CALCIUM SPEC-SCNC: 9.3 MG/DL (ref 8.6–10.5)
CALCIUM SPEC-SCNC: 9.4 MG/DL (ref 8.6–10.5)
CALCIUM SPEC-SCNC: 9.5 MG/DL (ref 8.6–10.5)
CALCIUM SPEC-SCNC: 9.6 MG/DL (ref 8.6–10.5)
CHLORIDE SERPL-SCNC: 100 MMOL/L (ref 98–107)
CHLORIDE SERPL-SCNC: 101 MMOL/L (ref 98–107)
CHLORIDE SERPL-SCNC: 93 MMOL/L (ref 98–107)
CHLORIDE SERPL-SCNC: 93 MMOL/L (ref 98–107)
CHLORIDE SERPL-SCNC: 95 MMOL/L (ref 98–107)
CHLORIDE SERPL-SCNC: 96 MMOL/L (ref 98–107)
CHLORIDE SERPL-SCNC: 97 MMOL/L (ref 98–107)
CHLORIDE SERPL-SCNC: 98 MMOL/L (ref 98–107)
CHLORIDE SERPL-SCNC: 99 MMOL/L (ref 98–107)
CHLORIDE SERPL-SCNC: 99 MMOL/L (ref 98–107)
CK SERPL-CCNC: 34 U/L (ref 20–200)
CLARITY UR: CLEAR
CO2 BLDA-SCNC: 24.7 MMOL/L (ref 22–29)
CO2 BLDA-SCNC: 25.3 MMOL/L (ref 22–29)
CO2 SERPL-SCNC: 22 MMOL/L (ref 22–29)
CO2 SERPL-SCNC: 23 MMOL/L (ref 22–29)
CO2 SERPL-SCNC: 25 MMOL/L (ref 22–29)
CO2 SERPL-SCNC: 25.1 MMOL/L (ref 22–29)
CO2 SERPL-SCNC: 26 MMOL/L (ref 22–29)
CO2 SERPL-SCNC: 26 MMOL/L (ref 22–29)
CO2 SERPL-SCNC: 27 MMOL/L (ref 22–29)
CO2 SERPL-SCNC: 28 MMOL/L (ref 22–29)
CO2 SERPL-SCNC: 29 MMOL/L (ref 22–29)
CO2 SERPL-SCNC: 30 MMOL/L (ref 22–29)
COLOR UR: YELLOW
CREAT SERPL-MCNC: 1.81 MG/DL (ref 0.76–1.27)
CREAT SERPL-MCNC: 1.88 MG/DL (ref 0.76–1.27)
CREAT SERPL-MCNC: 1.91 MG/DL (ref 0.76–1.27)
CREAT SERPL-MCNC: 2.14 MG/DL (ref 0.76–1.27)
CREAT SERPL-MCNC: 2.16 MG/DL (ref 0.76–1.27)
CREAT SERPL-MCNC: 2.2 MG/DL (ref 0.76–1.27)
CREAT SERPL-MCNC: 2.23 MG/DL (ref 0.76–1.27)
CREAT SERPL-MCNC: 2.25 MG/DL (ref 0.76–1.27)
CREAT SERPL-MCNC: 2.52 MG/DL (ref 0.76–1.27)
CREAT SERPL-MCNC: 2.56 MG/DL (ref 0.76–1.27)
CREAT SERPL-MCNC: 2.59 MG/DL (ref 0.76–1.27)
CREAT SERPL-MCNC: 2.72 MG/DL (ref 0.76–1.27)
CREAT SERPL-MCNC: 2.77 MG/DL (ref 0.76–1.27)
CREAT SERPL-MCNC: 2.8 MG/DL (ref 0.76–1.27)
CREAT SERPL-MCNC: 3.22 MG/DL (ref 0.76–1.27)
D DIMER PPP FEU-MCNC: 1.1 MG/L (FEU) (ref 0–0.59)
D-LACTATE SERPL-SCNC: 2 MMOL/L (ref 0.5–2)
DEPRECATED RDW RBC AUTO: 51.5 FL (ref 37–54)
DEPRECATED RDW RBC AUTO: 52 FL (ref 37–54)
DEPRECATED RDW RBC AUTO: 63.4 FL (ref 37–54)
DEPRECATED RDW RBC AUTO: 63.4 FL (ref 37–54)
DEPRECATED RDW RBC AUTO: 63.9 FL (ref 37–54)
DEPRECATED RDW RBC AUTO: 63.9 FL (ref 37–54)
DEPRECATED RDW RBC AUTO: 64.3 FL (ref 37–54)
DEPRECATED RDW RBC AUTO: 64.8 FL (ref 37–54)
DEPRECATED RDW RBC AUTO: 65.6 FL (ref 37–54)
DEPRECATED RDW RBC AUTO: 65.6 FL (ref 37–54)
DEPRECATED RDW RBC AUTO: 66.1 FL (ref 37–54)
DEPRECATED RDW RBC AUTO: 66.1 FL (ref 37–54)
DEPRECATED RDW RBC AUTO: 68.7 FL (ref 37–54)
DEPRECATED RDW RBC AUTO: 70.9 FL (ref 37–54)
EOSINOPHIL # BLD AUTO: 0 10*3/MM3 (ref 0–0.4)
EOSINOPHIL # BLD AUTO: 0.1 10*3/MM3 (ref 0–0.4)
EOSINOPHIL # BLD AUTO: 0.18 10*3/MM3 (ref 0–0.4)
EOSINOPHIL # BLD AUTO: 0.2 10*3/MM3 (ref 0–0.4)
EOSINOPHIL # BLD AUTO: 0.2 10*3/MM3 (ref 0–0.4)
EOSINOPHIL # BLD AUTO: 0.3 10*3/MM3 (ref 0–0.4)
EOSINOPHIL # BLD AUTO: 0.51 10*3/MM3 (ref 0–0.4)
EOSINOPHIL # BLD AUTO: 0.8 10*3/MM3 (ref 0–0.4)
EOSINOPHIL # BLD AUTO: 1 10*3/MM3 (ref 0–0.4)
EOSINOPHIL NFR BLD AUTO: 0 % (ref 0.3–6.2)
EOSINOPHIL NFR BLD AUTO: 0.3 % (ref 0.3–6.2)
EOSINOPHIL NFR BLD AUTO: 0.3 % (ref 0.3–6.2)
EOSINOPHIL NFR BLD AUTO: 1.1 % (ref 0.3–6.2)
EOSINOPHIL NFR BLD AUTO: 1.3 % (ref 0.3–6.2)
EOSINOPHIL NFR BLD AUTO: 1.9 % (ref 0.3–6.2)
EOSINOPHIL NFR BLD AUTO: 2.6 % (ref 0.3–6.2)
EOSINOPHIL NFR BLD AUTO: 3.1 % (ref 0.3–6.2)
EOSINOPHIL NFR BLD AUTO: 4.8 % (ref 0.3–6.2)
EOSINOPHIL NFR BLD AUTO: 7.7 % (ref 0.3–6.2)
EOSINOPHIL NFR BLD AUTO: 8 % (ref 0.3–6.2)
ERYTHROCYTE [DISTWIDTH] IN BLOOD BY AUTOMATED COUNT: 13.7 % (ref 12.3–15.4)
ERYTHROCYTE [DISTWIDTH] IN BLOOD BY AUTOMATED COUNT: 14 % (ref 12.3–15.4)
ERYTHROCYTE [DISTWIDTH] IN BLOOD BY AUTOMATED COUNT: 17.6 % (ref 12.3–15.4)
ERYTHROCYTE [DISTWIDTH] IN BLOOD BY AUTOMATED COUNT: 17.6 % (ref 12.3–15.4)
ERYTHROCYTE [DISTWIDTH] IN BLOOD BY AUTOMATED COUNT: 17.7 % (ref 12.3–15.4)
ERYTHROCYTE [DISTWIDTH] IN BLOOD BY AUTOMATED COUNT: 17.8 % (ref 12.3–15.4)
ERYTHROCYTE [DISTWIDTH] IN BLOOD BY AUTOMATED COUNT: 17.9 % (ref 12.3–15.4)
ERYTHROCYTE [DISTWIDTH] IN BLOOD BY AUTOMATED COUNT: 17.9 % (ref 12.3–15.4)
ERYTHROCYTE [DISTWIDTH] IN BLOOD BY AUTOMATED COUNT: 18 % (ref 12.3–15.4)
ERYTHROCYTE [DISTWIDTH] IN BLOOD BY AUTOMATED COUNT: 18.1 % (ref 12.3–15.4)
ERYTHROCYTE [DISTWIDTH] IN BLOOD BY AUTOMATED COUNT: 18.2 % (ref 12.3–15.4)
ERYTHROCYTE [DISTWIDTH] IN BLOOD BY AUTOMATED COUNT: 18.2 % (ref 12.3–15.4)
ERYTHROCYTE [DISTWIDTH] IN BLOOD BY AUTOMATED COUNT: 18.6 % (ref 12.3–15.4)
ERYTHROCYTE [DISTWIDTH] IN BLOOD BY AUTOMATED COUNT: 19 % (ref 12.3–15.4)
FLUAV SUBTYP SPEC NAA+PROBE: NOT DETECTED
FLUBV RNA ISLT QL NAA+PROBE: NOT DETECTED
GFR SERPL CREATININE-BSD FRML MDRD: 18 ML/MIN/1.73
GFR SERPL CREATININE-BSD FRML MDRD: 22 ML/MIN/1.73
GFR SERPL CREATININE-BSD FRML MDRD: 24 ML/MIN/1.73
GFR SERPL CREATININE-BSD FRML MDRD: 24 ML/MIN/1.73
GFR SERPL CREATININE-BSD FRML MDRD: 25 ML/MIN/1.73
GFR SERPL CREATININE-BSD FRML MDRD: 28 ML/MIN/1.73
GFR SERPL CREATININE-BSD FRML MDRD: 28 ML/MIN/1.73
GFR SERPL CREATININE-BSD FRML MDRD: 29 ML/MIN/1.73
GFR SERPL CREATININE-BSD FRML MDRD: 29 ML/MIN/1.73
GFR SERPL CREATININE-BSD FRML MDRD: 30 ML/MIN/1.73
GFR SERPL CREATININE-BSD FRML MDRD: 34 ML/MIN/1.73
GFR SERPL CREATININE-BSD FRML MDRD: 34 ML/MIN/1.73
GFR SERPL CREATININE-BSD FRML MDRD: 36 ML/MIN/1.73
GLOBULIN UR ELPH-MCNC: 2.5 GM/DL
GLOBULIN UR ELPH-MCNC: 3 GM/DL
GLOBULIN UR ELPH-MCNC: 3.1 GM/DL
GLUCOSE BLDC GLUCOMTR-MCNC: 136 MG/DL (ref 70–105)
GLUCOSE BLDC GLUCOMTR-MCNC: 147 MG/DL (ref 70–105)
GLUCOSE BLDC GLUCOMTR-MCNC: 159 MG/DL (ref 70–105)
GLUCOSE BLDC GLUCOMTR-MCNC: 176 MG/DL (ref 70–105)
GLUCOSE BLDC GLUCOMTR-MCNC: 189 MG/DL (ref 70–105)
GLUCOSE BLDC GLUCOMTR-MCNC: 192 MG/DL (ref 70–105)
GLUCOSE BLDC GLUCOMTR-MCNC: 195 MG/DL (ref 70–105)
GLUCOSE BLDC GLUCOMTR-MCNC: 200 MG/DL (ref 70–105)
GLUCOSE BLDC GLUCOMTR-MCNC: 206 MG/DL (ref 70–105)
GLUCOSE BLDC GLUCOMTR-MCNC: 207 MG/DL (ref 70–105)
GLUCOSE BLDC GLUCOMTR-MCNC: 212 MG/DL (ref 70–105)
GLUCOSE BLDC GLUCOMTR-MCNC: 213 MG/DL (ref 70–105)
GLUCOSE BLDC GLUCOMTR-MCNC: 215 MG/DL (ref 70–105)
GLUCOSE BLDC GLUCOMTR-MCNC: 226 MG/DL (ref 70–105)
GLUCOSE BLDC GLUCOMTR-MCNC: 228 MG/DL (ref 70–105)
GLUCOSE BLDC GLUCOMTR-MCNC: 238 MG/DL (ref 70–105)
GLUCOSE BLDC GLUCOMTR-MCNC: 242 MG/DL (ref 70–105)
GLUCOSE BLDC GLUCOMTR-MCNC: 242 MG/DL (ref 70–105)
GLUCOSE BLDC GLUCOMTR-MCNC: 248 MG/DL (ref 70–105)
GLUCOSE BLDC GLUCOMTR-MCNC: 254 MG/DL (ref 70–105)
GLUCOSE BLDC GLUCOMTR-MCNC: 268 MG/DL (ref 70–105)
GLUCOSE BLDC GLUCOMTR-MCNC: 273 MG/DL (ref 70–105)
GLUCOSE BLDC GLUCOMTR-MCNC: 281 MG/DL (ref 70–105)
GLUCOSE BLDC GLUCOMTR-MCNC: 284 MG/DL (ref 70–105)
GLUCOSE BLDC GLUCOMTR-MCNC: 289 MG/DL (ref 70–105)
GLUCOSE BLDC GLUCOMTR-MCNC: 292 MG/DL (ref 70–105)
GLUCOSE BLDC GLUCOMTR-MCNC: 293 MG/DL (ref 70–105)
GLUCOSE BLDC GLUCOMTR-MCNC: 305 MG/DL (ref 70–105)
GLUCOSE BLDC GLUCOMTR-MCNC: 313 MG/DL (ref 70–105)
GLUCOSE BLDC GLUCOMTR-MCNC: 316 MG/DL (ref 70–105)
GLUCOSE BLDC GLUCOMTR-MCNC: 318 MG/DL (ref 70–105)
GLUCOSE BLDC GLUCOMTR-MCNC: 326 MG/DL (ref 70–105)
GLUCOSE BLDC GLUCOMTR-MCNC: 335 MG/DL (ref 70–105)
GLUCOSE BLDC GLUCOMTR-MCNC: 342 MG/DL (ref 70–105)
GLUCOSE BLDC GLUCOMTR-MCNC: 346 MG/DL (ref 70–105)
GLUCOSE BLDC GLUCOMTR-MCNC: 375 MG/DL (ref 70–105)
GLUCOSE BLDC GLUCOMTR-MCNC: 391 MG/DL (ref 70–105)
GLUCOSE BLDC GLUCOMTR-MCNC: 395 MG/DL (ref 70–105)
GLUCOSE BLDC GLUCOMTR-MCNC: 400 MG/DL (ref 70–105)
GLUCOSE BLDC GLUCOMTR-MCNC: 476 MG/DL (ref 70–105)
GLUCOSE BLDC GLUCOMTR-MCNC: >500 MG/DL (ref 70–105)
GLUCOSE SERPL-MCNC: 149 MG/DL (ref 65–99)
GLUCOSE SERPL-MCNC: 150 MG/DL (ref 65–99)
GLUCOSE SERPL-MCNC: 206 MG/DL (ref 65–99)
GLUCOSE SERPL-MCNC: 234 MG/DL (ref 65–99)
GLUCOSE SERPL-MCNC: 234 MG/DL (ref 65–99)
GLUCOSE SERPL-MCNC: 257 MG/DL (ref 65–99)
GLUCOSE SERPL-MCNC: 267 MG/DL (ref 65–99)
GLUCOSE SERPL-MCNC: 273 MG/DL (ref 65–99)
GLUCOSE SERPL-MCNC: 289 MG/DL (ref 65–99)
GLUCOSE SERPL-MCNC: 306 MG/DL (ref 65–99)
GLUCOSE SERPL-MCNC: 323 MG/DL (ref 65–99)
GLUCOSE SERPL-MCNC: 361 MG/DL (ref 65–99)
GLUCOSE SERPL-MCNC: 365 MG/DL (ref 65–99)
GLUCOSE SERPL-MCNC: 371 MG/DL (ref 65–99)
GLUCOSE SERPL-MCNC: 429 MG/DL (ref 65–99)
GLUCOSE UR STRIP-MCNC: ABNORMAL MG/DL
HADV DNA SPEC NAA+PROBE: NOT DETECTED
HBA1C MFR BLD: 9.4 % (ref 3.5–5.6)
HCO3 BLDA-SCNC: 23.5 MMOL/L (ref 21–28)
HCO3 BLDA-SCNC: 24.5 MMOL/L (ref 21–28)
HCOV 229E RNA SPEC QL NAA+PROBE: NOT DETECTED
HCOV HKU1 RNA SPEC QL NAA+PROBE: NOT DETECTED
HCOV NL63 RNA SPEC QL NAA+PROBE: NOT DETECTED
HCOV OC43 RNA SPEC QL NAA+PROBE: NOT DETECTED
HCT VFR BLD AUTO: 28.8 % (ref 37.5–51)
HCT VFR BLD AUTO: 28.9 % (ref 37.5–51)
HCT VFR BLD AUTO: 30.8 % (ref 37.5–51)
HCT VFR BLD AUTO: 31.1 % (ref 37.5–51)
HCT VFR BLD AUTO: 34 % (ref 37.5–51)
HCT VFR BLD AUTO: 34.1 % (ref 37.5–51)
HCT VFR BLD AUTO: 34.2 % (ref 37.5–51)
HCT VFR BLD AUTO: 34.4 % (ref 37.5–51)
HCT VFR BLD AUTO: 35.2 % (ref 37.5–51)
HCT VFR BLD AUTO: 35.6 % (ref 37.5–51)
HCT VFR BLD AUTO: 36.3 % (ref 37.5–51)
HCT VFR BLD AUTO: 36.3 % (ref 37.5–51)
HEMODILUTION: NO
HEMODILUTION: NO
HGB BLD-MCNC: 10.1 G/DL (ref 13–17.7)
HGB BLD-MCNC: 10.2 G/DL (ref 13–17.7)
HGB BLD-MCNC: 10.2 G/DL (ref 13–17.7)
HGB BLD-MCNC: 10.5 G/DL (ref 13–17.7)
HGB BLD-MCNC: 11.1 G/DL (ref 13–17.7)
HGB BLD-MCNC: 11.2 G/DL (ref 13–17.7)
HGB BLD-MCNC: 11.3 G/DL (ref 13–17.7)
HGB BLD-MCNC: 11.3 G/DL (ref 13–17.7)
HGB BLD-MCNC: 11.4 G/DL (ref 13–17.7)
HGB BLD-MCNC: 11.7 G/DL (ref 13–17.7)
HGB BLD-MCNC: 11.9 G/DL (ref 13–17.7)
HGB BLD-MCNC: 12 G/DL (ref 13–17.7)
HGB BLD-MCNC: 9.5 G/DL (ref 13–17.7)
HGB BLD-MCNC: 9.5 G/DL (ref 13–17.7)
HGB UR QL STRIP.AUTO: NEGATIVE
HMPV RNA NPH QL NAA+NON-PROBE: NOT DETECTED
HPIV1 RNA SPEC QL NAA+PROBE: NOT DETECTED
HPIV2 RNA SPEC QL NAA+PROBE: NOT DETECTED
HPIV3 RNA NPH QL NAA+PROBE: NOT DETECTED
HPIV4 P GENE NPH QL NAA+PROBE: NOT DETECTED
IMM GRANULOCYTES # BLD AUTO: 0.05 10*3/MM3 (ref 0–0.05)
IMM GRANULOCYTES # BLD AUTO: 0.12 10*3/MM3 (ref 0–0.05)
IMM GRANULOCYTES NFR BLD AUTO: 0.5 % (ref 0–0.5)
IMM GRANULOCYTES NFR BLD AUTO: 0.9 % (ref 0–0.5)
INHALED O2 CONCENTRATION: 32 %
INHALED O2 CONCENTRATION: 36 %
INR PPP: 1.4 (ref 2–3)
INR PPP: 1.41 (ref 2–3)
INR PPP: 1.43 (ref 2–3)
INR PPP: 1.55 (ref 2–3)
INR PPP: 1.69 (ref 2–3)
INR PPP: 1.7 (ref 2–3)
INR PPP: 1.83 (ref 2–3)
INR PPP: 1.9 (ref 0.9–1.1)
INR PPP: 1.9 (ref 2–3)
INR PPP: 1.91 (ref 2–3)
INR PPP: 2 (ref 2–3)
INR PPP: 2.01 (ref 2–3)
KETONES UR QL STRIP: NEGATIVE
L PNEUMO1 AG UR QL IA: NEGATIVE
LEUKOCYTE ESTERASE UR QL STRIP.AUTO: NEGATIVE
LV EF 2D ECHO EST: 70 %
LYMPHOCYTES # BLD AUTO: 0.8 10*3/MM3 (ref 0.7–3.1)
LYMPHOCYTES # BLD AUTO: 0.9 10*3/MM3 (ref 0.7–3.1)
LYMPHOCYTES # BLD AUTO: 1 10*3/MM3 (ref 0.7–3.1)
LYMPHOCYTES # BLD AUTO: 1.1 10*3/MM3 (ref 0.7–3.1)
LYMPHOCYTES # BLD AUTO: 1.2 10*3/MM3 (ref 0.7–3.1)
LYMPHOCYTES # BLD AUTO: 1.2 10*3/MM3 (ref 0.7–3.1)
LYMPHOCYTES # BLD AUTO: 1.3 10*3/MM3 (ref 0.7–3.1)
LYMPHOCYTES # BLD AUTO: 1.4 10*3/MM3 (ref 0.7–3.1)
LYMPHOCYTES # BLD AUTO: 1.5 10*3/MM3 (ref 0.7–3.1)
LYMPHOCYTES # BLD AUTO: 1.6 10*3/MM3 (ref 0.7–3.1)
LYMPHOCYTES # BLD AUTO: 1.8 10*3/MM3 (ref 0.7–3.1)
LYMPHOCYTES # BLD AUTO: 1.85 10*3/MM3 (ref 0.7–3.1)
LYMPHOCYTES # BLD AUTO: 2.17 10*3/MM3 (ref 0.7–3.1)
LYMPHOCYTES NFR BLD AUTO: 10.5 % (ref 19.6–45.3)
LYMPHOCYTES NFR BLD AUTO: 10.7 % (ref 19.6–45.3)
LYMPHOCYTES NFR BLD AUTO: 11 % (ref 19.6–45.3)
LYMPHOCYTES NFR BLD AUTO: 11.2 % (ref 19.6–45.3)
LYMPHOCYTES NFR BLD AUTO: 12.2 % (ref 19.6–45.3)
LYMPHOCYTES NFR BLD AUTO: 12.7 % (ref 19.6–45.3)
LYMPHOCYTES NFR BLD AUTO: 13.1 % (ref 19.6–45.3)
LYMPHOCYTES NFR BLD AUTO: 14.5 % (ref 19.6–45.3)
LYMPHOCYTES NFR BLD AUTO: 16.1 % (ref 19.6–45.3)
LYMPHOCYTES NFR BLD AUTO: 17.5 % (ref 19.6–45.3)
LYMPHOCYTES NFR BLD AUTO: 20.1 % (ref 19.6–45.3)
LYMPHOCYTES NFR BLD AUTO: 8.4 % (ref 19.6–45.3)
LYMPHOCYTES NFR BLD AUTO: 9.2 % (ref 19.6–45.3)
M PNEUMO IGG SER IA-ACNC: NOT DETECTED
MACROCYTES BLD QL SMEAR: NORMAL
MAXIMAL PREDICTED HEART RATE: 136 BPM
MCH RBC QN AUTO: 33.6 PG (ref 26.6–33)
MCH RBC QN AUTO: 33.7 PG (ref 26.6–33)
MCH RBC QN AUTO: 33.9 PG (ref 26.6–33)
MCH RBC QN AUTO: 33.9 PG (ref 26.6–33)
MCH RBC QN AUTO: 34.1 PG (ref 26.6–33)
MCH RBC QN AUTO: 34.2 PG (ref 26.6–33)
MCH RBC QN AUTO: 34.3 PG (ref 26.6–33)
MCH RBC QN AUTO: 34.3 PG (ref 26.6–33)
MCH RBC QN AUTO: 34.4 PG (ref 26.6–33)
MCH RBC QN AUTO: 34.9 PG (ref 26.6–33)
MCHC RBC AUTO-ENTMCNC: 32.2 G/DL (ref 31.5–35.7)
MCHC RBC AUTO-ENTMCNC: 32.2 G/DL (ref 31.5–35.7)
MCHC RBC AUTO-ENTMCNC: 32.6 G/DL (ref 31.5–35.7)
MCHC RBC AUTO-ENTMCNC: 32.6 G/DL (ref 31.5–35.7)
MCHC RBC AUTO-ENTMCNC: 32.7 G/DL (ref 31.5–35.7)
MCHC RBC AUTO-ENTMCNC: 32.8 G/DL (ref 31.5–35.7)
MCHC RBC AUTO-ENTMCNC: 32.9 G/DL (ref 31.5–35.7)
MCHC RBC AUTO-ENTMCNC: 33 G/DL (ref 31.5–35.7)
MCHC RBC AUTO-ENTMCNC: 33 G/DL (ref 31.5–35.7)
MCHC RBC AUTO-ENTMCNC: 33.1 G/DL (ref 31.5–35.7)
MCHC RBC AUTO-ENTMCNC: 33.1 G/DL (ref 31.5–35.7)
MCHC RBC AUTO-ENTMCNC: 33.3 G/DL (ref 31.5–35.7)
MCHC RBC AUTO-ENTMCNC: 33.4 G/DL (ref 31.5–35.7)
MCHC RBC AUTO-ENTMCNC: 33.8 G/DL (ref 31.5–35.7)
MCV RBC AUTO: 101.9 FL (ref 79–97)
MCV RBC AUTO: 102 FL (ref 79–97)
MCV RBC AUTO: 102.6 FL (ref 79–97)
MCV RBC AUTO: 102.7 FL (ref 79–97)
MCV RBC AUTO: 102.8 FL (ref 79–97)
MCV RBC AUTO: 102.9 FL (ref 79–97)
MCV RBC AUTO: 103.3 FL (ref 79–97)
MCV RBC AUTO: 103.7 FL (ref 79–97)
MCV RBC AUTO: 103.8 FL (ref 79–97)
MCV RBC AUTO: 104 FL (ref 79–97)
MCV RBC AUTO: 104.1 FL (ref 79–97)
MCV RBC AUTO: 104.4 FL (ref 79–97)
MCV RBC AUTO: 105.4 FL (ref 79–97)
MCV RBC AUTO: 106.1 FL (ref 79–97)
MODALITY: ABNORMAL
MODALITY: ABNORMAL
MONOCYTES # BLD AUTO: 0.2 10*3/MM3 (ref 0.1–0.9)
MONOCYTES # BLD AUTO: 0.5 10*3/MM3 (ref 0.1–0.9)
MONOCYTES # BLD AUTO: 0.5 10*3/MM3 (ref 0.1–0.9)
MONOCYTES # BLD AUTO: 0.6 10*3/MM3 (ref 0.1–0.9)
MONOCYTES # BLD AUTO: 0.7 10*3/MM3 (ref 0.1–0.9)
MONOCYTES # BLD AUTO: 0.73 10*3/MM3 (ref 0.1–0.9)
MONOCYTES # BLD AUTO: 0.8 10*3/MM3 (ref 0.1–0.9)
MONOCYTES # BLD AUTO: 0.8 10*3/MM3 (ref 0.1–0.9)
MONOCYTES # BLD AUTO: 0.9 10*3/MM3 (ref 0.1–0.9)
MONOCYTES # BLD AUTO: 0.9 10*3/MM3 (ref 0.1–0.9)
MONOCYTES # BLD AUTO: 1.08 10*3/MM3 (ref 0.1–0.9)
MONOCYTES NFR BLD AUTO: 1.8 % (ref 5–12)
MONOCYTES NFR BLD AUTO: 4.2 % (ref 5–12)
MONOCYTES NFR BLD AUTO: 5.7 % (ref 5–12)
MONOCYTES NFR BLD AUTO: 5.8 % (ref 5–12)
MONOCYTES NFR BLD AUTO: 5.9 % (ref 5–12)
MONOCYTES NFR BLD AUTO: 6.5 % (ref 5–12)
MONOCYTES NFR BLD AUTO: 6.9 % (ref 5–12)
MONOCYTES NFR BLD AUTO: 7.1 % (ref 5–12)
MONOCYTES NFR BLD AUTO: 7.5 % (ref 5–12)
MONOCYTES NFR BLD AUTO: 8 % (ref 5–12)
MONOCYTES NFR BLD AUTO: 8.1 % (ref 5–12)
MRSA DNA SPEC QL NAA+PROBE: NORMAL
NEUTROPHILS NFR BLD AUTO: 10 10*3/MM3 (ref 1.7–7)
NEUTROPHILS NFR BLD AUTO: 10.4 10*3/MM3 (ref 1.7–7)
NEUTROPHILS NFR BLD AUTO: 5.5 10*3/MM3 (ref 1.7–7)
NEUTROPHILS NFR BLD AUTO: 69.1 % (ref 42.7–76)
NEUTROPHILS NFR BLD AUTO: 69.4 % (ref 42.7–76)
NEUTROPHILS NFR BLD AUTO: 7 10*3/MM3 (ref 1.7–7)
NEUTROPHILS NFR BLD AUTO: 7.1 10*3/MM3 (ref 1.7–7)
NEUTROPHILS NFR BLD AUTO: 7.35 10*3/MM3 (ref 1.7–7)
NEUTROPHILS NFR BLD AUTO: 7.4 10*3/MM3 (ref 1.7–7)
NEUTROPHILS NFR BLD AUTO: 70 % (ref 42.7–76)
NEUTROPHILS NFR BLD AUTO: 71.9 % (ref 42.7–76)
NEUTROPHILS NFR BLD AUTO: 73.2 % (ref 42.7–76)
NEUTROPHILS NFR BLD AUTO: 77.7 % (ref 42.7–76)
NEUTROPHILS NFR BLD AUTO: 79.2 % (ref 42.7–76)
NEUTROPHILS NFR BLD AUTO: 8.7 10*3/MM3 (ref 1.7–7)
NEUTROPHILS NFR BLD AUTO: 8.8 10*3/MM3 (ref 1.7–7)
NEUTROPHILS NFR BLD AUTO: 8.8 10*3/MM3 (ref 1.7–7)
NEUTROPHILS NFR BLD AUTO: 80.1 % (ref 42.7–76)
NEUTROPHILS NFR BLD AUTO: 82.4 % (ref 42.7–76)
NEUTROPHILS NFR BLD AUTO: 83 % (ref 42.7–76)
NEUTROPHILS NFR BLD AUTO: 83.3 % (ref 42.7–76)
NEUTROPHILS NFR BLD AUTO: 86.3 % (ref 42.7–76)
NEUTROPHILS NFR BLD AUTO: 86.5 % (ref 42.7–76)
NEUTROPHILS NFR BLD AUTO: 9 10*3/MM3 (ref 1.7–7)
NEUTROPHILS NFR BLD AUTO: 9.3 10*3/MM3 (ref 1.7–7)
NEUTROPHILS NFR BLD AUTO: 9.88 10*3/MM3 (ref 1.7–7)
NITRITE UR QL STRIP: NEGATIVE
NRBC BLD AUTO-RTO: 0 /100 WBC (ref 0–0.2)
NRBC BLD AUTO-RTO: 0.1 /100 WBC (ref 0–0.2)
NRBC BLD AUTO-RTO: 0.2 /100 WBC (ref 0–0.2)
NT-PROBNP SERPL-MCNC: 3383 PG/ML (ref 0–1800)
NT-PROBNP SERPL-MCNC: 3801 PG/ML (ref 0–1800)
NT-PROBNP SERPL-MCNC: 4001 PG/ML (ref 0–1800)
NT-PROBNP SERPL-MCNC: 6687 PG/ML (ref 0–1800)
NT-PROBNP SERPL-MCNC: 6872 PG/ML (ref 0–1800)
PCO2 BLDA: 28.4 MM HG (ref 35–48)
PCO2 BLDA: 38.1 MM HG (ref 35–48)
PH BLDA: 7.4 PH UNITS (ref 7.35–7.45)
PH BLDA: 7.54 PH UNITS (ref 7.35–7.45)
PH UR STRIP.AUTO: <=5 [PH] (ref 5–8)
PHOSPHATE SERPL-MCNC: 5.1 MG/DL (ref 2.5–4.5)
PHOSPHATE SERPL-MCNC: 5.5 MG/DL (ref 2.5–4.5)
PHOSPHATE SERPL-MCNC: 5.5 MG/DL (ref 2.5–4.5)
PLATELET # BLD AUTO: 228 10*3/MM3 (ref 140–450)
PLATELET # BLD AUTO: 239 10*3/MM3 (ref 140–450)
PLATELET # BLD AUTO: 258 10*3/MM3 (ref 140–450)
PLATELET # BLD AUTO: 264 10*3/MM3 (ref 140–450)
PLATELET # BLD AUTO: 266 10*3/MM3 (ref 140–450)
PLATELET # BLD AUTO: 268 10*3/MM3 (ref 140–450)
PLATELET # BLD AUTO: 268 10*3/MM3 (ref 140–450)
PLATELET # BLD AUTO: 277 10*3/MM3 (ref 140–450)
PLATELET # BLD AUTO: 278 10*3/MM3 (ref 140–450)
PLATELET # BLD AUTO: 286 10*3/MM3 (ref 140–450)
PLATELET # BLD AUTO: 295 10*3/MM3 (ref 140–450)
PLATELET # BLD AUTO: 313 10*3/MM3 (ref 140–450)
PLATELET # BLD AUTO: 326 10*3/MM3 (ref 140–450)
PLATELET # BLD AUTO: 351 10*3/MM3 (ref 140–450)
PMV BLD AUTO: 10 FL (ref 6–12)
PMV BLD AUTO: 7.2 FL (ref 6–12)
PMV BLD AUTO: 7.3 FL (ref 6–12)
PMV BLD AUTO: 7.4 FL (ref 6–12)
PMV BLD AUTO: 7.5 FL (ref 6–12)
PMV BLD AUTO: 7.6 FL (ref 6–12)
PMV BLD AUTO: 7.6 FL (ref 6–12)
PMV BLD AUTO: 8.2 FL (ref 6–12)
PMV BLD AUTO: 9.5 FL (ref 6–12)
PO2 BLDA: 65.8 MM HG (ref 83–108)
PO2 BLDA: 73.8 MM HG (ref 83–108)
POTASSIUM SERPL-SCNC: 4.4 MMOL/L (ref 3.5–5.2)
POTASSIUM SERPL-SCNC: 4.6 MMOL/L (ref 3.5–5.2)
POTASSIUM SERPL-SCNC: 4.8 MMOL/L (ref 3.5–5.2)
POTASSIUM SERPL-SCNC: 5 MMOL/L (ref 3.5–5.2)
POTASSIUM SERPL-SCNC: 5 MMOL/L (ref 3.5–5.2)
POTASSIUM SERPL-SCNC: 5.1 MMOL/L (ref 3.5–5.2)
POTASSIUM SERPL-SCNC: 5.2 MMOL/L (ref 3.5–5.2)
POTASSIUM SERPL-SCNC: 5.2 MMOL/L (ref 3.5–5.2)
POTASSIUM SERPL-SCNC: 5.3 MMOL/L (ref 3.5–5.2)
POTASSIUM SERPL-SCNC: 5.4 MMOL/L (ref 3.5–5.2)
POTASSIUM SERPL-SCNC: 5.5 MMOL/L (ref 3.5–5.2)
PROCALCITONIN SERPL-MCNC: 0.11 NG/ML (ref 0–0.25)
PROCALCITONIN SERPL-MCNC: 0.12 NG/ML (ref 0–0.25)
PROCALCITONIN SERPL-MCNC: 0.14 NG/ML (ref 0–0.25)
PROT SERPL-MCNC: 6.5 G/DL (ref 6–8.5)
PROT SERPL-MCNC: 6.9 G/DL (ref 6–8.5)
PROT SERPL-MCNC: 7 G/DL (ref 6–8.5)
PROT UR QL STRIP: NEGATIVE
PROTHROMBIN TIME: 15.2 SECONDS (ref 19.4–28.5)
PROTHROMBIN TIME: 15.3 SECONDS (ref 19.4–28.5)
PROTHROMBIN TIME: 15.5 SECONDS (ref 19.4–28.5)
PROTHROMBIN TIME: 16.7 SECONDS (ref 19.4–28.5)
PROTHROMBIN TIME: 18.1 SECONDS (ref 19.4–28.5)
PROTHROMBIN TIME: 18.2 SECONDS (ref 19.4–28.5)
PROTHROMBIN TIME: 19.6 SECONDS (ref 19.4–28.5)
PROTHROMBIN TIME: 20.3 SECONDS (ref 19.4–28.5)
PROTHROMBIN TIME: 20.4 SECONDS (ref 19.4–28.5)
PROTHROMBIN TIME: 21.3 SECONDS (ref 19.4–28.5)
PROTHROMBIN TIME: 21.4 SECONDS (ref 19.4–28.5)
QT INTERVAL: 388 MS
QT INTERVAL: 417 MS
RBC # BLD AUTO: 2.78 10*6/MM3 (ref 4.14–5.8)
RBC # BLD AUTO: 2.78 10*6/MM3 (ref 4.14–5.8)
RBC # BLD AUTO: 3 10*6/MM3 (ref 4.14–5.8)
RBC # BLD AUTO: 3 10*6/MM3 (ref 4.14–5.8)
RBC # BLD AUTO: 3.01 10*6/MM3 (ref 4.14–5.8)
RBC # BLD AUTO: 3.06 10*6/MM3 (ref 4.14–5.8)
RBC # BLD AUTO: 3.24 10*6/MM3 (ref 4.14–5.8)
RBC # BLD AUTO: 3.28 10*6/MM3 (ref 4.14–5.8)
RBC # BLD AUTO: 3.31 10*6/MM3 (ref 4.14–5.8)
RBC # BLD AUTO: 3.35 10*6/MM3 (ref 4.14–5.8)
RBC # BLD AUTO: 3.37 10*6/MM3 (ref 4.14–5.8)
RBC # BLD AUTO: 3.42 10*6/MM3 (ref 4.14–5.8)
RBC # BLD AUTO: 3.47 10*6/MM3 (ref 4.14–5.8)
RBC # BLD AUTO: 3.56 10*6/MM3 (ref 4.14–5.8)
RHINOVIRUS RNA SPEC NAA+PROBE: NOT DETECTED
RSV RNA NPH QL NAA+NON-PROBE: NOT DETECTED
S PNEUM AG SPEC QL LA: NEGATIVE
SAO2 % BLDCOA: 92.7 % (ref 94–98)
SAO2 % BLDCOA: 96.6 % (ref 94–98)
SARS-COV-2 ORF1AB RESP QL NAA+PROBE: NOT DETECTED
SARS-COV-2 RNA NPH QL NAA+NON-PROBE: NOT DETECTED
SARS-COV-2 RNA PNL SPEC NAA+PROBE: NOT DETECTED
SMALL PLATELETS BLD QL SMEAR: ADEQUATE
SODIUM SERPL-SCNC: 131 MMOL/L (ref 136–145)
SODIUM SERPL-SCNC: 132 MMOL/L (ref 136–145)
SODIUM SERPL-SCNC: 132 MMOL/L (ref 136–145)
SODIUM SERPL-SCNC: 133 MMOL/L (ref 136–145)
SODIUM SERPL-SCNC: 133 MMOL/L (ref 136–145)
SODIUM SERPL-SCNC: 134 MMOL/L (ref 136–145)
SODIUM SERPL-SCNC: 135 MMOL/L (ref 136–145)
SODIUM SERPL-SCNC: 136 MMOL/L (ref 136–145)
SODIUM SERPL-SCNC: 136 MMOL/L (ref 136–145)
SODIUM SERPL-SCNC: 137 MMOL/L (ref 136–145)
SODIUM SERPL-SCNC: 138 MMOL/L (ref 136–145)
SODIUM UR-SCNC: 68 MMOL/L
SP GR UR STRIP: 1.01 (ref 1–1.03)
STRESS TARGET HR: 116 BPM
THEOPHYLLINE SERPL-MCNC: 15.4 MCG/ML (ref 10–20)
THEOPHYLLINE SERPL-MCNC: 18.2 MCG/ML (ref 10–20)
TROPONIN T SERPL-MCNC: 0.03 NG/ML (ref 0–0.03)
TSH SERPL DL<=0.05 MIU/L-ACNC: 0.76 UIU/ML (ref 0.27–4.2)
UROBILINOGEN UR QL STRIP: ABNORMAL
VANCOMYCIN SERPL-MCNC: 14.4 MCG/ML (ref 5–40)
WBC # BLD AUTO: 10.5 10*3/MM3 (ref 3.4–10.8)
WBC # BLD AUTO: 10.58 10*3/MM3 (ref 3.4–10.8)
WBC # BLD AUTO: 10.8 10*3/MM3 (ref 3.4–10.8)
WBC # BLD AUTO: 11.3 10*3/MM3 (ref 3.4–10.8)
WBC # BLD AUTO: 11.4 10*3/MM3 (ref 3.4–10.8)
WBC # BLD AUTO: 11.7 10*3/MM3 (ref 3.4–10.8)
WBC # BLD AUTO: 12.1 10*3/MM3 (ref 3.4–10.8)
WBC # BLD AUTO: 12.1 10*3/MM3 (ref 3.4–10.8)
WBC # BLD AUTO: 12.6 10*3/MM3 (ref 3.4–10.8)
WBC # BLD AUTO: 13.5 10*3/MM3 (ref 3.4–10.8)
WBC # BLD AUTO: 7.9 10*3/MM3 (ref 3.4–10.8)
WBC # BLD AUTO: 8.5 10*3/MM3 (ref 3.4–10.8)
WBC # BLD AUTO: 8.8 10*3/MM3 (ref 3.4–10.8)
WBC # BLD AUTO: 9.9 10*3/MM3 (ref 3.4–10.8)
WBC MORPH BLD: NORMAL

## 2021-01-01 PROCEDURE — C9803 HOPD COVID-19 SPEC COLLECT: HCPCS

## 2021-01-01 PROCEDURE — 85730 THROMBOPLASTIN TIME PARTIAL: CPT | Performed by: NURSE PRACTITIONER

## 2021-01-01 PROCEDURE — 99284 EMERGENCY DEPT VISIT MOD MDM: CPT

## 2021-01-01 PROCEDURE — 80048 BASIC METABOLIC PNL TOTAL CA: CPT | Performed by: NURSE PRACTITIONER

## 2021-01-01 PROCEDURE — 94799 UNLISTED PULMONARY SVC/PX: CPT

## 2021-01-01 PROCEDURE — 25010000002 FUROSEMIDE PER 20 MG: Performed by: INTERNAL MEDICINE

## 2021-01-01 PROCEDURE — 99214 OFFICE O/P EST MOD 30 MIN: CPT | Performed by: FAMILY MEDICINE

## 2021-01-01 PROCEDURE — 82962 GLUCOSE BLOOD TEST: CPT

## 2021-01-01 PROCEDURE — 82803 BLOOD GASES ANY COMBINATION: CPT

## 2021-01-01 PROCEDURE — 99222 1ST HOSP IP/OBS MODERATE 55: CPT | Performed by: NURSE PRACTITIONER

## 2021-01-01 PROCEDURE — 84484 ASSAY OF TROPONIN QUANT: CPT | Performed by: EMERGENCY MEDICINE

## 2021-01-01 PROCEDURE — 94640 AIRWAY INHALATION TREATMENT: CPT

## 2021-01-01 PROCEDURE — 99232 SBSQ HOSP IP/OBS MODERATE 35: CPT | Performed by: HOSPITALIST

## 2021-01-01 PROCEDURE — 99233 SBSQ HOSP IP/OBS HIGH 50: CPT | Performed by: INTERNAL MEDICINE

## 2021-01-01 PROCEDURE — 85610 PROTHROMBIN TIME: CPT | Performed by: EMERGENCY MEDICINE

## 2021-01-01 PROCEDURE — 97116 GAIT TRAINING THERAPY: CPT

## 2021-01-01 PROCEDURE — 87641 MR-STAPH DNA AMP PROBE: CPT | Performed by: NURSE PRACTITIONER

## 2021-01-01 PROCEDURE — 84484 ASSAY OF TROPONIN QUANT: CPT | Performed by: NURSE PRACTITIONER

## 2021-01-01 PROCEDURE — 94760 N-INVAS EAR/PLS OXIMETRY 1: CPT

## 2021-01-01 PROCEDURE — 87899 AGENT NOS ASSAY W/OPTIC: CPT | Performed by: NURSE PRACTITIONER

## 2021-01-01 PROCEDURE — 99223 1ST HOSP IP/OBS HIGH 75: CPT | Performed by: INTERNAL MEDICINE

## 2021-01-01 PROCEDURE — 63710000001 INSULIN REGULAR HUMAN PER 5 UNITS: Performed by: HOSPITALIST

## 2021-01-01 PROCEDURE — 93280 PM DEVICE PROGR EVAL DUAL: CPT | Performed by: INTERNAL MEDICINE

## 2021-01-01 PROCEDURE — 25010000002 FUROSEMIDE PER 20 MG: Performed by: NURSE PRACTITIONER

## 2021-01-01 PROCEDURE — G0378 HOSPITAL OBSERVATION PER HR: HCPCS

## 2021-01-01 PROCEDURE — 94729 DIFFUSING CAPACITY: CPT

## 2021-01-01 PROCEDURE — 63710000001 PREDNISONE PER 1 MG: Performed by: INTERNAL MEDICINE

## 2021-01-01 PROCEDURE — 63710000001 INSULIN LISPRO (HUMAN) PER 5 UNITS: Performed by: INTERNAL MEDICINE

## 2021-01-01 PROCEDURE — 93306 TTE W/DOPPLER COMPLETE: CPT

## 2021-01-01 PROCEDURE — 99285 EMERGENCY DEPT VISIT HI MDM: CPT

## 2021-01-01 PROCEDURE — 63710000001 PREDNISONE PER 5 MG: Performed by: INTERNAL MEDICINE

## 2021-01-01 PROCEDURE — 80202 ASSAY OF VANCOMYCIN: CPT | Performed by: NURSE PRACTITIONER

## 2021-01-01 PROCEDURE — 25010000002 CEFEPIME PER 500 MG: Performed by: NURSE PRACTITIONER

## 2021-01-01 PROCEDURE — 87040 BLOOD CULTURE FOR BACTERIA: CPT | Performed by: NURSE PRACTITIONER

## 2021-01-01 PROCEDURE — 84145 PROCALCITONIN (PCT): CPT | Performed by: NURSE PRACTITIONER

## 2021-01-01 PROCEDURE — 63710000001 PREDNISONE PER 1 MG: Performed by: NURSE PRACTITIONER

## 2021-01-01 PROCEDURE — 94660 CPAP INITIATION&MGMT: CPT

## 2021-01-01 PROCEDURE — 80048 BASIC METABOLIC PNL TOTAL CA: CPT | Performed by: EMERGENCY MEDICINE

## 2021-01-01 PROCEDURE — 80069 RENAL FUNCTION PANEL: CPT | Performed by: INTERNAL MEDICINE

## 2021-01-01 PROCEDURE — 82962 GLUCOSE BLOOD TEST: CPT | Performed by: INTERNAL MEDICINE

## 2021-01-01 PROCEDURE — 97530 THERAPEUTIC ACTIVITIES: CPT

## 2021-01-01 PROCEDURE — 87040 BLOOD CULTURE FOR BACTERIA: CPT | Performed by: EMERGENCY MEDICINE

## 2021-01-01 PROCEDURE — 99232 SBSQ HOSP IP/OBS MODERATE 35: CPT | Performed by: INTERNAL MEDICINE

## 2021-01-01 PROCEDURE — U0004 COV-19 TEST NON-CDC HGH THRU: HCPCS

## 2021-01-01 PROCEDURE — 93306 TTE W/DOPPLER COMPLETE: CPT | Performed by: INTERNAL MEDICINE

## 2021-01-01 PROCEDURE — 80053 COMPREHEN METABOLIC PANEL: CPT

## 2021-01-01 PROCEDURE — 85025 COMPLETE CBC W/AUTO DIFF WBC: CPT | Performed by: NURSE PRACTITIONER

## 2021-01-01 PROCEDURE — 83880 ASSAY OF NATRIURETIC PEPTIDE: CPT | Performed by: NURSE PRACTITIONER

## 2021-01-01 PROCEDURE — 85025 COMPLETE CBC W/AUTO DIFF WBC: CPT | Performed by: INTERNAL MEDICINE

## 2021-01-01 PROCEDURE — 71250 CT THORAX DX C-: CPT

## 2021-01-01 PROCEDURE — 80198 ASSAY OF THEOPHYLLINE: CPT | Performed by: INTERNAL MEDICINE

## 2021-01-01 PROCEDURE — 0202U NFCT DS 22 TRGT SARS-COV-2: CPT | Performed by: NURSE PRACTITIONER

## 2021-01-01 PROCEDURE — 36415 COLL VENOUS BLD VENIPUNCTURE: CPT

## 2021-01-01 PROCEDURE — 85610 PROTHROMBIN TIME: CPT | Performed by: NURSE PRACTITIONER

## 2021-01-01 PROCEDURE — 94618 PULMONARY STRESS TESTING: CPT

## 2021-01-01 PROCEDURE — 97162 PT EVAL MOD COMPLEX 30 MIN: CPT

## 2021-01-01 PROCEDURE — 63710000001 INSULIN GLARGINE PER 5 UNITS: Performed by: INTERNAL MEDICINE

## 2021-01-01 PROCEDURE — 63710000001 INSULIN LISPRO (HUMAN) PER 5 UNITS: Performed by: HOSPITALIST

## 2021-01-01 PROCEDURE — 99215 OFFICE O/P EST HI 40 MIN: CPT | Performed by: INTERNAL MEDICINE

## 2021-01-01 PROCEDURE — 85027 COMPLETE CBC AUTOMATED: CPT | Performed by: HOSPITALIST

## 2021-01-01 PROCEDURE — 83880 ASSAY OF NATRIURETIC PEPTIDE: CPT | Performed by: INTERNAL MEDICINE

## 2021-01-01 PROCEDURE — 99239 HOSP IP/OBS DSCHRG MGMT >30: CPT | Performed by: INTERNAL MEDICINE

## 2021-01-01 PROCEDURE — 95810 POLYSOM 6/> YRS 4/> PARAM: CPT

## 2021-01-01 PROCEDURE — 25010000002 CEFEPIME PER 500 MG: Performed by: EMERGENCY MEDICINE

## 2021-01-01 PROCEDURE — 71046 X-RAY EXAM CHEST 2 VIEWS: CPT

## 2021-01-01 PROCEDURE — 71045 X-RAY EXAM CHEST 1 VIEW: CPT

## 2021-01-01 PROCEDURE — 81003 URINALYSIS AUTO W/O SCOPE: CPT | Performed by: INTERNAL MEDICINE

## 2021-01-01 PROCEDURE — 94060 EVALUATION OF WHEEZING: CPT

## 2021-01-01 PROCEDURE — 97535 SELF CARE MNGMENT TRAINING: CPT

## 2021-01-01 PROCEDURE — 63710000001 INSULIN GLARGINE PER 5 UNITS: Performed by: HOSPITALIST

## 2021-01-01 PROCEDURE — 84443 ASSAY THYROID STIM HORMONE: CPT | Performed by: HOSPITALIST

## 2021-01-01 PROCEDURE — 93005 ELECTROCARDIOGRAM TRACING: CPT

## 2021-01-01 PROCEDURE — 83605 ASSAY OF LACTIC ACID: CPT

## 2021-01-01 PROCEDURE — 93005 ELECTROCARDIOGRAM TRACING: CPT | Performed by: EMERGENCY MEDICINE

## 2021-01-01 PROCEDURE — 85610 PROTHROMBIN TIME: CPT | Performed by: INTERNAL MEDICINE

## 2021-01-01 PROCEDURE — 85025 COMPLETE CBC W/AUTO DIFF WBC: CPT

## 2021-01-01 PROCEDURE — 82550 ASSAY OF CK (CPK): CPT | Performed by: INTERNAL MEDICINE

## 2021-01-01 PROCEDURE — 80198 ASSAY OF THEOPHYLLINE: CPT | Performed by: NURSE PRACTITIONER

## 2021-01-01 PROCEDURE — 80048 BASIC METABOLIC PNL TOTAL CA: CPT | Performed by: HOSPITALIST

## 2021-01-01 PROCEDURE — 87635 SARS-COV-2 COVID-19 AMP PRB: CPT | Performed by: EMERGENCY MEDICINE

## 2021-01-01 PROCEDURE — 36416 COLLJ CAPILLARY BLOOD SPEC: CPT | Performed by: INTERNAL MEDICINE

## 2021-01-01 PROCEDURE — 25010000002 VANCOMYCIN 10 G RECONSTITUTED SOLUTION: Performed by: EMERGENCY MEDICINE

## 2021-01-01 PROCEDURE — 85379 FIBRIN DEGRADATION QUANT: CPT | Performed by: INTERNAL MEDICINE

## 2021-01-01 PROCEDURE — 83880 ASSAY OF NATRIURETIC PEPTIDE: CPT | Performed by: EMERGENCY MEDICINE

## 2021-01-01 PROCEDURE — 84484 ASSAY OF TROPONIN QUANT: CPT | Performed by: INTERNAL MEDICINE

## 2021-01-01 PROCEDURE — 63710000001 INSULIN LISPRO (HUMAN) PER 5 UNITS: Performed by: STUDENT IN AN ORGANIZED HEALTH CARE EDUCATION/TRAINING PROGRAM

## 2021-01-01 PROCEDURE — 80053 COMPREHEN METABOLIC PANEL: CPT | Performed by: NURSE PRACTITIONER

## 2021-01-01 PROCEDURE — 97161 PT EVAL LOW COMPLEX 20 MIN: CPT

## 2021-01-01 PROCEDURE — 99220 PR INITIAL OBSERVATION CARE/DAY 70 MINUTES: CPT | Performed by: INTERNAL MEDICINE

## 2021-01-01 PROCEDURE — 85007 BL SMEAR W/DIFF WBC COUNT: CPT | Performed by: INTERNAL MEDICINE

## 2021-01-01 PROCEDURE — 99239 HOSP IP/OBS DSCHRG MGMT >30: CPT | Performed by: HOSPITALIST

## 2021-01-01 PROCEDURE — 99214 OFFICE O/P EST MOD 30 MIN: CPT | Performed by: INTERNAL MEDICINE

## 2021-01-01 PROCEDURE — 84300 ASSAY OF URINE SODIUM: CPT | Performed by: INTERNAL MEDICINE

## 2021-01-01 PROCEDURE — 85025 COMPLETE CBC W/AUTO DIFF WBC: CPT | Performed by: EMERGENCY MEDICINE

## 2021-01-01 PROCEDURE — 94664 DEMO&/EVAL PT USE INHALER: CPT

## 2021-01-01 PROCEDURE — 85730 THROMBOPLASTIN TIME PARTIAL: CPT | Performed by: EMERGENCY MEDICINE

## 2021-01-01 PROCEDURE — 99222 1ST HOSP IP/OBS MODERATE 55: CPT | Performed by: INTERNAL MEDICINE

## 2021-01-01 PROCEDURE — 94727 GAS DIL/WSHOT DETER LNG VOL: CPT

## 2021-01-01 PROCEDURE — 63710000001 INSULIN LISPRO (HUMAN) PER 5 UNITS: Performed by: NURSE PRACTITIONER

## 2021-01-01 PROCEDURE — 83036 HEMOGLOBIN GLYCOSYLATED A1C: CPT | Performed by: HOSPITALIST

## 2021-01-01 PROCEDURE — 97165 OT EVAL LOW COMPLEX 30 MIN: CPT

## 2021-01-01 PROCEDURE — 80048 BASIC METABOLIC PNL TOTAL CA: CPT | Performed by: INTERNAL MEDICINE

## 2021-01-01 PROCEDURE — 36600 WITHDRAWAL OF ARTERIAL BLOOD: CPT

## 2021-01-01 PROCEDURE — 84145 PROCALCITONIN (PCT): CPT | Performed by: INTERNAL MEDICINE

## 2021-01-01 PROCEDURE — 93005 ELECTROCARDIOGRAM TRACING: CPT | Performed by: INTERNAL MEDICINE

## 2021-01-01 RX ORDER — ONDANSETRON 4 MG/1
4 TABLET, FILM COATED ORAL EVERY 6 HOURS PRN
Status: DISCONTINUED | OUTPATIENT
Start: 2021-01-01 | End: 2021-01-01 | Stop reason: HOSPADM

## 2021-01-01 RX ORDER — PREDNISONE 10 MG/1
10 TABLET ORAL
Qty: 15 TABLET | Refills: 0 | Status: SHIPPED | OUTPATIENT
Start: 2021-01-01 | End: 2021-01-01

## 2021-01-01 RX ORDER — IPRATROPIUM BROMIDE AND ALBUTEROL SULFATE 2.5; .5 MG/3ML; MG/3ML
3 SOLUTION RESPIRATORY (INHALATION)
Status: DISCONTINUED | OUTPATIENT
Start: 2021-01-01 | End: 2021-01-01 | Stop reason: HOSPADM

## 2021-01-01 RX ORDER — NICOTINE POLACRILEX 4 MG
15 LOZENGE BUCCAL
Status: DISCONTINUED | OUTPATIENT
Start: 2021-01-01 | End: 2021-01-01 | Stop reason: HOSPADM

## 2021-01-01 RX ORDER — FAMOTIDINE 20 MG/1
40 TABLET, FILM COATED ORAL DAILY
Status: DISCONTINUED | OUTPATIENT
Start: 2021-01-01 | End: 2021-01-01

## 2021-01-01 RX ORDER — INSULIN LISPRO 100 [IU]/ML
0-7 INJECTION, SOLUTION INTRAVENOUS; SUBCUTANEOUS AS NEEDED
Status: DISCONTINUED | OUTPATIENT
Start: 2021-01-01 | End: 2021-01-01

## 2021-01-01 RX ORDER — WARFARIN SODIUM 5 MG/1
5 TABLET ORAL
Status: COMPLETED | OUTPATIENT
Start: 2021-01-01 | End: 2021-01-01

## 2021-01-01 RX ORDER — WARFARIN SODIUM 4 MG/1
4 TABLET ORAL NIGHTLY
Qty: 30 TABLET | Refills: 0 | Status: SHIPPED | OUTPATIENT
Start: 2021-01-01 | End: 2021-01-01

## 2021-01-01 RX ORDER — ATORVASTATIN CALCIUM 20 MG/1
20 TABLET, FILM COATED ORAL NIGHTLY
Status: DISCONTINUED | OUTPATIENT
Start: 2021-01-01 | End: 2021-01-01 | Stop reason: HOSPADM

## 2021-01-01 RX ORDER — DOXYCYCLINE 100 MG/1
100 TABLET ORAL EVERY 12 HOURS SCHEDULED
Qty: 12 TABLET | Refills: 0 | Status: SHIPPED | OUTPATIENT
Start: 2021-01-01 | End: 2021-01-01

## 2021-01-01 RX ORDER — CHOLECALCIFEROL (VITAMIN D3) 125 MCG
5 CAPSULE ORAL NIGHTLY PRN
Status: DISCONTINUED | OUTPATIENT
Start: 2021-01-01 | End: 2021-01-01

## 2021-01-01 RX ORDER — ONDANSETRON 2 MG/ML
4 INJECTION INTRAMUSCULAR; INTRAVENOUS EVERY 6 HOURS PRN
Status: DISCONTINUED | OUTPATIENT
Start: 2021-01-01 | End: 2021-01-01 | Stop reason: HOSPADM

## 2021-01-01 RX ORDER — ALPRAZOLAM 0.25 MG/1
0.25 TABLET ORAL NIGHTLY PRN
Status: DISCONTINUED | OUTPATIENT
Start: 2021-01-01 | End: 2021-01-01 | Stop reason: HOSPADM

## 2021-01-01 RX ORDER — INSULIN GLARGINE 100 [IU]/ML
10 INJECTION, SOLUTION SUBCUTANEOUS NIGHTLY
Status: DISCONTINUED | OUTPATIENT
Start: 2021-01-01 | End: 2021-01-01

## 2021-01-01 RX ORDER — DOXYCYCLINE 100 MG/1
100 TABLET ORAL EVERY 12 HOURS SCHEDULED
Status: DISCONTINUED | OUTPATIENT
Start: 2021-01-01 | End: 2021-01-01 | Stop reason: HOSPADM

## 2021-01-01 RX ORDER — DIGOXIN 125 MCG
1 TABLET ORAL DAILY
COMMUNITY
Start: 2021-01-01

## 2021-01-01 RX ORDER — ACETAMINOPHEN 500 MG
500 TABLET ORAL EVERY 6 HOURS PRN
Status: DISCONTINUED | OUTPATIENT
Start: 2021-01-01 | End: 2021-01-01 | Stop reason: SDUPTHER

## 2021-01-01 RX ORDER — GUAIFENESIN 600 MG/1
600 TABLET, EXTENDED RELEASE ORAL EVERY 12 HOURS SCHEDULED
Status: DISCONTINUED | OUTPATIENT
Start: 2021-01-01 | End: 2021-01-01 | Stop reason: HOSPADM

## 2021-01-01 RX ORDER — MIRTAZAPINE 15 MG/1
15 TABLET, FILM COATED ORAL NIGHTLY
Qty: 90 TABLET | Refills: 1 | Status: SHIPPED | OUTPATIENT
Start: 2021-01-01

## 2021-01-01 RX ORDER — FUROSEMIDE 10 MG/ML
40 INJECTION INTRAMUSCULAR; INTRAVENOUS
Status: DISCONTINUED | OUTPATIENT
Start: 2021-01-01 | End: 2021-01-01

## 2021-01-01 RX ORDER — IPRATROPIUM BROMIDE AND ALBUTEROL SULFATE 2.5; .5 MG/3ML; MG/3ML
3 SOLUTION RESPIRATORY (INHALATION) EVERY 4 HOURS PRN
Status: DISCONTINUED | OUTPATIENT
Start: 2021-01-01 | End: 2021-01-01 | Stop reason: HOSPADM

## 2021-01-01 RX ORDER — NITROGLYCERIN 0.4 MG/1
0.4 TABLET SUBLINGUAL
Status: DISCONTINUED | OUTPATIENT
Start: 2021-01-01 | End: 2021-01-01 | Stop reason: HOSPADM

## 2021-01-01 RX ORDER — WARFARIN SODIUM 3 MG/1
4.5 TABLET ORAL
Status: COMPLETED | OUTPATIENT
Start: 2021-01-01 | End: 2021-01-01

## 2021-01-01 RX ORDER — INSULIN LISPRO 100 [IU]/ML
10 INJECTION, SOLUTION INTRAVENOUS; SUBCUTANEOUS ONCE
Status: COMPLETED | OUTPATIENT
Start: 2021-01-01 | End: 2021-01-01

## 2021-01-01 RX ORDER — ACETAMINOPHEN 650 MG/1
650 SUPPOSITORY RECTAL EVERY 4 HOURS PRN
Status: DISCONTINUED | OUTPATIENT
Start: 2021-01-01 | End: 2021-01-01 | Stop reason: HOSPADM

## 2021-01-01 RX ORDER — FUROSEMIDE 10 MG/ML
40 INJECTION INTRAMUSCULAR; INTRAVENOUS ONCE
Status: COMPLETED | OUTPATIENT
Start: 2021-01-01 | End: 2021-01-01

## 2021-01-01 RX ORDER — WARFARIN SODIUM 4 MG/1
4 TABLET ORAL
Status: COMPLETED | OUTPATIENT
Start: 2021-01-01 | End: 2021-01-01

## 2021-01-01 RX ORDER — SODIUM CHLORIDE 0.9 % (FLUSH) 0.9 %
10 SYRINGE (ML) INJECTION AS NEEDED
Status: DISCONTINUED | OUTPATIENT
Start: 2021-01-01 | End: 2021-01-01 | Stop reason: HOSPADM

## 2021-01-01 RX ORDER — DEXTROSE MONOHYDRATE 25 G/50ML
25 INJECTION, SOLUTION INTRAVENOUS
Status: DISCONTINUED | OUTPATIENT
Start: 2021-01-01 | End: 2021-01-01 | Stop reason: HOSPADM

## 2021-01-01 RX ORDER — BUDESONIDE 0.5 MG/2ML
0.5 INHALANT ORAL
Status: DISCONTINUED | OUTPATIENT
Start: 2021-01-01 | End: 2021-01-01 | Stop reason: HOSPADM

## 2021-01-01 RX ORDER — BLOOD SUGAR DIAGNOSTIC
STRIP MISCELLANEOUS
Qty: 400 EACH | Refills: 3 | Status: SHIPPED | OUTPATIENT
Start: 2021-01-01 | End: 2021-01-01

## 2021-01-01 RX ORDER — ACETAMINOPHEN 325 MG/1
650 TABLET ORAL EVERY 4 HOURS PRN
Status: DISCONTINUED | OUTPATIENT
Start: 2021-01-01 | End: 2021-01-01 | Stop reason: HOSPADM

## 2021-01-01 RX ORDER — BUDESONIDE 0.5 MG/2ML
0.5 INHALANT ORAL
Qty: 60 EACH | Refills: 3 | Status: SHIPPED | OUTPATIENT
Start: 2021-01-01 | End: 2021-01-01

## 2021-01-01 RX ORDER — SODIUM CHLORIDE 0.9 % (FLUSH) 0.9 %
10 SYRINGE (ML) INJECTION EVERY 12 HOURS SCHEDULED
Status: DISCONTINUED | OUTPATIENT
Start: 2021-01-01 | End: 2021-01-01 | Stop reason: HOSPADM

## 2021-01-01 RX ORDER — INSULIN LISPRO 100 [IU]/ML
0-24 INJECTION, SOLUTION INTRAVENOUS; SUBCUTANEOUS AS NEEDED
Status: DISCONTINUED | OUTPATIENT
Start: 2021-01-01 | End: 2021-01-01 | Stop reason: HOSPADM

## 2021-01-01 RX ORDER — WARFARIN SODIUM 3 MG/1
3 TABLET ORAL NIGHTLY
Status: DISCONTINUED | OUTPATIENT
Start: 2021-01-01 | End: 2021-01-01

## 2021-01-01 RX ORDER — RAMELTEON 8 MG/1
8 TABLET ORAL NIGHTLY
Qty: 7 TABLET | Refills: 0 | Status: SHIPPED | OUTPATIENT
Start: 2021-01-01

## 2021-01-01 RX ORDER — INSULIN LISPRO 100 [IU]/ML
0-7 INJECTION, SOLUTION INTRAVENOUS; SUBCUTANEOUS
Status: DISCONTINUED | OUTPATIENT
Start: 2021-01-01 | End: 2021-01-01

## 2021-01-01 RX ORDER — ACETAMINOPHEN 500 MG
500 TABLET ORAL EVERY 6 HOURS PRN
Status: DISCONTINUED | OUTPATIENT
Start: 2021-01-01 | End: 2021-01-01

## 2021-01-01 RX ORDER — IPRATROPIUM BROMIDE AND ALBUTEROL SULFATE 2.5; .5 MG/3ML; MG/3ML
3 SOLUTION RESPIRATORY (INHALATION) EVERY 4 HOURS PRN
Qty: 360 ML | Refills: 3 | Status: SHIPPED | OUTPATIENT
Start: 2021-01-01

## 2021-01-01 RX ORDER — PREDNISONE 10 MG/1
10 TABLET ORAL
Status: DISCONTINUED | OUTPATIENT
Start: 2021-01-01 | End: 2021-01-01 | Stop reason: HOSPADM

## 2021-01-01 RX ORDER — FUROSEMIDE 40 MG/1
40 TABLET ORAL DAILY
Status: DISCONTINUED | OUTPATIENT
Start: 2021-01-01 | End: 2021-01-01

## 2021-01-01 RX ORDER — PREDNISONE 20 MG/1
40 TABLET ORAL
Status: DISCONTINUED | OUTPATIENT
Start: 2021-01-01 | End: 2021-01-01

## 2021-01-01 RX ORDER — SIMVASTATIN 40 MG
TABLET ORAL
Qty: 90 TABLET | Refills: 4 | Status: SHIPPED | OUTPATIENT
Start: 2021-01-01 | End: 2021-01-01

## 2021-01-01 RX ORDER — CALCITRIOL 0.25 UG/1
0.25 CAPSULE, LIQUID FILLED ORAL 3 TIMES WEEKLY
Status: DISCONTINUED | OUTPATIENT
Start: 2021-01-01 | End: 2021-01-01 | Stop reason: HOSPADM

## 2021-01-01 RX ORDER — PSEUDOEPHEDRINE HCL 30 MG
100 TABLET ORAL 2 TIMES DAILY
Start: 2021-01-01

## 2021-01-01 RX ORDER — ALUMINA, MAGNESIA, AND SIMETHICONE 2400; 2400; 240 MG/30ML; MG/30ML; MG/30ML
15 SUSPENSION ORAL EVERY 6 HOURS PRN
Status: DISCONTINUED | OUTPATIENT
Start: 2021-01-01 | End: 2021-01-01 | Stop reason: HOSPADM

## 2021-01-01 RX ORDER — BUDESONIDE 0.5 MG/2ML
0.5 INHALANT ORAL
Status: DISCONTINUED | OUTPATIENT
Start: 2021-01-01 | End: 2021-01-01 | Stop reason: SDUPTHER

## 2021-01-01 RX ORDER — CARVEDILOL 6.25 MG/1
12.5 TABLET ORAL NIGHTLY
Status: DISCONTINUED | OUTPATIENT
Start: 2021-01-01 | End: 2021-01-01 | Stop reason: HOSPADM

## 2021-01-01 RX ORDER — RAMELTEON 8 MG/1
8 TABLET ORAL NIGHTLY
Qty: 7 TABLET | Refills: 0 | Status: SHIPPED | OUTPATIENT
Start: 2021-01-01 | End: 2021-01-01 | Stop reason: SDUPTHER

## 2021-01-01 RX ORDER — WARFARIN SODIUM 4 MG/1
4 TABLET ORAL
Status: DISCONTINUED | OUTPATIENT
Start: 2021-01-01 | End: 2021-01-01 | Stop reason: HOSPADM

## 2021-01-01 RX ORDER — ASPIRIN 81 MG/1
81 TABLET ORAL DAILY
Status: DISCONTINUED | OUTPATIENT
Start: 2021-01-01 | End: 2021-01-01 | Stop reason: HOSPADM

## 2021-01-01 RX ORDER — INSULIN LISPRO 100 [IU]/ML
0-14 INJECTION, SOLUTION INTRAVENOUS; SUBCUTANEOUS
Status: DISCONTINUED | OUTPATIENT
Start: 2021-01-01 | End: 2021-01-01 | Stop reason: DRUGHIGH

## 2021-01-01 RX ORDER — MIRTAZAPINE 15 MG/1
15 TABLET, FILM COATED ORAL NIGHTLY
Status: DISCONTINUED | OUTPATIENT
Start: 2021-01-01 | End: 2021-01-01 | Stop reason: HOSPADM

## 2021-01-01 RX ORDER — CHOLECALCIFEROL (VITAMIN D3) 125 MCG
5 CAPSULE ORAL NIGHTLY PRN
Status: DISCONTINUED | OUTPATIENT
Start: 2021-01-01 | End: 2021-01-01 | Stop reason: HOSPADM

## 2021-01-01 RX ORDER — DOCUSATE SODIUM 100 MG/1
100 CAPSULE, LIQUID FILLED ORAL 2 TIMES DAILY
Status: DISCONTINUED | OUTPATIENT
Start: 2021-01-01 | End: 2021-01-01 | Stop reason: HOSPADM

## 2021-01-01 RX ORDER — INSULIN LISPRO 100 [IU]/ML
0-24 INJECTION, SOLUTION INTRAVENOUS; SUBCUTANEOUS
Status: DISCONTINUED | OUTPATIENT
Start: 2021-01-01 | End: 2021-01-01 | Stop reason: HOSPADM

## 2021-01-01 RX ORDER — INSULIN ASPART 100 [IU]/ML
INJECTION, SOLUTION INTRAVENOUS; SUBCUTANEOUS
Qty: 3 PEN | Refills: 1 | Status: SHIPPED
Start: 2021-01-01 | End: 2021-01-01

## 2021-01-01 RX ORDER — INSULIN GLARGINE 100 [IU]/ML
12 INJECTION, SOLUTION SUBCUTANEOUS NIGHTLY
Status: DISCONTINUED | OUTPATIENT
Start: 2021-01-01 | End: 2021-01-01 | Stop reason: HOSPADM

## 2021-01-01 RX ORDER — SEMAGLUTIDE 1.34 MG/ML
INJECTION, SOLUTION SUBCUTANEOUS
Qty: 2 ML | Refills: 6 | Status: SHIPPED | OUTPATIENT
Start: 2021-01-01 | End: 2021-01-01

## 2021-01-01 RX ORDER — CLINDAMYCIN HYDROCHLORIDE 300 MG/1
300 CAPSULE ORAL EVERY 8 HOURS SCHEDULED
Status: DISCONTINUED | OUTPATIENT
Start: 2021-01-01 | End: 2021-01-01

## 2021-01-01 RX ORDER — GUAIFENESIN 600 MG/1
600 TABLET, EXTENDED RELEASE ORAL EVERY 12 HOURS SCHEDULED
Qty: 60 TABLET | Refills: 0 | Status: SHIPPED | OUTPATIENT
Start: 2021-01-01 | End: 2021-01-01

## 2021-01-01 RX ORDER — WARFARIN SODIUM 3 MG/1
1.5 TABLET ORAL
Status: COMPLETED | OUTPATIENT
Start: 2021-01-01 | End: 2021-01-01

## 2021-01-01 RX ORDER — INSULIN LISPRO 100 [IU]/ML
0-14 INJECTION, SOLUTION INTRAVENOUS; SUBCUTANEOUS AS NEEDED
Status: DISCONTINUED | OUTPATIENT
Start: 2021-01-01 | End: 2021-01-01 | Stop reason: DRUGHIGH

## 2021-01-01 RX ORDER — INSULIN LISPRO 100 [IU]/ML
0-7 INJECTION, SOLUTION INTRAVENOUS; SUBCUTANEOUS
Status: DISCONTINUED | OUTPATIENT
Start: 2021-01-01 | End: 2021-01-01 | Stop reason: DRUGHIGH

## 2021-01-01 RX ORDER — INSULIN ASPART 100 [IU]/ML
INJECTION, SOLUTION INTRAVENOUS; SUBCUTANEOUS
Qty: 3 PEN | Refills: 1 | Status: SHIPPED | OUTPATIENT
Start: 2021-01-01 | End: 2021-01-01

## 2021-01-01 RX ORDER — SUVOREXANT 5 MG/1
1 TABLET, FILM COATED ORAL NIGHTLY PRN
Qty: 7 TABLET | Refills: 0 | Status: SHIPPED | OUTPATIENT
Start: 2021-01-01

## 2021-01-01 RX ORDER — CALCIUM CARBONATE 200(500)MG
2 TABLET,CHEWABLE ORAL 3 TIMES DAILY PRN
Status: DISCONTINUED | OUTPATIENT
Start: 2021-01-01 | End: 2021-01-01 | Stop reason: HOSPADM

## 2021-01-01 RX ORDER — ACETAMINOPHEN 160 MG/5ML
650 SOLUTION ORAL EVERY 4 HOURS PRN
Status: DISCONTINUED | OUTPATIENT
Start: 2021-01-01 | End: 2021-01-01 | Stop reason: HOSPADM

## 2021-01-01 RX ORDER — RAMELTEON 8 MG/1
8 TABLET ORAL NIGHTLY
Qty: 7 TABLET | Refills: 0 | Status: SHIPPED | OUTPATIENT
Start: 2021-01-01 | End: 2021-01-01

## 2021-01-01 RX ORDER — FUROSEMIDE 40 MG/1
40 TABLET ORAL DAILY
Status: DISCONTINUED | OUTPATIENT
Start: 2021-01-01 | End: 2021-01-01 | Stop reason: HOSPADM

## 2021-01-01 RX ORDER — PREDNISONE 20 MG/1
20 TABLET ORAL 2 TIMES DAILY WITH MEALS
Status: DISCONTINUED | OUTPATIENT
Start: 2021-01-01 | End: 2021-01-01 | Stop reason: HOSPADM

## 2021-01-01 RX ORDER — FUROSEMIDE 40 MG/1
40 TABLET ORAL
Status: DISCONTINUED | OUTPATIENT
Start: 2021-01-01 | End: 2021-01-01

## 2021-01-01 RX ORDER — HYDROXYZINE HYDROCHLORIDE 25 MG/1
25 TABLET, FILM COATED ORAL NIGHTLY PRN
Status: DISCONTINUED | OUTPATIENT
Start: 2021-01-01 | End: 2021-01-01 | Stop reason: HOSPADM

## 2021-01-01 RX ORDER — FUROSEMIDE 40 MG/1
40 TABLET ORAL
Status: DISCONTINUED | OUTPATIENT
Start: 2021-01-01 | End: 2021-01-01 | Stop reason: HOSPADM

## 2021-01-01 RX ORDER — HYDROXYZINE HYDROCHLORIDE 25 MG/1
25 TABLET, FILM COATED ORAL ONCE
Status: COMPLETED | OUTPATIENT
Start: 2021-01-01 | End: 2021-01-01

## 2021-01-01 RX ORDER — INSULIN LISPRO 100 [IU]/ML
0-14 INJECTION, SOLUTION INTRAVENOUS; SUBCUTANEOUS AS NEEDED
Status: DISCONTINUED | OUTPATIENT
Start: 2021-01-01 | End: 2021-01-01

## 2021-01-01 RX ORDER — INSULIN LISPRO 100 [IU]/ML
0-7 INJECTION, SOLUTION INTRAVENOUS; SUBCUTANEOUS AS NEEDED
Status: DISCONTINUED | OUTPATIENT
Start: 2021-01-01 | End: 2021-01-01 | Stop reason: DRUGHIGH

## 2021-01-01 RX ORDER — INSULIN LISPRO 100 [IU]/ML
1 INJECTION, SOLUTION INTRAVENOUS; SUBCUTANEOUS
Status: DISCONTINUED | OUTPATIENT
Start: 2021-01-01 | End: 2021-01-01

## 2021-01-01 RX ORDER — ACETYLCYSTEINE 200 MG/ML
2 SOLUTION ORAL; RESPIRATORY (INHALATION)
Status: DISCONTINUED | OUTPATIENT
Start: 2021-01-01 | End: 2021-01-01

## 2021-01-01 RX ORDER — INSULIN LISPRO 100 [IU]/ML
0-14 INJECTION, SOLUTION INTRAVENOUS; SUBCUTANEOUS
Status: DISCONTINUED | OUTPATIENT
Start: 2021-01-01 | End: 2021-01-01

## 2021-01-01 RX ORDER — BLOOD SUGAR DIAGNOSTIC
STRIP MISCELLANEOUS
Qty: 400 EACH | Refills: 3 | Status: SHIPPED | OUTPATIENT
Start: 2021-01-01 | End: 2021-01-01 | Stop reason: SDUPTHER

## 2021-01-01 RX ORDER — FUROSEMIDE 40 MG/1
40 TABLET ORAL DAILY
COMMUNITY

## 2021-01-01 RX ORDER — THEOPHYLLINE 300 MG/1
300 TABLET, EXTENDED RELEASE ORAL NIGHTLY
Status: DISCONTINUED | OUTPATIENT
Start: 2021-01-01 | End: 2021-01-01 | Stop reason: HOSPADM

## 2021-01-01 RX ORDER — ATORVASTATIN CALCIUM 20 MG/1
20 TABLET, FILM COATED ORAL DAILY
Status: DISCONTINUED | OUTPATIENT
Start: 2021-01-01 | End: 2021-01-01 | Stop reason: HOSPADM

## 2021-01-01 RX ORDER — HYDROCODONE BITARTRATE AND ACETAMINOPHEN 7.5; 325 MG/1; MG/1
2 TABLET ORAL EVERY 4 HOURS PRN
Status: DISCONTINUED | OUTPATIENT
Start: 2021-01-01 | End: 2021-01-01 | Stop reason: HOSPADM

## 2021-01-01 RX ORDER — INSULIN GLARGINE 100 [IU]/ML
25 INJECTION, SOLUTION SUBCUTANEOUS NIGHTLY
Status: DISCONTINUED | OUTPATIENT
Start: 2021-01-01 | End: 2021-01-01 | Stop reason: HOSPADM

## 2021-01-01 RX ADMIN — FUROSEMIDE 40 MG: 40 TABLET ORAL at 17:10

## 2021-01-01 RX ADMIN — INSULIN HUMAN 8 UNITS: 100 INJECTION, SOLUTION PARENTERAL at 17:01

## 2021-01-01 RX ADMIN — CEFEPIME HYDROCHLORIDE 2 G: 2 INJECTION, POWDER, FOR SOLUTION INTRAVENOUS at 13:44

## 2021-01-01 RX ADMIN — MAGNESIUM GLUCONATE 500 MG ORAL TABLET 200 MG: 500 TABLET ORAL at 09:26

## 2021-01-01 RX ADMIN — PREDNISONE 10 MG: 10 TABLET ORAL at 08:35

## 2021-01-01 RX ADMIN — MIRTAZAPINE 15 MG: 15 TABLET, FILM COATED ORAL at 20:07

## 2021-01-01 RX ADMIN — Medication 10 ML: at 09:25

## 2021-01-01 RX ADMIN — DOCUSATE SODIUM 100 MG: 100 CAPSULE, LIQUID FILLED ORAL at 20:34

## 2021-01-01 RX ADMIN — INSULIN LISPRO 12 UNITS: 100 INJECTION, SOLUTION INTRAVENOUS; SUBCUTANEOUS at 21:08

## 2021-01-01 RX ADMIN — INSULIN LISPRO 5 UNITS: 100 INJECTION, SOLUTION INTRAVENOUS; SUBCUTANEOUS at 18:03

## 2021-01-01 RX ADMIN — ATORVASTATIN CALCIUM 20 MG: 20 TABLET, FILM COATED ORAL at 14:59

## 2021-01-01 RX ADMIN — Medication 10 ML: at 20:07

## 2021-01-01 RX ADMIN — INSULIN LISPRO 4 UNITS: 100 INJECTION, SOLUTION INTRAVENOUS; SUBCUTANEOUS at 17:01

## 2021-01-01 RX ADMIN — GUAIFENESIN 600 MG: 600 TABLET, EXTENDED RELEASE ORAL at 20:07

## 2021-01-01 RX ADMIN — INSULIN LISPRO 8 UNITS: 100 INJECTION, SOLUTION INTRAVENOUS; SUBCUTANEOUS at 13:46

## 2021-01-01 RX ADMIN — FUROSEMIDE 40 MG: 10 INJECTION, SOLUTION INTRAMUSCULAR; INTRAVENOUS at 08:23

## 2021-01-01 RX ADMIN — PREDNISONE 20 MG: 20 TABLET ORAL at 17:00

## 2021-01-01 RX ADMIN — BUDESONIDE 0.5 MG: 0.5 SUSPENSION RESPIRATORY (INHALATION) at 18:16

## 2021-01-01 RX ADMIN — MAGNESIUM GLUCONATE 500 MG ORAL TABLET 200 MG: 500 TABLET ORAL at 09:44

## 2021-01-01 RX ADMIN — BUDESONIDE 0.5 MG: 0.5 SUSPENSION RESPIRATORY (INHALATION) at 18:38

## 2021-01-01 RX ADMIN — WARFARIN 4.5 MG: 3 TABLET ORAL at 17:00

## 2021-01-01 RX ADMIN — BUDESONIDE 0.5 MG: 0.5 SUSPENSION RESPIRATORY (INHALATION) at 08:04

## 2021-01-01 RX ADMIN — MAGNESIUM GLUCONATE 500 MG ORAL TABLET 200 MG: 500 TABLET ORAL at 08:56

## 2021-01-01 RX ADMIN — INSULIN LISPRO 10 UNITS: 100 INJECTION, SOLUTION INTRAVENOUS; SUBCUTANEOUS at 22:24

## 2021-01-01 RX ADMIN — DOCUSATE SODIUM 100 MG: 100 CAPSULE, LIQUID FILLED ORAL at 20:37

## 2021-01-01 RX ADMIN — ASPIRIN 81 MG: 81 TABLET, COATED ORAL at 08:35

## 2021-01-01 RX ADMIN — FUROSEMIDE 40 MG: 40 TABLET ORAL at 17:30

## 2021-01-01 RX ADMIN — Medication 10 ML: at 10:45

## 2021-01-01 RX ADMIN — IPRATROPIUM BROMIDE AND ALBUTEROL SULFATE 3 ML: 2.5; .5 SOLUTION RESPIRATORY (INHALATION) at 19:02

## 2021-01-01 RX ADMIN — INSULIN LISPRO 3 UNITS: 100 INJECTION, SOLUTION INTRAVENOUS; SUBCUTANEOUS at 10:44

## 2021-01-01 RX ADMIN — INSULIN LISPRO 4 UNITS: 100 INJECTION, SOLUTION INTRAVENOUS; SUBCUTANEOUS at 08:27

## 2021-01-01 RX ADMIN — PREDNISONE 20 MG: 20 TABLET ORAL at 08:27

## 2021-01-01 RX ADMIN — INSULIN LISPRO 8 UNITS: 100 INJECTION, SOLUTION INTRAVENOUS; SUBCUTANEOUS at 17:10

## 2021-01-01 RX ADMIN — CLINDAMYCIN HYDROCHLORIDE 300 MG: 300 CAPSULE ORAL at 06:07

## 2021-01-01 RX ADMIN — ATORVASTATIN CALCIUM 20 MG: 20 TABLET, FILM COATED ORAL at 20:34

## 2021-01-01 RX ADMIN — CEFEPIME HYDROCHLORIDE 2 G: 2 INJECTION, POWDER, FOR SOLUTION INTRAVENOUS at 12:55

## 2021-01-01 RX ADMIN — BUDESONIDE 0.5 MG: 0.5 SUSPENSION RESPIRATORY (INHALATION) at 19:43

## 2021-01-01 RX ADMIN — INSULIN LISPRO 12 UNITS: 100 INJECTION, SOLUTION INTRAVENOUS; SUBCUTANEOUS at 20:19

## 2021-01-01 RX ADMIN — GUAIFENESIN 600 MG: 600 TABLET, EXTENDED RELEASE ORAL at 08:35

## 2021-01-01 RX ADMIN — Medication 5 MG: at 21:10

## 2021-01-01 RX ADMIN — Medication 10 ML: at 18:18

## 2021-01-01 RX ADMIN — ALPRAZOLAM 0.25 MG: 0.25 TABLET ORAL at 21:11

## 2021-01-01 RX ADMIN — FUROSEMIDE 40 MG: 40 TABLET ORAL at 10:44

## 2021-01-01 RX ADMIN — INSULIN LISPRO 8 UNITS: 100 INJECTION, SOLUTION INTRAVENOUS; SUBCUTANEOUS at 12:03

## 2021-01-01 RX ADMIN — INSULIN LISPRO 4 UNITS: 100 INJECTION, SOLUTION INTRAVENOUS; SUBCUTANEOUS at 09:25

## 2021-01-01 RX ADMIN — DOCUSATE SODIUM 100 MG: 100 CAPSULE, LIQUID FILLED ORAL at 08:48

## 2021-01-01 RX ADMIN — INSULIN LISPRO 16 UNITS: 100 INJECTION, SOLUTION INTRAVENOUS; SUBCUTANEOUS at 17:22

## 2021-01-01 RX ADMIN — CARVEDILOL 12.5 MG: 6.25 TABLET, FILM COATED ORAL at 21:02

## 2021-01-01 RX ADMIN — IPRATROPIUM BROMIDE AND ALBUTEROL SULFATE 3 ML: 2.5; .5 SOLUTION RESPIRATORY (INHALATION) at 12:22

## 2021-01-01 RX ADMIN — DOCUSATE SODIUM 100 MG: 100 CAPSULE, LIQUID FILLED ORAL at 09:26

## 2021-01-01 RX ADMIN — MIRTAZAPINE 15 MG: 15 TABLET, FILM COATED ORAL at 21:06

## 2021-01-01 RX ADMIN — INSULIN HUMAN 4 UNITS: 100 INJECTION, SOLUTION PARENTERAL at 08:47

## 2021-01-01 RX ADMIN — CALCITRIOL 0.25 MCG: 0.25 CAPSULE ORAL at 08:22

## 2021-01-01 RX ADMIN — WARFARIN 3 MG: 3 TABLET ORAL at 20:33

## 2021-01-01 RX ADMIN — INSULIN LISPRO 16 UNITS: 100 INJECTION, SOLUTION INTRAVENOUS; SUBCUTANEOUS at 08:34

## 2021-01-01 RX ADMIN — INSULIN LISPRO 20 UNITS: 100 INJECTION, SOLUTION INTRAVENOUS; SUBCUTANEOUS at 22:52

## 2021-01-01 RX ADMIN — GUAIFENESIN 600 MG: 600 TABLET, EXTENDED RELEASE ORAL at 08:56

## 2021-01-01 RX ADMIN — Medication 10 ML: at 08:48

## 2021-01-01 RX ADMIN — Medication 10 ML: at 08:23

## 2021-01-01 RX ADMIN — CLINDAMYCIN HYDROCHLORIDE 300 MG: 300 CAPSULE ORAL at 23:30

## 2021-01-01 RX ADMIN — BUDESONIDE 0.5 MG: 0.5 SUSPENSION RESPIRATORY (INHALATION) at 07:08

## 2021-01-01 RX ADMIN — INSULIN LISPRO 12 UNITS: 100 INJECTION, SOLUTION INTRAVENOUS; SUBCUTANEOUS at 17:30

## 2021-01-01 RX ADMIN — IPRATROPIUM BROMIDE AND ALBUTEROL SULFATE 3 ML: 2.5; .5 SOLUTION RESPIRATORY (INHALATION) at 14:31

## 2021-01-01 RX ADMIN — CLINDAMYCIN HYDROCHLORIDE 300 MG: 300 CAPSULE ORAL at 06:16

## 2021-01-01 RX ADMIN — IPRATROPIUM BROMIDE AND ALBUTEROL SULFATE 3 ML: 2.5; .5 SOLUTION RESPIRATORY (INHALATION) at 00:04

## 2021-01-01 RX ADMIN — ASPIRIN 81 MG: 81 TABLET, COATED ORAL at 09:26

## 2021-01-01 RX ADMIN — GUAIFENESIN 600 MG: 600 TABLET, EXTENDED RELEASE ORAL at 21:59

## 2021-01-01 RX ADMIN — PREDNISONE 20 MG: 20 TABLET ORAL at 08:23

## 2021-01-01 RX ADMIN — INSULIN LISPRO 14 UNITS: 100 INJECTION, SOLUTION INTRAVENOUS; SUBCUTANEOUS at 17:00

## 2021-01-01 RX ADMIN — PREDNISONE 40 MG: 20 TABLET ORAL at 09:45

## 2021-01-01 RX ADMIN — BUDESONIDE 0.5 MG: 0.5 INHALANT RESPIRATORY (INHALATION) at 19:10

## 2021-01-01 RX ADMIN — FUROSEMIDE 40 MG: 40 TABLET ORAL at 08:48

## 2021-01-01 RX ADMIN — INSULIN GLARGINE 10 UNITS: 100 INJECTION, SOLUTION SUBCUTANEOUS at 20:38

## 2021-01-01 RX ADMIN — CALCIUM CARBONATE (ANTACID) CHEW TAB 500 MG 2 TABLET: 500 CHEW TAB at 10:42

## 2021-01-01 RX ADMIN — CARVEDILOL 12.5 MG: 6.25 TABLET, FILM COATED ORAL at 20:34

## 2021-01-01 RX ADMIN — BUDESONIDE 0.5 MG: 0.5 SUSPENSION RESPIRATORY (INHALATION) at 19:58

## 2021-01-01 RX ADMIN — FUROSEMIDE 40 MG: 40 TABLET ORAL at 18:02

## 2021-01-01 RX ADMIN — PREDNISONE 20 MG: 20 TABLET ORAL at 09:25

## 2021-01-01 RX ADMIN — CARVEDILOL 12.5 MG: 6.25 TABLET, FILM COATED ORAL at 20:14

## 2021-01-01 RX ADMIN — FUROSEMIDE 40 MG: 10 INJECTION, SOLUTION INTRAMUSCULAR; INTRAVENOUS at 19:46

## 2021-01-01 RX ADMIN — FUROSEMIDE 40 MG: 10 INJECTION, SOLUTION INTRAMUSCULAR; INTRAVENOUS at 18:34

## 2021-01-01 RX ADMIN — CALCITRIOL 0.25 MCG: 0.25 CAPSULE ORAL at 14:59

## 2021-01-01 RX ADMIN — BUDESONIDE 0.5 MG: 0.5 INHALANT RESPIRATORY (INHALATION) at 06:31

## 2021-01-01 RX ADMIN — BUDESONIDE 0.5 MG: 0.5 INHALANT RESPIRATORY (INHALATION) at 19:02

## 2021-01-01 RX ADMIN — DOCUSATE SODIUM 100 MG: 100 CAPSULE, LIQUID FILLED ORAL at 20:07

## 2021-01-01 RX ADMIN — INSULIN LISPRO 12 UNITS: 100 INJECTION, SOLUTION INTRAVENOUS; SUBCUTANEOUS at 08:06

## 2021-01-01 RX ADMIN — ACETAMINOPHEN 650 MG: 325 TABLET, FILM COATED ORAL at 18:37

## 2021-01-01 RX ADMIN — WARFARIN 3 MG: 3 TABLET ORAL at 18:34

## 2021-01-01 RX ADMIN — DOXYCYCLINE 100 MG: 100 TABLET, FILM COATED ORAL at 20:07

## 2021-01-01 RX ADMIN — IPRATROPIUM BROMIDE AND ALBUTEROL SULFATE 3 ML: 2.5; .5 SOLUTION RESPIRATORY (INHALATION) at 11:01

## 2021-01-01 RX ADMIN — CARVEDILOL 12.5 MG: 6.25 TABLET, FILM COATED ORAL at 20:36

## 2021-01-01 RX ADMIN — ATORVASTATIN CALCIUM 20 MG: 20 TABLET, FILM COATED ORAL at 10:44

## 2021-01-01 RX ADMIN — ASPIRIN 81 MG: 81 TABLET, COATED ORAL at 14:59

## 2021-01-01 RX ADMIN — WARFARIN 4 MG: 4 TABLET ORAL at 17:22

## 2021-01-01 RX ADMIN — MAGNESIUM GLUCONATE 500 MG ORAL TABLET 200 MG: 500 TABLET ORAL at 08:22

## 2021-01-01 RX ADMIN — CALCITRIOL 0.25 MCG: 0.25 CAPSULE ORAL at 08:38

## 2021-01-01 RX ADMIN — INSULIN GLARGINE 12 UNITS: 100 INJECTION, SOLUTION SUBCUTANEOUS at 21:07

## 2021-01-01 RX ADMIN — ATORVASTATIN CALCIUM 20 MG: 20 TABLET, FILM COATED ORAL at 20:07

## 2021-01-01 RX ADMIN — ACETYLCYSTEINE 4 ML: 200 SOLUTION ORAL; RESPIRATORY (INHALATION) at 11:51

## 2021-01-01 RX ADMIN — Medication 10 ML: at 21:59

## 2021-01-01 RX ADMIN — GUAIFENESIN 600 MG: 600 TABLET, EXTENDED RELEASE ORAL at 08:38

## 2021-01-01 RX ADMIN — IPRATROPIUM BROMIDE AND ALBUTEROL SULFATE 3 ML: 2.5; .5 SOLUTION RESPIRATORY (INHALATION) at 08:09

## 2021-01-01 RX ADMIN — HYDROCODONE BITARTRATE AND ACETAMINOPHEN 2 TABLET: 7.5; 325 TABLET ORAL at 00:14

## 2021-01-01 RX ADMIN — IPRATROPIUM BROMIDE AND ALBUTEROL SULFATE 3 ML: 2.5; .5 SOLUTION RESPIRATORY (INHALATION) at 08:17

## 2021-01-01 RX ADMIN — Medication 10 ML: at 21:02

## 2021-01-01 RX ADMIN — ACETAMINOPHEN 650 MG: 325 TABLET, FILM COATED ORAL at 03:08

## 2021-01-01 RX ADMIN — ACETYLCYSTEINE 2 ML: 200 SOLUTION ORAL; RESPIRATORY (INHALATION) at 19:03

## 2021-01-01 RX ADMIN — Medication 10 ML: at 20:34

## 2021-01-01 RX ADMIN — WARFARIN 3 MG: 3 TABLET ORAL at 21:03

## 2021-01-01 RX ADMIN — IPRATROPIUM BROMIDE AND ALBUTEROL SULFATE 3 ML: 2.5; .5 SOLUTION RESPIRATORY (INHALATION) at 15:51

## 2021-01-01 RX ADMIN — CARVEDILOL 12.5 MG: 6.25 TABLET, FILM COATED ORAL at 20:07

## 2021-01-01 RX ADMIN — DOCUSATE SODIUM 100 MG: 100 CAPSULE, LIQUID FILLED ORAL at 21:03

## 2021-01-01 RX ADMIN — ASPIRIN 81 MG: 81 TABLET, COATED ORAL at 08:56

## 2021-01-01 RX ADMIN — IPRATROPIUM BROMIDE AND ALBUTEROL SULFATE 3 ML: 2.5; .5 SOLUTION RESPIRATORY (INHALATION) at 15:27

## 2021-01-01 RX ADMIN — CALCITRIOL 0.25 MCG: 0.25 CAPSULE ORAL at 08:48

## 2021-01-01 RX ADMIN — INSULIN LISPRO 16 UNITS: 100 INJECTION, SOLUTION INTRAVENOUS; SUBCUTANEOUS at 23:18

## 2021-01-01 RX ADMIN — INSULIN LISPRO 10 UNITS: 100 INJECTION, SOLUTION INTRAVENOUS; SUBCUTANEOUS at 12:51

## 2021-01-01 RX ADMIN — INSULIN GLARGINE 12 UNITS: 100 INJECTION, SOLUTION SUBCUTANEOUS at 20:35

## 2021-01-01 RX ADMIN — CEFEPIME HYDROCHLORIDE 2 G: 2 INJECTION, POWDER, FOR SOLUTION INTRAVENOUS at 12:46

## 2021-01-01 RX ADMIN — IPRATROPIUM BROMIDE AND ALBUTEROL SULFATE 3 ML: 2.5; .5 SOLUTION RESPIRATORY (INHALATION) at 10:55

## 2021-01-01 RX ADMIN — INSULIN GLARGINE 10 UNITS: 100 INJECTION, SOLUTION SUBCUTANEOUS at 21:59

## 2021-01-01 RX ADMIN — IPRATROPIUM BROMIDE AND ALBUTEROL SULFATE 3 ML: 2.5; .5 SOLUTION RESPIRATORY (INHALATION) at 09:04

## 2021-01-01 RX ADMIN — INSULIN LISPRO 20 UNITS: 100 INJECTION, SOLUTION INTRAVENOUS; SUBCUTANEOUS at 18:03

## 2021-01-01 RX ADMIN — IPRATROPIUM BROMIDE AND ALBUTEROL SULFATE 3 ML: 2.5; .5 SOLUTION RESPIRATORY (INHALATION) at 14:30

## 2021-01-01 RX ADMIN — WARFARIN 1.5 MG: 3 TABLET ORAL at 17:00

## 2021-01-01 RX ADMIN — ATORVASTATIN CALCIUM 20 MG: 20 TABLET, FILM COATED ORAL at 08:35

## 2021-01-01 RX ADMIN — IPRATROPIUM BROMIDE AND ALBUTEROL SULFATE 3 ML: 2.5; .5 SOLUTION RESPIRATORY (INHALATION) at 19:43

## 2021-01-01 RX ADMIN — MIRTAZAPINE 15 MG: 15 TABLET, FILM COATED ORAL at 20:34

## 2021-01-01 RX ADMIN — INSULIN LISPRO 16 UNITS: 100 INJECTION, SOLUTION INTRAVENOUS; SUBCUTANEOUS at 08:47

## 2021-01-01 RX ADMIN — VANCOMYCIN HYDROCHLORIDE 1500 MG: 10 INJECTION, POWDER, LYOPHILIZED, FOR SOLUTION INTRAVENOUS at 12:48

## 2021-01-01 RX ADMIN — THEOPHYLLINE ANHYDROUS 300 MG: 300 CAPSULE, EXTENDED RELEASE ORAL at 20:33

## 2021-01-01 RX ADMIN — Medication 10 ML: at 08:36

## 2021-01-01 RX ADMIN — MAGNESIUM GLUCONATE 500 MG ORAL TABLET 200 MG: 500 TABLET ORAL at 08:28

## 2021-01-01 RX ADMIN — PREDNISONE 40 MG: 20 TABLET ORAL at 08:38

## 2021-01-01 RX ADMIN — ATORVASTATIN CALCIUM 20 MG: 20 TABLET, FILM COATED ORAL at 09:45

## 2021-01-01 RX ADMIN — BUDESONIDE 0.5 MG: 0.5 INHALANT RESPIRATORY (INHALATION) at 08:10

## 2021-01-01 RX ADMIN — ASPIRIN 81 MG: 81 TABLET, COATED ORAL at 08:38

## 2021-01-01 RX ADMIN — CARVEDILOL 12.5 MG: 6.25 TABLET, FILM COATED ORAL at 21:03

## 2021-01-01 RX ADMIN — THEOPHYLLINE 300 MG: 300 TABLET, EXTENDED RELEASE ORAL at 20:14

## 2021-01-01 RX ADMIN — Medication 10 ML: at 09:01

## 2021-01-01 RX ADMIN — PREDNISONE 40 MG: 20 TABLET ORAL at 08:56

## 2021-01-01 RX ADMIN — INSULIN HUMAN 4 UNITS: 100 INJECTION, SOLUTION PARENTERAL at 17:11

## 2021-01-01 RX ADMIN — IPRATROPIUM BROMIDE AND ALBUTEROL SULFATE 3 ML: 2.5; .5 SOLUTION RESPIRATORY (INHALATION) at 08:19

## 2021-01-01 RX ADMIN — ASPIRIN 81 MG: 81 TABLET, COATED ORAL at 08:48

## 2021-01-01 RX ADMIN — PREDNISONE 20 MG: 20 TABLET ORAL at 17:10

## 2021-01-01 RX ADMIN — ACETAMINOPHEN 650 MG: 325 TABLET, FILM COATED ORAL at 15:42

## 2021-01-01 RX ADMIN — INSULIN LISPRO 20 UNITS: 100 INJECTION, SOLUTION INTRAVENOUS; SUBCUTANEOUS at 20:35

## 2021-01-01 RX ADMIN — Medication 10 ML: at 08:43

## 2021-01-01 RX ADMIN — Medication 10 ML: at 20:14

## 2021-01-01 RX ADMIN — WARFARIN 3 MG: 3 TABLET ORAL at 20:36

## 2021-01-01 RX ADMIN — IPRATROPIUM BROMIDE AND ALBUTEROL SULFATE 3 ML: 2.5; .5 SOLUTION RESPIRATORY (INHALATION) at 08:03

## 2021-01-01 RX ADMIN — ACETAMINOPHEN 650 MG: 325 TABLET ORAL at 09:30

## 2021-01-01 RX ADMIN — HYDROCODONE BITARTRATE AND ACETAMINOPHEN 2 TABLET: 7.5; 325 TABLET ORAL at 23:30

## 2021-01-01 RX ADMIN — FUROSEMIDE 40 MG: 10 INJECTION, SOLUTION INTRAMUSCULAR; INTRAVENOUS at 11:44

## 2021-01-01 RX ADMIN — INSULIN LISPRO 8 UNITS: 100 INJECTION, SOLUTION INTRAVENOUS; SUBCUTANEOUS at 12:46

## 2021-01-01 RX ADMIN — ALPRAZOLAM 0.25 MG: 0.25 TABLET ORAL at 22:53

## 2021-01-01 RX ADMIN — FUROSEMIDE 40 MG: 40 TABLET ORAL at 08:56

## 2021-01-01 RX ADMIN — HYDROXYZINE HYDROCHLORIDE 25 MG: 25 TABLET, FILM COATED ORAL at 21:08

## 2021-01-01 RX ADMIN — MAGNESIUM GLUCONATE 500 MG ORAL TABLET 200 MG: 500 TABLET ORAL at 08:38

## 2021-01-01 RX ADMIN — ACETAMINOPHEN 650 MG: 325 TABLET, FILM COATED ORAL at 13:46

## 2021-01-01 RX ADMIN — INSULIN GLARGINE 10 UNITS: 100 INJECTION, SOLUTION SUBCUTANEOUS at 21:06

## 2021-01-01 RX ADMIN — FUROSEMIDE 40 MG: 40 TABLET ORAL at 09:45

## 2021-01-01 RX ADMIN — MAGNESIUM GLUCONATE 500 MG ORAL TABLET 200 MG: 500 TABLET ORAL at 08:35

## 2021-01-01 RX ADMIN — Medication 10 ML: at 21:00

## 2021-01-01 RX ADMIN — INSULIN GLARGINE 12 UNITS: 100 INJECTION, SOLUTION SUBCUTANEOUS at 20:07

## 2021-01-01 RX ADMIN — FUROSEMIDE 40 MG: 10 INJECTION, SOLUTION INTRAMUSCULAR; INTRAVENOUS at 17:00

## 2021-01-01 RX ADMIN — CLINDAMYCIN HYDROCHLORIDE 300 MG: 300 CAPSULE ORAL at 20:34

## 2021-01-01 RX ADMIN — BUDESONIDE 0.5 MG: 0.5 SUSPENSION RESPIRATORY (INHALATION) at 07:02

## 2021-01-01 RX ADMIN — GUAIFENESIN 600 MG: 600 TABLET, EXTENDED RELEASE ORAL at 20:13

## 2021-01-01 RX ADMIN — IPRATROPIUM BROMIDE AND ALBUTEROL SULFATE 3 ML: 2.5; .5 SOLUTION RESPIRATORY (INHALATION) at 18:11

## 2021-01-01 RX ADMIN — Medication 10 ML: at 09:45

## 2021-01-01 RX ADMIN — INSULIN LISPRO 8 UNITS: 100 INJECTION, SOLUTION INTRAVENOUS; SUBCUTANEOUS at 19:10

## 2021-01-01 RX ADMIN — ASPIRIN 81 MG: 81 TABLET, COATED ORAL at 18:03

## 2021-01-01 RX ADMIN — Medication 10 ML: at 21:05

## 2021-01-01 RX ADMIN — BUDESONIDE 0.5 MG: 0.5 INHALANT RESPIRATORY (INHALATION) at 08:17

## 2021-01-01 RX ADMIN — INSULIN LISPRO 12 UNITS: 100 INJECTION, SOLUTION INTRAVENOUS; SUBCUTANEOUS at 12:55

## 2021-01-01 RX ADMIN — DOXYCYCLINE 100 MG: 100 TABLET, FILM COATED ORAL at 08:35

## 2021-01-01 RX ADMIN — FUROSEMIDE 40 MG: 40 TABLET ORAL at 17:00

## 2021-01-01 RX ADMIN — WARFARIN 5 MG: 5 TABLET ORAL at 18:03

## 2021-01-01 RX ADMIN — Medication 5 MG: at 20:58

## 2021-01-01 RX ADMIN — Medication 5 MG: at 22:53

## 2021-01-01 RX ADMIN — IPRATROPIUM BROMIDE AND ALBUTEROL SULFATE 3 ML: 2.5; .5 SOLUTION RESPIRATORY (INHALATION) at 15:13

## 2021-01-01 RX ADMIN — CALCITRIOL 0.25 MCG: 0.25 CAPSULE ORAL at 09:44

## 2021-01-01 RX ADMIN — ACETYLCYSTEINE 4 ML: 200 SOLUTION ORAL; RESPIRATORY (INHALATION) at 09:04

## 2021-01-01 RX ADMIN — PREDNISONE 20 MG: 20 TABLET ORAL at 08:48

## 2021-01-01 RX ADMIN — CARVEDILOL 12.5 MG: 6.25 TABLET, FILM COATED ORAL at 21:05

## 2021-01-01 RX ADMIN — GUAIFENESIN 600 MG: 600 TABLET, EXTENDED RELEASE ORAL at 21:06

## 2021-01-01 RX ADMIN — THEOPHYLLINE 300 MG: 300 TABLET, EXTENDED RELEASE ORAL at 21:02

## 2021-01-01 RX ADMIN — CARVEDILOL 12.5 MG: 6.25 TABLET, FILM COATED ORAL at 21:59

## 2021-01-01 RX ADMIN — INSULIN LISPRO 4 UNITS: 100 INJECTION, SOLUTION INTRAVENOUS; SUBCUTANEOUS at 12:07

## 2021-01-01 RX ADMIN — THEOPHYLLINE 300 MG: 300 TABLET, EXTENDED RELEASE ORAL at 20:07

## 2021-01-01 RX ADMIN — IPRATROPIUM BROMIDE AND ALBUTEROL SULFATE 3 ML: 2.5; .5 SOLUTION RESPIRATORY (INHALATION) at 18:38

## 2021-01-01 RX ADMIN — Medication 5 MG: at 23:18

## 2021-01-01 RX ADMIN — BUDESONIDE 0.5 MG: 0.5 INHALANT RESPIRATORY (INHALATION) at 09:04

## 2021-01-01 RX ADMIN — INSULIN HUMAN 8 UNITS: 100 INJECTION, SOLUTION PARENTERAL at 08:28

## 2021-01-01 RX ADMIN — WARFARIN 4 MG: 4 TABLET ORAL at 17:30

## 2021-01-01 RX ADMIN — IPRATROPIUM BROMIDE AND ALBUTEROL SULFATE 3 ML: 2.5; .5 SOLUTION RESPIRATORY (INHALATION) at 18:56

## 2021-01-01 RX ADMIN — THEOPHYLLINE ANHYDROUS 300 MG: 300 CAPSULE, EXTENDED RELEASE ORAL at 20:07

## 2021-01-01 RX ADMIN — IPRATROPIUM BROMIDE AND ALBUTEROL SULFATE 3 ML: 2.5; .5 SOLUTION RESPIRATORY (INHALATION) at 17:10

## 2021-01-01 RX ADMIN — MIRTAZAPINE 15 MG: 15 TABLET, FILM COATED ORAL at 20:14

## 2021-01-01 RX ADMIN — CLINDAMYCIN HYDROCHLORIDE 300 MG: 300 CAPSULE ORAL at 14:52

## 2021-01-01 RX ADMIN — ASPIRIN 81 MG: 81 TABLET, COATED ORAL at 09:45

## 2021-01-01 RX ADMIN — THEOPHYLLINE 300 MG: 300 TABLET, EXTENDED RELEASE ORAL at 21:59

## 2021-01-01 RX ADMIN — HYDROXYZINE HYDROCHLORIDE 25 MG: 25 TABLET, FILM COATED ORAL at 21:55

## 2021-01-01 RX ADMIN — INSULIN LISPRO 8 UNITS: 100 INJECTION, SOLUTION INTRAVENOUS; SUBCUTANEOUS at 08:28

## 2021-01-01 RX ADMIN — FUROSEMIDE 40 MG: 10 INJECTION, SOLUTION INTRAMUSCULAR; INTRAVENOUS at 09:25

## 2021-01-01 RX ADMIN — PREDNISONE 20 MG: 20 TABLET ORAL at 23:30

## 2021-01-01 RX ADMIN — IPRATROPIUM BROMIDE AND ALBUTEROL SULFATE 3 ML: 2.5; .5 SOLUTION RESPIRATORY (INHALATION) at 06:31

## 2021-01-01 RX ADMIN — Medication 10 ML: at 21:03

## 2021-01-01 RX ADMIN — BUDESONIDE 0.5 MG: 0.5 SUSPENSION RESPIRATORY (INHALATION) at 08:19

## 2021-01-01 RX ADMIN — IPRATROPIUM BROMIDE AND ALBUTEROL SULFATE 3 ML: 2.5; .5 SOLUTION RESPIRATORY (INHALATION) at 19:14

## 2021-01-01 RX ADMIN — INSULIN LISPRO 12 UNITS: 100 INJECTION, SOLUTION INTRAVENOUS; SUBCUTANEOUS at 11:53

## 2021-01-01 RX ADMIN — ASPIRIN 81 MG: 81 TABLET, COATED ORAL at 10:44

## 2021-01-01 RX ADMIN — FUROSEMIDE 40 MG: 40 TABLET ORAL at 08:35

## 2021-01-01 RX ADMIN — THEOPHYLLINE ANHYDROUS 300 MG: 300 CAPSULE, EXTENDED RELEASE ORAL at 21:03

## 2021-01-01 RX ADMIN — ASPIRIN 81 MG: 81 TABLET, COATED ORAL at 08:28

## 2021-01-01 RX ADMIN — MIRTAZAPINE 15 MG: 15 TABLET, FILM COATED ORAL at 21:03

## 2021-01-01 RX ADMIN — MAGNESIUM GLUCONATE 500 MG ORAL TABLET 200 MG: 500 TABLET ORAL at 08:48

## 2021-01-01 RX ADMIN — Medication 10 ML: at 08:28

## 2021-01-01 RX ADMIN — DOCUSATE SODIUM 100 MG: 100 CAPSULE, LIQUID FILLED ORAL at 08:28

## 2021-01-01 RX ADMIN — IPRATROPIUM BROMIDE AND ALBUTEROL SULFATE 3 ML: 2.5; .5 SOLUTION RESPIRATORY (INHALATION) at 07:02

## 2021-01-01 RX ADMIN — CEFEPIME HYDROCHLORIDE 2 G: 2 INJECTION, POWDER, FOR SOLUTION INTRAVENOUS at 12:48

## 2021-01-01 RX ADMIN — BUDESONIDE 0.5 MG: 0.5 INHALANT RESPIRATORY (INHALATION) at 15:13

## 2021-01-01 RX ADMIN — ATORVASTATIN CALCIUM 20 MG: 20 TABLET, FILM COATED ORAL at 20:36

## 2021-01-01 RX ADMIN — ATORVASTATIN CALCIUM 20 MG: 20 TABLET, FILM COATED ORAL at 08:38

## 2021-01-01 RX ADMIN — MIRTAZAPINE 15 MG: 15 TABLET, FILM COATED ORAL at 20:37

## 2021-01-01 RX ADMIN — ATORVASTATIN CALCIUM 20 MG: 20 TABLET, FILM COATED ORAL at 08:56

## 2021-01-01 RX ADMIN — MIRTAZAPINE 15 MG: 15 TABLET, FILM COATED ORAL at 21:59

## 2021-01-01 RX ADMIN — IPRATROPIUM BROMIDE AND ALBUTEROL SULFATE 3 ML: 2.5; .5 SOLUTION RESPIRATORY (INHALATION) at 19:58

## 2021-01-01 RX ADMIN — INSULIN LISPRO 5 UNITS: 100 INJECTION, SOLUTION INTRAVENOUS; SUBCUTANEOUS at 09:44

## 2021-01-01 RX ADMIN — IPRATROPIUM BROMIDE AND ALBUTEROL SULFATE 3 ML: 2.5; .5 SOLUTION RESPIRATORY (INHALATION) at 12:20

## 2021-01-01 RX ADMIN — WARFARIN 5 MG: 5 TABLET ORAL at 19:10

## 2021-01-01 RX ADMIN — FUROSEMIDE 40 MG: 40 TABLET ORAL at 08:28

## 2021-01-01 RX ADMIN — THEOPHYLLINE ANHYDROUS 300 MG: 300 CAPSULE, EXTENDED RELEASE ORAL at 20:36

## 2021-01-01 RX ADMIN — THEOPHYLLINE 300 MG: 300 TABLET, EXTENDED RELEASE ORAL at 21:06

## 2021-01-01 RX ADMIN — DOCUSATE SODIUM 100 MG: 100 CAPSULE, LIQUID FILLED ORAL at 08:23

## 2021-01-01 RX ADMIN — IPRATROPIUM BROMIDE AND ALBUTEROL SULFATE 3 ML: 2.5; .5 SOLUTION RESPIRATORY (INHALATION) at 10:32

## 2021-01-01 RX ADMIN — IPRATROPIUM BROMIDE AND ALBUTEROL SULFATE 3 ML: 2.5; .5 SOLUTION RESPIRATORY (INHALATION) at 11:51

## 2021-01-01 RX ADMIN — ATORVASTATIN CALCIUM 20 MG: 20 TABLET, FILM COATED ORAL at 21:03

## 2021-01-01 RX ADMIN — GUAIFENESIN 600 MG: 600 TABLET, EXTENDED RELEASE ORAL at 09:45

## 2021-01-01 RX ADMIN — IPRATROPIUM BROMIDE AND ALBUTEROL SULFATE 3 ML: 2.5; .5 SOLUTION RESPIRATORY (INHALATION) at 14:58

## 2021-01-01 RX ADMIN — HYDROXYZINE HYDROCHLORIDE 25 MG: 25 TABLET, FILM COATED ORAL at 00:48

## 2021-01-01 RX ADMIN — MIRTAZAPINE 15 MG: 15 TABLET, FILM COATED ORAL at 21:02

## 2021-01-01 RX ADMIN — INSULIN LISPRO 16 UNITS: 100 INJECTION, SOLUTION INTRAVENOUS; SUBCUTANEOUS at 08:56

## 2021-01-01 RX ADMIN — ASPIRIN 81 MG: 81 TABLET, COATED ORAL at 08:22

## 2021-01-01 RX ADMIN — MAGNESIUM GLUCONATE 500 MG ORAL TABLET 200 MG: 500 TABLET ORAL at 10:44

## 2021-01-01 RX ADMIN — Medication 10 ML: at 20:37

## 2021-01-01 RX ADMIN — CEFEPIME HYDROCHLORIDE 2 G: 2 INJECTION, POWDER, FOR SOLUTION INTRAVENOUS at 15:00

## 2021-01-01 RX ADMIN — INSULIN GLARGINE 25 UNITS: 100 INJECTION, SOLUTION SUBCUTANEOUS at 20:08

## 2021-01-06 PROBLEM — I48.21 PERMANENT ATRIAL FIBRILLATION (HCC): Status: ACTIVE | Noted: 2019-06-17

## 2021-01-06 NOTE — PROGRESS NOTES
Subjective:     Encounter Date:01/06/2021      Patient ID: Cali Santoro is a 85 y.o. male.    Chief Complaint: Atrial Fibrillation & Coronary Artery Disease  History of Present Illness     85-year-old white male patient with known history of paroxysmal atrial fibrillation CAD sick sinus node syndrome status post biventricular pacemaker hypertension dyslipidemia diabetes COPD comes back for follow-up     Last cardiac catheterization showed LAD 40% disease mild to moderate diffuse in-stent restenosis to 50% and also in the midportion 60 to 70% left main 40% RCA up to 40%  Patient does have mild chronic renal insufficiency         Patient was seen by electrophysiology recently and was given the option of watchman device due to recent hemoptysis  Patient currently decided to continue anticoagulation no more hemoptysis refused watchman device    Patient recently had a repeat echocardiogram December 2020 EF is 60% septal asymmetric hypertrophy RV borderline dilated mild aortic valve stenosis there is again noted a 3 small pericardial effusion no significant change    Patient was admitted to hospital for pneumonia now he is using oxygen is also had some diastolic congestive heart failure  I reviewed his recent labs lipids well controlled his creatinine 1.96 BUN 41 potassium 5.3 his digoxin 1.7  Advised patient to stop digoxin completely now and start next week take 2 times a week  He will be followed by nephrology regarding renal insufficiency and hyperkalemia  He will decrease the Lasix  Follow-up in the next 3 months with a repeat dig level     Patient denies of any chest pain today    The following portions of the patient's history were reviewed and updated as appropriate: Allergies current medications past family history past medical history past social history past surgical history problem list and review of systems  Past Medical History:   Diagnosis Date   • Arrhythmia    • Bradycardia    • Cardiomyopathy  "(CMS/[facility])    • Cataract    • COPD (chronic obstructive pulmonary disease) (CMS/[facility])    • Coronary artery disease    • Diabetes mellitus, type 2 (CMS/[facility])    • Emphysema, unspecified (CMS/[facility])    • Hx of sick sinus syndrome     S/P Biventricular Pacemaker   • Hypertension    • Paroxysmal atrial fibrillation (CMS/[facility])    • Sleep apnea    • Ventricular arrhythmia      Past Surgical History:   Procedure Laterality Date   • CARDIAC CATHETERIZATION  02/04/2015   • CARDIAC CATHETERIZATION  04/25/2016   • CAROTID ENDARTERECTOMY     • CORONARY ANGIOPLASTY  1997   • LARYNGOSCOPY     • LUNG SURGERY  2008    Lung resection   • PACEMAKER IMPLANTATION  04/25/2016    Dual chamber; [employer]   • TRACHEOSTOMY       /63   Pulse 82   Temp 97.4 °F (36.3 °C) (Infrared)   Ht 170.2 cm (67\")   Wt 77.6 kg (171 lb)   BMI 26.78 kg/m²   Family History   Problem Relation Age of Onset   • Heart disease Mother    • Hypertension Mother    • Stroke Father    • Breast cancer Sister    • Colon cancer Sister    • Heart disease Brother    • Diabetes Other        Current Outpatient Medications:   •  acetaminophen (TYLENOL) 500 MG tablet, Take 500 mg by mouth Every 6 (Six) Hours As Needed for Mild Pain ., Disp: , Rfl:   •  aspirin 81 MG tablet, Take 81 mg by mouth Every Night., Disp: , Rfl:   •  calcitriol (ROCALTROL) 0.25 MCG capsule, Take 0.25 mcg by mouth 3 (Three) Times a Week. Mon, Wed, Fri, Disp: , Rfl:   •  carvedilol (COREG) 12.5 MG tablet, Take 12.5 mg by mouth Every Night., Disp: , Rfl:   •  digoxin (LANOXIN) 125 MCG tablet, Take 1 tablet by mouth Every Other Day., Disp: , Rfl:   •  furosemide (LASIX) 40 MG tablet, Take 1 tablet by mouth in the am and 1/2 tablet by mouth in the pm, Disp: 60 tablet, Rfl: 0  •  glimepiride (AMARYL) 2 MG tablet, TAKE 1 TABLET TWICE DAILY, Disp: 180 tablet, Rfl: 1  •  Insulin Glargine (Lantus SoloStar) 100 UNIT/ML injection pen, Inject 10 units sq at bedtime (Patient taking differently: Inject 10 " Units under the skin into the appropriate area as directed Daily With Dinner.), Disp: 15 pen, Rfl: 3  •  Magnesium 250 MG tablet, Take 1 tablet by mouth Daily., Disp: , Rfl:   •  melatonin 5 MG tablet tablet, Take 5 mg by mouth Every Night., Disp: , Rfl:   •  mirtazapine (REMERON) 15 MG tablet, TAKE 1 TABLET BY MOUTH EVERY NIGHT., Disp: 90 tablet, Rfl: 1  •  simvastatin (ZOCOR) 40 MG tablet, 1 tablet p.o. at bedtime. (Patient taking differently: Take 40 mg by mouth Every Night.), Disp: 90 tablet, Rfl: 4  •  SITagliptin-metFORMIN HCl ER (Janumet XR)  MG tablet, Take 1 tablet by mouth Daily. (Patient taking differently: Take 1 tablet by mouth Daily With Lunch.), Disp: 90 tablet, Rfl: 2  •  theophylline (THEODUR) 300 MG 12 hr tablet, Take 300 mg by mouth Every Night., Disp: , Rfl:   •  warfarin (COUMADIN) 3 MG tablet, Take 1 tablet by mouth Every Night., Disp: 34 tablet, Rfl: 2  Social History     Socioeconomic History   • Marital status:      Spouse name: Not on file   • Number of children: Not on file   • Years of education: Not on file   • Highest education level: Not on file   Occupational History   • Occupation: Retired   Social Needs   • Financial resource strain: Patient refused   • Food insecurity     Worry: Patient refused     Inability: Patient refused   • Transportation needs     Medical: Patient refused     Non-medical: Patient refused   Tobacco Use   • Smoking status: Former Smoker     Types: Cigarettes   • Smokeless tobacco: Never Used   Substance and Sexual Activity   • Alcohol use: Yes     Comment: occ   • Drug use: No   • Sexual activity: Defer     Allergies   Allergen Reactions   • Penicillins Itching     Review of Systems   Constitution: Positive for malaise/fatigue. Negative for fever.   HENT: Negative for congestion and hearing loss.    Eyes: Negative for double vision and visual disturbance.   Cardiovascular: Positive for leg swelling. Negative for chest pain, claudication, dyspnea  on exertion and syncope.   Respiratory: Positive for shortness of breath. Negative for cough.    Endocrine: Negative for cold intolerance.   Skin: Negative for color change and rash.   Musculoskeletal: Negative for arthritis and joint pain.   Gastrointestinal: Negative for abdominal pain and heartburn.   Genitourinary: Negative for hematuria.   Neurological: Positive for dizziness. Negative for excessive daytime sleepiness.   Psychiatric/Behavioral: Negative for depression. The patient is not nervous/anxious.    All other systems reviewed and are negative.             Objective:     Physical Exam  Patient is alert not in any acute distress currently using oxygen he is afebrile blood pressure heart rate stable  Neck no significant JVP lungs bilateral entry few rhonchi and rales at the bases heart sounds S1-S2 regular no significant gallop murmur extremities bilateral trace edema bilateral pulses present equal  Procedures    Lab Review:       Assessment:          Diagnosis Plan   1. Permanent atrial fibrillation (CMS/[facility])  Digoxin Level   2. Chronic obstructive pulmonary disease, unspecified COPD type (CMS/[facility])     3. Coronary artery disease of native artery of native heart with stable angina pectoris (CMS/[facility])  Digoxin Level   4. Essential hypertension     5. Presence of cardiac pacemaker  Digoxin Level   6. Sleep apnea, unspecified type  Digoxin Level   7. Type 2 diabetes mellitus with hyperglycemia, without long-term current use of insulin (CMS/[facility])     8. Stage 3 chronic kidney disease, unspecified whether stage 3a or 3b CKD     9. Acute on chronic heart failure with preserved ejection fraction (CMS/[facility])            Plan:       MDM  Number of Diagnoses or Management Options  Acute on chronic heart failure with preserved ejection fraction (CMS/HCC): established, improving  Chronic obstructive pulmonary disease, unspecified COPD type (CMS/HCC): established, improving  Coronary artery disease of native artery of  native heart with stable angina pectoris (CMS/[facility]): established, improving  Essential hypertension: established, improving  Permanent atrial fibrillation (CMS/[facility]): established, improving  Presence of cardiac pacemaker: established, improving  Sleep apnea, unspecified type: established, improving  Stage 3 chronic kidney disease, unspecified whether stage 3a or 3b CKD: established, worsening  Type 2 diabetes mellitus with hyperglycemia, without long-term current use of insulin (CMS/[facility]): established, improving     Amount and/or Complexity of Data Reviewed  Clinical lab tests: reviewed and ordered  Review and summarize past medical records: yes  Independent visualization of images, tracings, or specimens: yes    Risk of Complications, Morbidity, and/or Mortality  Presenting problems: high  Management options: high    Patient Progress  Patient progress: improved    Continue anticoagulation currently stable status post permanent pacemaker placement stable  Digitoxicity we will stop the digoxin completely  Follow-up dig level in 3 months and dig dose will be decreased to 2 times a week  Acute on chronic renal failure patient will be followed by nephrology decrease the diuresis  History of nonobstructive CAD currently mostly stable if any active symptoms needs further evaluation  Diastolic congestive heart failure resolving  Hypertension stable

## 2021-01-08 PROBLEM — N18.32 STAGE 3B CHRONIC KIDNEY DISEASE (HCC): Status: ACTIVE | Noted: 2019-07-03

## 2021-01-08 NOTE — PATIENT INSTRUCTIONS
Please stop Janumet, glimepiride and Lantus insulin  Start Ozempic 0.25 mg subcu weekly for 4 weeks and if he is able to tolerate without any abdominal pain, nausea or vomiting then after 4 weeks increase it to 0.50 mg subcu weekly.  Continue to work on your diet and activity  Annual eye exam and flu vaccine  Labs before follow-up.

## 2021-01-08 NOTE — PROGRESS NOTES
Endocrine Progress Note Outpatient     Patient Care Team:  Yusef Sosa Jr., MD as PCP - General  Yusef Sosa Jr., MD as PCP - Family Medicine  Luis Paulino MD as Consulting Physician (Cardiology)    Chief Complaint: Follow-up type 2 diabetes    HPI: 85-year-old male with history of type 2 diabetes, hypertension, hyperlipidemia, vitamin D deficiency is here for follow-up.    For type 2 diabetes he is currently on Janumet XR 50/thousand, 1 tablet daily with glimepiride 2 mg twice a day 10 units of Lantus subcu nightly.  Now on oxygen supplementation.  He is taking his medications on a regular basis.  He is trying to work on his diet.    Hypertension: Well-controlled    Hyperlipidemia: On simvastatin    Vitamin D deficiency: On vitamin D supplementation.    Past Medical History:   Diagnosis Date   • Arrhythmia    • Bradycardia    • Cardiomyopathy (CMS/HCC)    • Cataract    • COPD (chronic obstructive pulmonary disease) (CMS/HCC)    • Coronary artery disease    • Diabetes mellitus, type 2 (CMS/HCC)    • Emphysema, unspecified (CMS/HCC)    • Hx of sick sinus syndrome     S/P Biventricular Pacemaker   • Hypertension    • Paroxysmal atrial fibrillation (CMS/HCC)    • Sleep apnea    • Ventricular arrhythmia        Social History     Socioeconomic History   • Marital status:      Spouse name: Not on file   • Number of children: Not on file   • Years of education: Not on file   • Highest education level: Not on file   Occupational History   • Occupation: Retired   Social Needs   • Financial resource strain: Patient refused   • Food insecurity     Worry: Patient refused     Inability: Patient refused   • Transportation needs     Medical: Patient refused     Non-medical: Patient refused   Tobacco Use   • Smoking status: Former Smoker     Types: Cigarettes   • Smokeless tobacco: Never Used   Substance and Sexual Activity   • Alcohol use: Yes     Comment: occ   • Drug use: No   • Sexual activity:  Defer       Family History   Problem Relation Age of Onset   • Heart disease Mother    • Hypertension Mother    • Stroke Father    • Breast cancer Sister    • Colon cancer Sister    • Heart disease Brother    • Diabetes Other        Allergies   Allergen Reactions   • Penicillins Itching       ROS:   Constitutional:  Denies fatigue, tiredness.    Eyes:  Denies change in visual acuity   HENT:  Denies nasal congestion or sore throat   Respiratory: denies cough, shortness of breath.   Cardiovascular:  denies chest pain, edema   GI:  Denies abdominal pain, nausea, vomiting.   Musculoskeletal:  Denies back pain or joint pain   Integument:  Denies dry skin and rash   Neurologic:  Denies headache, focal weakness or sensory changes   Endocrine:  Denies polyuria or polydipsia   Psychiatric:  Denies depression or anxiety      Vitals:    01/08/21 0856   BP: 118/68   Pulse: 70   Temp: 96.9 °F (36.1 °C)   SpO2: (!) 68%       Physical Exam:  GEN: NAD, conversant  EYES: EOMI, PERRL, no conjunctival erythema  NECK: no thyromegaly, full ROM   CV: RRR, no murmurs/rubs/gallops, no peripheral edema  LUNG: CTAB, no wheezes/rales/ronchi  SKIN: no rashes, no acanthosis  MSK: no deformities, full ROM of all extremities  NEURO: no tremors, DTR normal  PSYCH: AOX3, appropriate mood, affect normal      Results Review:     I reviewed the patient's new clinical results.    Lab Results   Component Value Date    HGBA1C 7.1 (H) 12/29/2020    HGBA1C 6.50 (H) 12/09/2020    HGBA1C 6.6 (H) 07/06/2020      Lab Results   Component Value Date    GLUCOSE 246 (H) 12/29/2020    BUN 41 (H) 12/29/2020    CREATININE 1.96 (H) 12/29/2020    EGFRIFNONA 33 (L) 12/29/2020    BCR 20.9 12/29/2020    K 5.3 (H) 12/29/2020    CO2 26.2 12/29/2020    CALCIUM 10.1 12/29/2020    ALBUMIN 4.40 12/29/2020    LABIL2 1.5 04/11/2019    AST 15 12/29/2020    ALT 14 12/29/2020    CHOL 131 12/29/2020    TRIG 207 (H) 12/29/2020    LDL 57 12/29/2020    HDL 40 12/29/2020            Medication Review: Reviewed.       Current Outpatient Medications:   •  acetaminophen (TYLENOL) 500 MG tablet, Take 500 mg by mouth Every 6 (Six) Hours As Needed for Mild Pain ., Disp: , Rfl:   •  aspirin 81 MG tablet, Take 81 mg by mouth Every Night., Disp: , Rfl:   •  calcitriol (ROCALTROL) 0.25 MCG capsule, Take 0.25 mcg by mouth 3 (Three) Times a Week. Mon, Wed, Fri, Disp: , Rfl:   •  carvedilol (COREG) 12.5 MG tablet, Take 12.5 mg by mouth Every Night., Disp: , Rfl:   •  digoxin (LANOXIN) 125 MCG tablet, Take 1 tablet by mouth Every Other Day., Disp: , Rfl:   •  furosemide (LASIX) 40 MG tablet, Take 1 tablet by mouth in the am and 1/2 tablet by mouth in the pm, Disp: 60 tablet, Rfl: 0  •  glimepiride (AMARYL) 2 MG tablet, TAKE 1 TABLET TWICE DAILY, Disp: 180 tablet, Rfl: 1  •  Insulin Glargine (Lantus SoloStar) 100 UNIT/ML injection pen, Inject 10 units sq at bedtime (Patient taking differently: Inject 10 Units under the skin into the appropriate area as directed Daily With Dinner.), Disp: 15 pen, Rfl: 3  •  Magnesium 250 MG tablet, Take 1 tablet by mouth Daily., Disp: , Rfl:   •  melatonin 5 MG tablet tablet, Take 5 mg by mouth Every Night., Disp: , Rfl:   •  mirtazapine (REMERON) 15 MG tablet, TAKE 1 TABLET BY MOUTH EVERY NIGHT., Disp: 90 tablet, Rfl: 1  •  simvastatin (ZOCOR) 40 MG tablet, 1 tablet p.o. at bedtime. (Patient taking differently: Take 40 mg by mouth Every Night.), Disp: 90 tablet, Rfl: 4  •  SITagliptin-metFORMIN HCl ER (Janumet XR)  MG tablet, Take 1 tablet by mouth Daily. (Patient taking differently: Take 1 tablet by mouth Daily With Lunch.), Disp: 90 tablet, Rfl: 2  •  theophylline (THEODUR) 300 MG 12 hr tablet, Take 300 mg by mouth Every Night., Disp: , Rfl:   •  warfarin (COUMADIN) 3 MG tablet, Take 1 tablet by mouth Every Night., Disp: 34 tablet, Rfl: 2      Assessment/Plan   1.  Diabetes mellitus type 2: Uncontrolled, A1c 7.1%, is now on oxygen supplementation his GFR is  33, I will DC Janumet, glimepiride and Lantus and add Ozempic 0.25 mg subcu weekly for 4 weeks and if able to tolerate without any abdominal pain, nausea or vomiting then we will increase the Ozempic to 0.50 mg weekly.  Side effects of abdominal pain, nausea and vomiting discussed with him.    2.  Hypertension: Well-controlled, continue current medication    3.  Hyperlipidemia: On simvastatin 40 mg p.o. daily.    4.  Vitamin D deficiency: On vitamin D supplementation.    5.  CKD stage III disease: Follows with Dr. Yan.            Jhonatan Mart MD FACE.    Much of the above report is an electronic transcription/translation of the spoken language to printed text using Dragon Software. As such, the subtleties and finesse of the spoken language may permit erroneous, or at times, nonsensical words or phrases to be inadvertently transcribed; thus changes may be made at a later date to rectify these errors.

## 2021-01-11 PROBLEM — N18.9 CKD (CHRONIC KIDNEY DISEASE): Chronic | Status: ACTIVE | Noted: 2021-01-01

## 2021-01-11 PROBLEM — A41.9 SEPSIS (HCC): Status: ACTIVE | Noted: 2021-01-01

## 2021-01-11 PROBLEM — I50.33 ACUTE ON CHRONIC DIASTOLIC HEART FAILURE (HCC): Status: ACTIVE | Noted: 2021-01-01

## 2021-01-11 PROBLEM — R79.89 ELEVATED BRAIN NATRIURETIC PEPTIDE (BNP) LEVEL: Status: ACTIVE | Noted: 2021-01-01

## 2021-01-11 PROBLEM — J18.9 HCAP (HEALTHCARE-ASSOCIATED PNEUMONIA): Status: ACTIVE | Noted: 2021-01-01

## 2021-01-11 PROBLEM — R06.02 SHORTNESS OF BREATH: Status: ACTIVE | Noted: 2021-01-01

## 2021-01-11 NOTE — H&P
[facility] Medicine Services    Patient Name: Cali Santoro  : 1935  MRN: [id number]  Primary Care Physician: Yusef Sosa Jr., MD  Date of admission: 2021    Patient Care Team:  Yusef Sosa Jr., MD as PCP - General  Yusef Sosa Jr., MD as PCP - Family Medicine  Luis Paulino MD as Consulting Physician (Cardiology)      Subjective   History Present Illness     Chief Complaint:   Chief Complaint   Patient presents with   • Shortness of Breath     Pt present siwth SoA, low oxygen saturations at home, reports 52% when ambulating.  pt on 3ltr   home sat reading 70 at triage/ our machine 1000%     Mr. Santoro is a 85 y.o. male with past medical history of diabetes, hyperlipidemia, cardiomyopathy, hypertension, CAD, COPD, A. fib, CKD and CHF who presents to [facility] ER 21 complaining of shortness of breath. He stated he has been short of breath upon exertion since he was discharged from here back in 2020. Within the last two days he has become more short of breath. He walked from his living room to the bathroom this morning and could not catch his breath. He checked his oxygen saturation and it was in the 50s. He denied any cough, fever, or lower extremity swelling. He has been taking lasix and urinating. His lasix was recently decreased due to being up 10-13 times a night per his report.     In the ER the patient had CXR that showed stable right lower lobe and suspected new mild right upper lobe ill-defined infiltrates.  Correlate clinically for worsening pneumonia.  WBC 9.9, lactate 2.0.  Afebrile.  proBNP 4000. Further labs showed K 5.5, BUN 29, Cr 1.81. ABG showed ph 7.54, CO2 28.4, PO2 73.8, HCO3 24.5 on 3L home O2 rate. He was given IV vancomycin and cefepime and admitted for further treatment of pneumonia    Medical record showed patient was discharged 2020 after treatment for CHF. He received IV diuretics with help  of nephrology and switched to oral lasix at discharge  His CT chest showed a small pleural effusion and a right basilar lung density consistent with atelectasis also some scarring in the right lung and a stable 8 mm lung nodule, 5 mm groundglass nodule in the left lower lobe noted as well small pericardial effusion. He qualified for home O2. He was also treated with antibiotics with the possibility of an infectious component. His strep pneumo antigen test did come back positive.  Discharged on omnicef.     Review of Systems   Constitution: Positive for malaise/fatigue.   HENT: Negative.    Eyes: Negative.    Cardiovascular: Negative.    Respiratory: Positive for shortness of breath. Negative for cough.    Endocrine: Negative.    Hematologic/Lymphatic: Negative.    Skin: Negative.    Musculoskeletal: Negative.    Gastrointestinal: Negative.    Genitourinary: Negative.    Neurological: Negative.    Psychiatric/Behavioral: Negative.    Allergic/Immunologic: Negative.    All other systems reviewed and are negative.      Personal History     Past Medical History:   Past Medical History:   Diagnosis Date   • Arrhythmia    • Bradycardia    • Cardiomyopathy (CMS/HCC)    • Cataract    • COPD (chronic obstructive pulmonary disease) (CMS/HCC)    • Coronary artery disease    • Diabetes mellitus, type 2 (CMS/HCC)    • Emphysema, unspecified (CMS/HCC)    • Hx of sick sinus syndrome     S/P Biventricular Pacemaker   • Hypertension    • Paroxysmal atrial fibrillation (CMS/HCC)    • Pneumonia    • Sleep apnea    • Ventricular arrhythmia        Surgical History:      Past Surgical History:   Procedure Laterality Date   • CARDIAC CATHETERIZATION  02/04/2015   • CARDIAC CATHETERIZATION  04/25/2016   • CAROTID ENDARTERECTOMY     • CORONARY ANGIOPLASTY  1997   • LARYNGOSCOPY     • LUNG SURGERY  2008    Lung resection   • PACEMAKER IMPLANTATION  04/25/2016    Dual chamber; [employer]   • TRACHEOSTOMY             Family History:  family history includes Breast cancer in his sister; Colon cancer in his sister; Diabetes in an other family member; Heart disease in his brother and mother; Hypertension in his mother; Stroke in his father. Otherwise pertinent FHx was reviewed and unremarkable.     Social History:  reports that he has quit smoking. His smoking use included cigarettes. He has never used smokeless tobacco. He reports current alcohol use. He reports that he does not use drugs.      Medications:  Prior to Admission medications    Medication Sig Start Date End Date Taking? Authorizing Provider   acetaminophen (TYLENOL) 500 MG tablet Take 500 mg by mouth Every 6 (Six) Hours As Needed for Mild Pain .   Yes Astrid Gonzalez MD   aspirin 81 MG tablet Take 81 mg by mouth Every Night. 3/7/14  Yes Astrid Gonzalez MD   calcitriol (ROCALTROL) 0.25 MCG capsule Take 0.25 mcg by mouth 3 (Three) Times a Week. Mon, Wed, Fri   Yes Astrid Gonzalez MD   carvedilol (COREG) 12.5 MG tablet Take 12.5 mg by mouth Every Night.   Yes Astrid Gonzalez MD   furosemide (LASIX) 40 MG tablet Take 40 mg by mouth Daily.   Yes Astrid Gonzalez MD   Magnesium 250 MG tablet Take 1 tablet by mouth Daily.   Yes Astrid Gonzalez MD   melatonin 5 MG tablet tablet Take 10 mg by mouth At Night As Needed.   Yes Astrid Gonzalez MD   mirtazapine (REMERON) 15 MG tablet TAKE 1 TABLET BY MOUTH EVERY NIGHT. 6/19/20  Yes Liliam Goode APRN   Semaglutide,0.25 or 0.5MG/DOS, (Ozempic, 0.25 or 0.5 MG/DOSE,) 2 MG/1.5ML solution pen-injector 0.25 mg subcu weekly for 4 weeks then increase to 0.5 mg subcu weekly if tolerated. 1/8/21  Yes Jhonatan Mart MD   simvastatin (ZOCOR) 40 MG tablet 1 tablet p.o. at bedtime. 1/7/20  Yes Jhonatan Mart MD   theophylline (THEODUR) 300 MG 12 hr tablet Take 300 mg by mouth Every Night. 4/8/19  Yes Astrid Gonzalez MD   warfarin (COUMADIN) 3 MG tablet Take 1 tablet by mouth Every Night. 12/28/20  Yes  Luis Paulino MD   furosemide (LASIX) 40 MG tablet Take 1 tablet by mouth in the am and 1/2 tablet by mouth in the pm  Patient taking differently: Take 1 tablet by mouth in the am 12/23/20 1/11/21  Yusef Sosa Jr., MD       Allergies:    Allergies   Allergen Reactions   • Penicillins Itching       Objective   Objective     Vital Signs  Temp:  [97.7 °F (36.5 °C)] 97.7 °F (36.5 °C)  Heart Rate:  [70-98] 70  Resp:  [22] 22  BP: (120-140)/(70-78) 140/78  SpO2:  [88 %-100 %] 88 %  on  Flow (L/min):  [3] 3;   Device (Oxygen Therapy): nasal cannula  Body mass index is 25.84 kg/m².    Physical Exam  Vitals signs and nursing note reviewed.   Constitutional:       Appearance: Normal appearance. He is normal weight.   HENT:      Head: Normocephalic.   Eyes:      Pupils: Pupils are equal, round, and reactive to light.   Neck:      Musculoskeletal: Normal range of motion.   Cardiovascular:      Rate and Rhythm: Normal rate and regular rhythm.      Pulses: Normal pulses.      Heart sounds: Normal heart sounds.   Pulmonary:      Effort: Pulmonary effort is normal.      Breath sounds: Examination of the right-lower field reveals decreased breath sounds. Examination of the left-lower field reveals decreased breath sounds. Decreased breath sounds present.   Abdominal:      General: Bowel sounds are normal.      Palpations: Abdomen is soft.   Musculoskeletal: Normal range of motion.   Skin:     General: Skin is warm.   Neurological:      Mental Status: He is alert and oriented to person, place, and time.   Psychiatric:         Mood and Affect: Mood normal.         Behavior: Behavior normal.         Results Review:  I have personally reviewed most recent cardiac tracings, lab results and radiology images and interpretations and agree with findings    Results from last 7 days   Lab Units 01/11/21  1059   WBC 10*3/mm3 9.90   HEMOGLOBIN g/dL 11.4*   HEMATOCRIT % 34.4*   PLATELETS 10*3/mm3 239   INR  1.43*     Results  from last 7 days   Lab Units 01/11/21  1104 01/11/21  1059   SODIUM mmol/L  --  134*   POTASSIUM mmol/L  --  5.5*   CHLORIDE mmol/L  --  96*   CO2 mmol/L  --  28.0   BUN mg/dL  --  29*   CREATININE mg/dL  --  1.81*   GLUCOSE mg/dL  --  267*   CALCIUM mg/dL  --  9.2   TROPONIN T ng/mL  --  0.027   PROBNP pg/mL  --  4,001.0*   LACTATE mmol/L 2.0  --      Estimated Creatinine Clearance: 32.1 mL/min (A) (by C-G formula based on SCr of 1.81 mg/dL (H)).  Brief Urine Lab Results  (Last result in the past 365 days)      Color   Clarity   Blood   Leuk Est   Nitrite   Protein   CREAT   Urine HCG        12/29/20 1019             25.5             Microbiology Results (last 10 days)     Procedure Component Value - Date/Time    COVID-19,[facility] IN-HOUSE,Nasal Swab No Transport Media 3-4 HR TAT - Swab, Nasal Cavity [[id number]]  (Normal) Collected: 01/11/21 1107    Lab Status: Final result Specimen: Swab from Nasal Cavity Updated: 01/11/21 1137     COVID19 Not Detected    Narrative:      Fact sheet for providers: https://www.fda.gov/media/430043/download     Fact sheet for patients: https://www.fda.gov/media/435329/download    Test performed by PCR.          ECG/EMG Results (most recent)     Procedure Component Value Units Date/Time    ECG 12 Lead [[id number]] Collected: 01/11/21 1056     Updated: 01/11/21 1059     QT Interval 417 ms     Narrative:      HEART RATE= 70  bpm  RR Interval= 855  ms  CO Interval=   ms  P Horizontal Axis=   deg  P Front Axis=   deg  QRSD Interval= 127  ms  QT Interval= 417  ms  QRS Axis= 128  deg  T Wave Axis= -72  deg  - ABNORMAL ECG -  Afib/flutter and ventricular-paced rhythm  Electronically Signed By:   Date and Time of Study: 2021-01-11 10:56:50          Results for orders placed in visit on 02/06/08   SCANNED VASCULAR STUDIES       Results for orders placed during the hospital encounter of 12/08/20   Adult Transthoracic Echo Complete W/ Cont if Necessary Per Protocol    Narrative · Left ventricular  wall thickness is consistent with septal asymmetric   hypertrophy.  · Estimated left ventricular EF = 60% Left ventricular systolic function   is normal.  · The right ventricular cavity is borderline dilated.  · The right atrial cavity is borderline dilated.  · Mild aortic valve stenosis is present.  · Estimated right ventricular systolic pressure from tricuspid   regurgitation is normal (<35 mmHg).  · There is a small (<1cm) pericardial effusion adjacent to the left   ventricle.          Xr Chest 1 View    Result Date: 1/11/2021   1. Stable right lower lobe, and suspected new mild right upper lobe ill-defined infiltrates. Correlate clinically for worsening pneumonia.  Electronically Signed By-Alexandra Preston MD On:1/11/2021 11:40 AM This report was finalized on [id number] by  Alexandra Preston MD.        Estimated Creatinine Clearance: 32.1 mL/min (A) (by C-G formula based on SCr of 1.81 mg/dL (H)).    Assessment/Plan   Assessment/Plan       Active Hospital Problems    Diagnosis  POA   • **HCAP (healthcare-associated pneumonia) [J18.9]  Yes     Priority: High   • Shortness of breath [R06.02]  Yes     Priority: High   • Acute on chronic diastolic heart failure (CMS/HCC) [I50.33]  Yes     Priority: High   • CKD (chronic kidney disease) [N18.9]  Yes   • Mixed hyperlipidemia [E78.2]  Yes   • Chronic obstructive pulmonary disease (CMS/HCC) [J44.9]  Yes   • Coronary artery disease of native artery of native heart with stable angina pectoris (CMS/HCC) [I25.118]  Yes   • Essential hypertension [I10]  Yes   • Type 2 diabetes mellitus with hyperglycemia, without long-term current use of insulin (CMS/HCC) [E11.65]  Yes   • Permanent atrial fibrillation (CMS/HCC) [I48.21]  Yes   • Presence of cardiac pacemaker [Z95.0]  Yes      Resolved Hospital Problems   No resolved problems to display.       Right sided pneumonia  -HCAP due to patient recent hospitalization 12/2020  -CXR: stable right lower lobe and suspected new mild right  upper lobe ill-defined infiltrates  -blood cultures drawn  -WBC 9.9, lactate 2.0, afebrile  -IV cefepime, IV vancomycin started in ER and will continue    Shortness of breath  -likely multifactorial from COPD, CHF, and pneumonia   -ABG reviewed on home O2 3L, continue home rate and monitor oxygen saturation  -abx as above  -pulmicort and duonebs, cont home theophylline  -one dose IV lasix  -covid negative     Acute on chronic HFpEF, PPM  -proBNP 4000 compared to 2,850 on 12/8/20  -pt has no lower extremity edema or crackles on exam  -give one dose IV lasix 40mg for now then reassess  -check bladder to scan to rule out urinary retention   -cont home coreg, lasix  -2D echo 12/8/20: Left ventricular wall thickness consistent with septal asymmetric hypertrophy, EF 60% with left ventricular systolic function normal, right ventricular cavity borderline dilated, right atrial cavity borderline dilated, mild atrial valve stenosis, right ventricular systolic pressure from tricuspid regurgitation is normal, small pericardial effusion adjacent to the left ventricle    CKD  -Cr 1.81 compared to 1.96 on 12/29/20  -monitor BMP    Hypertension  -controlled. Cont home coreg  -cont home statin    Hyperlipidemia    DM Type II  -recent hgb a1c 7.1%  -SSI AC/HS    Afib, nonobstructive CAD  -controlled. Cont home coreg, coumadin. Digoxin recently stopped by cardiology 1/6/21    KIM  -cpap qhs      VTE Prophylaxis - on couamdin    CODE STATUS:    Code Status and Medical Interventions:   Ordered at: 01/11/21 1441     Level Of Support Discussed With:    Patient     Code Status:    CPR     Medical Interventions (Level of Support Prior to Arrest):    Full       This patient has been examined wearing appropriate Personal Protective Equipment . 01/11/21      I discussed the patient's findings and my recommendations with patient.      Signature: Electronically signed by TERESO Almendarez, 01/11/21, 4:06 PM ALEIDA Traore  Hospitalist Team    I have reviewed the notes, assessments, and/or procedures performed by TERESO Almendarez, I concur with her/his documentation of Cali Santoro.  Patient is an 85-year-old white male with multimedical problems, presented to the ER with complaints of dyspnea.  He is on oxygen at home 3 L.  Chest x-ray showed stable right lower lobe suspected new right upper lobe infiltrate, on exam 85-year-old male in bed no acute distress, his neck is supple, lungs he has decreased breath sounds bilaterally.  Heart regular rhythm normal S1-S2, the abdomen is soft nontender non distended, extremity no edema, neurological exam no focal deficit, assessment and plan    1.  Chronic hypoxic respiratory failure.  Right side pneumonia.HCAP-continue IV antibiotics.  Awaiting cultures.  Legionella and pneumococcal antigen.  MRSA screen.  Blood cultures.  2.  Dyspnea likely multifactorial, COPD, CHF and pneumonia.  -DuoNeb theophylline and Pulmicort.  IV Lasix given.  Covid was negative.  3.  Acute on chronic congestive heart failure preserved ejection fraction.  To new IV diuretics monitor BNP monitor renal function.  Continue Coreg.  Electronically signed by Henry Hansen MD, 01/12/21, 6:53 PM EST.

## 2021-01-11 NOTE — ED NOTES
Pt wife updated on pt admission- wife requesting to speak with pt will transfer into room     Azalia Gomez  01/11/21 2841

## 2021-01-11 NOTE — PROGRESS NOTES
"Pharmacy Antimicrobial Dosing Service    Subjective:  Cali Santoro is a 85 y.o.male admitted with sepsis. Pharmacy has been consulted to dose Vancomycin for possible pneumonia.    PMH: Recent hospital admission, CKD      Assessment/Plan    1. Day # 1 Vancomycin: Pulse dosing d/t renal dysfxn. Patient received a 1.5 gram (20 mg/kg actual body weight) in the ED. Secondary to patient's renal function will place patient on pulse dosing. Pharmacy will follow levels and redose when level is <20 mcg/mL.     2. Day #1  Cefepime: 2 grams IV q24h for estCrCl < 30 mL/min.    Will continue to monitor drug levels, renal function, culture and sensitivities, and patient clinical status.       Objective:  Relevant clinical data and objective history reviewed:  170.2 cm (67\")   74.8 kg (165 lb)   Ideal body weight: 66.1 kg (145 lb 11.6 oz)  Adjusted ideal body weight: 69.6 kg (153 lb 7 oz)  Body mass index is 25.84 kg/m².        Results from last 7 days   Lab Units 01/11/21  1059   CREATININE mg/dL 1.81*     Estimated Creatinine Clearance: 32.1 mL/min (A) (by C-G formula based on SCr of 1.81 mg/dL (H)).  No intake/output data recorded.    Results from last 7 days   Lab Units 01/11/21  1059   WBC 10*3/mm3 9.90     Temperature    01/11/21 1007 01/11/21 1013   Temp: 97.7 °F (36.5 °C) 97.7 °F (36.5 °C)     Baseline culture/source/susceptibility:  Microbiology Results (last 10 days)       Procedure Component Value - Date/Time    COVID-19,[facility] IN-HOUSE,Nasal Swab No Transport Media 3-4 HR TAT - Swab, Nasal Cavity [[id number]]  (Normal) Collected: 01/11/21 1107    Lab Status: Final result Specimen: Swab from Nasal Cavity Updated: 01/11/21 1137     COVID19 Not Detected    Narrative:      Fact sheet for providers: https://www.fda.gov/media/242463/download     Fact sheet for patients: https://www.fda.gov/media/540576/download    Test performed by PCR.             Anti-Infectives (From admission, onward)      Ordered     Dose/Rate Route " Frequency Start Stop    01/11/21 1442  cefepime 2 gm IVPB in 100 ml NS (MBP)     Ordering Provider: Kandi Cho APRN    2 g  over 4 Hours Intravenous Every 24 Hours 01/12/21 1300 01/18/21 1259    01/11/21 1517  vancomycin (VANCOCIN) 1,000 mg in sodium chloride 0.9 % 250 mL IVPB     Ordering Provider: Kandi Cho APRN    1,000 mg Intravenous As Needed 01/11/21 1516 01/18/21 1515    01/11/21 1442  Pharmacy to dose vancomycin     Ordering Provider: Kandi Cho APRN     Does not apply Continuous PRN 01/11/21 1442 01/18/21 1441    01/11/21 1148  ceFEPime (MAXIPIME) in SWFI 2g/10ml IV PUSH syringe     Ordering Provider: Nikolay Domingo MD    2 g  over 5 Minutes Intravenous Once 01/11/21 1150 01/11/21 1253    01/11/21 1148  vancomycin 1500 mg/500 mL 0.9% NS IVPB ([facility])     Ordering Provider: Nikolay Domingo MD    20 mg/kg × 74.8 kg Intravenous Once 01/11/21 1150 01/11/21 1501            Mercedes Mulligan [facility]  01/11/21 15:27 EST

## 2021-01-11 NOTE — ED PROVIDER NOTES
Subjective   Chief complaint: Shortness of breath    85-year-old male with a history of COPD presents with shortness of breath.  Patient states symptoms started when he woke up this morning.  He is on 3 L nasal cannula at home.  He has a home O2 monitor and he states his oxygen saturation was in the 50s when he ambulated at home.  However, his saturations on his home monitor were reading 70 when he arrived to triage but on the hospital monitor it was reading 100%.  Patient states he is feeling better now than when he was at home.  He denies any fever.  He reports minimal cough.  He states he been in the hospital recently for pneumonia.  He has had no chest pain.  He denies any alleviating or exacerbating factors.      History provided by:  Patient      Review of Systems   Constitutional: Negative for fever.   HENT: Negative for congestion and sore throat.    Eyes: Negative for redness.   Respiratory: Positive for cough and shortness of breath.    Cardiovascular: Negative for chest pain.   Gastrointestinal: Negative for abdominal pain, diarrhea and vomiting.   Genitourinary: Negative for dysuria.   Musculoskeletal: Negative for back pain.   Skin: Negative for rash.   Neurological: Negative for dizziness and headaches.   Psychiatric/Behavioral: Negative for confusion.       Past Medical History:   Diagnosis Date   • Arrhythmia    • Bradycardia    • Cardiomyopathy (CMS/HCC)    • Cataract    • COPD (chronic obstructive pulmonary disease) (CMS/HCC)    • Coronary artery disease    • Diabetes mellitus, type 2 (CMS/HCC)    • Emphysema, unspecified (CMS/HCC)    • Hx of sick sinus syndrome     S/P Biventricular Pacemaker   • Hypertension    • Paroxysmal atrial fibrillation (CMS/HCC)    • Pneumonia    • Sleep apnea    • Ventricular arrhythmia        Allergies   Allergen Reactions   • Penicillins Itching       Past Surgical History:   Procedure Laterality Date   • CARDIAC CATHETERIZATION  02/04/2015   • CARDIAC CATHETERIZATION   "04/25/2016   • CAROTID ENDARTERECTOMY     • CORONARY ANGIOPLASTY  1997   • LARYNGOSCOPY     • LUNG SURGERY  2008    Lung resection   • PACEMAKER IMPLANTATION  04/25/2016    Dual chamber; [employer]   • TRACHEOSTOMY         Family History   Problem Relation Age of Onset   • Heart disease Mother    • Hypertension Mother    • Stroke Father    • Breast cancer Sister    • Colon cancer Sister    • Heart disease Brother    • Diabetes Other        Social History     Socioeconomic History   • Marital status:      Spouse name: Not on file   • Number of children: Not on file   • Years of education: Not on file   • Highest education level: Not on file   Occupational History   • Occupation: Retired   Social Needs   • Financial resource strain: Patient refused   • Food insecurity     Worry: Patient refused     Inability: Patient refused   • Transportation needs     Medical: Patient refused     Non-medical: Patient refused   Tobacco Use   • Smoking status: Former Smoker     Types: Cigarettes   • Smokeless tobacco: Never Used   Substance and Sexual Activity   • Alcohol use: Yes     Comment: occ   • Drug use: No   • Sexual activity: Defer       /70 (BP Location: Left arm, Patient Position: Sitting)   Pulse 98   Temp 97.7 °F (36.5 °C) (Oral)   Resp 22   Ht 170.2 cm (67\")   Wt 74.8 kg (165 lb)   SpO2 100%   BMI 25.84 kg/m²       Objective   Physical Exam  Vitals signs and nursing note reviewed.   Constitutional:       Appearance: He is well-developed.   HENT:      Head: Normocephalic and atraumatic.   Eyes:      Extraocular Movements: Extraocular movements intact.      Pupils: Pupils are equal, round, and reactive to light.   Cardiovascular:      Rate and Rhythm: Normal rate and regular rhythm.      Heart sounds: Normal heart sounds.   Pulmonary:      Effort: Pulmonary effort is normal. No respiratory distress.      Breath sounds: Normal breath sounds.   Abdominal:      General: Abdomen is flat. Bowel sounds " are normal.      Palpations: Abdomen is soft.      Tenderness: There is no abdominal tenderness.   Skin:     General: Skin is warm and dry.   Neurological:      General: No focal deficit present.      Mental Status: He is alert and oriented to person, place, and time.         Procedures           ED Course      Results for orders placed or performed during the hospital encounter of 01/11/21   COVID-19,[facility] IN-HOUSE,Nasal Swab No Transport Media 3-4 HR TAT - Swab, Nasal Cavity    Specimen: Nasal Cavity; Swab   Result Value Ref Range    COVID19 Not Detected Not Detected - Ref. Range   Basic Metabolic Panel    Specimen: Arm, Left; Blood   Result Value Ref Range    Glucose 267 (H) 65 - 99 mg/dL    BUN 29 (H) 8 - 23 mg/dL    Creatinine 1.81 (H) 0.76 - 1.27 mg/dL    Sodium 134 (L) 136 - 145 mmol/L    Potassium 5.5 (H) 3.5 - 5.2 mmol/L    Chloride 96 (L) 98 - 107 mmol/L    CO2 28.0 22.0 - 29.0 mmol/L    Calcium 9.2 8.6 - 10.5 mg/dL    eGFR Non African Amer 36 (L) >60 mL/min/1.73    BUN/Creatinine Ratio 16.0 7.0 - 25.0    Anion Gap 10.0 5.0 - 15.0 mmol/L   Protime-INR    Specimen: Arm, Left; Blood   Result Value Ref Range    Protime 15.5 (L) 19.4 - 28.5 Seconds    INR 1.43 (L) 2.00 - 3.00   aPTT    Specimen: Arm, Left; Blood   Result Value Ref Range    PTT 27.9 24.0 - 31.0 seconds   Troponin    Specimen: Arm, Left; Blood   Result Value Ref Range    Troponin T 0.027 0.000 - 0.030 ng/mL   BNP    Specimen: Arm, Left; Blood   Result Value Ref Range    proBNP 4,001.0 (H) 0.0-1,800.0 pg/mL   CBC Auto Differential    Specimen: Arm, Left; Blood   Result Value Ref Range    WBC 9.90 3.40 - 10.80 10*3/mm3    RBC 3.35 (L) 4.14 - 5.80 10*6/mm3    Hemoglobin 11.4 (L) 13.0 - 17.7 g/dL    Hematocrit 34.4 (L) 37.5 - 51.0 %    .7 (H) 79.0 - 97.0 fL    MCH 34.2 (H) 26.6 - 33.0 pg    MCHC 33.3 31.5 - 35.7 g/dL    RDW 17.8 (H) 12.3 - 15.4 %    RDW-SD 64.3 (H) 37.0 - 54.0 fl    MPV 7.6 6.0 - 12.0 fL    Platelets 239 140 - 450 10*3/mm3     Neutrophil % 71.9 42.7 - 76.0 %    Lymphocyte % 12.2 (L) 19.6 - 45.3 %    Monocyte % 7.1 5.0 - 12.0 %    Eosinophil % 8.0 (H) 0.3 - 6.2 %    Basophil % 0.8 0.0 - 1.5 %    Neutrophils, Absolute 7.10 (H) 1.70 - 7.00 10*3/mm3    Lymphocytes, Absolute 1.20 0.70 - 3.10 10*3/mm3    Monocytes, Absolute 0.70 0.10 - 0.90 10*3/mm3    Eosinophils, Absolute 0.80 (H) 0.00 - 0.40 10*3/mm3    Basophils, Absolute 0.10 0.00 - 0.20 10*3/mm3    nRBC 0.0 0.0 - 0.2 /100 WBC   POC Lactate    Specimen: Blood   Result Value Ref Range    Lactate 2.0 0.5 - 2.0 mmol/L   ECG 12 Lead   Result Value Ref Range    QT Interval 417 ms     Xr Chest 1 View    Result Date: 1/11/2021   1. Stable right lower lobe, and suspected new mild right upper lobe ill-defined infiltrates. Correlate clinically for worsening pneumonia.  Electronically Signed By-Alexandra Preston MD On:1/11/2021 11:40 AM This report was finalized on [id number] by  Alexandra Preston MD.         My interpretation of EKG shows atrial fibrillation/flutter with ventricular paced rhythm, rate of 70, unchanged from previous                                MDM   Patient had the above evaluation.  Results were discussed with the patient.  Patient did meet sepsis criteria and sepsis protocol was initiated.  Blood cultures were obtained.  Lactic acid was 2.0.  White blood cell count was 9.9.  Chest x-ray is showing worsening pneumonia.  Patient was started on IV antibiotics.  Covid screen was negative.  I discussed with the nurse practitioner on-call for the hospitalist and the patient will be admitted for further evaluation and management.      Final diagnoses:   Sepsis, due to unspecified organism, unspecified whether acute organ dysfunction present (CMS/[facility])   Pneumonia due to infectious organism, unspecified laterality, unspecified part of lung   Dyspnea, unspecified type            Nikolay Domingo MD  01/11/21 2296

## 2021-01-12 NOTE — PROGRESS NOTES
"Pharmacy Antimicrobial Dosing Service    Subjective:  Cali Santoro is a 85 y.o.male admitted with sepsis. Pharmacy has been consulted to dose Vancomycin for possible pneumonia.    PMH: Recent hospital admission (12/2020; received IV abx), CKD    1/12) MRSA nares negative      Assessment/Plan    1. Day #2 Vancomycin: Pulse dosing d/t renal dysfxn. Patient received 1500 mg (20 mg/kg ActualBW) x1 yesterday at 1248. Random level of 14.4 mcg/ml was obtained with AM labs this morning, approximately 13 hours after dose. Will give 1250 mg (~15 mg/kg ActualBW) x1 today. Will obtain random w/AM labs on 1/13. Plan to re-dose when level is <20 mcg/mL.     2. Day #2  Cefepime: 2 grams IV q24h for estCrCl < 30 mL/min.    Will continue to monitor drug levels, renal function, culture and sensitivities, and patient clinical status.       Objective:  Relevant clinical data and objective history reviewed:  170.2 cm (67\")   78.7 kg (173 lb 8 oz)   Ideal body weight: 66.1 kg (145 lb 11.6 oz)  Adjusted ideal body weight: 71.1 kg (156 lb 13.4 oz)  Body mass index is 27.17 kg/m².    Results from last 7 days   Lab Units 01/12/21 0222   VANCOMYCIN RM mcg/mL 14.40     Results from last 7 days   Lab Units 01/12/21 0222 01/11/21  1059   CREATININE mg/dL 2.23* 1.81*     Estimated Creatinine Clearance: 27.4 mL/min (A) (by C-G formula based on SCr of 2.23 mg/dL (H)).  I/O last 3 completed shifts:  In: 980 [P.O.:480; IV Piggyback:500]  Out: 700 [Urine:700]    Results from last 7 days   Lab Units 01/12/21 0222 01/11/21  1059   WBC 10*3/mm3 12.60* 9.90     Temperature    01/11/21 2216 01/12/21 0028 01/12/21 0432   Temp: 98 °F (36.7 °C) 98 °F (36.7 °C) 98 °F (36.7 °C)     Baseline culture/source/susceptibility:  Microbiology Results (last 10 days)       Procedure Component Value - Date/Time    MRSA Screen, PCR (Inpatient) - Swab, Nares [[id number]]  (Normal) Collected: 01/12/21 0309    Lab Status: Final result Specimen: Swab from Nares Updated: " 01/12/21 0511     MRSA PCR No MRSA Detected    Legionella Antigen, Urine - Urine, Urine, Clean Catch [[id number]]  (Normal) Collected: 01/11/21 1935    Lab Status: Final result Specimen: Urine, Clean Catch Updated: 01/11/21 2005     LEGIONELLA ANTIGEN, URINE Negative    S. Pneumo Ag Urine or CSF - Urine, Urine, Clean Catch [[id number]]  (Normal) Collected: 01/11/21 1935    Lab Status: Final result Specimen: Urine, Clean Catch Updated: 01/11/21 2005     Strep Pneumo Ag Negative    COVID-19,[facility] IN-HOUSE,Nasal Swab No Transport Media 3-4 HR TAT - Swab, Nasal Cavity [[id number]]  (Normal) Collected: 01/11/21 1107    Lab Status: Final result Specimen: Swab from Nasal Cavity Updated: 01/11/21 1137     COVID19 Not Detected    Narrative:      Fact sheet for providers: https://www.fda.gov/media/375027/download     Fact sheet for patients: https://www.fda.gov/media/983087/download    Test performed by PCR.             Anti-Infectives (From admission, onward)      Ordered     Dose/Rate Route Frequency Start Stop    01/11/21 1442  cefepime 2 gm IVPB in 100 ml NS (MBP)     Ordering Provider: Kandi Cho APRN    2 g  over 4 Hours Intravenous Every 24 Hours 01/12/21 1300 01/18/21 1259    01/11/21 1517  vancomycin (VANCOCIN) 1,000 mg in sodium chloride 0.9 % 250 mL IVPB     Ordering Provider: Kandi Cho APRN    1,000 mg Intravenous As Needed 01/11/21 1516 01/18/21 1515    01/11/21 1442  Pharmacy to dose vancomycin     Ordering Provider: Kandi Cho APRN     Does not apply Continuous PRN 01/11/21 1442 01/18/21 1441    01/11/21 1148  ceFEPime (MAXIPIME) in SWFI 2g/10ml IV PUSH syringe     Ordering Provider: Nikolay Domingo MD    2 g  over 5 Minutes Intravenous Once 01/11/21 1150 01/11/21 1253    01/11/21 1148  vancomycin 1500 mg/500 mL 0.9% NS IVPB ([facility])     Ordering Provider: Nikolay Domingo MD    20 mg/kg × 74.8 kg Intravenous Once 01/11/21 1150 01/11/21 1501            Tanya Foster, Pharmacy  Intern  01/12/21 07:59 EST

## 2021-01-12 NOTE — PROGRESS NOTES
"Pharmacy dosing service  Anticoagulant  Warfarin     Subjective:    Cali Santoro is a 85 y.o.male being continued on warfarin for atrial fibrillation.    INR Goal: 2 - 3  Home medication?:  Yes, warfarin 3 mg once daily  Bridge Therapy Present?:  No  Interacting Medications Evaluation (New/Present/Discontinued): cefepime  Additional Contributing Factors:      Assessment/Plan:    Patient's INR is subtherapeutic today. Will give increased dose of warfarin 5 mg x1 today. Suspect patient's home regimen will need to be adjusted due to subtherapeutic INR upon admission.    Continue to monitor and adjust based on INR.         Date 1/11 1/12          INR 1.43 1.41          Dose 3 mg 5 mg              Objective:  [Ht: 170.2 cm (67\"); Wt: 78.7 kg (173 lb 8 oz); BMI: Body mass index is 27.17 kg/m².]    Lab Results   Component Value Date    ALBUMIN 4.40 12/29/2020     Lab Results   Component Value Date    INR 1.41 (L) 01/12/2021    INR 1.43 (L) 01/11/2021    INR 1.90 (A) 01/06/2021    PROTIME 15.3 (L) 01/12/2021    PROTIME 15.5 (L) 01/11/2021    PROTIME 21.4 12/14/2020     Lab Results   Component Value Date    HGB 11.1 (L) 01/12/2021    HGB 11.4 (L) 01/11/2021    HGB 12.3 (L) 12/29/2020     Lab Results   Component Value Date    HCT 34.0 (L) 01/12/2021    HCT 34.4 (L) 01/11/2021    HCT 38.0 12/29/2020       Tanya Foster, Pharmacy Intern  01/12/21 15:43 EST     "

## 2021-01-12 NOTE — PLAN OF CARE
Pt resting. Pt not complaining of pain at this time. Pt ambulatory with one person assist. Will continue to monitor.     Problem: Adult Inpatient Plan of Care  Goal: Plan of Care Review  Outcome: Ongoing, Progressing  Flowsheets (Taken 1/12/2021 0123)  Progress: no change  Plan of Care Reviewed With: patient

## 2021-01-12 NOTE — DISCHARGE PLACEMENT REQUEST
"Ernesto Martinez (85 y.o. Male)     Date of Birth Social Security Number Address Home Phone MRN    [id number]  [id number] [address] [phone] [id number]    [employer] Marital Status          Congregation        Admission Date Admission Type Admitting Provider Attending Provider Department, Room/Bed    1/11/21 Emergency Henry Hansen MD Salcedo, Federico N, MD [facility] SURGICAL INPATIENT, 4105/1    Discharge Date Discharge Disposition Discharge Destination                       Attending Provider: Henry Hansen MD    Allergies: Penicillins    Isolation: None   Infection: None   Code Status: CPR    Ht: 170.2 cm (67\")   Wt: 78.7 kg (173 lb 8 oz)    Admission Cmt: None   Principal Problem: HCAP (healthcare-associated pneumonia) [J18.9]                 Active Insurance as of 1/11/2021     Primary Coverage     Payor Plan Insurance Group Employer/Plan Group    HUMANA MEDICARE REPLACEMENT HU[employer]NA MEDICARE REPLACEMENT [id number]     Payor Plan Address Payor Plan Phone Number Payor Plan Fax Number Effective Dates    [address] [address] [phone]  1/1/2018 - None Entered    [facility] [phone]       Subscriber Name Subscriber Birth Date Member ID       ERNESTO MARTINEZ 1935 [id number]                 Emergency Contacts      (Rel.) Home Phone Work Phone Mobile Phone    WILDER MARTINEZ \"RENETTA\" (Spouse) [phone] -- [phone]              "

## 2021-01-12 NOTE — PROGRESS NOTES
[facility] Medicine Services Daily Progress Note      Hospitalist Team  LOS 1 days      Patient Care Team:  Yusef Sosa Jr., MD as PCP - General  Yusef Sosa Jr., MD as PCP - Family Medicine  Luis Paulino MD as Consulting Physician (Cardiology)    Patient Location: [facility]/1      Subjective   Subjective     Chief Complaint / Subjective  Chief Complaint   Patient presents with   • Shortness of Breath     Pt present siwth SoA, low oxygen saturations at home, reports 52% when ambulating.  pt on 3ltr   home sat reading 70 at triage/ our machine 1000%         Brief Synopsis of Hospital Course/HPI  *Mr. Santoro is a 85 y.o. male with past medical history of diabetes, hyperlipidemia, cardiomyopathy, hypertension, CAD, COPD, A. fib, CKD and CHF who presents to [facility] ER 1/11/21 complaining of shortness of breath. He stated he has been short of breath upon exertion since he was discharged from here back in December 2020. Within the last two days he has become more short of breath. He walked from his living room to the bathroom this morning and could not catch his breath. He checked his oxygen saturation and it was in the 50s. He denied any cough, fever, or lower extremity swelling. He has been taking lasix and urinating. His lasix was recently decreased due to being up 10-13 times a night per his report.      In the ER the patient had CXR that showed stable right lower lobe and suspected new mild right upper lobe ill-defined infiltrates.  Correlate clinically for worsening pneumonia.  WBC 9.9, lactate 2.0.  Afebrile.  proBNP 4000. Further labs showed K 5.5, BUN 29, Cr 1.81. ABG showed ph 7.54, CO2 28.4, PO2 73.8, HCO3 24.5 on 3L home O2 rate. He was given IV vancomycin and cefepime and admitted for further treatment of pneumonia     Medical record showed patient was discharged 12/14/2020 after treatment for CHF. He received IV diuretics with help of nephrology and  "switched to oral lasix at discharge  His CT chest showed a small pleural effusion and a right basilar lung density consistent with atelectasis also some scarring in the right lung and a stable 8 mm lung nodule, 5 mm groundglass nodule in the left lower lobe noted as well small pericardial effusion. He qualified for home O2. He was also treated with antibiotics with the possibility of an infectious component. His strep pneumo antigen test did come back positive.  Discharged on omnicef      Date:1/12/2021: Patient seen and examined, sitting in recliner, says she feels better.  On 5 L of oxygen.  Dyspnea slightly improved.  With complaints of anxiety        Review of Systems   Constitution: Positive for malaise/fatigue.   HENT: Negative.    Eyes: Negative.    Cardiovascular: Positive for dyspnea on exertion.   Respiratory: Positive for shortness of breath.    Endocrine: Negative.    Hematologic/Lymphatic: Negative.    Skin: Negative.    Musculoskeletal: Negative.    Gastrointestinal: Negative.    Genitourinary: Negative.    Neurological: Negative.    Psychiatric/Behavioral: Negative.    Allergic/Immunologic: Negative.    All other systems reviewed and are negative.        Objective   Objective      Vital Signs  Temp:  [97.5 °F (36.4 °C)-98 °F (36.7 °C)] 97.5 °F (36.4 °C)  Heart Rate:  [69-87] 78  Resp:  [15-20] 15  BP: (108-151)/(64-81) 119/69  Oxygen Therapy  SpO2: 91 %  Pulse Oximetry Type: Intermittent  Device (Oxygen Therapy): nasal cannula  Flow (L/min): 5  Flowsheet Rows      First Filed Value   Admission Height  170.2 cm (67\") Documented at 01/11/2021 1007   Admission Weight  74.8 kg (165 lb) Documented at 01/11/2021 1007        Intake & Output (last 3 days)       01/09 0701 - 01/10 0700 01/10 0701 - 01/11 0700 01/11 0701 - 01/12 0700 01/12 0701 - 01/13 0700    P.O.   480 1260    IV Piggyback   500     Total Intake(mL/kg)   980 (12.5) 1260 (16)    Urine (mL/kg/hr)   700 150 (0.2)    Stool    0    Total Output   " 700 150    Net   +280 +1110            Urine Unmeasured Occurrence    2 x    Stool Unmeasured Occurrence    1 x        Lines, Drains & Airways    Active LDAs     Name:   Placement date:   Placement time:   Site:   Days:    Peripheral IV 01/11/21 1040 Right Arm   01/11/21    1040    Arm   1    Peripheral IV 01/12/21 1820 Anterior;Right Forearm   01/12/21 1820    Forearm   less than 1                  Physical Exam  Vitals signs and nursing note reviewed.   Constitutional:       General: He is not in acute distress.     Appearance: Normal appearance. He is well-developed. He is not ill-appearing, toxic-appearing or diaphoretic.   HENT:      Head: Normocephalic and atraumatic.      Nose: Nose normal. No congestion or rhinorrhea.      Mouth/Throat:      Mouth: Mucous membranes are moist.      Pharynx: No oropharyngeal exudate.   Eyes:      General: No scleral icterus.        Right eye: No discharge.         Left eye: No discharge.      Extraocular Movements: Extraocular movements intact.      Conjunctiva/sclera: Conjunctivae normal.      Pupils: Pupils are equal, round, and reactive to light.   Neck:      Musculoskeletal: Normal range of motion and neck supple. No neck rigidity or muscular tenderness.      Thyroid: No thyromegaly.      Vascular: No carotid bruit or JVD.      Trachea: No tracheal deviation.   Cardiovascular:      Rate and Rhythm: Normal rate and regular rhythm.      Pulses: Normal pulses.      Heart sounds: Normal heart sounds. No murmur. No friction rub. No gallop.    Pulmonary:      Effort: Pulmonary effort is normal. No respiratory distress.      Breath sounds: No stridor. No wheezing, rhonchi or rales.      Comments: Decreased breath sounds bilaterally  Chest:      Chest wall: No tenderness.   Abdominal:      General: Bowel sounds are normal. There is no distension.      Palpations: Abdomen is soft. There is no mass.      Tenderness: There is no abdominal tenderness. There is no guarding or  rebound.      Hernia: No hernia is present.   Musculoskeletal: Normal range of motion.         General: No swelling, tenderness, deformity or signs of injury.      Right lower leg: No edema.      Left lower leg: No edema.   Lymphadenopathy:      Cervical: No cervical adenopathy.   Skin:     General: Skin is warm and dry.      Coloration: Skin is not jaundiced or pale.      Findings: No bruising, erythema or rash.   Neurological:      General: No focal deficit present.      Mental Status: He is alert and oriented to person, place, and time. Mental status is at baseline.      Cranial Nerves: No cranial nerve deficit.      Sensory: No sensory deficit.      Motor: No weakness or abnormal muscle tone.      Coordination: Coordination normal.   Psychiatric:         Mood and Affect: Mood normal.         Behavior: Behavior normal.         Thought Content: Thought content normal.         Judgment: Judgment normal.              Wounds (last 24 hours)      LDA Wound     Row Name 01/12/21 0715 01/11/21 2100          Wound 01/11/21 1718 Bilateral posterior sacral spine    Wound - Properties Group Placement Date: 01/11/21  -HN Placement Time: 1718  -HN Present on Hospital Admission: Y  -HN Side: Bilateral  -HN Orientation: posterior  -HN Location: sacral spine  -HN Stage, Pressure Injury : Stage 1  -HN    Dressing Appearance  open to air  -BD  open to air  -MS     Closure  Open to air  -BD  Open to air  -MS     Retired Wound - Properties Group Date first assessed: 01/11/21  -HN Time first assessed: 1718  -HN Present on Hospital Admission: Y  -HN Side: Bilateral  -HN Location: sacral spine  -HN      User Key  (r) = Recorded By, (t) = Taken By, (c) = Cosigned By    Initials Name Provider Type    Sheri Dodd RN Registered Nurse    Mary Marie RN Registered Nurse    Diane Antunez LPN Licensed Nurse          Procedures:              Results Review:     I reviewed the patient's new clinical  results.      Lab Results (last 24 hours)     Procedure Component Value Units Date/Time    POC Glucose Once [[id number]]  (Abnormal) Collected: 01/12/21 1734    Specimen: Blood Updated: 01/12/21 1735     Glucose 254 mg/dL      Comment: Serial Number: [id number]:  [id number]       Blood Culture - Blood, Blood, Venous Line [[id number]] Collected: 01/11/21 1242    Specimen: Blood, Venous Line Updated: 01/12/21 1300     Blood Culture No growth at 24 hours    POC Glucose Once [[id number]]  (Abnormal) Collected: 01/12/21 1129    Specimen: Blood Updated: 01/12/21 1134     Glucose 215 mg/dL      Comment: Serial Number: [id number]:  [id number]       Blood Culture - Blood, Arm, Left [[id number]] Collected: 01/11/21 1059    Specimen: Blood from Arm, Left Updated: 01/12/21 1115     Blood Culture No growth at 24 hours    POC Glucose Once [[id number]]  (Abnormal) Collected: 01/12/21 0746    Specimen: Blood Updated: 01/12/21 0750     Glucose 212 mg/dL      Comment: Serial Number: [id number]:  [id number]       MRSA Screen, PCR (Inpatient) - Swab, Nares [[id number]]  (Normal) Collected: 01/12/21 0309    Specimen: Swab from Nares Updated: 01/12/21 0511     MRSA PCR No MRSA Detected    Vancomycin, Random [[id number]]  (Normal) Collected: 01/12/21 0222    Specimen: Blood Updated: 01/12/21 0401     Vancomycin Random 14.40 mcg/mL     Basic Metabolic Panel [[id number]]  (Abnormal) Collected: 01/12/21 0222    Specimen: Blood Updated: 01/12/21 0346     Glucose 206 mg/dL      BUN 33 mg/dL      Creatinine 2.23 mg/dL      Sodium 137 mmol/L      Potassium 5.2 mmol/L      Chloride 98 mmol/L      CO2 29.0 mmol/L      Calcium 9.2 mg/dL      eGFR Non African Amer 28 mL/min/1.73      BUN/Creatinine Ratio 14.8     Anion Gap 10.0 mmol/L     Narrative:      GFR Normal >60  Chronic Kidney Disease <60  Kidney Failure <15      Protime-INR [[id number]]  (Abnormal) Collected: 01/12/21 0222    Specimen: Blood Updated: 01/12/21 0317     Protime 15.3  Seconds      INR 1.41    CBC Auto Differential [[id number]]  (Abnormal) Collected: 01/12/21 0222    Specimen: Blood Updated: 01/12/21 0255     WBC 12.60 10*3/mm3      RBC 3.31 10*6/mm3      Hemoglobin 11.1 g/dL      Hematocrit 34.0 %      .9 fL      MCH 33.6 pg      MCHC 32.7 g/dL      RDW 17.7 %      RDW-SD 63.9 fl      MPV 7.4 fL      Platelets 264 10*3/mm3      Neutrophil % 70.0 %      Lymphocyte % 14.5 %      Monocyte % 7.1 %      Eosinophil % 7.7 %      Basophil % 0.7 %      Neutrophils, Absolute 8.80 10*3/mm3      Lymphocytes, Absolute 1.80 10*3/mm3      Monocytes, Absolute 0.90 10*3/mm3      Eosinophils, Absolute 1.00 10*3/mm3      Basophils, Absolute 0.10 10*3/mm3      nRBC 0.0 /100 WBC     POC Glucose Once [[id number]]  (Abnormal) Collected: 01/11/21 2221    Specimen: Blood Updated: 01/11/21 2222     Glucose 248 mg/dL      Comment: Serial Number: [id number]:  [id number]       Legionella Antigen, Urine - Urine, Urine, Clean Catch [[id number]]  (Normal) Collected: 01/11/21 1935    Specimen: Urine, Clean Catch Updated: 01/11/21 2005     LEGIONELLA ANTIGEN, URINE Negative    S. Pneumo Ag Urine or CSF - Urine, Urine, Clean Catch [[id number]]  (Normal) Collected: 01/11/21 1935    Specimen: Urine, Clean Catch Updated: 01/11/21 2005     Strep Pneumo Ag Negative        No results found for: HGBA1C  Results from last 7 days   Lab Units 01/12/21  0222 01/11/21  1059 01/06/21  1557   INR  1.41* 1.43* 1.90*       Results from last 7 days   Lab Units 01/11/21  1132   PH, ARTERIAL pH units 7.544*   PO2 ART mm Hg 73.8*   PCO2, ARTERIAL mm Hg 28.4*   HCO3 ART mmol/L 24.5     No results found for: LIPASE  Lab Results   Component Value Date    CHOL 131 12/29/2020    TRIG 207 (H) 12/29/2020    HDL 40 12/29/2020    LDL 57 12/29/2020       No results found for: INTRAOP, PREDX, FINALDX, COMDX    Microbiology Results (last 10 days)     Procedure Component Value - Date/Time    MRSA Screen, PCR (Inpatient) - Swab, Nares  [[id number]]  (Normal) Collected: 01/12/21 0309    Lab Status: Final result Specimen: Swab from Nares Updated: 01/12/21 0511     MRSA PCR No MRSA Detected    Legionella Antigen, Urine - Urine, Urine, Clean Catch [[id number]]  (Normal) Collected: 01/11/21 1935    Lab Status: Final result Specimen: Urine, Clean Catch Updated: 01/11/21 2005     LEGIONELLA ANTIGEN, URINE Negative    S. Pneumo Ag Urine or CSF - Urine, Urine, Clean Catch [[id number]]  (Normal) Collected: 01/11/21 1935    Lab Status: Final result Specimen: Urine, Clean Catch Updated: 01/11/21 2005     Strep Pneumo Ag Negative    Blood Culture - Blood, Blood, Venous Line [[id number]] Collected: 01/11/21 1242    Lab Status: Preliminary result Specimen: Blood, Venous Line Updated: 01/12/21 1300     Blood Culture No growth at 24 hours    COVID-19,[facility] [employer] IN-HOUSE,Nasal Swab No Transport Media 3-4 HR TAT - Swab, Nasal Cavity [[id number]]  (Normal) Collected: 01/11/21 1107    Lab Status: Final result Specimen: Swab from Nasal Cavity Updated: 01/11/21 1137     COVID19 Not Detected    Narrative:      Fact sheet for providers: https://www.fda.gov/media/698371/download     Fact sheet for patients: https://www.fda.gov/media/252376/download    Test performed by PCR.    Blood Culture - Blood, Arm, Left [[id number]] Collected: 01/11/21 1059    Lab Status: Preliminary result Specimen: Blood from Arm, Left Updated: 01/12/21 1115     Blood Culture No growth at 24 hours          ECG/EMG Results (most recent)     Procedure Component Value Units Date/Time    ECG 12 Lead [[id number]] Collected: 01/11/21 1056     Updated: 01/12/21 1319     QT Interval 417 ms     Narrative:      HEART RATE= 70  bpm  RR Interval= 855  ms  TN Interval=   ms  P Horizontal Axis=   deg  P Front Axis=   deg  QRSD Interval= 127  ms  QT Interval= 417  ms  QRS Axis= 128  deg  T Wave Axis= -72  deg  - ABNORMAL ECG -  Afib/flutter and ventricular-paced rhythm  When compared with ECG of 08-Dec-2020 15:36:47,  No  significant change  Electronically Signed By: Nikolay Domingo (Jaya) 12-Jan-2021 13:18:22  Date and Time of Study: 2021-01-11 10:56:50          Results for orders placed in visit on 02/06/08   SCANNED VASCULAR STUDIES       Results for orders placed during the hospital encounter of 12/08/20   Adult Transthoracic Echo Complete W/ Cont if Necessary Per Protocol    Narrative · Left ventricular wall thickness is consistent with septal asymmetric   hypertrophy.  · Estimated left ventricular EF = 60% Left ventricular systolic function   is normal.  · The right ventricular cavity is borderline dilated.  · The right atrial cavity is borderline dilated.  · Mild aortic valve stenosis is present.  · Estimated right ventricular systolic pressure from tricuspid   regurgitation is normal (<35 mmHg).  · There is a small (<1cm) pericardial effusion adjacent to the left   ventricle.          Xr Chest 1 View    Result Date: 1/11/2021   1. Stable right lower lobe, and suspected new mild right upper lobe ill-defined infiltrates. Correlate clinically for worsening pneumonia.  Electronically Signed By-Alexandra Preston MD On:1/11/2021 11:40 AM This report was finalized on [id number] by  Alexandra Preston MD.          Xrays, labs reviewed personally by physician.    Medication Review:   I have reviewed the patient's current medication list      Scheduled Meds  aspirin, 81 mg, Oral, Daily  atorvastatin, 20 mg, Oral, Daily  budesonide, 0.5 mg, Nebulization, BID - RT  calcitriol, 0.25 mcg, Oral, Once per day on Mon Wed Fri  carvedilol, 12.5 mg, Oral, Nightly  cefepime, 2 g, Intravenous, Q24H  furosemide, 40 mg, Oral, Daily  insulin lispro, 0-14 Units, Subcutaneous, TID AC  ipratropium-albuterol, 3 mL, Nebulization, 4x Daily - RT  magnesium oxide, 200 mg, Oral, Daily  mirtazapine, 15 mg, Oral, Nightly  sodium chloride, 10 mL, Intravenous, Q12H  theophylline, 300 mg, Oral, Nightly  warfarin, 5 mg, Oral, Once        Meds Infusions  Pharmacy to dose  warfarin,         Meds PRN  •  acetaminophen **OR** acetaminophen **OR** acetaminophen  •  ALPRAZolam  •  aluminum-magnesium hydroxide-simethicone  •  dextrose  •  dextrose  •  glucagon (human recombinant)  •  insulin lispro **AND** insulin lispro  •  ipratropium-albuterol  •  melatonin  •  ondansetron **OR** ondansetron  •  Pharmacy to dose warfarin  •  [COMPLETED] Insert peripheral IV **AND** sodium chloride  •  sodium chloride        Assessment/Plan   Assessment/Plan     Active Hospital Problems    Diagnosis  POA   • **HCAP (healthcare-associated pneumonia) [J18.9]  Yes   • Shortness of breath [R06.02]  Yes   • Acute on chronic diastolic heart failure (CMS/HCC) [I50.33]  Yes   • CKD (chronic kidney disease) [N18.9]  Yes   • Mixed hyperlipidemia [E78.2]  Yes   • Chronic obstructive pulmonary disease (CMS/[facility]) [J44.9]  Yes   • Coronary artery disease of native artery of native heart with stable angina pectoris (CMS/[facility]) [I25.118]  Yes   • Essential hypertension [I10]  Yes   • Type 2 diabetes mellitus with hyperglycemia, without long-term current use of insulin (CMS/[facility]) [E11.65]  Yes   • Permanent atrial fibrillation (CMS/[facility]) [I48.21]  Yes   • Presence of cardiac pacemaker [Z95.0]  Yes      Resolved Hospital Problems   No resolved problems to display.       MEDICAL DECISION MAKING COMPLEXITY BY PROBLEM:   Right sided pneumonia  -HCAP due to patient recent hospitalization 12/2020  -CXR: stable right lower lobe and suspected new mild right upper lobe ill-defined infiltrates  -blood cultures drawn  -WBC 9.9, lactate 2.0, afebrile  -IV cefepime, IV vancomycin started in ER and will continue     Shortness of breath  -likely multifactorial from COPD, CHF, and pneumonia   -ABG reviewed on home O2 3L, continue home rate and monitor oxygen saturation  -abx as above  -pulmicort and duonebs, cont home theophylline  -one dose IV lasix  -covid negative      Acute on chronic HFpEF, PPM  -proBNP 4000 compared to 2,850 on  12/8/20  -pt has no lower extremity edema or crackles on exam  -give one dose IV lasix 40mg for now then reassess  -check bladder to scan to rule out urinary retention   -cont home coreg, lasix  -2D echo 12/8/20: Left ventricular wall thickness consistent with septal asymmetric hypertrophy, EF 60% with left ventricular systolic function normal, right ventricular cavity borderline dilated, right atrial cavity borderline dilated, mild atrial valve stenosis, right ventricular systolic pressure from tricuspid regurgitation is normal, small pericardial effusion adjacent to the left ventricle     CKD  -Cr 2.2 compared to 1.96 on 12/29/20  -monitor BMP     Hypertension  -controlled. Cont home coreg  -cont home statin     Hyperlipidemia     DM Type II  -recent hgb a1c 7.1%  -SSI AC/HS     Afib, nonobstructive CAD  -controlled. Cont home coreg, coumadin. Digoxin recently stopped by cardiology 1/6/21     KIM  -cpap qhs      VTE Prophylaxis -   Mechanical Order History:      Ordered        01/11/21 1441  Place Sequential Compression Device  Once         01/11/21 1441  Maintain Sequential Compression Device  Continuous                 Pharmalogical Order History:      Ordered     Dose Route Frequency Stop    01/12/21 1545  warfarin (COUMADIN) tablet 5 mg     Question:  Target INR  Answer:  2 - 3    5 mg PO Once (Warfarin) --    01/12/21 1543  Pharmacy to dose warfarin     Question:  Target INR  Answer:  2 - 3    -- XX Continuous PRN --    01/11/21 1443  warfarin (COUMADIN) tablet 3 mg  Status:  Discontinued     Question:  Target INR  Answer:  2 - 3    3 mg PO Nightly 01/12/21 1545                  Code Status -   Code Status and Medical Interventions:   Ordered at: 01/11/21 1441     Level Of Support Discussed With:    Patient     Code Status:    CPR     Medical Interventions (Level of Support Prior to Arrest):    Full       This patient has been examined wearing appropriate Personal Protective Equipment and discussed with rn.  01/12/21    Discharge Planning  home    Electronically signed by Henry Hansen MD, 01/12/21, 18:54 EST.  [facility]ist Team

## 2021-01-12 NOTE — PLAN OF CARE
Goal Outcome Evaluation:  Plan of Care Reviewed With: patient  Progress: no change  Outcome Summary: Pt without significant complaints this shift. VSS. Plan ongoing

## 2021-01-13 NOTE — PLAN OF CARE
Goal Outcome Evaluation:  Plan of Care Reviewed With: patient  Progress: no change  Outcome Summary: pt continues to need the 5L oxygen and drops quickly when he removes his oxygen or goes to ambulate. He is in good spirits and has been up in the chair and worked with PT today.

## 2021-01-13 NOTE — PROGRESS NOTES
[facility] Medicine Services Daily Progress Note      Hospitalist Team  LOS 2 days      Patient Care Team:  Yusef Sosa Jr., MD as PCP - General  Yusef Sosa Jr., MD as PCP - Family Medicine  Luis Paulino MD as Consulting Physician (Cardiology)    Patient Location: [facility]/      Subjective   Subjective     Chief Complaint / Subjective  Chief Complaint   Patient presents with   • Shortness of Breath     Pt present siwth SoA, low oxygen saturations at home, reports 52% when ambulating.  pt on 3ltr   home sat reading 70 at triage/ our machine 1000%         Brief Synopsis of Hospital Course/HPI  *Mr. Santoro is a 85 y.o. male with past medical history of diabetes, hyperlipidemia, cardiomyopathy, hypertension, CAD, COPD, A. fib, CKD and CHF who presents to [facility] ER 1/11/21 complaining of shortness of breath. He stated he has been short of breath upon exertion since he was discharged from here back in December 2020. Within the last two days he has become more short of breath. He walked from his living room to the bathroom this morning and could not catch his breath. He checked his oxygen saturation and it was in the 50s. He denied any cough, fever, or lower extremity swelling. He has been taking lasix and urinating. His lasix was recently decreased due to being up 10-13 times a night per his report.      In the ER the patient had CXR that showed stable right lower lobe and suspected new mild right upper lobe ill-defined infiltrates.  Correlate clinically for worsening pneumonia.  WBC 9.9, lactate 2.0.  Afebrile.  proBNP 4000. Further labs showed K 5.5, BUN 29, Cr 1.81. ABG showed ph 7.54, CO2 28.4, PO2 73.8, HCO3 24.5 on 3L home O2 rate. He was given IV vancomycin and cefepime and admitted for further treatment of pneumonia     Medical record showed patient was discharged 12/14/2020 after treatment for CHF. He received IV diuretics with help of nephrology and  "switched to oral lasix at discharge  His CT chest showed a small pleural effusion and a right basilar lung density consistent with atelectasis also some scarring in the right lung and a stable 8 mm lung nodule, 5 mm groundglass nodule in the left lower lobe noted as well small pericardial effusion. He qualified for home O2. He was also treated with antibiotics with the possibility of an infectious component. His strep pneumo antigen test did come back positive.  Discharged on omnicef      Date:1/12/2021: Patient seen and examined, sitting in recliner, says she feels better.  On 5 L of oxygen.  Dyspnea slightly improved.  With complaints of anxiety  1/13/2021-seen in recliner no acute distress on 5 L of oxygen.  says feels better.  Her anxiety overnight. bg 375 lantus added       Review of Systems   Constitution: Positive for malaise/fatigue.   HENT: Negative.    Eyes: Negative.    Cardiovascular: Positive for dyspnea on exertion.   Respiratory: Positive for shortness of breath.    Endocrine: Negative.    Hematologic/Lymphatic: Negative.    Skin: Negative.    Musculoskeletal: Negative.    Gastrointestinal: Negative.    Genitourinary: Negative.    Neurological: Negative.    Psychiatric/Behavioral: Negative.    Allergic/Immunologic: Negative.    All other systems reviewed and are negative.    Objective   Objective      Vital Signs  Temp:  [97.4 °F (36.3 °C)-98.3 °F (36.8 °C)] 97.6 °F (36.4 °C)  Heart Rate:  [71-80] 76  Resp:  [14-18] 18  BP: (117-132)/(64-76) 130/72  Oxygen Therapy  SpO2: 91 %  Pulse Oximetry Type: Intermittent  Device (Oxygen Therapy): high-flow nasal cannula, humidified  Flow (L/min): 5  Flowsheet Rows      First Filed Value   Admission Height  170.2 cm (67\") Documented at 01/11/2021 1007   Admission Weight  74.8 kg (165 lb) Documented at 01/11/2021 1007        Intake & Output (last 3 days)       01/10 0701 - 01/11 0700 01/11 0701 - 01/12 0700 01/12 0701 - 01/13 0700 01/13 0701 - 01/14 0700    P.O.  " 480 1260 720    IV Piggyback  500      Total Intake(mL/kg)  980 (12.5) 1260 (15.6) 720 (8.9)    Urine (mL/kg/hr)  700 400 (0.2) 425 (0.5)    Stool   0     Total Output  700 400 425    Net  +280 +860 +295            Urine Unmeasured Occurrence   2 x     Stool Unmeasured Occurrence   1 x         Lines, Drains & Airways    Active LDAs     Name:   Placement date:   Placement time:   Site:   Days:    Peripheral IV 01/11/21 1040 Right Arm   01/11/21    1040    Arm   1    Peripheral IV 01/12/21 1820 Anterior;Right Forearm   01/12/21    1820    Forearm   less than 1                  Physical Exam  Vitals signs and nursing note reviewed.   Constitutional:       General: He is not in acute distress.     Appearance: Normal appearance. He is well-developed. He is not ill-appearing, toxic-appearing or diaphoretic.   HENT:      Head: Normocephalic and atraumatic.      Nose: Nose normal. No congestion or rhinorrhea.      Mouth/Throat:      Mouth: Mucous membranes are moist.      Pharynx: No oropharyngeal exudate.   Eyes:      General: No scleral icterus.        Right eye: No discharge.         Left eye: No discharge.      Extraocular Movements: Extraocular movements intact.      Conjunctiva/sclera: Conjunctivae normal.      Pupils: Pupils are equal, round, and reactive to light.   Neck:      Musculoskeletal: Normal range of motion and neck supple. No neck rigidity or muscular tenderness.      Thyroid: No thyromegaly.      Vascular: No carotid bruit or JVD.      Trachea: No tracheal deviation.   Cardiovascular:      Rate and Rhythm: Normal rate and regular rhythm.      Pulses: Normal pulses.      Heart sounds: Normal heart sounds. No murmur. No friction rub. No gallop.    Pulmonary:      Effort: Pulmonary effort is normal. No respiratory distress.      Breath sounds: No stridor. No wheezing, rhonchi or rales.      Comments: Decreased breath sounds bilaterally  Chest:      Chest wall: No tenderness.   Abdominal:      General: Bowel  sounds are normal. There is no distension.      Palpations: Abdomen is soft. There is no mass.      Tenderness: There is no abdominal tenderness. There is no guarding or rebound.      Hernia: No hernia is present.   Musculoskeletal: Normal range of motion.         General: No swelling, tenderness, deformity or signs of injury.      Right lower leg: No edema.      Left lower leg: No edema.   Lymphadenopathy:      Cervical: No cervical adenopathy.   Skin:     General: Skin is warm and dry.      Coloration: Skin is not jaundiced or pale.      Findings: No bruising, erythema or rash.   Neurological:      General: No focal deficit present.      Mental Status: He is alert and oriented to person, place, and time. Mental status is at baseline.      Cranial Nerves: No cranial nerve deficit.      Sensory: No sensory deficit.      Motor: No weakness or abnormal muscle tone.      Coordination: Coordination normal.   Psychiatric:         Mood and Affect: Mood normal.         Behavior: Behavior normal.         Thought Content: Thought content normal.         Judgment: Judgment normal.              Wounds (last 24 hours)      LDA Wound     Row Name 01/13/21 0726 01/12/21 2016          Wound 01/11/21 1718 Bilateral posterior sacral spine    Wound - Properties Group Placement Date: 01/11/21  -HN Placement Time: 1718  -HN Present on Hospital Admission: Y  -HN Side: Bilateral  -HN Orientation: posterior  -HN Location: sacral spine  -HN Stage, Pressure Injury : Stage 1  -HN    Dressing Appearance  open to air  -LB  open to air  -MS     Closure  --  Open to air  -MS     Retired Wound - Properties Group Date first assessed: 01/11/21  -HN Time first assessed: 1718  -HN Present on Hospital Admission: Y  -HN Side: Bilateral  -HN Location: sacral spine  -HN      User Key  (r) = Recorded By, (t) = Taken By, (c) = Cosigned By    Initials Name Provider Type    Valentina Brewster RN Registered Nurse    Sheri Dodd, CARLA  Registered Nurse    Mary Marie RN Registered Nurse          Procedures:              Results Review:     I reviewed the patient's new clinical results.      Lab Results (last 24 hours)     Procedure Component Value Units Date/Time    POC Glucose Once [[id number]]  (Abnormal) Collected: 01/13/21 1644    Specimen: Blood Updated: 01/13/21 1646     Glucose 375 mg/dL      Comment: Serial Number: [id number]:  [id number]       Blood Culture - Blood, Blood, Venous Line [[id number]] Collected: 01/11/21 1242    Specimen: Blood, Venous Line Updated: 01/13/21 1301     Blood Culture No growth at 2 days    POC Glucose Once [[id number]]  (Abnormal) Collected: 01/13/21 1258    Specimen: Blood Updated: 01/13/21 1259     Glucose 281 mg/dL      Comment: Serial Number: [id number]:  [id number]       Blood Culture - Blood, Arm, Left [[id number]] Collected: 01/11/21 1059    Specimen: Blood from Arm, Left Updated: 01/13/21 1116     Blood Culture No growth at 2 days    POC Glucose Once [[id number]]  (Abnormal) Collected: 01/13/21 0735    Specimen: Blood Updated: 01/13/21 0739     Glucose 226 mg/dL      Comment: Serial Number: [id number]:  [id number]       Procalcitonin [[id number]]  (Normal) Collected: 01/13/21 0250    Specimen: Blood Updated: 01/13/21 0334     Procalcitonin 0.14 ng/mL     Narrative:      As a Marker for Sepsis (Non-Neonates):   1. <0.5 ng/mL represents a low risk of severe sepsis and/or septic shock.  1. >2 ng/mL represents a high risk of severe sepsis and/or septic shock.    As a Marker for Lower Respiratory Tract Infections that require antibiotic therapy:  PCT on Admission     Antibiotic Therapy             6-12 Hrs later  > 0.5                Strongly Recommended            >0.25 - <0.5         Recommended  0.1 - 0.25           Discouraged                   Remeasure/reassess PCT  <0.1                 Strongly Discouraged          Remeasure/reassess PCT      As 28 day mortality risk marker:  "\"Change in Procalcitonin Result\" (> 80 % or <=80 %) if Day 0 (or Day 1) and Day 4 values are available. Refer to http://www.[facility]-pct-calculator.com/   Change in PCT <=80 %   A decrease of PCT levels below or equal to 80 % defines a positive change in PCT test result representing a higher risk for 28-day all-cause mortality of patients diagnosed with severe sepsis or septic shock.  Change in PCT > 80 %   A decrease of PCT levels of more than 80 % defines a negative change in PCT result representing a lower risk for 28-day all-cause mortality of patients diagnosed with severe sepsis or septic shock.                Results may be falsely decreased if patient taking Biotin.     Basic Metabolic Panel [[id number]]  (Abnormal) Collected: 01/13/21 0250    Specimen: Blood Updated: 01/13/21 0328     Glucose 234 mg/dL      BUN 40 mg/dL      Creatinine 2.56 mg/dL      Sodium 134 mmol/L      Potassium 5.0 mmol/L      Comment: Slight hemolysis detected by analyzer. Results may be affected.        Chloride 97 mmol/L      CO2 28.0 mmol/L      Calcium 8.6 mg/dL      eGFR Non African Amer 24 mL/min/1.73      BUN/Creatinine Ratio 15.6     Anion Gap 9.0 mmol/L     Narrative:      GFR Normal >60  Chronic Kidney Disease <60  Kidney Failure <15      CK [[id number]]  (Normal) Collected: 01/13/21 0250    Specimen: Blood Updated: 01/13/21 0328     Creatine Kinase 34 U/L     BNP [[id number]]  (Abnormal) Collected: 01/13/21 0250    Specimen: Blood Updated: 01/13/21 0324     proBNP 3,383.0 pg/mL     Narrative:      Among patients with dyspnea, NT-proBNP is highly sensitive for the detection of acute congestive heart failure. In addition NT-proBNP of <300 pg/ml effectively rules out acute congestive heart failure with 99% negative predictive value.    Results may be falsely decreased if patient taking Biotin.      Protime-INR [[id number]]  (Abnormal) Collected: 01/13/21 0250    Specimen: Blood Updated: 01/13/21 0320     Protime 15.2 Seconds      " INR 1.40    CBC (No Diff) [[id number]]  (Abnormal) Collected: 01/13/21 0250    Specimen: Blood Updated: 01/13/21 0305     WBC 11.30 10*3/mm3      RBC 3.00 10*6/mm3      Hemoglobin 10.2 g/dL      Hematocrit 30.8 %      .8 fL      MCH 33.9 pg      MCHC 33.0 g/dL      RDW 17.6 %      RDW-SD 63.4 fl      MPV 7.4 fL      Platelets 228 10*3/mm3     POC Glucose Once [[id number]]  (Abnormal) Collected: 01/12/21 2048    Specimen: Blood Updated: 01/12/21 2049     Glucose 228 mg/dL      Comment: Serial Number: [id number]:  [id number]       BNP [[id number]]  (Abnormal) Collected: 01/12/21 1920    Specimen: Blood Updated: 01/12/21 1945     proBNP 3,801.0 pg/mL     Narrative:      Among patients with dyspnea, NT-proBNP is highly sensitive for the detection of acute congestive heart failure. In addition NT-proBNP of <300 pg/ml effectively rules out acute congestive heart failure with 99% negative predictive value.    Results may be falsely decreased if patient taking Biotin.      POC Glucose Once [[id number]]  (Abnormal) Collected: 01/12/21 1734    Specimen: Blood Updated: 01/12/21 1735     Glucose 254 mg/dL      Comment: Serial Number: [id number]:  [id number]           No results found for: HGBA1C  Results from last 7 days   Lab Units 01/13/21  0250 01/12/21  0222 01/11/21  1059   INR  1.40* 1.41* 1.43*       Results from last 7 days   Lab Units 01/11/21  1132   PH, ARTERIAL pH units 7.544*   PO2 ART mm Hg 73.8*   PCO2, ARTERIAL mm Hg 28.4*   HCO3 ART mmol/L 24.5     No results found for: LIPASE  Lab Results   Component Value Date    CHOL 131 12/29/2020    TRIG 207 (H) 12/29/2020    HDL 40 12/29/2020    LDL 57 12/29/2020       No results found for: INTRAOP, PREDX, FINALDX, COMDX    Microbiology Results (last 10 days)     Procedure Component Value - Date/Time    MRSA Screen, PCR (Inpatient) - Swab, Nares [[id number]]  (Normal) Collected: 01/12/21 0309    Lab Status: Final result Specimen: Swab from Nares Updated:  01/12/21 0511     MRSA PCR No MRSA Detected    Legionella Antigen, Urine - Urine, Urine, Clean Catch [[id number]]  (Normal) Collected: 01/11/21 1935    Lab Status: Final result Specimen: Urine, Clean Catch Updated: 01/11/21 2005     LEGIONELLA ANTIGEN, URINE Negative    S. Pneumo Ag Urine or CSF - Urine, Urine, Clean Catch [[id number]]  (Normal) Collected: 01/11/21 1935    Lab Status: Final result Specimen: Urine, Clean Catch Updated: 01/11/21 2005     Strep Pneumo Ag Negative    Blood Culture - Blood, Blood, Venous Line [[id number]] Collected: 01/11/21 1242    Lab Status: Preliminary result Specimen: Blood, Venous Line Updated: 01/13/21 1301     Blood Culture No growth at 2 days    COVID-19,[facility] [employer] IN-HOUSE,Nasal Swab No Transport Media 3-4 HR TAT - Swab, Nasal Cavity [[id number]]  (Normal) Collected: 01/11/21 1107    Lab Status: Final result Specimen: Swab from Nasal Cavity Updated: 01/11/21 1137     COVID19 Not Detected    Narrative:      Fact sheet for providers: https://www.fda.gov/media/518528/download     Fact sheet for patients: https://www.fda.gov/media/695181/download    Test performed by PCR.    Blood Culture - Blood, Arm, Left [[id number]] Collected: 01/11/21 1059    Lab Status: Preliminary result Specimen: Blood from Arm, Left Updated: 01/13/21 1116     Blood Culture No growth at 2 days          ECG/EMG Results (most recent)     Procedure Component Value Units Date/Time    ECG 12 Lead [[id number]] Collected: 01/11/21 1056     Updated: 01/12/21 1319     QT Interval 417 ms     Narrative:      HEART RATE= 70  bpm  RR Interval= 855  ms  VA Interval=   ms  P Horizontal Axis=   deg  P Front Axis=   deg  QRSD Interval= 127  ms  QT Interval= 417  ms  QRS Axis= 128  deg  T Wave Axis= -72  deg  - ABNORMAL ECG -  Afib/flutter and ventricular-paced rhythm  When compared with ECG of 08-Dec-2020 15:36:47,  No significant change  Electronically Signed By: Nikolay Domingo ([facility]) 12-Jan-2021 13:18:22  Date and Time of Study:  2021-01-11 10:56:50          Results for orders placed in visit on 02/06/08   SCANNED VASCULAR STUDIES       Results for orders placed during the hospital encounter of 12/08/20   Adult Transthoracic Echo Complete W/ Cont if Necessary Per Protocol    Narrative · Left ventricular wall thickness is consistent with septal asymmetric   hypertrophy.  · Estimated left ventricular EF = 60% Left ventricular systolic function   is normal.  · The right ventricular cavity is borderline dilated.  · The right atrial cavity is borderline dilated.  · Mild aortic valve stenosis is present.  · Estimated right ventricular systolic pressure from tricuspid   regurgitation is normal (<35 mmHg).  · There is a small (<1cm) pericardial effusion adjacent to the left   ventricle.          Xr Chest 1 View    Result Date: 1/11/2021   1. Stable right lower lobe, and suspected new mild right upper lobe ill-defined infiltrates. Correlate clinically for worsening pneumonia.  Electronically Signed By-Alexandra Preston MD On:1/11/2021 11:40 AM This report was finalized on [id number] by  Alexandra Preston MD.          Xrays, labs reviewed personally by physician.    Medication Review:   I have reviewed the patient's current medication list      Scheduled Meds  acetylcysteine, 2 mL, Nebulization, BID - RT  aspirin, 81 mg, Oral, Daily  atorvastatin, 20 mg, Oral, Daily  budesonide, 0.5 mg, Nebulization, BID - RT  calcitriol, 0.25 mcg, Oral, Once per day on Mon Wed Fri  carvedilol, 12.5 mg, Oral, Nightly  cefepime, 2 g, Intravenous, Q24H  furosemide, 40 mg, Oral, BID  guaiFENesin, 600 mg, Oral, Q12H  insulin glargine, 10 Units, Subcutaneous, Nightly  insulin lispro, 0-24 Units, Subcutaneous, TID AC  ipratropium-albuterol, 3 mL, Nebulization, 4x Daily - RT  magnesium oxide, 200 mg, Oral, Daily  mirtazapine, 15 mg, Oral, Nightly  predniSONE, 40 mg, Oral, Daily With Breakfast  sodium chloride, 10 mL, Intravenous, Q12H  theophylline, 300 mg, Oral,  Nightly  [START ON 1/14/2021] warfarin, 3.5 mg, Oral, Daily  warfarin, 5 mg, Oral, Once        Meds Infusions  Pharmacy to dose warfarin,         Meds PRN  •  acetaminophen **OR** acetaminophen **OR** acetaminophen  •  ALPRAZolam  •  aluminum-magnesium hydroxide-simethicone  •  dextrose  •  dextrose  •  glucagon (human recombinant)  •  insulin lispro **AND** insulin lispro  •  ipratropium-albuterol  •  melatonin  •  ondansetron **OR** ondansetron  •  Pharmacy to dose warfarin  •  [COMPLETED] Insert peripheral IV **AND** sodium chloride  •  sodium chloride        Assessment/Plan   Assessment/Plan     Active Hospital Problems    Diagnosis  POA   • **HCAP (healthcare-associated pneumonia) [J18.9]  Yes   • Shortness of breath [R06.02]  Yes   • Acute on chronic diastolic heart failure (CMS/HCC) [I50.33]  Yes   • CKD (chronic kidney disease) [N18.9]  Yes   • Mixed hyperlipidemia [E78.2]  Yes   • Chronic obstructive pulmonary disease (CMS/formerly [facility]) [J44.9]  Yes   • Coronary artery disease of native artery of native heart with stable angina pectoris (CMS/formerly [facility]) [I25.118]  Yes   • Essential hypertension [I10]  Yes   • Type 2 diabetes mellitus with hyperglycemia, without long-term current use of insulin (CMS/formerly [facility]) [E11.65]  Yes   • Permanent atrial fibrillation (CMS/formerly [facility]) [I48.21]  Yes   • Presence of cardiac pacemaker [Z95.0]  Yes      Resolved Hospital Problems   No resolved problems to display.       MEDICAL DECISION MAKING COMPLEXITY BY PROBLEM:   Right sided pneumonia  -HCAP due to patient recent hospitalization 12/2020  -CXR: stable right lower lobe and suspected new mild right upper lobe ill-defined infiltrates  -blood cultures drawn  -WBC 9.9, lactate 2.0, afebrile  -IV cefepime, IV vancomycin started in ER and will continue     Shortness of breath  -likely multifactorial from COPD, CHF, and pneumonia   -ABG reviewed on home O2 3L, continue home rate and monitor oxygen saturation  -abx as above  -pulmicort and joy, cont  home theophylline  -one dose IV lasix  -covid negative      Acute on chronic HFpEF, PPM  -proBNP 4000 compared to 2,850 on 12/8/20  -pt has no lower extremity edema or crackles on exam  -give one dose IV lasix 40mg for now then reassess  -check bladder to scan to rule out urinary retention   -cont home coreg, lasix  -2D echo 12/8/20: Left ventricular wall thickness consistent with septal asymmetric hypertrophy, EF 60% with left ventricular systolic function normal, right ventricular cavity borderline dilated, right atrial cavity borderline dilated, mild atrial valve stenosis, right ventricular systolic pressure from tricuspid regurgitation is normal, small pericardial effusion adjacent to the left ventricle     CKD  -Cr 2.2 compared to 1.96 on 12/29/20  -monitor BMP     Hypertension  -controlled. Cont home coreg  -cont home statin     Hyperlipidemia     DM Type II uncontrolled -add lantus  -recent hgb a1c 7.1%  -SSI AC/HS     Afib, nonobstructive CAD  -controlled. Cont home coreg, coumadin. Digoxin recently stopped by cardiology 1/6/21     KIM  -cpap qhs      VTE Prophylaxis -   Mechanical Order History:      Ordered        01/11/21 1441  Place Sequential Compression Device  Once         01/11/21 1441  Maintain Sequential Compression Device  Continuous                 Pharmalogical Order History:      Ordered     Dose Route Frequency Stop    01/12/21 1545  warfarin (COUMADIN) tablet 5 mg     Question:  Target INR  Answer:  2 - 3    5 mg PO Once (Warfarin) --    01/12/21 1543  Pharmacy to dose warfarin     Question:  Target INR  Answer:  2 - 3    -- XX Continuous PRN --    01/11/21 1443  warfarin (COUMADIN) tablet 3 mg  Status:  Discontinued     Question:  Target INR  Answer:  2 - 3    3 mg PO Nightly 01/12/21 1545                  Code Status -   Code Status and Medical Interventions:   Ordered at: 01/11/21 1441     Level Of Support Discussed With:    Patient     Code Status:    CPR     Medical Interventions (Level  of Support Prior to Arrest):    Full       This patient has been examined wearing appropriate Personal Protective Equipment and discussed with rn. 01/13/21    Discharge Planning  home    Electronically signed by Henry Hansen MD, 01/13/21, 17:29 EST.  [facility] Hospitalist Team

## 2021-01-13 NOTE — PROGRESS NOTES
"Pharmacy dosing service  Anticoagulant  Warfarin     Subjective:    Cali Santoro is a 85 y.o.male being continued on warfarin for atrial fibrillation.    INR Goal: 2 - 3  Home medication?:  Yes, warfarin 3 mg once daily  Bridge Therapy Present?:  No  Interacting Medications Evaluation (New/Present/Discontinued): cefepime  Additional Contributing Factors:      Assessment/Plan:    Subtherapeutic INR on admission. Will give 5mg again today.  Anticipate patient needing higher maintenance dose d/t subtherapeutic INR on admission. (e.g. 3.5 mg daily = 10.5% increase from home regimen)    Continue to monitor and adjust based on INR.         Date 1/11 1/12 1/13         INR 1.43 1.41 1.4         Dose 3 mg 5 mg 5 mg             Objective:  [Ht: 170.2 cm (67\"); Wt: 80.7 kg (178 lb); BMI: Body mass index is 27.88 kg/m².]    Lab Results   Component Value Date    ALBUMIN 4.40 12/29/2020     Lab Results   Component Value Date    INR 1.40 (L) 01/13/2021    INR 1.41 (L) 01/12/2021    INR 1.43 (L) 01/11/2021    PROTIME 15.2 (L) 01/13/2021    PROTIME 15.3 (L) 01/12/2021    PROTIME 15.5 (L) 01/11/2021     Lab Results   Component Value Date    HGB 10.2 (L) 01/13/2021    HGB 11.1 (L) 01/12/2021    HGB 11.4 (L) 01/11/2021     Lab Results   Component Value Date    HCT 30.8 (L) 01/13/2021    HCT 34.0 (L) 01/12/2021    HCT 34.4 (L) 01/11/2021       Ely Hernandez, PharmD, BCPS  01/13/21 14:13 EST   "

## 2021-01-13 NOTE — PLAN OF CARE
Problem: Adult Inpatient Plan of Care  Goal: Plan of Care Review  Outcome: Ongoing, Progressing  Flowsheets  Taken 1/13/2021 1529  Plan of Care Reviewed With: patient  Outcome Summary: Pt is 86 yo male admitted for SOA, PNA, acute on chronic CHF, and sepsis.  Pt normally on 3L O2 at home. Pt presents with decreased endurance impacting ambulation distance.  Pt able to ambulate 125 ft with RW, however O2 desats to 85% requiring long seated rest break to return to >90%.  Pt able to complete transfers with SBA and ambulate with SBA.  PT spoke with pt regarding importance of ambulating and asking for assist from nursing staff to ambulate while inpatient.  Pt has strong support of wife who is available 24/7.  PT will follow 3x/week while at [facility] and recommend home with wife, HHPT, and use of RW.  PPE donned: mask with faceshield, gloves.  Taken 1/13/2021 1526  Outcome Summary: Pt is 86 yo male admitted for SOA, PNA, acute on chronic CHF, and sepsis.  Pt normally on 3L O2 at home.

## 2021-01-13 NOTE — PROGRESS NOTES
Continued Stay Note  ADY Traore     Patient Name: Cali Santoro  MRN: [id number]  Today's Date: 1/13/2021    Admit Date: 1/11/2021    Discharge Plan     Row Name 01/13/21 0937       Plan    Plan  D/C Plan : Pending PT/OT  eval. Pt is on 6l of o2 . Pt has home o2 .        Discharge Codes    No documentation.             Meagan Higgins RN

## 2021-01-13 NOTE — CONSULTS
Referring Provider: Dr. GEMINI Hansen  Reason for Consultation: Renal insufficiency    Chief complaint .  Shortness of breath    History of present illness:    Patient is 85 years old  male well-known to me and has a history of CKD 3, coronary artery disease, CHF, A. fib, recent hospital admission for pneumonia and fluid overload came to the hospital shortness of breath, cough with minimal expectoration.  Patient did not have any fever, chills, nausea, vomiting.  Patient denied any hematuria or dysuria but had nocturia.  Lasix dose was recently decreased from 40 twice daily to 40 in the morning and 20 in the evening.  Denies any use of NSAIDs.  Lab work-up on presentation showed elevated BNP and creatinine of 1.8 mg/DL.  His creatinine increased to 2.5 mg/DL this morning which prompted renal consult.  Patient's chest x-ray showed possible worsening pneumonia.  Patient is receiving IV antibiotics.  He received IV Lasix but was changed to oral this morning    History  Past Medical History:   Diagnosis Date   • Arrhythmia    • Bradycardia    • Cardiomyopathy (CMS/[facility])    • Cataract    • COPD (chronic obstructive pulmonary disease) (CMS/[facility])    • Coronary artery disease    • Diabetes mellitus, type 2 (CMS/[facility])    • Emphysema, unspecified (CMS/[facility])    • Hx of sick sinus syndrome     S/P Biventricular Pacemaker   • Hypertension    • Paroxysmal atrial fibrillation (CMS/[facility])    • Pneumonia    • Sleep apnea    • Ventricular arrhythmia      Past Surgical History:   Procedure Laterality Date   • CARDIAC CATHETERIZATION  02/04/2015   • CARDIAC CATHETERIZATION  04/25/2016   • CAROTID ENDARTERECTOMY     • CORONARY ANGIOPLASTY  1997   • LARYNGOSCOPY     • LUNG SURGERY  2008    Lung resection   • PACEMAKER IMPLANTATION  04/25/2016    Dual chamber; [employer]   • TRACHEOSTOMY       Social History     Tobacco Use   • Smoking status: Former Smoker     Types: Cigarettes   • Smokeless tobacco: Never Used   Substance Use  Topics   • Alcohol use: Yes     Comment: occ   • Drug use: No     Family History   Problem Relation Age of Onset   • Heart disease Mother    • Hypertension Mother    • Stroke Father    • Breast cancer Sister    • Colon cancer Sister    • Heart disease Brother    • Diabetes Other        Review of Systems  ROS  All system reviewed, negative except as mentioned in HPI  Objective     Vital Signs  Temp:  [97.4 °F (36.3 °C)-98.3 °F (36.8 °C)] 97.4 °F (36.3 °C)  Heart Rate:  [70-80] 71  Resp:  [14-16] 14  BP: (117-144)/(64-76) 132/74    I/O this shift:  In: -   Out: 250 [Urine:250]  I/O last 3 completed shifts:  In: 1500 [P.O.:1500]  Out: 1100 [Urine:1100]    Physical Exam:  Physical Exam  General.  Awake, alert  Head.  Atraumatic, normocephalic  Neck.  Supple  ENT.  Oral mucosa moist  Respiratory.  Decreased breath sounds bases  Cardiovascular.  Regular rhythm  Abdominal.  Obese, soft, nontender  Extremities.  No edema  Results Review:   I reviewed the patient's new clinical results.    Lab Results   Component Value Date    CALCIUM 8.6 01/13/2021     Results from last 7 days   Lab Units 01/13/21  0250 01/12/21  0222 01/11/21  1059   SODIUM mmol/L 134* 137 134*   POTASSIUM mmol/L 5.0 5.2 5.5*   CHLORIDE mmol/L 97* 98 96*   CO2 mmol/L 28.0 29.0 28.0   BUN mg/dL 40* 33* 29*   CREATININE mg/dL 2.56* 2.23* 1.81*   GLUCOSE mg/dL 234* 206* 267*   CALCIUM mg/dL 8.6 9.2 9.2   WBC 10*3/mm3 11.30* 12.60* 9.90   HEMOGLOBIN g/dL 10.2* 11.1* 11.4*   PLATELETS 10*3/mm3 228 264 239     Lab Results   Component Value Date    CKTOTAL 34 01/13/2021    TROPONINI 0.030 06/28/2019    TROPONINT 0.027 01/11/2021     Estimated Creatinine Clearance: 21.8 mL/min (A) (by C-G formula based on SCr of 2.56 mg/dL (H)).No results found for: URICACID    Brief Urine Lab Results  (Last result in the past 365 days)      Color   Clarity   Blood   Leuk Est   Nitrite   Protein   CREAT   Urine HCG        12/29/20 1019             25.5             Prior to  Admission medications    Medication Sig Start Date End Date Taking? Authorizing Provider   acetaminophen (TYLENOL) 500 MG tablet Take 500 mg by mouth Every 6 (Six) Hours As Needed for Mild Pain .   Yes Astrid Gonzalez MD   aspirin 81 MG tablet Take 81 mg by mouth Every Night. 3/7/14  Yes Astrid Gonzalez MD   calcitriol (ROCALTROL) 0.25 MCG capsule Take 0.25 mcg by mouth 3 (Three) Times a Week. Mon, Wed, Fri   Yes Astrid Gonzalez MD   carvedilol (COREG) 12.5 MG tablet Take 12.5 mg by mouth Every Night.   Yes Astrid Gonzalez MD   furosemide (LASIX) 40 MG tablet Take 40 mg by mouth Daily.   Yes Astrid Gonzalez MD   Magnesium 250 MG tablet Take 1 tablet by mouth Daily.   Yes Astrid Gonzalez MD   melatonin 5 MG tablet tablet Take 10 mg by mouth At Night As Needed.   Yes Astrid Gonzalez MD   mirtazapine (REMERON) 15 MG tablet TAKE 1 TABLET BY MOUTH EVERY NIGHT. 6/19/20  Yes Liliam Goode APRN   Semaglutide,0.25 or 0.5MG/DOS, (Ozempic, 0.25 or 0.5 MG/DOSE,) 2 MG/1.5ML solution pen-injector 0.25 mg subcu weekly for 4 weeks then increase to 0.5 mg subcu weekly if tolerated. 1/8/21  Yes Jhonatan Mart MD   simvastatin (ZOCOR) 40 MG tablet 1 tablet p.o. at bedtime. 1/7/20  Yes Jhonatan Mart MD   theophylline (THEODUR) 300 MG 12 hr tablet Take 300 mg by mouth Every Night. 4/8/19  Yes Astrid Gonzalez MD   warfarin (COUMADIN) 3 MG tablet Take 1 tablet by mouth Every Night. 12/28/20  Yes Luis Paulino MD       acetylcysteine, 2 mL, Nebulization, BID - RT  aspirin, 81 mg, Oral, Daily  atorvastatin, 20 mg, Oral, Daily  budesonide, 0.5 mg, Nebulization, BID - RT  calcitriol, 0.25 mcg, Oral, Once per day on Mon Wed Fri  carvedilol, 12.5 mg, Oral, Nightly  cefepime, 2 g, Intravenous, Q24H  furosemide, 40 mg, Oral, BID  guaiFENesin, 600 mg, Oral, Q12H  insulin lispro, 0-14 Units, Subcutaneous, TID AC  ipratropium-albuterol, 3 mL, Nebulization, 4x Daily - RT  magnesium  oxide, 200 mg, Oral, Daily  mirtazapine, 15 mg, Oral, Nightly  predniSONE, 40 mg, Oral, Daily With Breakfast  sodium chloride, 10 mL, Intravenous, Q12H  theophylline, 300 mg, Oral, Nightly      Pharmacy to dose warfarin,         Assessment/Plan       1.  Acute kidney injury on CKD 3  Most likely due to diuretics.  Nonoliguric  Electrolytes okay.  Seems to be euvolemic clinically  Lasix already changed to oral  We will follow renal function closely    2.  Hypertension with CKD 3  Blood pressure well controlled    3.  Hypervolemia  Volume status has improved.  On oral Lasix now.    4.  Pneumonia.  On IV antibiotics.  Pulmonary to evaluate    I discussed the patients findings and my recommendations with patient and nursing staff    Cam Yan MD  01/13/21  10:55 EST

## 2021-01-13 NOTE — CONSULTS
[facility]: [facility]         CONSULT NOTE    Patient Identification:  Cali Santoro  85 y.o.  male  1935  [id number]            Requesting physician: Attending physician    Reason for Consultation: Dyspnea      History of Present Illness:  85 year old male who presented to ED on1/11/21 complaining of shortness of breath that is worse on exertion. He checked his oxygen saturation and it was in the 50s.  He has a past medical history of diabetes, hyperlipidemia, cardiomyopathy, hypertension, CAD, COPD, A. fib, CKD and CHF. He checked his oxygen saturation and it was in the 50s. He denied any cough, fever, or lower extremity swelling. On previous admission 12/20 he qualified for home oxygen at 2 liters  In the ED, CXR that showed stable right lower lobe and suspected new mild right upper lobe ill-defined infiltrates. Correlate clinically for worsening pneumonia.  proBNP 4000. Further labs showed K 5.5, BUN 29, Cr 1.81. ABG showed ph 7.54, CO2 28.4, PO2 73.8, HCO3 24.5 on 3L home O2 rate. He was given IV vancomycin and cefepime      Assessment:    Right lower lobe pneumonia  CXR: stable right lower lobe and suspected new mild right upper lobe ill-defined infiltrates    Acute on chronic CHF  proBNP 4000 on admission      Office note  CT scan 05/18/2020 right lung nodule smaller in size  history of significant cough and dyspnea and hemoptysis in June 2019 CT scan of the chest 07/25/2019 suggestive of resolving pneumonia  CT chest 12/2016 and 12/14/15 revealed stable nodular densities bilaterally. These findings indicate two year stability at this time. Stable post operative changes in the right mid to lower hemithorax.  Hx of Lung cancer 2008 stge I [facility]--right lower lobectomy  history of recurrent sinus infections   current sleep machine is malfunctioning, patient will need in-lab study for new machine  CONT AUTO-CPAP 8 to 20 cm H2O              Recommendations:    Antibiotics continue with cefepime  patient has pcn allergies  Steroids 40 mg po daily  Continue duo nebs and pulmicort  Anticoagulation coumadin  Continue theophylline, mucinex,   Add mucomyst nebs  Nephrology consult CKD     Review of Sytems:  Review of Systems   Constitutional: Positive for activity change.   Respiratory: Positive for cough and shortness of breath.        Past Medical History:  Past Medical History:   Diagnosis Date   • Arrhythmia    • Bradycardia    • Cardiomyopathy (CMS/[facility])    • Cataract    • COPD (chronic obstructive pulmonary disease) (CMS/[facility])    • Coronary artery disease    • Diabetes mellitus, type 2 (CMS/[facility])    • Emphysema, unspecified (CMS/[facility])    • Hx of sick sinus syndrome     S/P Biventricular Pacemaker   • Hypertension    • Paroxysmal atrial fibrillation (CMS/[facility])    • Pneumonia    • Sleep apnea    • Ventricular arrhythmia        Past Surgical History:  Past Surgical History:   Procedure Laterality Date   • CARDIAC CATHETERIZATION  02/04/2015   • CARDIAC CATHETERIZATION  04/25/2016   • CAROTID ENDARTERECTOMY     • CORONARY ANGIOPLASTY  1997   • LARYNGOSCOPY     • LUNG SURGERY  2008    Lung resection   • PACEMAKER IMPLANTATION  04/25/2016    Dual chamber; [employer]   • TRACHEOSTOMY          Home Meds:  Medications Prior to Admission   Medication Sig Dispense Refill Last Dose   • acetaminophen (TYLENOL) 500 MG tablet Take 500 mg by mouth Every 6 (Six) Hours As Needed for Mild Pain .   Past Week at Unknown time   • aspirin 81 MG tablet Take 81 mg by mouth Every Night.   1/10/2021 at Unknown time   • calcitriol (ROCALTROL) 0.25 MCG capsule Take 0.25 mcg by mouth 3 (Three) Times a Week. Mon, Wed, Fri   Past Week at Unknown time   • carvedilol (COREG) 12.5 MG tablet Take 12.5 mg by mouth Every Night.   1/10/2021 at Unknown time   • furosemide (LASIX) 40 MG tablet Take 40 mg by mouth Daily.   1/11/2021 at Unknown time   • Magnesium 250 MG tablet Take 1 tablet by mouth Daily.   1/11/2021 at Unknown time   • melatonin  "5 MG tablet tablet Take 10 mg by mouth At Night As Needed.   1/10/2021 at Unknown time   • mirtazapine (REMERON) 15 MG tablet TAKE 1 TABLET BY MOUTH EVERY NIGHT. 90 tablet 1 1/10/2021 at Unknown time   • Semaglutide,0.25 or 0.5MG/DOS, (Ozempic, 0.25 or 0.5 MG/DOSE,) 2 MG/1.5ML solution pen-injector 0.25 mg subcu weekly for 4 weeks then increase to 0.5 mg subcu weekly if tolerated. 2 mL 6 1/10/2021   • simvastatin (ZOCOR) 40 MG tablet 1 tablet p.o. at bedtime. 90 tablet 4 1/10/2021 at Unknown time   • theophylline (THEODUR) 300 MG 12 hr tablet Take 300 mg by mouth Every Night.   1/10/2021 at Unknown time   • warfarin (COUMADIN) 3 MG tablet Take 1 tablet by mouth Every Night. 34 tablet 2 1/10/2021 at Unknown time       Allergies:  Allergies   Allergen Reactions   • Penicillins Itching       Social History:   Social History     Socioeconomic History   • Marital status:      Spouse name: Not on file   • Number of children: Not on file   • Years of education: Not on file   • Highest education level: Not on file   Occupational History   • Occupation: Retired   Social Needs   • Financial resource strain: Patient refused   • Food insecurity     Worry: Patient refused     Inability: Patient refused   • Transportation needs     Medical: Patient refused     Non-medical: Patient refused   Tobacco Use   • Smoking status: Former Smoker     Types: Cigarettes   • Smokeless tobacco: Never Used   Substance and Sexual Activity   • Alcohol use: Yes     Comment: occ   • Drug use: No   • Sexual activity: Defer       Family History:  Family History   Problem Relation Age of Onset   • Heart disease Mother    • Hypertension Mother    • Stroke Father    • Breast cancer Sister    • Colon cancer Sister    • Heart disease Brother    • Diabetes Other        Physical Exam:  /74 (BP Location: Left arm, Patient Position: Lying)   Pulse 72   Temp 97.4 °F (36.3 °C) (Oral)   Resp 16   Ht 170.2 cm (67\")   Wt 80.7 kg (178 lb)   SpO2 " 90% Comment: cues to deep breathe and cough; transferred to chair  BMI 27.88 kg/m²  Body mass index is 27.88 kg/m². 90% 80.7 kg (178 lb)  Physical Exam  Vitals signs reviewed.   Pulmonary:      Breath sounds: Rhonchi present.   Skin:     General: Skin is warm and dry.   Neurological:      Mental Status: He is alert and oriented to person, place, and time.         LABS:  Lab Results   Component Value Date    CALCIUM 8.6 01/13/2021     Results from last 7 days   Lab Units 01/13/21  0250 01/12/21  1920 01/12/21  0222 01/11/21  1059   SODIUM mmol/L 134*  --  137 134*   POTASSIUM mmol/L 5.0  --  5.2 5.5*   CHLORIDE mmol/L 97*  --  98 96*   CO2 mmol/L 28.0  --  29.0 28.0   BUN mg/dL 40*  --  33* 29*   CREATININE mg/dL 2.56*  --  2.23* 1.81*   GLUCOSE mg/dL 234*  --  206* 267*   CALCIUM mg/dL 8.6  --  9.2 9.2   WBC 10*3/mm3 11.30*  --  12.60* 9.90   HEMOGLOBIN g/dL 10.2*  --  11.1* 11.4*   PLATELETS 10*3/mm3 228  --  264 239   PROBNP pg/mL 3,383.0* 3,801.0*  --  4,001.0*   PROCALCITONIN ng/mL 0.14  --   --  0.12     Lab Results   Component Value Date    CKTOTAL 34 01/13/2021    TROPONINI 0.030 06/28/2019    TROPONINT 0.027 01/11/2021     Results from last 7 days   Lab Units 01/13/21  0250 01/11/21  1059   CK TOTAL U/L 34  --    TROPONIN T ng/mL  --  0.027     Results from last 7 days   Lab Units 01/11/21  1242 01/11/21  1059   BLOODCX  No growth at 24 hours No growth at 24 hours     Results from last 7 days   Lab Units 01/13/21  0250 01/11/21  1104 01/11/21  1059   PROCALCITONIN ng/mL 0.14  --  0.12   LACTATE mmol/L  --  2.0  --      Results from last 7 days   Lab Units 01/11/21  1132   PH, ARTERIAL pH units 7.544*   PCO2, ARTERIAL mm Hg 28.4*   PO2 ART mm Hg 73.8*   O2 SATURATION ART % 96.6   MODALITY  Cannula         Results from last 7 days   Lab Units 01/13/21  0250 01/12/21  0222 01/11/21  1059 01/06/21  1557   INR  1.40* 1.41* 1.43* 1.90*     Results from last 7 days   Lab Units 01/11/21  1242 01/11/21  1059    BLOODCX  No growth at 24 hours No growth at 24 hours     No results found for: TSH  Estimated Creatinine Clearance: 21.8 mL/min (A) (by C-G formula based on SCr of 2.56 mg/dL (H)).         Imaging:  Imaging Results (Last 24 Hours)     ** No results found for the last 24 hours. **            Current Meds:   SCHEDULE  aspirin, 81 mg, Oral, Daily  atorvastatin, 20 mg, Oral, Daily  budesonide, 0.5 mg, Nebulization, BID - RT  calcitriol, 0.25 mcg, Oral, Once per day on Mon Wed Fri  carvedilol, 12.5 mg, Oral, Nightly  cefepime, 2 g, Intravenous, Q24H  furosemide, 40 mg, Oral, BID  guaiFENesin, 600 mg, Oral, Q12H  insulin lispro, 0-14 Units, Subcutaneous, TID AC  ipratropium-albuterol, 3 mL, Nebulization, 4x Daily - RT  magnesium oxide, 200 mg, Oral, Daily  mirtazapine, 15 mg, Oral, Nightly  predniSONE, 40 mg, Oral, Daily With Breakfast  sodium chloride, 10 mL, Intravenous, Q12H  theophylline, 300 mg, Oral, Nightly      Infusions  Pharmacy to dose warfarin,       PRNs  •  acetaminophen **OR** acetaminophen **OR** acetaminophen  •  ALPRAZolam  •  aluminum-magnesium hydroxide-simethicone  •  dextrose  •  dextrose  •  glucagon (human recombinant)  •  insulin lispro **AND** insulin lispro  •  ipratropium-albuterol  •  melatonin  •  ondansetron **OR** ondansetron  •  Pharmacy to dose warfarin  •  [COMPLETED] Insert peripheral IV **AND** sodium chloride  •  sodium chloride        Alvaro Hopson MD  1/13/2021  07:54 EST      Much of this encounter note is an electronic transcription/translation of spoken language to printed text using Dragon Software.

## 2021-01-13 NOTE — PLAN OF CARE
Pt resting. Pt not complaining of pain at this time. Pt is reporting that he is fatigued. Vitals stable. Will continue to monitor.     Problem: Adult Inpatient Plan of Care  Goal: Plan of Care Review  Outcome: Ongoing, Progressing  Flowsheets (Taken 1/13/2021 0124)  Progress: no change  Plan of Care Reviewed With: patient

## 2021-01-13 NOTE — THERAPY EVALUATION
Patient Name: Cali Santoro  : 1935    MRN: [id number]                              Today's Date: 2021       Admit Date: 2021    Visit Dx:     ICD-10-CM ICD-9-CM   1. Sepsis, due to unspecified organism, unspecified whether acute organ dysfunction present (CMS/HCC)  A41.9 038.9     995.91   2. Pneumonia due to infectious organism, unspecified laterality, unspecified part of lung  J18.9 486   3. Dyspnea, unspecified type  R06.00 786.09     Patient Active Problem List   Diagnosis   • Permanent atrial fibrillation (CMS/[facility])   • Arrhythmia, ventricular   • Body mass index (BMI) of 27.0-27.9 in adult   • Chronic obstructive pulmonary disease (CMS/[facility])   • Coronary artery disease of native artery of native heart with stable angina pectoris (CMS/HCC)   • Essential hypertension   • Presence of cardiac pacemaker   • Sleep apnea   • Type 2 diabetes mellitus with hyperglycemia, without long-term current use of insulin (CMS/HCC)   • Vitamin D deficiency   • Stage 3b chronic kidney disease   • Mixed hyperlipidemia   • Pneumonia due to Streptococcus pneumoniae (CMS/HCC)   • 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)   • Acute on chronic heart failure with preserved ejection fraction (CMS/HCC)   • Acute-on-chronic kidney injury (CMS/HCC)   • Hypoxemia   • Sepsis (CMS/HCC)   • Shortness of breath   • HCAP (healthcare-associated pneumonia)   • Acute on chronic diastolic heart failure (CMS/HCC)   • CKD (chronic kidney disease)     Past Medical History:   Diagnosis Date   • Arrhythmia    • Bradycardia    • Cardiomyopathy (CMS/HCC)    • Cataract    • COPD (chronic obstructive pulmonary disease) (CMS/HCC)    • Coronary artery disease    • Diabetes mellitus, type 2 (CMS/HCC)    • Emphysema, unspecified (CMS/HCC)    • Hx of sick sinus syndrome     S/P Biventricular Pacemaker   • Hypertension    • Paroxysmal atrial fibrillation (CMS/HCC)    • Pneumonia    • Sleep apnea    •  Ventricular arrhythmia      Past Surgical History:   Procedure Laterality Date   • CARDIAC CATHETERIZATION  02/04/2015   • CARDIAC CATHETERIZATION  04/25/2016   • CAROTID ENDARTERECTOMY     • CORONARY ANGIOPLASTY  1997   • LARYNGOSCOPY     • LUNG SURGERY  2008    Lung resection   • PACEMAKER IMPLANTATION  04/25/2016    Dual chamber; [employer]   • TRACHEOSTOMY       General Information     [facility] Name 01/13/21 1524          Physical Therapy Time and Intention    Document Type  evaluation  -     Mode of Treatment  physical therapy  -     Row Name 01/13/21 1524          General Information    Patient Profile Reviewed  yes  -     Prior Level of Function  independent: 3L O2 at home, has RW  -     Existing Precautions/Restrictions  oxygen therapy device and L/min  -     Row Name 01/13/21 1524          Living Environment    Lives With  spouse  -     Row Name 01/13/21 1524          Home Main Entrance    Number of Stairs, Main Entrance  two  -     Row Name 01/13/21 1524          Cognition    Orientation Status (Cognition)  oriented x 4 mild confusion noted, increased time needed for stating year  -     Row Name 01/13/21 1524          Safety Issues, Functional Mobility    Safety Issues Affecting Function (Mobility)  safety precaution awareness  -     Impairments Affecting Function (Mobility)  endurance/activity tolerance;strength;shortness of breath;balance;postural/trunk control  -       User Key  (r) = Recorded By, (t) = Taken By, (c) = Cosigned By    Initials Name Provider Type     Nuvia Ferrera, PT Physical Therapist        Mobility     Row Name 01/13/21 1525          Bed Mobility    Bed Mobility  -- up in chair  -     Row Name 01/13/21 1525          Sit-Stand Transfer    Sit-Stand [place] (Transfers)  standby assist  -     Assistive Device (Sit-Stand Transfers)  walker, front-wheeled  -     Row Name 01/13/21 1525          Gait/Stairs (Locomotion)    [place] Level (Gait)   standby assist  -HC     Assistive Device (Gait)  walker, front-wheeled  -HC     Distance in Feet (Gait)  2x125 ft  -HC     Deviations/Abnormal Patterns (Gait)  gait speed decreased  -HC     Comment (Gait/Stairs)  verbal cues for proper positoning within RW, one standing rest break due to SOA  -HC       User Key  (r) = Recorded By, (t) = Taken By, (c) = Cosigned By    Initials Name Provider Type     Nuvia Ferrera, PT Physical Therapist        Obj/Interventions     Row Name 01/13/21 1526          Range of Motion Comprehensive    Comment, General Range of Motion  right UE lacking 75% shoulder elevation, left UE lacking 50% shoulder elevation, BLEs WFL  -HC     Row Name 01/13/21 1526          Strength Comprehensive (MMT)    Comment, General Manual Muscle Testing (MMT) Assessment  BUEs 4-/5 within available ROM, BLEs 4/5  -HC     Row Name 01/13/21 1526          Balance    Balance Assessment  standing static balance;standing dynamic balance  -HC     Static Standing Balance  WFL  -HC     Dynamic Standing Balance  mild impairment  -HC       User Key  (r) = Recorded By, (t) = Taken By, (c) = Cosigned By    Initials Name Provider Type     Nuvia Ferrera, PT Physical Therapist        Goals/Plan     Row Name 01/13/21 1528          Transfer Goal 1 (PT)    Activity/Assistive Device (Transfer Goal 1, PT)  transfers, all  -HC     [place] Level/Cues Needed (Transfer Goal 1, PT)  modified independence  -HC     Time Frame (Transfer Goal 1, PT)  2 weeks  -     Row Name 01/13/21 1528          Gait Training Goal 1 (PT)    Activity/Assistive Device (Gait Training Goal 1, PT)  gait (walking locomotion)  -HC     [place] Level (Gait Training Goal 1, PT)  supervision required  -HC     Distance (Gait Training Goal 1, PT)  150 ft while maintaining O2 >90%  -HC     Time Frame (Gait Training Goal 1, PT)  2 weeks  -HC     Row Name 01/13/21 1528          Stairs Goal 1 (PT)    Activity/Assistive Device (Stairs Goal 1, PT)   stairs, all skills  -     [place] Level/Cues Needed (Stairs Goal 1, PT)  standby assist  -HC     Number of Stairs (Stairs Goal 1, PT)  2  -HC     Time Frame (Stairs Goal 1, PT)  2 weeks  -HC       User Key  (r) = Recorded By, (t) = Taken By, (c) = Cosigned By    Initials Name Provider Type     Nuvia Ferrera, PT Physical Therapist        Clinical Impression     Row Name 01/13/21 1526          Pain    Additional Documentation  Pain Scale: FACES Pre/Post-Treatment (Group)  -[facility] Name 01/13/21 1526          Pain Scale: FACES Pre/Post-Treatment    Pain: FACES Scale, Pretreatment  2-->hurts little bit  -     Posttreatment Pain Rating  2-->hurts little bit  -HC     Pre/Posttreatment Pain Comment  Pt requested meds prior to treatment session  -[facility] Name 01/13/21 1526          Plan of Care Review    Outcome Summary  Pt is 86 yo male admitted for SOA, PNA, acute on chronic CHF, and sepsis.  Pt normally on 3L O2 at home.  -[facility] Name 01/13/21 1526          Therapy Assessment/Plan (PT)    Patient/Family Therapy Goals Statement (PT)  return home with wife  -HC     Rehab Potential (PT)  good, to achieve stated therapy goals  -     Criteria for Skilled Interventions Met (PT)  yes;skilled treatment is necessary  -     Predicted Duration of Therapy Intervention (PT)  until d/c  -[facility] Name 01/13/21 1526          Vital Signs    Intra SpO2 (%)  85  -HC     O2 Delivery Intra Treatment  supplemental O2 6L  -HC     Post SpO2 (%)  93  -HC     O2 Delivery Post Treatment  supplemental O2 5L  -[facility] Name 01/13/21 1526          Positioning and Restraints    Pre-Treatment Position  sitting in chair/recliner  -     Post Treatment Position  chair  -HC     In Chair  notified nsg;call light within reach;encouraged to call for assist wife present  -       User Key  (r) = Recorded By, (t) = Taken By, (c) = Cosigned By    Initials Name Provider Type     Nuvia Ferrera, PT Physical Therapist         Outcome Measures    No documentation.       Physical Therapy Education                 Title: PT OT SLP Therapies (Done)     Topic: Physical Therapy (Done)     Point: Mobility training (Done)     Learning Progress Summary           Patient Acceptance, E, VU by  at 1/13/2021 1529                   Point: Precautions (Done)     Learning Progress Summary           Patient Acceptance, E, VU by  at 1/13/2021 1529                               User Key     Initials Effective Dates Name Provider Type Discipline     04/17/20 -  Nuvia Ferrera, PT Physical Therapist PT              PT Recommendation and Plan  Planned Therapy Interventions (PT): balance training, gait training, transfer training, neuromuscular re-education, patient/family education, strengthening  Plan of Care Reviewed With: patient  Outcome Summary: Pt is 86 yo male admitted for SOA, PNA, acute on chronic CHF, and sepsis.  Pt normally on 3L O2 at home. Pt presents with decreased endurance impacting ambulation distance.  Pt able to ambulate 125 ft with RW, however O2 desats to 85% requiring long seated rest break to return to >90%.  Pt able to complete transfers with SBA and ambulate with SBA.  PT spoke with pt regarding importance of ambulating and asking for assist from nursing staff to ambulate while inpatient.  Pt has strong support of wife who is available 24/7.  PT will follow 3x/week while at [facility] and recommend home with wife, HHPT, and use of RW.  PPE donned: mask with faceshield, gloves.     Time Calculation:   PT Charges     Row Name 01/13/21 1531             Time Calculation    Start Time  1337  -      Stop Time  1408  -      Time Calculation (min)  31 min  -      PT Received On  01/13/21  -      PT - Next Appointment  01/15/21  -      PT Goal Re-Cert Due Date  01/27/21  -         Time Calculation- PT    Total Timed Code Minutes- PT  10 minute(s)  -        User Key  (r) = Recorded By, (t) = Taken By, (c) = Cosigned By     Initials Name Provider Type    HC Nuvia Ferrera, PT Physical Therapist        Therapy Charges for Today     Code Description Service Date Service Provider Modifiers Qty    90431963295 HC PT EVAL LOW COMPLEXITY 3 1/13/2021 Nuvia Ferrera, PT GP 1    [id number] HC GAIT TRAINING EA 15 MIN 1/13/2021 Nuvia Ferrera, PT GP 1               Nuvia Ferrera, PT  1/13/2021

## 2021-01-14 NOTE — PLAN OF CARE
Goal Outcome Evaluation:  Plan of Care Reviewed With: patient  Progress: no change  Outcome Summary: pt is doing well but still requiring 5L. He becomes SOA while ambulating but is able to recover quickly. He is hopeful to go home tomorrow as stated by Dr. Hansen, he is just waiting on Dr. Yan and Dr. Hopson to sign off.

## 2021-01-14 NOTE — PLAN OF CARE
Goal Outcome Evaluation:  Plan of Care Reviewed With: patient    Outcome Summary: Pt is an 86 y/o M admitted for SOA and now on 5L of hi-flow nasal canula, which increased from a baseline of 3L. Prior to admission, pt lives with wife in a SSH with 2-3 steps to enter, independent with all ADLs and func mob, drives, retired, active. pt req SUP this date for func mob in room and bathroom with 5-6L O2 and c/o SOA for less than 10 feet of mobility. pt req multiple rest breaks throughout session. REC home with HH vs OP pulm rehab at [place]. PPE worn: eye goggles, mask, gloves.    Destiny Rust, OTYARED, OTR

## 2021-01-14 NOTE — PROGRESS NOTES
[facility] Medicine Services Daily Progress Note      Hospitalist Team  LOS 3 days      Patient Care Team:  Yusef Sosa Jr., MD as PCP - General  Yusef Sosa Jr., MD as PCP - Family Medicine  Luis Paulino MD as Consulting Physician (Cardiology)    Patient Location: [facility]/      Subjective   Subjective     Chief Complaint / Subjective  Chief Complaint   Patient presents with   • Shortness of Breath     Pt present siwth SoA, low oxygen saturations at home, reports 52% when ambulating.  pt on 3ltr   home sat reading 70 at triage/ our machine 1000%         Brief Synopsis of Hospital Course/HPI  *Mr. Santoro is a 85 y.o. male with past medical history of diabetes, hyperlipidemia, cardiomyopathy, hypertension, CAD, COPD, A. fib, CKD and CHF who presents to [facility] ER 1/11/21 complaining of shortness of breath. He stated he has been short of breath upon exertion since he was discharged from here back in December 2020. Within the last two days he has become more short of breath. He walked from his living room to the bathroom this morning and could not catch his breath. He checked his oxygen saturation and it was in the 50s. He denied any cough, fever, or lower extremity swelling. He has been taking lasix and urinating. His lasix was recently decreased due to being up 10-13 times a night per his report.      In the ER the patient had CXR that showed stable right lower lobe and suspected new mild right upper lobe ill-defined infiltrates.  Correlate clinically for worsening pneumonia.  WBC 9.9, lactate 2.0.  Afebrile.  proBNP 4000. Further labs showed K 5.5, BUN 29, Cr 1.81. ABG showed ph 7.54, CO2 28.4, PO2 73.8, HCO3 24.5 on 3L home O2 rate. He was given IV vancomycin and cefepime and admitted for further treatment of pneumonia     Medical record showed patient was discharged 12/14/2020 after treatment for CHF. He received IV diuretics with help of nephrology and  "switched to oral lasix at discharge  His CT chest showed a small pleural effusion and a right basilar lung density consistent with atelectasis also some scarring in the right lung and a stable 8 mm lung nodule, 5 mm groundglass nodule in the left lower lobe noted as well small pericardial effusion. He qualified for home O2. He was also treated with antibiotics with the possibility of an infectious component. His strep pneumo antigen test did come back positive.  Discharged on omnicef      Date:1/12/2021: Patient seen and examined, sitting in recliner, says she feels better.  On 5 L of oxygen.  Dyspnea slightly improved.  With complaints of anxiety  1/13/2021-seen in recliner no acute distress on 5 L of oxygen.  says feels better.  Her anxiety overnight. bg 375 lantus added   1/14/21 feeling better today, had some confusion last night, vss, on 5lts      Review of Systems   Constitution: Positive for malaise/fatigue.   HENT: Negative.    Eyes: Negative.    Cardiovascular: Positive for dyspnea on exertion.   Respiratory: Positive for shortness of breath.    Endocrine: Negative.    Hematologic/Lymphatic: Negative.    Skin: Negative.    Musculoskeletal: Negative.    Gastrointestinal: Negative.    Genitourinary: Negative.    Neurological: Negative.    Psychiatric/Behavioral: Negative.    Allergic/Immunologic: Negative.    All other systems reviewed and are negative.    Objective   Objective      Vital Signs  Temp:  [97.4 °F (36.3 °C)-98.3 °F (36.8 °C)] 97.4 °F (36.3 °C)  Heart Rate:  [70-76] 70  Resp:  [14-18] 16  BP: (112-174)/(58-76) 112/58  Oxygen Therapy  SpO2: 95 %  Pulse Oximetry Type: Continuous  Device (Oxygen Therapy): high-flow nasal cannula  Flow (L/min): 5  Flowsheet Rows      First Filed Value   Admission Height  170.2 cm (67\") Documented at 01/11/2021 1007   Admission Weight  74.8 kg (165 lb) Documented at 01/11/2021 1007        Intake & Output (last 3 days)       01/11 0701 - 01/12 0700 01/12 0701 - 01/13 " 0700 01/13 0701 - 01/14 0700 01/14 0701 - 01/15 0700    P.O. 480 1260 1440 240    IV Piggyback 500       Total Intake(mL/kg) 980 (12.5) 1260 (15.6) 1440 (18.7) 240 (3.1)    Urine (mL/kg/hr) 700 400 (0.2) 425 (0.2)     Stool  0      Total Output 700 400 425     Net +280 +860 +1015 +240            Urine Unmeasured Occurrence  2 x      Stool Unmeasured Occurrence  1 x          Lines, Drains & Airways    Active LDAs     Name:   Placement date:   Placement time:   Site:   Days:    Peripheral IV 01/11/21 1040 Right Arm   01/11/21    1040    Arm   1    Peripheral IV 01/12/21 1820 Anterior;Right Forearm   01/12/21    1820    Forearm   less than 1                  Physical Exam  Vitals signs and nursing note reviewed.   Constitutional:       General: He is not in acute distress.     Appearance: Normal appearance. He is well-developed. He is not ill-appearing, toxic-appearing or diaphoretic.   HENT:      Head: Normocephalic and atraumatic.      Nose: Nose normal. No congestion or rhinorrhea.      Mouth/Throat:      Mouth: Mucous membranes are moist.      Pharynx: No oropharyngeal exudate.   Eyes:      General: No scleral icterus.        Right eye: No discharge.         Left eye: No discharge.      Extraocular Movements: Extraocular movements intact.      Conjunctiva/sclera: Conjunctivae normal.      Pupils: Pupils are equal, round, and reactive to light.   Neck:      Musculoskeletal: Normal range of motion and neck supple. No neck rigidity or muscular tenderness.      Thyroid: No thyromegaly.      Vascular: No carotid bruit or JVD.      Trachea: No tracheal deviation.   Cardiovascular:      Rate and Rhythm: Normal rate and regular rhythm.      Pulses: Normal pulses.      Heart sounds: Normal heart sounds. No murmur. No friction rub. No gallop.    Pulmonary:      Effort: Pulmonary effort is normal. No respiratory distress.      Breath sounds: No stridor. No wheezing, rhonchi or rales.      Comments: Decreased breath sounds  bilaterally  Chest:      Chest wall: No tenderness.   Abdominal:      General: Bowel sounds are normal. There is no distension.      Palpations: Abdomen is soft. There is no mass.      Tenderness: There is no abdominal tenderness. There is no guarding or rebound.      Hernia: No hernia is present.   Musculoskeletal: Normal range of motion.         General: No swelling, tenderness, deformity or signs of injury.      Right lower leg: No edema.      Left lower leg: No edema.   Lymphadenopathy:      Cervical: No cervical adenopathy.   Skin:     General: Skin is warm and dry.      Coloration: Skin is not jaundiced or pale.      Findings: No bruising, erythema or rash.   Neurological:      General: No focal deficit present.      Mental Status: He is alert and oriented to person, place, and time. Mental status is at baseline.      Cranial Nerves: No cranial nerve deficit.      Sensory: No sensory deficit.      Motor: No weakness or abnormal muscle tone.      Coordination: Coordination normal.   Psychiatric:         Mood and Affect: Mood normal.         Behavior: Behavior normal.         Thought Content: Thought content normal.         Judgment: Judgment normal.          Wounds (last 24 hours)      LDA Wound     Row Name 01/13/21 1912             Wound 01/11/21 1718 Bilateral posterior sacral spine    Wound - Properties Group Placement Date: 01/11/21  -HN Placement Time: 1718  -HN Present on Hospital Admission: Y  -HN Side: Bilateral  -HN Orientation: posterior  -HN Location: sacral spine  -HN Stage, Pressure Injury : Stage 1  -HN    Dressing Appearance  open to air  -CP      Closure  Open to air  -CP      Drainage Amount  none  -CP      Retired Wound - Properties Group Date first assessed: 01/11/21  -HN Time first assessed: 1718  -HN Present on Hospital Admission: Y  -HN Side: Bilateral  -HN Location: sacral spine  -HN      User Key  (r) = Recorded By, (t) = Taken By, (c) = Cosigned By    Initials Name Provider Type    HN  Mary Rudolph, RN Registered Nurse    Tierney Asif LPN Licensed Nurse          Procedures:      Results Review:     I reviewed the patient's new clinical results.      Lab Results (last 24 hours)     Procedure Component Value Units Date/Time    POC Glucose Once [[id number]]  (Abnormal) Collected: 01/14/21 1138    Specimen: Blood Updated: 01/14/21 1148     Glucose 200 mg/dL      Comment: Serial Number: [id number]:  [id number]       Blood Culture - Blood, Arm, Left [[id number]] Collected: 01/11/21 1059    Specimen: Blood from Arm, Left Updated: 01/14/21 1116     Blood Culture No growth at 3 days    Basic Metabolic Panel [[id number]]  (Abnormal) Collected: 01/14/21 0836    Specimen: Blood Updated: 01/14/21 0938     Glucose 306 mg/dL      BUN 45 mg/dL      Creatinine 2.20 mg/dL      Sodium 131 mmol/L      Potassium 4.6 mmol/L      Comment: Slight hemolysis detected by analyzer. Results may be affected.        Chloride 93 mmol/L      CO2 28.0 mmol/L      Calcium 9.3 mg/dL      eGFR Non African Amer 29 mL/min/1.73      BUN/Creatinine Ratio 20.5     Anion Gap 10.0 mmol/L     Narrative:      GFR Normal >60  Chronic Kidney Disease <60  Kidney Failure <15      CBC & Differential [[id number]]  (Abnormal) Collected: 01/14/21 0836    Specimen: Blood Updated: 01/14/21 0916    Narrative:      The following orders were created for panel order CBC & Differential.  Procedure                               Abnormality         Status                     ---------                               -----------         ------                     CBC Auto Differential[[id number]]        Abnormal            Final result                 Please view results for these tests on the individual orders.    CBC Auto Differential [[id number]]  (Abnormal) Collected: 01/14/21 0836    Specimen: Blood Updated: 01/14/21 0916     WBC 11.40 10*3/mm3      RBC 3.01 10*6/mm3      Hemoglobin 10.1 g/dL      Hematocrit 31.1 %      .3 fL      MCH  33.6 pg      MCHC 32.6 g/dL      RDW 17.6 %      RDW-SD 63.4 fl      MPV 7.4 fL      Platelets 266 10*3/mm3      Neutrophil % 77.7 %      Lymphocyte % 12.7 %      Monocyte % 8.1 %      Eosinophil % 1.1 %      Basophil % 0.4 %      Neutrophils, Absolute 8.80 10*3/mm3      Lymphocytes, Absolute 1.40 10*3/mm3      Monocytes, Absolute 0.90 10*3/mm3      Eosinophils, Absolute 0.10 10*3/mm3      Basophils, Absolute 0.00 10*3/mm3      nRBC 0.1 /100 WBC     POC Glucose Once [[id number]]  (Abnormal) Collected: 01/14/21 0744    Specimen: Blood Updated: 01/14/21 0746     Glucose 316 mg/dL      Comment: Serial Number: [id number]:  [id number]       Protime-INR [[id number]]  (Abnormal) Collected: 01/14/21 0202    Specimen: Blood Updated: 01/14/21 0251     Protime 16.7 Seconds      INR 1.55    POC Glucose Once [[id number]]  (Abnormal) Collected: 01/13/21 2142    Specimen: Blood Updated: 01/13/21 2146     Glucose 346 mg/dL      Comment: Serial Number: [id number]:  [id number]       POC Glucose Once [[id number]]  (Abnormal) Collected: 01/13/21 1644    Specimen: Blood Updated: 01/13/21 1646     Glucose 375 mg/dL      Comment: Serial Number: [id number]:  [id number]       Blood Culture - Blood, Blood, Venous Line [[id number]] Collected: 01/11/21 1242    Specimen: Blood, Venous Line Updated: 01/13/21 1301     Blood Culture No growth at 2 days    POC Glucose Once [[id number]]  (Abnormal) Collected: 01/13/21 1258    Specimen: Blood Updated: 01/13/21 1259     Glucose 281 mg/dL      Comment: Serial Number: [id number]:  [id number]           No results found for: HGBA1C  Results from last 7 days   Lab Units 01/14/21  0202 01/13/21  0250 01/12/21  0222   INR  1.55* 1.40* 1.41*       Results from last 7 days   Lab Units 01/11/21  1132   PH, ARTERIAL pH units 7.544*   PO2 ART mm Hg 73.8*   PCO2, ARTERIAL mm Hg 28.4*   HCO3 ART mmol/L 24.5     No results found for: LIPASE  Lab Results   Component Value Date    CHOL 131 12/29/2020     TRIG 207 (H) 12/29/2020    HDL 40 12/29/2020    LDL 57 12/29/2020       No results found for: INTRAOP, PREDX, FINALDX, COMDX    Microbiology Results (last 10 days)     Procedure Component Value - Date/Time    MRSA Screen, PCR (Inpatient) - Swab, Nares [[id number]]  (Normal) Collected: 01/12/21 0309    Lab Status: Final result Specimen: Swab from Nares Updated: 01/12/21 0511     MRSA PCR No MRSA Detected    Legionella Antigen, Urine - Urine, Urine, Clean Catch [[id number]]  (Normal) Collected: 01/11/21 1935    Lab Status: Final result Specimen: Urine, Clean Catch Updated: 01/11/21 2005     LEGIONELLA ANTIGEN, URINE Negative    S. Pneumo Ag Urine or CSF - Urine, Urine, Clean Catch [[id number]]  (Normal) Collected: 01/11/21 1935    Lab Status: Final result Specimen: Urine, Clean Catch Updated: 01/11/21 2005     Strep Pneumo Ag Negative    Blood Culture - Blood, Blood, Venous Line [[id number]] Collected: 01/11/21 1242    Lab Status: Preliminary result Specimen: Blood, Venous Line Updated: 01/13/21 1301     Blood Culture No growth at 2 days    COVID-19,[facility] [employer] IN-HOUSE,Nasal Swab No Transport Media 3-4 HR TAT - Swab, Nasal Cavity [[id number]]  (Normal) Collected: 01/11/21 1107    Lab Status: Final result Specimen: Swab from Nasal Cavity Updated: 01/11/21 1137     COVID19 Not Detected    Narrative:      Fact sheet for providers: https://www.fda.gov/media/249858/download     Fact sheet for patients: https://www.fda.gov/media/319025/download    Test performed by PCR.    Blood Culture - Blood, Arm, Left [[id number]] Collected: 01/11/21 1059    Lab Status: Preliminary result Specimen: Blood from Arm, Left Updated: 01/14/21 1116     Blood Culture No growth at 3 days          ECG/EMG Results (most recent)     Procedure Component Value Units Date/Time    ECG 12 Lead [[id number]] Collected: 01/11/21 1056     Updated: 01/12/21 1319     QT Interval 417 ms     Narrative:      HEART RATE= 70  bpm  RR Interval= 855  ms  NM Interval=    ms  P Horizontal Axis=   deg  P Front Axis=   deg  QRSD Interval= 127  ms  QT Interval= 417  ms  QRS Axis= 128  deg  T Wave Axis= -72  deg  - ABNORMAL ECG -  Afib/flutter and ventricular-paced rhythm  When compared with ECG of 08-Dec-2020 15:36:47,  No significant change  Electronically Signed By: Nikolay Domingo (Jaya) 12-Jan-2021 13:18:22  Date and Time of Study: 2021-01-11 10:56:50          Results for orders placed in visit on 02/06/08   SCANNED VASCULAR STUDIES       Results for orders placed during the hospital encounter of 12/08/20   Adult Transthoracic Echo Complete W/ Cont if Necessary Per Protocol    Narrative · Left ventricular wall thickness is consistent with septal asymmetric   hypertrophy.  · Estimated left ventricular EF = 60% Left ventricular systolic function   is normal.  · The right ventricular cavity is borderline dilated.  · The right atrial cavity is borderline dilated.  · Mild aortic valve stenosis is present.  · Estimated right ventricular systolic pressure from tricuspid   regurgitation is normal (<35 mmHg).  · There is a small (<1cm) pericardial effusion adjacent to the left   ventricle.          Xr Chest 1 View    Result Date: 1/11/2021   1. Stable right lower lobe, and suspected new mild right upper lobe ill-defined infiltrates. Correlate clinically for worsening pneumonia.  Electronically Signed By-Alexandra Preston MD On:1/11/2021 11:40 AM This report was finalized on [id number] by  Alexandra Preston MD.          Xrays, labs reviewed personally by physician.    Medication Review:   I have reviewed the patient's current medication list      Scheduled Meds  acetylcysteine, 2 mL, Nebulization, BID - RT  aspirin, 81 mg, Oral, Daily  atorvastatin, 20 mg, Oral, Daily  budesonide, 0.5 mg, Nebulization, BID - RT  calcitriol, 0.25 mcg, Oral, Once per day on Mon Wed Fri  carvedilol, 12.5 mg, Oral, Nightly  cefepime, 2 g, Intravenous, Q24H  furosemide, 40 mg, Oral, BID  guaiFENesin, 600 mg, Oral,  Q12H  insulin glargine, 10 Units, Subcutaneous, Nightly  insulin lispro, 0-24 Units, Subcutaneous, TID AC  ipratropium-albuterol, 3 mL, Nebulization, 4x Daily - RT  magnesium oxide, 200 mg, Oral, Daily  mirtazapine, 15 mg, Oral, Nightly  predniSONE, 40 mg, Oral, Daily With Breakfast  sodium chloride, 10 mL, Intravenous, Q12H  theophylline, 300 mg, Oral, Nightly  warfarin, 3.5 mg, Oral, Daily        Meds Infusions  Pharmacy to dose warfarin,         Meds PRN  •  acetaminophen **OR** acetaminophen **OR** acetaminophen  •  ALPRAZolam  •  aluminum-magnesium hydroxide-simethicone  •  dextrose  •  dextrose  •  glucagon (human recombinant)  •  insulin lispro **AND** insulin lispro  •  ipratropium-albuterol  •  melatonin  •  ondansetron **OR** ondansetron  •  Pharmacy to dose warfarin  •  [COMPLETED] Insert peripheral IV **AND** sodium chloride  •  sodium chloride        Assessment/Plan   Assessment/Plan     Active Hospital Problems    Diagnosis  POA   • **HCAP (healthcare-associated pneumonia) [J18.9]  Yes   • Shortness of breath [R06.02]  Yes   • Acute on chronic diastolic heart failure (CMS/[facility]) [I50.33]  Yes   • CKD (chronic kidney disease) [N18.9]  Yes   • Mixed hyperlipidemia [E78.2]  Yes   • Chronic obstructive pulmonary disease (CMS/[facility]) [J44.9]  Yes   • Coronary artery disease of native artery of native heart with stable angina pectoris (CMS/[facility]) [I25.118]  Yes   • Essential hypertension [I10]  Yes   • Type 2 diabetes mellitus with hyperglycemia, without long-term current use of insulin (CMS/[facility]) [E11.65]  Yes   • Permanent atrial fibrillation (CMS/[facility]) [I48.21]  Yes   • Presence of cardiac pacemaker [Z95.0]  Yes      Resolved Hospital Problems   No resolved problems to display.       MEDICAL DECISION MAKING COMPLEXITY BY PROBLEM:   Right sided pneumonia-improved  -HCAP due to patient recent hospitalization 12/2020  -CXR: stable right lower lobe and suspected new mild right upper lobe ill-defined  infiltrates  -blood cultures drawn  -WBC 9.9, lactate 2.0, afebrile  -IV cefepime, IV vancomycin started in ER, continue on cefepime      Shortness of breath-improving   -likely multifactorial from COPD, CHF, and pneumonia   -ABG reviewed on home O2 3L, continue home rate and monitor oxygen saturation  -abx as above  -pulmicort and duonebs, cont home theophylline  -one dose IV lasix  -covid negative      Acute on chronic HFpEF, PPM  -proBNP 4000 compared to 2,850 on 12/8/20  -pt has no lower extremity edema or crackles on exam  -give one dose IV lasix 40mg for now then reassess  -check bladder to scan to rule out urinary retention   -cont home coreg, lasix  -2D echo 12/8/20: Left ventricular wall thickness consistent with septal asymmetric hypertrophy, EF 60% with left ventricular systolic function normal, right ventricular cavity borderline dilated, right atrial cavity borderline dilated, mild atrial valve stenosis, right ventricular systolic pressure from tricuspid regurgitation is normal, small pericardial effusion adjacent to the left ventricle     CKD  -Cr 2.2 compared to 1.96 on 12/29/20  -monitor BMP     Hypertension  -controlled. Cont home coreg  -cont home statin     Hyperlipidemia     DM Type II uncontrolled -add lantus  -recent hgb a1c 7.1%  -SSI AC/HS     Afib, nonobstructive CAD  -controlled. Cont home coreg, coumadin. Digoxin recently stopped by cardiology 1/6/21     KIM  -cpap qhs      VTE Prophylaxis -   Mechanical Order History:      Ordered        01/11/21 1441  Place Sequential Compression Device  Once         01/11/21 1441  Maintain Sequential Compression Device  Continuous                 Pharmalogical Order History:      Ordered     Dose Route Frequency Stop    01/12/21 1545  warfarin (COUMADIN) tablet 5 mg     Question:  Target INR  Answer:  2 - 3    5 mg PO Once (Warfarin) --    01/12/21 1543  Pharmacy to dose warfarin     Question:  Target INR  Answer:  2 - 3    -- XX Continuous PRN --     01/11/21 1443  warfarin (COUMADIN) tablet 3 mg  Status:  Discontinued     Question:  Target INR  Answer:  2 - 3    3 mg PO Nightly 01/12/21 1545                  Code Status -   Code Status and Medical Interventions:   Ordered at: 01/11/21 1441     Level Of Support Discussed With:    Patient     Code Status:    CPR     Medical Interventions (Level of Support Prior to Arrest):    Full       This patient has been examined wearing appropriate Personal Protective Equipment and discussed with rn. 01/14/21    Discharge Planning  home    Electronically signed by Hnery Hansen MD, 01/14/21, 12:21 EST.  [facility] Hospitalist Team

## 2021-01-14 NOTE — PROGRESS NOTES
"Pharmacy dosing service  Anticoagulant  Warfarin     Subjective:    Cali Santoro is a 85 y.o.male being continued on warfarin for atrial fibrillation.    INR Goal: 2 - 3  Home medication?:  Yes, warfarin 3 mg once daily  Bridge Therapy Present?:  No  Interacting Medications Evaluation (New/Present/Discontinued): cefepime  Additional Contributing Factors:      Assessment/Plan:    Subtherapeutic INR on admission. INR increasing slowly, still yet to see effects of 5mg doses from last 2 days. Will give 4mg today.  Anticipate patient needing higher maintenance dose d/t subtherapeutic INR on admission. (e.g. 3.5 mg daily = 10.5% increase from home regimen)    Continue to monitor and adjust based on INR.         Date 1/11 1/12 1/13 1/14        INR 1.43 1.41 1.4 1.55        Dose 3 mg 5 mg 5 mg 4 mg            Objective:  [Ht: 170.2 cm (67\"); Wt: 77 kg (169 lb 12.1 oz); BMI: Body mass index is 26.59 kg/m².]    Lab Results   Component Value Date    ALBUMIN 4.40 12/29/2020     Lab Results   Component Value Date    INR 1.55 (L) 01/14/2021    INR 1.40 (L) 01/13/2021    INR 1.41 (L) 01/12/2021    PROTIME 16.7 (L) 01/14/2021    PROTIME 15.2 (L) 01/13/2021    PROTIME 15.3 (L) 01/12/2021     Lab Results   Component Value Date    HGB 10.1 (L) 01/14/2021    HGB 10.2 (L) 01/13/2021    HGB 11.1 (L) 01/12/2021     Lab Results   Component Value Date    HCT 31.1 (L) 01/14/2021    HCT 30.8 (L) 01/13/2021    HCT 34.0 (L) 01/12/2021       Ely Hernandez, PharmD, BCPS  01/14/21 14:16 EST   "

## 2021-01-14 NOTE — PROGRESS NOTES
"   LOS: 3 days    Patient Care Team:  Yusef Sosa Jr., MD as PCP - General  Yusef Sosa Jr., MD as PCP - Family Medicine  Luis Paulino MD as Consulting Physician (Cardiology)      Subjective     Patient feeling better.  Still has dyspnea but improving  Denies any chest pain, nausea or vomiting    Objective     Vital Sign Min/Max for last 24 hours  Temp:  [97.6 °F (36.4 °C)-98.3 °F (36.8 °C)] 97.6 °F (36.4 °C)  Heart Rate:  [70-77] 70  Resp:  [14-18] 16  BP: (130-174)/(72-76) 134/76                       Flowsheet Rows      First Filed Value   Admission Height  170.2 cm (67\") Documented at 01/11/2021 1007   Admission Weight  74.8 kg (165 lb) Documented at 01/11/2021 1007          I/O this shift:  In: 240 [P.O.:240]  Out: -   I/O last 3 completed shifts:  In: 1440 [P.O.:1440]  Out: 475 [Urine:475]    Physical Exam:  Physical Exam    General.  Awake, alert  Head.  Atraumatic, normocephalic  Neck.  Supple  ENT.  Oral mucosa moist  Respiratory.  Decreased breath sounds bases  Cardiovascular.  Regular rhythm  Abdominal.  Obese, soft, nontender  Extremities.  No edema       LABS:  Lab Results   Component Value Date    CALCIUM 9.3 01/14/2021     Results from last 7 days   Lab Units 01/14/21  0836 01/13/21  0250 01/12/21  0222   SODIUM mmol/L 131* 134* 137   POTASSIUM mmol/L 4.6 5.0 5.2   CHLORIDE mmol/L 93* 97* 98   CO2 mmol/L 28.0 28.0 29.0   BUN mg/dL 45* 40* 33*   CREATININE mg/dL 2.20* 2.56* 2.23*   GLUCOSE mg/dL 306* 234* 206*   CALCIUM mg/dL 9.3 8.6 9.2   WBC 10*3/mm3 11.40* 11.30* 12.60*   HEMOGLOBIN g/dL 10.1* 10.2* 11.1*   PLATELETS 10*3/mm3 266 228 264     Lab Results   Component Value Date    CKTOTAL 34 01/13/2021    TROPONINI 0.030 06/28/2019    TROPONINT 0.027 01/11/2021     Estimated Creatinine Clearance: 27.2 mL/min (A) (by C-G formula based on SCr of 2.2 mg/dL (H)).  Results from last 7 days   Lab Units 01/11/21  1132   PH, ARTERIAL pH units 7.544*   PO2 ART mm Hg 73.8*   PCO2, " ARTERIAL mm Hg 28.4*   HCO3 ART mmol/L 24.5     Brief Urine Lab Results  (Last result in the past 365 days)      Color   Clarity   Blood   Leuk Est   Nitrite   Protein   CREAT   Urine HCG        12/29/20 1019             25.5           WEIGHTS:     Wt Readings from Last 1 Encounters:   01/14/21 0600 77 kg (169 lb 12.1 oz)   01/14/21 0421 77 kg (169 lb 12.1 oz)   01/12/21 2021 80.7 kg (178 lb)   01/11/21 2216 78.7 kg (173 lb 8 oz)   01/11/21 1007 74.8 kg (165 lb)       acetylcysteine, 2 mL, Nebulization, BID - RT  aspirin, 81 mg, Oral, Daily  atorvastatin, 20 mg, Oral, Daily  budesonide, 0.5 mg, Nebulization, BID - RT  calcitriol, 0.25 mcg, Oral, Once per day on Mon Wed Fri  carvedilol, 12.5 mg, Oral, Nightly  cefepime, 2 g, Intravenous, Q24H  furosemide, 40 mg, Oral, BID  guaiFENesin, 600 mg, Oral, Q12H  insulin glargine, 10 Units, Subcutaneous, Nightly  insulin lispro, 0-24 Units, Subcutaneous, TID AC  ipratropium-albuterol, 3 mL, Nebulization, 4x Daily - RT  magnesium oxide, 200 mg, Oral, Daily  mirtazapine, 15 mg, Oral, Nightly  predniSONE, 40 mg, Oral, Daily With Breakfast  sodium chloride, 10 mL, Intravenous, Q12H  theophylline, 300 mg, Oral, Nightly  warfarin, 3.5 mg, Oral, Daily      Pharmacy to dose warfarin,         Assessment/Plan       1.  Acute kidney injury on CKD 3  Creatinine has started to improve after decreasing diuretics  Electrolytes, volume status okay.    2.  Hypertension with CKD 3  Blood pressure well controlled    3.  Hypokalemia  Volume status improving.  On oral Lasix now    4.  Pneumonia  On IV antibiotics and oral steroids  Pulmonary following      Cam Yan MD  01/14/21  10:52 EST

## 2021-01-14 NOTE — PROGRESS NOTES
Daily Progress Note        HCAP (healthcare-associated pneumonia)    Permanent atrial fibrillation (CMS/HCC)    Chronic obstructive pulmonary disease (CMS/HCC)    Coronary artery disease of native artery of native heart with stable angina pectoris (CMS/HCC)    Essential hypertension    Presence of cardiac pacemaker    Type 2 diabetes mellitus with hyperglycemia, without long-term current use of insulin (CMS/HCC)    Mixed hyperlipidemia    Shortness of breath    Acute on chronic diastolic heart failure (CMS/HCC)    CKD (chronic kidney disease)      Assessment:     Right lower lobe pneumonia  CXR: stable right lower lobe and suspected new mild right upper lobe ill-defined infiltrates     Acute on chronic CHF  proBNP 4000 on admission        Office note  CT scan 05/18/2020 right lung nodule smaller in size  history of significant cough and dyspnea and hemoptysis in June 2019 CT scan of the chest 07/25/2019 suggestive of resolving pneumonia  CT chest 12/2016 and 12/14/15 revealed stable nodular densities bilaterally. These findings indicate two year stability at this time. Stable post operative changes in the right mid to lower hemithorax.  Hx of Lung cancer 2008 stge I [facility]--right lower lobectomy  history of recurrent sinus infections   current sleep machine is malfunctioning, patient will need in-lab study for new machine  CONT AUTO-CPAP 8 to 20 cm H2O       Recommendations:     CT scan of the chest without contrast   Pulmonary function test in a.m.    antibiotics continue with cefepime patient has pcn allergies  Steroids 40 mg po daily  Continue duo nebs and pulmicort  Anticoagulation coumadin  Continue theophylline, mucinex,   mucomyst nebs  Nephrology consult CKD           LOS: 3 days     Subjective         Objective     Vital signs for last 24 hours:  Vitals:    01/14/21 0909 01/14/21 1142 01/14/21 1220 01/14/21 1226   BP:  112/58     BP Location:  Left arm     Patient Position:  Lying     Pulse: 70 70 71 74    Resp:  16 14    Temp:  97.4 °F (36.3 °C)     TempSrc:  Oral     SpO2:  95% 93%    Weight:       Height:           Intake/Output last 3 shifts:  I/O last 3 completed shifts:  In: 1440 [P.O.:1440]  Out: 475 [Urine:475]  Intake/Output this shift:  I/O this shift:  In: 240 [P.O.:240]  Out: -       Radiology  Imaging Results (Last 24 Hours)     ** No results found for the last 24 hours. **          Labs:  Results from last 7 days   Lab Units 01/14/21  0836   WBC 10*3/mm3 11.40*   HEMOGLOBIN g/dL 10.1*   HEMATOCRIT % 31.1*   PLATELETS 10*3/mm3 266     Results from last 7 days   Lab Units 01/14/21  0836   SODIUM mmol/L 131*   POTASSIUM mmol/L 4.6   CHLORIDE mmol/L 93*   CO2 mmol/L 28.0   BUN mg/dL 45*   CREATININE mg/dL 2.20*   CALCIUM mg/dL 9.3   GLUCOSE mg/dL 306*     Results from last 7 days   Lab Units 01/11/21  1132   PH, ARTERIAL pH units 7.544*   PO2 ART mm Hg 73.8*   PCO2, ARTERIAL mm Hg 28.4*   HCO3 ART mmol/L 24.5         Results from last 7 days   Lab Units 01/13/21  0250 01/11/21  1059   CK TOTAL U/L 34  --    TROPONIN T ng/mL  --  0.027             Results from last 7 days   Lab Units 01/14/21  0202 01/13/21  0250 01/12/21  0222 01/11/21  1059   INR  1.55* 1.40* 1.41* 1.43*   APTT seconds  --   --   --  27.9               Meds:   SCHEDULE  acetylcysteine, 2 mL, Nebulization, BID - RT  aspirin, 81 mg, Oral, Daily  atorvastatin, 20 mg, Oral, Daily  budesonide, 0.5 mg, Nebulization, BID - RT  calcitriol, 0.25 mcg, Oral, Once per day on Mon Wed Fri  carvedilol, 12.5 mg, Oral, Nightly  cefepime, 2 g, Intravenous, Q24H  furosemide, 40 mg, Oral, BID  guaiFENesin, 600 mg, Oral, Q12H  insulin glargine, 10 Units, Subcutaneous, Nightly  insulin lispro, 0-24 Units, Subcutaneous, TID AC  ipratropium-albuterol, 3 mL, Nebulization, 4x Daily - RT  magnesium oxide, 200 mg, Oral, Daily  mirtazapine, 15 mg, Oral, Nightly  predniSONE, 40 mg, Oral, Daily With Breakfast  sodium chloride, 10 mL, Intravenous, Q12H  theophylline,  300 mg, Oral, Nightly  warfarin, 3.5 mg, Oral, Daily      Infusions  Pharmacy to dose warfarin,       PRNs  •  acetaminophen **OR** acetaminophen **OR** acetaminophen  •  ALPRAZolam  •  aluminum-magnesium hydroxide-simethicone  •  dextrose  •  dextrose  •  glucagon (human recombinant)  •  insulin lispro **AND** insulin lispro  •  ipratropium-albuterol  •  melatonin  •  ondansetron **OR** ondansetron  •  Pharmacy to dose warfarin  •  [COMPLETED] Insert peripheral IV **AND** sodium chloride  •  sodium chloride    Physical Exam:  Physical Exam  Cardiovascular:      Heart sounds: Murmur present.   Pulmonary:      Breath sounds: Wheezing, rhonchi and rales present.         ROS  Review of Systems   Respiratory: Positive for cough, shortness of breath and wheezing.              Total time spent with patient greater than: 45 Minutes

## 2021-01-14 NOTE — PLAN OF CARE
Goal Outcome Evaluation:  Plan of Care Reviewed With: patient  Progress: no change   Pt A&O X 4, able to make needs known. Continues on 5 lpm NC w/no improvement noted to lower this NOC. C/O chronic pain to neck; pain meds offered but refused. PRN Xanax & Melatonin given per pt request. VSS.

## 2021-01-14 NOTE — PROGRESS NOTES
Exercise Oximetry    Patient Name:Cali Santoro   MRN: [id number]   Date: 01/14/21             ROOM AIR BASELINE   SpO2% 82   Heart Rate 72   Blood Pressure      EXERCISE ON ROOM AIR SpO2% EXERCISE ON O2 @ 5 LPM SpO2%   1 MINUTE  1 MINUTE    2 MINUTES  2 MINUTES    3 MINUTES  3 MINUTES    4 MINUTES  4 MINUTES    5 MINUTES  5 MINUTES    6 MINUTES  6 MINUTES               Distance Walked  00 Distance Walked   Dyspnea (Yovany Scale)   Dyspnea (Yovany Scale)   Fatigue (Yovany Scale)   Fatigue (Yovany Scale)   SpO2% Post Exercise  93 SpO2% Post Exercise   HR Post Exercise  70 HR Post Exercise   Time to Recovery   Time to Recovery     Comments: patient desaturated to 78% while in room air and while waiting for 6 minute walk, unable to do conduct, 5L needed to maintain 93%

## 2021-01-14 NOTE — THERAPY EVALUATION
Patient Name: Cali Santoro  : 1935    MRN: [id number]                              Today's Date: 2021       Admit Date: 2021    Visit Dx:     ICD-10-CM ICD-9-CM   1. Sepsis, due to unspecified organism, unspecified whether acute organ dysfunction present (CMS/HCC)  A41.9 038.9     995.91   2. Pneumonia due to infectious organism, unspecified laterality, unspecified part of lung  J18.9 486   3. Dyspnea, unspecified type  R06.00 786.09     Patient Active Problem List   Diagnosis   • Permanent atrial fibrillation (CMS/[facility])   • Arrhythmia, ventricular   • Body mass index (BMI) of 27.0-27.9 in adult   • Chronic obstructive pulmonary disease (CMS/[facility])   • Coronary artery disease of native artery of native heart with stable angina pectoris (CMS/HCC)   • Essential hypertension   • Presence of cardiac pacemaker   • Sleep apnea   • Type 2 diabetes mellitus with hyperglycemia, without long-term current use of insulin (CMS/HCC)   • Vitamin D deficiency   • Stage 3b chronic kidney disease   • Mixed hyperlipidemia   • Pneumonia due to Streptococcus pneumoniae (CMS/HCC)   • 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)   • Acute on chronic heart failure with preserved ejection fraction (CMS/HCC)   • Acute-on-chronic kidney injury (CMS/HCC)   • Hypoxemia   • Sepsis (CMS/HCC)   • Shortness of breath   • HCAP (healthcare-associated pneumonia)   • Acute on chronic diastolic heart failure (CMS/HCC)   • CKD (chronic kidney disease)     Past Medical History:   Diagnosis Date   • Arrhythmia    • Bradycardia    • Cardiomyopathy (CMS/HCC)    • Cataract    • COPD (chronic obstructive pulmonary disease) (CMS/HCC)    • Coronary artery disease    • Diabetes mellitus, type 2 (CMS/HCC)    • Emphysema, unspecified (CMS/HCC)    • Hx of sick sinus syndrome     S/P Biventricular Pacemaker   • Hypertension    • Paroxysmal atrial fibrillation (CMS/HCC)    • Pneumonia    • Sleep apnea    •  "Ventricular arrhythmia      Past Surgical History:   Procedure Laterality Date   • CARDIAC CATHETERIZATION  02/04/2015   • CARDIAC CATHETERIZATION  04/25/2016   • CAROTID ENDARTERECTOMY     • CORONARY ANGIOPLASTY  1997   • LARYNGOSCOPY     • LUNG SURGERY  2008    Lung resection   • PACEMAKER IMPLANTATION  04/25/2016    Dual chamber; [employer]   • TRACHEOSTOMY       General Information     Row Name 01/14/21 0928          OT Time and Intention    Document Type  evaluation  -NA     Mode of Treatment  occupational therapy  -NA     Row Name 01/14/21 0928          General Information    Patient Profile Reviewed  yes  -NA     Prior Level of Function  independent:;ADL's;feeding;grooming;dressing;bathing;home management;all household mobility;community mobility;cooking;gait;cleaning;transfer;driving;using stairs;yard work;bed mobility  -NA     Existing Precautions/Restrictions  oxygen therapy device and L/min  -NA     Row Name 01/14/21 0928          Living Environment    Lives With  spouse  -NA     Row Name 01/14/21 0928          Home Main Entrance    Number of Stairs, Main Entrance  two;three  -NA     Stair Railings, Main Entrance  railings safe and in good condition;railings on both sides of stairs  -NA     Row Name 01/14/21 0928          Stairs Within Home, Primary    Number of Stairs, Within Home, Primary  none  -NA     Stair Railings, Within Home, Primary  none  -NA     Row Name 01/14/21 0928          Cognition    Orientation Status (Cognition)  oriented x 4 pt stating he feels \"kind of confused at times and slightly disoriented\"  -     Row Name 01/14/21 0928          Safety Issues, Functional Mobility    Safety Issues Affecting Function (Mobility)  safety precaution awareness;safety precautions follow-through/compliance  -NA     Impairments Affecting Function (Mobility)  endurance/activity tolerance;strength;shortness of breath;balance  -NA       User Key  (r) = Recorded By, (t) = Taken By, (c) = Cosigned By "    Initials Name Provider Type    NA Destiny Rust OT Occupational Therapist          Mobility/ADL's     Row Name 01/14/21 0930          Bed Mobility    Comment (Bed Mobility)  NT as pt was up in chair upon arrival to room  -     Row Name 01/14/21 0930          Transfers    Transfers  sit-stand transfer;toilet transfer  -     Sit-Stand [place] (Transfers)  verbal cues;supervision  -     [place] Level (Toilet Transfer)  contact guard;verbal cues standing at commode  -     Assistive Device (Toilet Transfer)  commode  -     Row Name 01/14/21 0930          Toilet Transfer    Type (Toilet Transfer)  sit-stand;stand-sit  -     Row Name 01/14/21 0930          Functional Mobility    Functional Mobility- Ind. Level  contact guard assist;verbal cues required  -     Functional Mobility- Comment  for func mob in room and bathroom  -[facility] Name 01/14/21 0930          Activities of Daily Living    BADL Assessment/Intervention  upper body dressing;lower body dressing;toileting;grooming  -[facility] Name 01/14/21 0930          Upper Body Dressing Assessment/Training    [place] Level (Upper Body Dressing)  upper body dressing skills;don;front opening garment;minimum assist (75% patient effort)  -     Row Name 01/14/21 0930          Lower Body Dressing Assessment/Training    [place] Level (Lower Body Dressing)  lower body dressing skills;don;socks;minimum assist (75% patient effort)  -     Position (Lower Body Dressing)  supported sitting  -[facility] Name 01/14/21 0930          Toileting Assessment/Training    [place] Level (Toileting)  toileting skills;perform perineal hygiene;standby assist;supervision  -     Position (Toileting)  unsupported standing  -[facility] Name 01/14/21 0930          Grooming Assessment/Training    [place] Level (Grooming)  grooming skills;wash face, hands;supervision  -NA     Position (Grooming)  sink side  -NA       User Key  (r) = Recorded By,  (t) = Taken By, (c) = Cosigned By    Initials Name Provider Type    NA Destiny Rust OT Occupational Therapist        Obj/Interventions     Row Name 01/14/21 0931          Sensory Assessment (Somatosensory)    Sensory Assessment (Somatosensory)  sensation intact  -NA     Row Name 01/14/21 0931          Vision Assessment/Intervention    Visual Impairment/Limitations  WFL  -NA     Row Name 01/14/21 0931          Range of Motion Comprehensive    General Range of Motion  upper extremity range of motion deficits identified  -NA     Comment, General Range of Motion  RUE residual deficits in shoulder with less than 40 degrees of AROM; BUE elbow/wrist/fingers WFL  -NA     Row Name 01/14/21 0931          Balance    Balance Assessment  sitting static balance;sitting dynamic balance;standing static balance;standing dynamic balance;contributing impairments  -NA     Static Sitting Balance  WFL  -NA     Dynamic Sitting Balance  WFL  -NA     Static Standing Balance  WFL  -NA     Dynamic Standing Balance  mild impairment  -NA     Impairments Contributing to Balance Dysfunction  pain;strength  -NA     Balance Interventions  UE activity with balance activity;dynamic reaching;occupation based/functional task;minimal challenge;dynamic;standing;static  -NA     Comment, Balance  with increased SOA  -NA       User Key  (r) = Recorded By, (t) = Taken By, (c) = Cosigned By    Initials Name Provider Type    NA Destiny Rust OT Occupational Therapist        Goals/Plan     Row Name 01/14/21 0937          Transfer Goal 1 (OT)    Activity/Assistive Device (Transfer Goal 1, OT)  toilet with 02 sats >90%  -NA     [place] Level/Cues Needed (Transfer Goal 1, OT)  independent  -NA     Time Frame (Transfer Goal 1, OT)  2 weeks  -NA     Row Name 01/14/21 0937          Grooming Goal 1 (OT)    Activity/Device (Grooming Goal 1, OT)  grooming skills, all;hair care;oral care;wash face, hands with O2 sats >90%  -NA     [place] (Grooming  Goal 1, OT)  independent  -NA     Time Frame (Grooming Goal 1, OT)  2 weeks  -NA     Row Name 01/14/21 0937          Therapy Assessment/Plan (OT)    Planned Therapy Interventions (OT)  activity tolerance training;BADL retraining;functional balance retraining;patient/caregiver education/training;ROM/therapeutic exercise;strengthening exercise;occupation/activity based interventions;transfer/mobility retraining;neuromuscular control/coordination retraining  -NA       User Key  (r) = Recorded By, (t) = Taken By, (c) = Cosigned By    Initials Name Provider Type    Destiny Perez OT Occupational Therapist        Clinical Impression     Row Name 01/14/21 0933          Pain Assessment    Additional Documentation  Pain Scale: Numbers Pre/Post-Treatment (Group)  -NA     Row Name 01/14/21 0933          Pain Scale: Numbers Pre/Post-Treatment    Pretreatment Pain Rating  0/10 - no pain  -NA     Posttreatment Pain Rating  0/10 - no pain  -NA     Row Name 01/14/21 0933          Plan of Care Review    Plan of Care Reviewed With  patient  -NA     Outcome Summary  Pt is an 84 y/o M admitted for SOA and now on 5L of hi-flow nasal canula, which increased from a baseline of 3L. Prior to admission, pt lives with wife in a SSH with 2-3 steps to enter, independent with all ADLs and func mob, drives, retired, active. pt req SUP this date for func mob in room and bathroom with 5-6L O2 and c/o SOA for less than 10 feet of mobility. pt req multiple rest breaks throughout session. REC home with HH vs OP pulm rehab at [place]. PPE worn: eye goggles, mask, gloves.  -NA     Row Name 01/14/21 0910          Therapy Assessment/Plan (OT)    Patient/Family Therapy Goal Statement (OT)  open to home health, pt stating that he may not be able to drive to OP as his wife might not let him, however she could take him  -NA     Rehab Potential (OT)  good, to achieve stated therapy goals  -NA     Criteria for Skilled Therapeutic Interventions Met (OT)   yes;meets criteria;skilled treatment is necessary  -NA     Therapy Frequency (OT)  3 times/wk  -NA     Row Name 01/14/21 0933          Therapy Plan Review/Discharge Plan (OT)    Anticipated Discharge Disposition (OT)  home with assist;home with home health;home with outpatient therapy services home with wife and OP pulm rehab vs home health  -NA     Row Name 01/14/21 0933          Vital Signs    O2 Delivery Pre Treatment  hi-flow 5L nc  -NA     O2 Delivery Intra Treatment  hi-flow 6L NC for func mob to bathroom  -NA     O2 Delivery Post Treatment  hi-flow 5L O2  -NA     Pre Patient Position  Sitting  -NA     Intra Patient Position  Standing  -NA     Post Patient Position  Sitting  -NA     Row Name 01/14/21 0933          Positioning and Restraints    Pre-Treatment Position  sitting in chair/recliner  -NA     Post Treatment Position  chair  -NA     In Chair  notified nsg;reclined;call light within reach;encouraged to call for assist  -NA       User Key  (r) = Recorded By, (t) = Taken By, (c) = Cosigned By    Initials Name Provider Type    NA Destiny Rust, OT Occupational Therapist        Outcome Measures     Row Name 01/14/21 0938          How much help from another is currently needed...    Putting on and taking off regular lower body clothing?  3  -NA     Bathing (including washing, rinsing, and drying)  3  -NA     Toileting (which includes using toilet bed pan or urinal)  3  -NA     Putting on and taking off regular upper body clothing  3  -NA     Taking care of personal grooming (such as brushing teeth)  3  -NA     Eating meals  4  -NA     AM-PAC 6 Clicks Score (OT)  19  -NA     Row Name 01/14/21 0938          How much help from another person do you currently need...    Turning from your back to your side while in flat bed without using bedrails?  3  -NA     Moving from lying on back to sitting on the side of a flat bed without bedrails?  3  -NA     Moving to and from a bed to a chair (including a  wheelchair)?  3  -NA     Standing up from a chair using your arms (e.g., wheelchair, bedside chair)?  3  -NA     Climbing 3-5 steps with a railing?  3  -NA     To walk in hospital room?  3  -NA     AM-PAC 6 Clicks Score (PT)  18  -NA     Row Name 01/14/21 0938          Functional Assessment    Outcome Measure Options  AM-PAC 6 Clicks Basic Mobility (PT);AM-PAC 6 Clicks Daily Activity (OT)  -NA       User Key  (r) = Recorded By, (t) = Taken By, (c) = Cosigned By    Initials Name Provider Type    Destiny Perez OT Occupational Therapist        Occupational Therapy Education                 Title: PT OT SLP Therapies (Done)     Topic: Occupational Therapy (Done)     Point: ADL training (Done)     Description:   Instruct learner(s) on proper safety adaptation and remediation techniques during self care or transfers.   Instruct in proper use of assistive devices.              Learning Progress Summary           Patient Acceptance, TB,D,E, VU,NR,DU by BJ at 1/14/2021 0938    Comment: role and benefit of OT, monitoring O2 sats, safety with transfers to commode, func mob in room and bathroom, using call light for assistance                   Point: Home exercise program (Done)     Description:   Instruct learner(s) on appropriate technique for monitoring, assisting and/or progressing therapeutic exercises/activities.              Learning Progress Summary           Patient Acceptance, TB,D,E, VU,NR,DU by NA at 1/14/2021 0938    Comment: role and benefit of OT, monitoring O2 sats, safety with transfers to commode, func mob in room and bathroom, using call light for assistance                   Point: Precautions (Done)     Description:   Instruct learner(s) on prescribed precautions during self-care and functional transfers.              Learning Progress Summary           Patient Acceptance, TB,D,E, VU,NR,DU by NA at 1/14/2021 0938    Comment: role and benefit of OT, monitoring O2 sats, safety with transfers to  commode, func mob in room and bathroom, using call light for assistance                   Point: Body mechanics (Done)     Description:   Instruct learner(s) on proper positioning and spine alignment during self-care, functional mobility activities and/or exercises.              Learning Progress Summary           Patient Acceptance, TB,D,E, VU,NR,DU by BJ at 1/14/2021 0938    Comment: role and benefit of OT, monitoring O2 sats, safety with transfers to commode, func mob in room and bathroom, using call light for assistance                               User Key     Initials Effective Dates Name Provider Type Discipline    BJ 09/30/20 -  Destiny Rust OT Occupational Therapist OT              OT Recommendation and Plan  Planned Therapy Interventions (OT): activity tolerance training, BADL retraining, functional balance retraining, patient/caregiver education/training, ROM/therapeutic exercise, strengthening exercise, occupation/activity based interventions, transfer/mobility retraining, neuromuscular control/coordination retraining  Therapy Frequency (OT): 3 times/wk  Plan of Care Review  Plan of Care Reviewed With: patient  Outcome Summary: Pt is an 84 y/o M admitted for SOA and now on 5L of hi-flow nasal canula, which increased from a baseline of 3L. Prior to admission, pt lives with wife in a SSH with 2-3 steps to enter, independent with all ADLs and func mob, drives, retired, active. pt req SUP this date for func mob in room and bathroom with 5-6L O2 and c/o SOA for less than 10 feet of mobility. pt req multiple rest breaks throughout session. REC home with HH vs OP pulm rehab at [place]. PPE worn: eye goggles, mask, gloves.     Time Calculation:   Time Calculation- OT     Row Name 01/14/21 0802             Time Calculation- OT    OT Start Time  0802  -NA      OT Stop Time  0817  -NA      OT Time Calculation (min)  15 min  -NA      Total Timed Code Minutes- OT  0 minute(s)  -NA      OT Non-Billable Time (min)   15 min  -NA      OT Received On  01/14/21  -NA      OT - Next Appointment  01/15/21  -NA      OT Goal Re-Cert Due Date  01/28/21  -NA        User Key  (r) = Recorded By, (t) = Taken By, (c) = Cosigned By    Initials Name Provider Type    Destiny Perez OT Occupational Therapist        Therapy Charges for Today     Code Description Service Date Service Provider Modifiers Qty    43149061962 HC OT EVAL LOW COMPLEXITY 4 1/14/2021 Destiny Rust OT GO 1               Destiny Rust OT  1/14/2021

## 2021-01-14 NOTE — DISCHARGE PLACEMENT REQUEST
"Ernesto Martinez (85 y.o. Male)     Date of Birth Social Security Number Address Home Phone MRN    1935  [id number] [address] [phone] [id number]    Anglican Marital Status          Anglican        Admission Date Admission Type Admitting Provider Attending Provider Department, Room/Bed    1/11/21 Emergency Henry Hansen MD Salcedo, Federico N, MD [facility] SURGICAL INPATIENT, 4105/1    Discharge Date Discharge Disposition Discharge Destination                       Attending Provider: Henry Hansen MD    Allergies: Penicillins    Isolation: None   Infection: None   Code Status: CPR    Ht: 170.2 cm (67\")   Wt: 77 kg (169 lb 12.1 oz)    Admission Cmt: None   Principal Problem: HCAP (healthcare-associated pneumonia) [J18.9]                 Active Insurance as of 1/11/2021     Primary Coverage     Payor Plan Insurance Group Employer/Plan Group    HUMANA MEDICARE REPLACEMENT HU[employer]NA MEDICARE REPLACEMENT [id number]     Payor Plan Address Payor Plan Phone Number Payor Plan Fax Number Effective Dates    [address] [address] [phone]  1/1/2018 - None Entered    [facility] [id number]       Subscriber Name Subscriber Birth Date Member ID       ERNESTO MARTINEZ 1935 [id number]                 Emergency Contacts      (Rel.) Home Phone Work Phone Mobile Phone    WILDER MARTINEZ \"RENETTA\" (Spouse) [phone] -- [phone]              "

## 2021-01-14 NOTE — PROGRESS NOTES
Patient is current with [facility] call [phone] or [phone] for any changes or concerns

## 2021-01-15 NOTE — THERAPY TREATMENT NOTE
Patient Name: Cali Santoro  : 1935    MRN: [id number]                              Today's Date: 1/15/2021       Admit Date: 2021    Visit Dx:     ICD-10-CM ICD-9-CM   1. Sepsis, due to unspecified organism, unspecified whether acute organ dysfunction present (CMS/HCC)  A41.9 038.9     995.91   2. Pneumonia due to infectious organism, unspecified laterality, unspecified part of lung  J18.9 486   3. Dyspnea, unspecified type  R06.00 786.09   4. Chronic bronchitis, unspecified chronic bronchitis type (CMS/HCC)  J42 491.9   5. 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)   • Essential hypertension   • Presence of cardiac pacemaker   • Sleep apnea   • Type 2 diabetes mellitus with hyperglycemia, without long-term current use of insulin (CMS/HCC)   • Vitamin D deficiency   • Stage 3b chronic kidney disease   • Mixed hyperlipidemia   • Pneumonia due to Streptococcus pneumoniae (CMS/HCC)   • 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)   • Acute on chronic heart failure with preserved ejection fraction (CMS/HCC)   • Acute-on-chronic kidney injury (CMS/HCC)   • Hypoxemia   • Sepsis (CMS/HCC)   • Shortness of breath   • HCAP (healthcare-associated pneumonia)   • Acute on chronic diastolic heart failure (CMS/HCC)   • CKD (chronic kidney disease)     Past Medical History:   Diagnosis Date   • Arrhythmia    • Bradycardia    • Cardiomyopathy (CMS/HCC)    • Cataract    • COPD (chronic obstructive pulmonary disease) (CMS/HCC)    • Coronary artery disease    • Diabetes mellitus, type 2 (CMS/HCC)    • Emphysema, unspecified (CMS/HCC)    •  Hx of sick sinus syndrome     S/P Biventricular Pacemaker   • Hypertension    • Paroxysmal atrial fibrillation (CMS/HCC)    • Pneumonia    • Sleep apnea    • Ventricular arrhythmia      Past Surgical History:   Procedure Laterality Date   • CARDIAC CATHETERIZATION  02/04/2015   • CARDIAC CATHETERIZATION  04/25/2016   • CAROTID ENDARTERECTOMY     • CORONARY ANGIOPLASTY  1997   • LARYNGOSCOPY     • LUNG SURGERY  2008    Lung resection   • PACEMAKER IMPLANTATION  04/25/2016    Dual chamber; [employer]   • TRACHEOSTOMY       General Information     Row Name 01/15/21 1402 01/15/21 1313       OT Time and Intention    Document Type  therapy note (daily note)  -ES  therapy note (daily note)  -ES    Mode of Treatment  occupational therapy  -ES  occupational therapy  -ES    Row Name 01/15/21 1402 01/15/21 1313       General Information    Patient Profile Reviewed  yes  -ES  yes  -ES    Row Name 01/15/21 1313          Cognition    Orientation Status (Cognition)  oriented x 4  -ES     Row Name 01/15/21 1313          Safety Issues, Functional Mobility    Impairments Affecting Function (Mobility)  endurance/activity tolerance;strength;shortness of breath;balance  -ES       User Key  (r) = Recorded By, (t) = Taken By, (c) = Cosigned By    Initials Name Provider Type    ES Carlie Nguyen OT Occupational Therapist          Mobility/ADL's     Row Name 01/15/21 1403          Bed Mobility    Bed Mobility  bed mobility (all) activities  -ES     All Activities, [place] (Bed Mobility)  supervision  -ES     Row Name 01/15/21 1403          Transfers    Transfers  other (see comments)  -ES     Sit-Stand [place] (Transfers)  contact guard;verbal cues;1 person assist  -ES     Row Name 01/15/21 1403          Sit-Stand Transfer    Assistive Device (Sit-Stand Transfers)  walker, front-wheeled  -ES     Row Name 01/15/21 1403          Functional Mobility    Functional Mobility- Comment  Pt completes mock shower transfers with  CGA for balance/safety.  -ES     Row Name 01/15/21 1403          Upper Body Dressing Assessment/Training    [place] Level (Upper Body Dressing)  supervision  -ES     Row Name 01/15/21 1403          Lower Body Dressing Assessment/Training    [place] Level (Lower Body Dressing)  supervision  -ES       User Key  (r) = Recorded By, (t) = Taken By, (c) = Cosigned By    Initials Name Provider Type    Carlie Mora OT Occupational Therapist        Obj/Interventions     Row Name 01/15/21 1405          Sensory Assessment (Somatosensory)    Sensory Assessment (Somatosensory)  sensation intact  -ES     Row Name 01/15/21 1405          Vision Assessment/Intervention    Visual Impairment/Limitations  WFL  -ES     Row Name 01/15/21 1405          Balance    Balance Assessment  sitting dynamic balance;sitting static balance;sit to stand dynamic balance;standing static balance;standing dynamic balance  -ES     Static Sitting Balance  WNL  -ES     Dynamic Sitting Balance  WNL  -ES     Static Standing Balance  WFL  -ES     Dynamic Standing Balance  mild impairment  -ES       User Key  (r) = Recorded By, (t) = Taken By, (c) = Cosigned By    Initials Name Provider Type    Carlie Mora OT Occupational Therapist        Goals/Plan    No documentation.       Clinical Impression     Row Name 01/15/21 1406          Pain Scale: Numbers Pre/Post-Treatment    Pretreatment Pain Rating  0/10 - no pain  -ES     Posttreatment Pain Rating  0/10 - no pain  -ES     Row Name 01/15/21 1406          Plan of Care Review    Plan of Care Reviewed With  patient  -ES     Outcome Summary  Pt is 84 yo male being followed for SOA seocndary to PNA, CHF, and sepsis. Pt demos improvement with actiivty tolerance, but continues to require 5L O2. Desaturates to 84% with minimal activity on 4L O2. Pt unsteady with dynamic balance tasks, requiring CGA - Min A. Reviewed MOck tub transfers and encouraged pt to utilize walk-in shower with shower  "chair. Pt spouse states concerns for pt with long O2 cord secondary to fall juany, and also states that pt has small walkways in home due to furntiure set up. Encouraged home modifications to allow RW through common walk areas, and briefly reviewed CHF maintainence such as daily weights and diet considerations. Pt has strong social support of spouse, but spouse states pt becomes somewhat irritated with her \"nagging\" for precautions. Patient is adamant on d/c home due to family visiting this weekend from out of state, but OT recommends IP rehab. Briefly discussed this, and pt agreeable to [facility] services with OP Pulmonary rehab when appropriate. PPE: Gloves, mask, eyewear  -ES     Row Name 01/15/21 1406          Therapy Plan Review/Discharge Plan (OT)    Anticipated Discharge Disposition (OT)  home with home health  -ES     Row Name 01/15/21 1406          Vital Signs    Pre SpO2 (%)  94  -ES     O2 Delivery Pre Treatment  supplemental O2 5L  -ES     Intra SpO2 (%)  87  -ES     O2 Delivery Intra Treatment  supplemental O2 5L  -ES     Post SpO2 (%)  96  -ES     O2 Delivery Post Treatment  supplemental O2 5L  -ES     Pre Patient Position  Sitting  -ES     Intra Patient Position  Standing  -ES     Post Patient Position  Sitting  -ES     Row Name 01/15/21 1406          Positioning and Restraints    Pre-Treatment Position  sitting in chair/recliner  -ES     Post Treatment Position  chair  -ES     In Chair  notified nsg;call light within reach;encouraged to call for assist;exit alarm on;with family/caregiver  -ES       User Key  (r) = Recorded By, (t) = Taken By, (c) = Cosigned By    Initials Name Provider Type    Carlie Mora OT Occupational Therapist        Outcome Measures    No documentation.       Occupational Therapy Education                 Title: PT OT SLP Therapies (Done)     Topic: Occupational Therapy (Done)     Point: ADL training (Done)     Description:   Instruct learner(s) on proper safety " adaptation and remediation techniques during self care or transfers.   Instruct in proper use of assistive devices.              Learning Progress Summary           Patient Acceptance, E,TB, VU by  at 1/15/2021 1415    Acceptance, E,D, DU by  at 1/15/2021 1323    Comment: Instructed/educated plb/posture to improve respiration.    Acceptance, TB,D,E, VU,NR,DU by  at 1/14/2021 0938    Comment: role and benefit of OT, monitoring O2 sats, safety with transfers to commode, func mob in room and bathroom, using call light for assistance   Family Acceptance, E,TB, VU by  at 1/15/2021 1415                   Point: Home exercise program (Done)     Description:   Instruct learner(s) on appropriate technique for monitoring, assisting and/or progressing therapeutic exercises/activities.              Learning Progress Summary           Patient Acceptance, E,D, DU by  at 1/15/2021 1323    Comment: Instructed/educated plb/posture to improve respiration.    Acceptance, TB,D,E, VU,NR,DU by  at 1/14/2021 0938    Comment: role and benefit of OT, monitoring O2 sats, safety with transfers to commode, func mob in room and bathroom, using call light for assistance                   Point: Precautions (Done)     Description:   Instruct learner(s) on prescribed precautions during self-care and functional transfers.              Learning Progress Summary           Patient Acceptance, E,TB, VU by  at 1/15/2021 1415    Acceptance, E,D, DU by  at 1/15/2021 1323    Comment: Instructed/educated plb/posture to improve respiration.    Acceptance, TB,D,E, VU,NR,DU by  at 1/14/2021 0938    Comment: role and benefit of OT, monitoring O2 sats, safety with transfers to commode, func mob in room and bathroom, using call light for assistance   Family Acceptance, E,TB, VU by  at 1/15/2021 1415                   Point: Body mechanics (Done)     Description:   Instruct learner(s) on proper positioning and spine alignment during self-care,  "functional mobility activities and/or exercises.              Learning Progress Summary           Patient Acceptance, E,D, DU by  at 1/15/2021 1323    Comment: Instructed/educated plb/posture to improve respiration.    Acceptance, TB,D,E, VU,NR,DU by  at 1/14/2021 0938    Comment: role and benefit of OT, monitoring O2 sats, safety with transfers to commode, func mob in room and bathroom, using call light for assistance                               User Key     Initials Effective Dates Name Provider Type Discipline     09/30/20 -  Destiny Rust, OT Occupational Therapist OT     03/01/19 -  Carlie Nguyen OT Occupational Therapist OT     03/01/19 -  Joan Siddiqui PTA Physical Therapy Assistant PT              OT Recommendation and Plan     Plan of Care Review  Plan of Care Reviewed With: patient  Outcome Summary: Pt is 84 yo male being followed for SOA seocndary to PNA, CHF, and sepsis. Pt demos improvement with actiivty tolerance, but continues to require 5L O2. Desaturates to 84% with minimal activity on 4L O2. Pt unsteady with dynamic balance tasks, requiring CGA - Min A. Reviewed MOck tub transfers and encouraged pt to utilize walk-in shower with shower chair. Pt spouse states concerns for pt with long O2 cord secondary to fall juany, and also states that pt has small walkways in home due to furntiure set up. Encouraged home modifications to allow RW through common walk areas, and briefly reviewed CHF maintainence such as daily weights and diet considerations. Pt has strong social support of spouse, but spouse states pt becomes somewhat irritated with her \"nagging\" for precautions. Patient is adamant on d/c home due to family visiting this weekend from out of state, but OT recommends IP rehab. Briefly discussed this, and pt agreeable to [facility] services with OP Pulmonary rehab when appropriate. PPE: Gloves, mask, eyewear     Time Calculation:   Time Calculation- OT     Row Name 01/15/21 1414 " 01/15/21 1325          Time Calculation- OT    OT Start Time  1310  -ES  --     OT Stop Time  1359  -ES  --     OT Time Calculation (min)  49 min  -ES  --     Total Timed Code Minutes- OT  49 minute(s)  -ES  --     OT Received On  01/15/21  -ES  --     OT - Next Appointment  01/18/21  -ES  --        Timed Charges    92830 - Gait Training Minutes   --  10  -       User Key  (r) = Recorded By, (t) = Taken By, (c) = Cosigned By    Initials Name Provider Type    Carlie Mora OT Occupational Therapist     Joan Siddiqui PTA Physical Therapy Assistant        Therapy Charges for Today     Code Description Service Date Service Provider Modifiers Qty    29324659026  OT SELF CARE/MGMT/TRAIN EA 15 MIN 1/15/2021 Carlie Nguyen OT GO 2    06698164427  OT THERAPEUTIC ACT EA 15 MIN 1/15/2021 Carlie Nguyen OT GO 1               Carlie Nguyen OT  1/15/2021

## 2021-01-15 NOTE — DISCHARGE PLACEMENT REQUEST
"Ernesto Martinez (85 y.o. Male)     Date of Birth Social Security Number Address Home Phone MRN    1935  [address] [address] [phone] [id number]    Syn[employer] Marital Status          L[employer]        Admission Date Admission Type Admitting Provider Attending Provider Department, Room/Bed    1/11/21 Emergency Henry Hansen MD Salcedo, Federico N, MD [facility] SURGICAL INPATIENT, 4105/1    Discharge Date Discharge Disposition Discharge Destination                       Attending Provider: Henry Hansen MD    Allergies: Penicillins    Isolation: None   Infection: None   Code Status: CPR    Ht: 170.2 cm (67\")   Wt: 76.7 kg (169 lb 1.5 oz)    Admission Cmt: None   Principal Problem: HCAP (healthcare-associated pneumonia) [J18.9]                 Active Insurance as of 1/11/2021     Primary Coverage     Payor Plan Insurance Group Employer/Plan Group    HUMANA MEDICARE REPLACEMENT HUMANA MEDICARE REPLACEMENT [id number]     Payor Plan Address Payor Plan Phone Number Payor Plan Fax Number Effective Dates    [address] [address] [phone]  1/1/2018 - None Entered    [facility] [id number]       Subscriber Name Subscriber Birth Date Member ID       ERNESTO MARTINEZ CURLY 1935 [id number]                 Emergency Contacts      (Rel.) Home Phone Work Phone Mobile Phone    WILDER MARTINEZ \"RENETTA\" (Spouse) [phone] -- [phone]              "

## 2021-01-15 NOTE — THERAPY TREATMENT NOTE
Patient Name: Cali Santoro  : 1935    MRN: [id number]                              Today's Date: 1/15/2021       Admit Date: 2021    Visit Dx:     ICD-10-CM ICD-9-CM   1. Sepsis, due to unspecified organism, unspecified whether acute organ dysfunction present (CMS/HCC)  A41.9 038.9     995.91   2. Pneumonia due to infectious organism, unspecified laterality, unspecified part of lung  J18.9 486   3. Dyspnea, unspecified type  R06.00 786.09   4. Chronic bronchitis, unspecified chronic bronchitis type (CMS/HCC)  J42 491.9   5. 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)   • Essential hypertension   • Presence of cardiac pacemaker   • Sleep apnea   • Type 2 diabetes mellitus with hyperglycemia, without long-term current use of insulin (CMS/HCC)   • Vitamin D deficiency   • Stage 3b chronic kidney disease   • Mixed hyperlipidemia   • Pneumonia due to Streptococcus pneumoniae (CMS/HCC)   • 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)   • Acute on chronic heart failure with preserved ejection fraction (CMS/HCC)   • Acute-on-chronic kidney injury (CMS/HCC)   • Hypoxemia   • Sepsis (CMS/HCC)   • Shortness of breath   • HCAP (healthcare-associated pneumonia)   • Acute on chronic diastolic heart failure (CMS/HCC)   • CKD (chronic kidney disease)     Past Medical History:   Diagnosis Date   • Arrhythmia    • Bradycardia    • Cardiomyopathy (CMS/HCC)    • Cataract    • COPD (chronic obstructive pulmonary disease) (CMS/HCC)    • Coronary artery disease    • Diabetes mellitus, type 2 (CMS/HCC)    • Emphysema, unspecified (CMS/HCC)    •  Hx of sick sinus syndrome     S/P Biventricular Pacemaker   • Hypertension    • Paroxysmal atrial fibrillation (CMS/HCC)    • Pneumonia    • Sleep apnea    • Ventricular arrhythmia      Past Surgical History:   Procedure Laterality Date   • CARDIAC CATHETERIZATION  02/04/2015   • CARDIAC CATHETERIZATION  04/25/2016   • CAROTID ENDARTERECTOMY     • CORONARY ANGIOPLASTY  1997   • LARYNGOSCOPY     • LUNG SURGERY  2008    Lung resection   • PACEMAKER IMPLANTATION  04/25/2016    Dual chamber; [employer]   • TRACHEOSTOMY       General Information     Row Name 01/15/21 1315          Physical Therapy Time and Intention    Document Type  therapy note (daily note)  -     Mode of Treatment  physical therapy  -     Row Name 01/15/21 1315          Cognition    Orientation Status (Cognition)  oriented x 3  -     Row Name 01/15/21 1315          Safety Issues, Functional Mobility    Impairments Affecting Function (Mobility)  endurance/activity tolerance;shortness of breath  -       User Key  (r) = Recorded By, (t) = Taken By, (c) = Cosigned By    Initials Name Provider Type     Joan Siddiqui PTA Physical Therapy Assistant        Mobility     Row Name 01/15/21 1316          Sit-Stand Transfer    Sit-Stand [place] (Transfers)  contact guard;verbal cues;1 person assist  -     Assistive Device (Sit-Stand Transfers)  walker, front-wheeled  -     Row Name 01/15/21 1316          Gait/Stairs (Locomotion)    [place] Level (Gait)  contact guard;1 person assist  -     Assistive Device (Gait)  walker, front-wheeled  -     Distance in Feet (Gait)  30'x 3  -     Deviations/Abnormal Patterns (Gait)  stride length decreased  -     Bilateral Gait Deviations  forward flexed posture  -     Comment (Gait/Stairs)  FF posture, decreased step length, jacqueline wfl., no lob, low activity tolerance.  -       User Key  (r) = Recorded By, (t) = Taken By, (c) = Cosigned By    Initials Name Provider Type     Herradhag,  GORDO Franco Physical Therapy Assistant        Obj/Interventions     Row Name 01/15/21 1318          Motor Skills    Therapeutic Exercise  -- Educated, instructed reinforced PLB/posture to improve respiration.  -formerly [facility] Name 01/15/21 1318          Balance    Static Sitting Balance  WNL  -     Dynamic Sitting Balance  WNL  -     Static Standing Balance  WFL  -     Dynamic Standing Balance  mild impairment  -     Comment, Balance  Unable to accept challenge.  -       User Key  (r) = Recorded By, (t) = Taken By, (c) = Cosigned By    Initials Name Provider Type     Joan Siddiqui PTA Physical Therapy Assistant        Goals/Plan    No documentation.       Clinical Impression     [facility] Name 01/15/21 1319          Pain    Additional Documentation  Pain Scale: Numbers Pre/Post-Treatment (Group)  -formerly [facility] Name 01/15/21 1319          Pain Scale: Numbers Pre/Post-Treatment    Pretreatment Pain Rating  0/10 - no pain  -     Posttreatment Pain Rating  0/10 - no pain  -formerly [facility] Name 01/15/21 1318          Plan of Care Review    Plan of Care Reviewed With  patient  -     Progress  improving  -     Outcome Summary  Required cga A for safe, functional mobility. Cga/vcs to stand c rwx. Amb. 30'x 3 in room c cga and rwx., no lob. Educated/instructed/reinforced PLB/posture to improve respiration. Sats intially 89%, increased c chest expansion et PLB. Will progress as able, presents safe for home c 24/7 caregiver et HH at d/c to address deficits. PE worn: Mask, gloves, shield.  -     Row Name 01/15/21 5948          Vital Signs    Pre SpO2 (%)  89  -     O2 Delivery Pre Treatment  supplemental O2  -     Intra SpO2 (%)  96  -     O2 Delivery Intra Treatment  supplemental O2  -LH     Post SpO2 (%)  99  -     O2 Delivery Post Treatment  supplemental O2  -LH     Pre Patient Position  Sitting  -     Intra Patient Position  Standing  -LH     Post Patient Position  Sitting  -     Row Name 01/15/21 1931           Positioning and Restraints    Pre-Treatment Position  sitting in chair/recliner  -     Post Treatment Position  chair  -LH     In Chair  notified nsg;sitting;call light within reach  -       User Key  (r) = Recorded By, (t) = Taken By, (c) = Cosigned By    Initials Name Provider Type     Joan Siddiqui PTA Physical Therapy Assistant        Outcome Measures     Row Name 01/15/21 1323          How much help from another person do you currently need...    Turning from your back to your side while in flat bed without using bedrails?  4  -LH     Moving from lying on back to sitting on the side of a flat bed without bedrails?  3  -LH     Moving to and from a bed to a chair (including a wheelchair)?  3  -LH     Standing up from a chair using your arms (e.g., wheelchair, bedside chair)?  3  -LH     Climbing 3-5 steps with a railing?  3  -LH     To walk in hospital room?  3  -LH     AM-PAC 6 Clicks Score (PT)  19  -       User Key  (r) = Recorded By, (t) = Taken By, (c) = Cosigned By    Initials Name Provider Type     Joan Siddiqui PTA Physical Therapy Assistant        Physical Therapy Education                 Title: PT OT SLP Therapies (Done)     Topic: Physical Therapy (Done)     Point: Mobility training (Done)     Learning Progress Summary           Patient Acceptance, E,D, DU by  at 1/15/2021 1323    Comment: Instructed/educated plb/posture to improve respiration.    Acceptance, E, VU by  at 1/13/2021 1529                   Point: Precautions (Done)     Learning Progress Summary           Patient Acceptance, E,D, DU by  at 1/15/2021 1323    Comment: Instructed/educated plb/posture to improve respiration.    Acceptance, E, VU by  at 1/13/2021 1529                               User Key     Initials Effective Dates Name Provider Type Discipline     04/17/20 -  Nuvia Ferrera, PT Physical Therapist PT     03/01/19 -  Joan Siddiqui PTA Physical Therapy Assistant PT              PT Recommendation and  Plan     Plan of Care Reviewed With: patient  Progress: improving  Outcome Summary: Required cga A for safe, functional mobility. Cga/vcs to stand c rwx. Amb. 30'x 3 in room c cga and rwx., no lob. Educated/instructed/reinforced PLB/posture to improve respiration. Sats intially 89%, increased c chest expansion et PLB. Will progress as able, presents safe for home c 24/7 caregiver et HH at d/c to address deficits. PE worn: Mask, gloves, shield.     Time Calculation:   PT Charges     Row Name 01/15/21 1325             Time Calculation    Start Time  0910  -      Stop Time  0940  -      Time Calculation (min)  30 min  -      PT Received On  01/15/21  -      PT - Next Appointment  01/17/21  -         Time Calculation- PT    Total Timed Code Minutes- PT  30 minute(s)  -         Timed Charges    79885 - Gait Training Minutes   10  -      23113 - PT Therapeutic Activity Minutes  20  -        User Key  (r) = Recorded By, (t) = Taken By, (c) = Cosigned By    Initials Name Provider Type     Joan Siddiqui PTA Physical Therapy Assistant        Therapy Charges for Today     Code Description Service Date Service Provider Modifiers Qty    50530688196 HC GAIT TRAINING EA 15 MIN 1/15/2021 Joan Siddiqui PTA GP 1    97608745843 HC PT THERAPEUTIC ACT EA 15 MIN 1/15/2021 Joan Siddiqui PTA GP 1          PT G-Codes  Outcome Measure Options: AM-PAC 6 Clicks Basic Mobility (PT), AM-PAC 6 Clicks Daily Activity (OT)  AM-PAC 6 Clicks Score (PT): 19  AM-PAC 6 Clicks Score (OT): 19    Joan Siddiqui PTA  1/15/2021

## 2021-01-15 NOTE — PLAN OF CARE
Problem: Adult Inpatient Plan of Care  Goal: Plan of Care Review  1/15/2021 1425 by Valentina Dumas RN  Outcome: Ongoing, Progressing  Flowsheets (Taken 1/15/2021 1425)  Outcome Summary: pt is doing well today. Still requiring 5L NC and is supposed to have a PFT today as set up by Dr. Hopson. Dr. Yan wanted to have labs drawn at 1400 to see if K+ improved. He is wanting to leave but still has some things that have to be taken care of first. He can still possibly go today as long as things get worked out.   Goal Outcome Evaluation:  Plan of Care Reviewed With: patient  Progress: improving  Outcome Summary: pt is doing well today. Still requiring 5L NC and is supposed to have a PFT today as set up by Dr. Hopson. Dr. Yan wanted to have labs drawn at 1400 to see if K+ improved. He is wanting to leave but still has some things that have to be taken care of first. He can still possibly go today as long as things get worked out.

## 2021-01-15 NOTE — PROGRESS NOTES
Continued Stay Note  [facility]     Patient Name: Cali Santoro  MRN: [id number]  Today's Date: 1/15/2021    Admit Date: 1/11/2021    Discharge Plan     Row Name 01/15/21 1206       Plan    Plan  Patient is current with [facility] (resumption of care written). Ela with [facility] Notified. Patient is now on 5 L NC. Patient in the process of switching over to [employer].    Patient/Family in Agreement with Plan  yes    Plan Comments  Anticipate discharge today.        Expected Discharge Date and Time     Expected Discharge Date Expected Discharge Time    Sherif 15, 2021               Anna [place]ae[place] [address]   [phone]  office  [phone]  fax  [email or web address]

## 2021-01-15 NOTE — PLAN OF CARE
Goal Outcome Evaluation:  Plan of Care Reviewed With: patient  Progress: improving  Outcome Summary: Required cga A for safe, functional mobility. Cga/vcs to stand c rwx. Amb. 30'x 3 in room c cga and rwx., no lob. Educated/instructed/reinforced PLB/posture to improve respiration. Sats intially 89%, increased c chest expansion et PLB. Will progress as able, presents safe for home c 24/7 caregiver et HH at d/c to address deficits. PE worn: Mask, gloves, shield.

## 2021-01-15 NOTE — PLAN OF CARE
Goal Outcome Evaluation:  Plan of Care Reviewed With: patient  Progress: no change   PT A&O X 4, able to make needs known. Given PRN melatonin & Xanax @ HS per pt request; ineffective. PT has not slept but maybe an hour so far this shift. Has had no confusion noted this shift. C/O mild headache that has since been relieved w/no medication intervention this shift, stated he was given Tylenol earlier. VSS.

## 2021-01-15 NOTE — PROGRESS NOTES
"   LOS: 4 days    Patient Care Team:  Yusef Sosa Jr., MD as PCP - General  Yusef Sosa Jr., MD as PCP - Family Medicine  Luis Paulino MD as Consulting Physician (Cardiology)      Subjective     Patient seen and examined this morning.  Sitting comfortably  Still has dyspnea but improving.  Denies any chest pain.  Cough improving    Objective     Vital Sign Min/Max for last 24 hours  Temp:  [97.4 °F (36.3 °C)-98.2 °F (36.8 °C)] 97.8 °F (36.6 °C)  Heart Rate:  [70-81] 70  Resp:  [14-20] 16  BP: (112-163)/(58-85) 163/85                       Flowsheet Rows      First Filed Value   Admission Height  170.2 cm (67\") Documented at 01/11/2021 1007   Admission Weight  74.8 kg (165 lb) Documented at 01/11/2021 1007          No intake/output data recorded.  I/O last 3 completed shifts:  In: 1920 [P.O.:1920]  Out: 1000 [Urine:1000]    Physical Exam:  Physical Exam    General.  Awake, alert  Head.  Atraumatic, normocephalic  Neck.  Supple  ENT.  Oral mucosa moist  Respiratory.  Decreased breath sounds bases  Cardiovascular.  Regular rhythm  Abdominal.  Obese, soft, nontender  Extremities.  No edema       LABS:  Lab Results   Component Value Date    CALCIUM 9.3 01/15/2021     Results from last 7 days   Lab Units 01/15/21  0620 01/14/21  0836 01/13/21  0250   SODIUM mmol/L 134* 131* 134*   POTASSIUM mmol/L 5.3* 4.6 5.0   CHLORIDE mmol/L 97* 93* 97*   CO2 mmol/L 28.0 28.0 28.0   BUN mg/dL 59* 45* 40*   CREATININE mg/dL 2.77* 2.20* 2.56*   GLUCOSE mg/dL 234* 306* 234*   CALCIUM mg/dL 9.3 9.3 8.6   WBC 10*3/mm3 11.70* 11.40* 11.30*   HEMOGLOBIN g/dL 9.5* 10.1* 10.2*   PLATELETS 10*3/mm3 268 266 228     Lab Results   Component Value Date    CKTOTAL 34 01/13/2021    TROPONINI 0.030 06/28/2019    TROPONINT 0.027 01/11/2021     Estimated Creatinine Clearance: 21.5 mL/min (A) (by C-G formula based on SCr of 2.77 mg/dL (H)).  Results from last 7 days   Lab Units 01/11/21  1132   PH, ARTERIAL pH units 7.544*   PO2 " ART mm Hg 73.8*   PCO2, ARTERIAL mm Hg 28.4*   HCO3 ART mmol/L 24.5     Brief Urine Lab Results  (Last result in the past 365 days)      Color   Clarity   Blood   Leuk Est   Nitrite   Protein   CREAT   Urine HCG        12/29/20 1019             25.5           WEIGHTS:     Wt Readings from Last 1 Encounters:   01/15/21 0600 76.7 kg (169 lb 1.5 oz)   01/14/21 0600 77 kg (169 lb 12.1 oz)   01/14/21 0421 77 kg (169 lb 12.1 oz)   01/12/21 2021 80.7 kg (178 lb)   01/11/21 2216 78.7 kg (173 lb 8 oz)   01/11/21 1007 74.8 kg (165 lb)       acetylcysteine, 2 mL, Nebulization, BID - RT  aspirin, 81 mg, Oral, Daily  atorvastatin, 20 mg, Oral, Daily  budesonide, 0.5 mg, Nebulization, BID - RT  calcitriol, 0.25 mcg, Oral, Once per day on Mon Wed Fri  carvedilol, 12.5 mg, Oral, Nightly  cefepime, 2 g, Intravenous, Q24H  [START ON 1/16/2021] furosemide, 40 mg, Oral, Daily  guaiFENesin, 600 mg, Oral, Q12H  insulin glargine, 10 Units, Subcutaneous, Nightly  insulin lispro, 0-24 Units, Subcutaneous, TID AC  ipratropium-albuterol, 3 mL, Nebulization, 4x Daily - RT  magnesium oxide, 200 mg, Oral, Daily  mirtazapine, 15 mg, Oral, Nightly  predniSONE, 40 mg, Oral, Daily With Breakfast  sodium chloride, 10 mL, Intravenous, Q12H  theophylline, 300 mg, Oral, Nightly  warfarin, 3.5 mg, Oral, Daily      Pharmacy to dose warfarin,         Assessment/Plan       1.  Acute kidney injury on CKD 3  Creatinine slightly increased today, 2.7 mg/DL this morning.  Baseline creatinine around 2  Held Lasix.  Can restart at 40 mg daily from tomorrow if creatinine starts improving    2.  Hypertension with CKD 3  Blood pressure well controlled    3.  Hyperkalemia  Mild.  Changed diet to low K diet    4.  Pneumonia  On IV antibiotics and oral steroids  Pulmonary following    5.  Hypervolemia  Improving    Cam Yan MD  01/15/21  08:51 EST

## 2021-01-15 NOTE — PROGRESS NOTES
"Pharmacy dosing service  Anticoagulant  Warfarin     Subjective:    Cali Santoro is a 85 y.o.male being continued on warfarin for atrial fibrillation.    INR Goal: 2 - 3  Home medication?:  Yes, warfarin 3 mg once daily  Bridge Therapy Present?:  No  Interacting Medications Evaluation (New/Present/Discontinued): cefepime  Additional Contributing Factors:      Assessment/Plan:    INR remains subtherapeutic today but is approaching therapeutic range. Will give 4 mg x1 again today. Suspect patient will need higher maintenance dose d/t subtherapeutic INR on admission. (e.g. 3.5 mg daily = 10.5% increase from home regimen)    Continue to monitor and adjust based on INR.         Date 1/11 1/12 1/13 1/14 1/15       INR 1.43 1.41 1.4 1.55 1.83       Dose 3 mg 5 mg 5 mg 4 mg 4 mg           Objective:  [Ht: 170.2 cm (67\"); Wt: 76.7 kg (169 lb 1.5 oz); BMI: Body mass index is 26.48 kg/m².]    Lab Results   Component Value Date    ALBUMIN 4.40 12/29/2020     Lab Results   Component Value Date    INR 1.83 (L) 01/15/2021    INR 1.55 (L) 01/14/2021    INR 1.40 (L) 01/13/2021    PROTIME 19.6 01/15/2021    PROTIME 16.7 (L) 01/14/2021    PROTIME 15.2 (L) 01/13/2021     Lab Results   Component Value Date    HGB 9.5 (L) 01/15/2021    HGB 10.1 (L) 01/14/2021    HGB 10.2 (L) 01/13/2021     Lab Results   Component Value Date    HCT 28.8 (L) 01/15/2021    HCT 31.1 (L) 01/14/2021    HCT 30.8 (L) 01/13/2021       Tanya Foster, Pharmacy Intern  01/15/21 14:29 EST     "

## 2021-01-15 NOTE — PROGRESS NOTES
[facility] Medicine Services Daily Progress Note    Hospitalist Team  LOS 4 days    Patient Care Team:  Yusef Sosa Jr., MD as PCP - General  Yusef Sosa Jr., MD as PCP - Family Medicine  Luis Paulino MD as Consulting Physician (Cardiology)    Patient Location: [facility]/    Subjective   Subjective     Chief Complaint / Subjective  Chief Complaint   Patient presents with   • Shortness of Breath     Pt present siwth SoA, low oxygen saturations at home, reports 52% when ambulating.  pt on 3ltr   home sat reading 70 at triage/ our machine 1000%     Brief Synopsis of Hospital Course/HPI  *Mr. Santoro is a 85 y.o. male with past medical history of diabetes, hyperlipidemia, cardiomyopathy, hypertension, CAD, COPD, A. fib, CKD and CHF who presents to [facility] ER 1/11/21 complaining of shortness of breath. He stated he has been short of breath upon exertion since he was discharged from here back in December 2020. Within the last two days he has become more short of breath. He walked from his living room to the bathroom this morning and could not catch his breath. He checked his oxygen saturation and it was in the 50s. He denied any cough, fever, or lower extremity swelling. He has been taking lasix and urinating. His lasix was recently decreased due to being up 10-13 times a night per his report.      In the ER the patient had CXR that showed stable right lower lobe and suspected new mild right upper lobe ill-defined infiltrates.  Correlate clinically for worsening pneumonia.  WBC 9.9, lactate 2.0.  Afebrile.  proBNP 4000. Further labs showed K 5.5, BUN 29, Cr 1.81. ABG showed ph 7.54, CO2 28.4, PO2 73.8, HCO3 24.5 on 3L home O2 rate. He was given IV vancomycin and cefepime and admitted for further treatment of pneumonia     Medical record showed patient was discharged 12/14/2020 after treatment for CHF. He received IV diuretics with help of nephrology and switched  "to oral lasix at discharge  His CT chest showed a small pleural effusion and a right basilar lung density consistent with atelectasis also some scarring in the right lung and a stable 8 mm lung nodule, 5 mm groundglass nodule in the left lower lobe noted as well small pericardial effusion. He qualified for home O2. He was also treated with antibiotics with the possibility of an infectious component. His strep pneumo antigen test did come back positive.  Discharged on omnicef    Date:  1/12/2021: Patient seen and examined, sitting in recliner, says she feels better.  On 5 L of oxygen.  Dyspnea slightly improved.  With complaints of anxiety  1/13/2021-seen in recliner no acute distress on 5 L of oxygen.  says feels better.  Her anxiety overnight. bg 375 lantus added   1/14/21 feeling better today, had some confusion last night, vss, on 5lts    1/15/21 says feels better, dyspnea still on 5lts, renal function slightly worse. bg 355, lantus increased     Review of Systems   Constitution: Positive for malaise/fatigue.   HENT: Negative.    Eyes: Negative.    Cardiovascular: Positive for dyspnea on exertion.   Respiratory: Positive for shortness of breath.    Endocrine: Negative.    Hematologic/Lymphatic: Negative.    Skin: Negative.    Musculoskeletal: Negative.    Gastrointestinal: Negative.    Genitourinary: Negative.    Neurological: Negative.    Psychiatric/Behavioral: Negative.    Allergic/Immunologic: Negative.    All other systems reviewed and are negative.    Objective   Objective      Vital Signs  Temp:  [97.4 °F (36.3 °C)-98.2 °F (36.8 °C)] 97.4 °F (36.3 °C)  Heart Rate:  [70-81] 73  Resp:  [16-20] 16  BP: (112-163)/(68-85) 123/73  Oxygen Therapy  SpO2: 92 %  Pulse Oximetry Type: Continuous  Device (Oxygen Therapy): nasal cannula  Flow (L/min): 5  Flowsheet Rows      First Filed Value   Admission Height  170.2 cm (67\") Documented at 01/11/2021 1007   Admission Weight  74.8 kg (165 lb) Documented at 01/11/2021 " 1007        Intake & Output (last 3 days)       01/12 0701 - 01/13 0700 01/13 0701 - 01/14 0700 01/14 0701 - 01/15 0700 01/15 0701 - 01/16 0700    P.O. 1260 1440 1200 360    IV Piggyback        Total Intake(mL/kg) 1260 (15.6) 1440 (18.7) 1200 (15.6) 360 (4.7)    Urine (mL/kg/hr) 400 (0.2) 425 (0.2) 1000 (0.5)     Stool 0       Total Output      Net +860 +1015 +200 +360            Urine Unmeasured Occurrence 2 x   600 x    Stool Unmeasured Occurrence 1 x           Lines, Drains & Airways    Active LDAs     Name:   Placement date:   Placement time:   Site:   Days:    Peripheral IV 01/11/21 1040 Right Arm   01/11/21    1040    Arm   1    Peripheral IV 01/12/21 1820 Anterior;Right Forearm   01/12/21    1820    Forearm   less than 1              Physical Exam  Vitals signs and nursing note reviewed.   Constitutional:       General: He is not in acute distress.     Appearance: Normal appearance. He is well-developed. He is not ill-appearing, toxic-appearing or diaphoretic.   HENT:      Head: Normocephalic and atraumatic.      Nose: Nose normal. No congestion or rhinorrhea.      Mouth/Throat:      Mouth: Mucous membranes are moist.      Pharynx: No oropharyngeal exudate.   Eyes:      General: No scleral icterus.        Right eye: No discharge.         Left eye: No discharge.      Extraocular Movements: Extraocular movements intact.      Conjunctiva/sclera: Conjunctivae normal.      Pupils: Pupils are equal, round, and reactive to light.   Neck:      Musculoskeletal: Normal range of motion and neck supple. No neck rigidity or muscular tenderness.      Thyroid: No thyromegaly.      Vascular: No carotid bruit or JVD.      Trachea: No tracheal deviation.   Cardiovascular:      Rate and Rhythm: Normal rate and regular rhythm.      Pulses: Normal pulses.      Heart sounds: Normal heart sounds. No murmur. No friction rub. No gallop.    Pulmonary:      Effort: Pulmonary effort is normal. No respiratory distress.       Breath sounds: No stridor. No wheezing, rhonchi or rales.      Comments: Decreased breath sounds bilaterally  Chest:      Chest wall: No tenderness.   Abdominal:      General: Bowel sounds are normal. There is no distension.      Palpations: Abdomen is soft. There is no mass.      Tenderness: There is no abdominal tenderness. There is no guarding or rebound.      Hernia: No hernia is present.   Musculoskeletal: Normal range of motion.         General: No swelling, tenderness, deformity or signs of injury.      Right lower leg: No edema.      Left lower leg: No edema.   Lymphadenopathy:      Cervical: No cervical adenopathy.   Skin:     General: Skin is warm and dry.      Coloration: Skin is not jaundiced or pale.      Findings: No bruising, erythema or rash.   Neurological:      General: No focal deficit present.      Mental Status: He is alert and oriented to person, place, and time. Mental status is at baseline.      Cranial Nerves: No cranial nerve deficit.      Sensory: No sensory deficit.      Motor: No weakness or abnormal muscle tone.      Coordination: Coordination normal.   Psychiatric:         Mood and Affect: Mood normal.         Behavior: Behavior normal.         Thought Content: Thought content normal.         Judgment: Judgment normal.          Wounds (last 24 hours)      [facility] Wound     Row Name 01/14/21 1955             Wound 01/11/21 1718 Bilateral posterior sacral spine    Wound - Properties Group Placement Date: 01/11/21  -HN Placement Time: 1718  -HN Present on Hospital Admission: Y  -HN Side: Bilateral  -HN Orientation: posterior  -HN Location: sacral spine  -HN Stage, Pressure Injury : Stage 1  -HN    Dressing Appearance  open to air  -CP      Closure  Open to air  -CP      Drainage Amount  none  -CP      Retired Wound - Properties Group Date first assessed: 01/11/21  -HN Time first assessed: 1718  -HN Present on Hospital Admission: Y  -HN Side: Bilateral  -HN Location: sacral spine  -HN       User Key  (r) = Recorded By, (t) = Taken By, (c) = Cosigned By    Initials Name Provider Type    Mary Marie, RN Registered Nurse    Tierney Asif LPN Licensed Nurse          Procedures:      Results Review:     I reviewed the patient's new clinical results.      Lab Results (last 24 hours)     Procedure Component Value Units Date/Time    POC Glucose Once [[id number]]  (Abnormal) Collected: 01/15/21 1556    Specimen: Blood Updated: 01/15/21 1557     Glucose 313 mg/dL      Comment: Serial Number: [id number]:  [id number]       Basic Metabolic Panel [[id number]]  (Abnormal) Collected: 01/15/21 1406    Specimen: Blood Updated: 01/15/21 1457     Glucose 365 mg/dL      BUN 66 mg/dL      Creatinine 3.22 mg/dL      Sodium 133 mmol/L      Potassium 5.4 mmol/L      Chloride 97 mmol/L      CO2 26.0 mmol/L      Calcium 9.0 mg/dL      eGFR Non African Amer 18 mL/min/1.73      BUN/Creatinine Ratio 20.5     Anion Gap 10.0 mmol/L     Narrative:      GFR Normal >60  Chronic Kidney Disease <60  Kidney Failure <15      Blood Culture - Blood, Blood, Venous Line [[id number]] Collected: 01/11/21 1242    Specimen: Blood, Venous Line Updated: 01/15/21 1301     Blood Culture No growth at 4 days    Blood Culture - Blood, Arm, Left [[id number]] Collected: 01/11/21 1059    Specimen: Blood from Arm, Left Updated: 01/15/21 1115     Blood Culture No growth at 4 days    POC Glucose Once [[id number]]  (Abnormal) Collected: 01/15/21 1100    Specimen: Blood Updated: 01/15/21 1101     Glucose 335 mg/dL      Comment: Serial Number: [id number]:  [id number]       POC Glucose Once [[id number]]  (Abnormal) Collected: 01/15/21 0725    Specimen: Blood Updated: 01/15/21 0727     Glucose 268 mg/dL      Comment: Serial Number: [id number]:  [id number]       CBC & Differential [[id number]]  (Abnormal) Collected: 01/15/21 0620    Specimen: Blood Updated: 01/15/21 0720    Narrative:      The following orders were created for panel  order CBC & Differential.  Procedure                               Abnormality         Status                     ---------                               -----------         ------                     CBC Auto Differential[[id number]]        Abnormal            Final result                 Please view results for these tests on the individual orders.    CBC Auto Differential [[id number]]  (Abnormal) Collected: 01/15/21 0620    Specimen: Blood Updated: 01/15/21 0720     WBC 11.70 10*3/mm3      RBC 2.78 10*6/mm3      Hemoglobin 9.5 g/dL      Hematocrit 28.8 %      .7 fL      MCH 34.2 pg      MCHC 33.0 g/dL      RDW 18.0 %      RDW-SD 65.6 fl      MPV 7.2 fL      Platelets 268 10*3/mm3      Neutrophil % 79.2 %      Lymphocyte % 13.1 %      Monocyte % 7.1 %      Eosinophil % 0.3 %      Basophil % 0.3 %      Neutrophils, Absolute 9.30 10*3/mm3      Lymphocytes, Absolute 1.50 10*3/mm3      Monocytes, Absolute 0.80 10*3/mm3      Eosinophils, Absolute 0.00 10*3/mm3      Basophils, Absolute 0.00 10*3/mm3      nRBC 0.2 /100 WBC     Basic Metabolic Panel [[id number]]  (Abnormal) Collected: 01/15/21 0620    Specimen: Blood Updated: 01/15/21 0719     Glucose 234 mg/dL      BUN 59 mg/dL      Creatinine 2.77 mg/dL      Sodium 134 mmol/L      Potassium 5.3 mmol/L      Comment: Slight hemolysis detected by analyzer. Results may be affected.        Chloride 97 mmol/L      CO2 28.0 mmol/L      Calcium 9.3 mg/dL      eGFR Non African Amer 22 mL/min/1.73      BUN/Creatinine Ratio 21.3     Anion Gap 9.0 mmol/L     Narrative:      GFR Normal >60  Chronic Kidney Disease <60  Kidney Failure <15      Protime-INR [[id number]]  (Abnormal) Collected: 01/15/21 0620    Specimen: Blood Updated: 01/15/21 0707     Protime 19.6 Seconds      INR 1.83    POC Glucose Once [[id number]]  (Abnormal) Collected: 01/14/21 2226    Specimen: Blood Updated: 01/14/21 2227     Glucose 395 mg/dL      Comment: Serial Number: [id number]:  [id number]            No results found for: HGBA1C  Results from last 7 days   Lab Units 01/15/21  0620 01/14/21  0202 01/13/21  0250   INR  1.83* 1.55* 1.40*       Results from last 7 days   Lab Units 01/11/21  1132   PH, ARTERIAL pH units 7.544*   PO2 ART mm Hg 73.8*   PCO2, ARTERIAL mm Hg 28.4*   HCO3 ART mmol/L 24.5     No results found for: LIPASE  Lab Results   Component Value Date    CHOL 131 12/29/2020    TRIG 207 (H) 12/29/2020    HDL 40 12/29/2020    LDL 57 12/29/2020       No results found for: INTRAOP, PREDX, FINALDX, COMDX    Microbiology Results (last 10 days)     Procedure Component Value - Date/Time    MRSA Screen, PCR (Inpatient) - Swab, Nares [[id number]]  (Normal) Collected: 01/12/21 0309    Lab Status: Final result Specimen: Swab from Nares Updated: 01/12/21 0511     MRSA PCR No MRSA Detected    Legionella Antigen, Urine - Urine, Urine, Clean Catch [[id number]]  (Normal) Collected: 01/11/21 1935    Lab Status: Final result Specimen: Urine, Clean Catch Updated: 01/11/21 2005     LEGIONELLA ANTIGEN, URINE Negative    S. Pneumo Ag Urine or CSF - Urine, Urine, Clean Catch [[id number]]  (Normal) Collected: 01/11/21 1935    Lab Status: Final result Specimen: Urine, Clean Catch Updated: 01/11/21 2005     Strep Pneumo Ag Negative    Blood Culture - Blood, Blood, Venous Line [[id number]] Collected: 01/11/21 1242    Lab Status: Preliminary result Specimen: Blood, Venous Line Updated: 01/15/21 1301     Blood Culture No growth at 4 days    COVID-19,[facility] IN-HOUSE,Nasal Swab No Transport Media 3-4 HR TAT - Swab, Nasal Cavity [[id number]]  (Normal) Collected: 01/11/21 1107    Lab Status: Final result Specimen: Swab from Nasal Cavity Updated: 01/11/21 1137     COVID19 Not Detected    Narrative:      Fact sheet for providers: https://www.fda.gov/media/931923/download     Fact sheet for patients: https://www.fda.gov/media/064954/download    Test performed by PCR.    Blood Culture - Blood, Arm, Left [[id number]] Collected: 01/11/21  1059    Lab Status: Preliminary result Specimen: Blood from Arm, Left Updated: 01/15/21 1115     Blood Culture No growth at 4 days          ECG/EMG Results (most recent)     Procedure Component Value Units Date/Time    ECG 12 Lead [[id number]] Collected: 01/11/21 1056     Updated: 01/12/21 1319     QT Interval 417 ms     Narrative:      HEART RATE= 70  bpm  RR Interval= 855  ms  TX Interval=   ms  P Horizontal Axis=   deg  P Front Axis=   deg  QRSD Interval= 127  ms  QT Interval= 417  ms  QRS Axis= 128  deg  T Wave Axis= -72  deg  - ABNORMAL ECG -  Afib/flutter and ventricular-paced rhythm  When compared with ECG of 08-Dec-2020 15:36:47,  No significant change  Electronically Signed By: Nikolay Domingo ([facility]) 12-Jan-2021 13:18:22  Date and Time of Study: 2021-01-11 10:56:50          Results for orders placed in visit on 02/06/08   SCANNED VASCULAR STUDIES       Results for orders placed during the hospital encounter of 12/08/20   Adult Transthoracic Echo Complete W/ Cont if Necessary Per Protocol    Narrative · Left ventricular wall thickness is consistent with septal asymmetric   hypertrophy.  · Estimated left ventricular EF = 60% Left ventricular systolic function   is normal.  · The right ventricular cavity is borderline dilated.  · The right atrial cavity is borderline dilated.  · Mild aortic valve stenosis is present.  · Estimated right ventricular systolic pressure from tricuspid   regurgitation is normal (<35 mmHg).  · There is a small (<1cm) pericardial effusion adjacent to the left   ventricle.          Ct Chest Without Contrast Diagnostic    Result Date: 1/14/2021  1. There is a small/moderate, partially loculated right pleural effusion which is larger than on 12/09/2020. There is mild adjacent right lower lobe lung consolidation consistent with compressive atelectasis no evidence of acute disease elsewhere in the right lung. 2. Emphysema. 3. Radiographically stable lung nodules. 4. Interval development of tiny  left pleural effusion. 5. Please see above for additional findings.  Electronically Signed By-Elvi Saenz MD On:1/14/2021 8:06 PM This report was finalized on [id number] by  Elvi Saenz MD.    Xr Chest 1 View    Result Date: 1/11/2021   1. Stable right lower lobe, and suspected new mild right upper lobe ill-defined infiltrates. Correlate clinically for worsening pneumonia.  Electronically Signed By-Alexandra Preston MD On:1/11/2021 11:40 AM This report was finalized on [id number] by  Alexandra Preston MD.          Xrays, labs reviewed personally by physician.    Medication Review:   I have reviewed the patient's current medication list      Scheduled Meds  acetylcysteine, 2 mL, Nebulization, BID - RT  aspirin, 81 mg, Oral, Daily  atorvastatin, 20 mg, Oral, Daily  budesonide, 0.5 mg, Nebulization, BID - RT  calcitriol, 0.25 mcg, Oral, Once per day on Mon Wed Fri  carvedilol, 12.5 mg, Oral, Nightly  cefepime, 2 g, Intravenous, Q24H  [START ON 1/16/2021] furosemide, 40 mg, Oral, Daily  guaiFENesin, 600 mg, Oral, Q12H  insulin glargine, 25 Units, Subcutaneous, Nightly  insulin lispro, 0-24 Units, Subcutaneous, TID AC  ipratropium-albuterol, 3 mL, Nebulization, 4x Daily - RT  magnesium oxide, 200 mg, Oral, Daily  mirtazapine, 15 mg, Oral, Nightly  predniSONE, 40 mg, Oral, Daily With Breakfast  sodium chloride, 10 mL, Intravenous, Q12H  theophylline, 300 mg, Oral, Nightly  [START ON 1/16/2021] warfarin, 3.5 mg, Oral, Daily  warfarin, 4 mg, Oral, Once        Meds Infusions  Pharmacy to dose warfarin,         Meds PRN  •  acetaminophen **OR** acetaminophen **OR** acetaminophen  •  ALPRAZolam  •  aluminum-magnesium hydroxide-simethicone  •  dextrose  •  dextrose  •  glucagon (human recombinant)  •  insulin lispro **AND** insulin lispro  •  ipratropium-albuterol  •  melatonin  •  ondansetron **OR** ondansetron  •  Pharmacy to dose warfarin  •  [COMPLETED] Insert peripheral IV **AND** sodium chloride  •  sodium  chloride        Assessment/Plan   Assessment/Plan     Active Hospital Problems    Diagnosis  POA   • **HCAP (healthcare-associated pneumonia) [J18.9]  Yes   • Shortness of breath [R06.02]  Yes   • Acute on chronic diastolic heart failure (CMS/[facility]) [I50.33]  Yes   • CKD (chronic kidney disease) [N18.9]  Yes   • Mixed hyperlipidemia [E78.2]  Yes   • Chronic obstructive pulmonary disease (CMS/[facility]) [J44.9]  Yes   • Coronary artery disease of native artery of native heart with stable angina pectoris (CMS/[facility]) [I25.118]  Yes   • Essential hypertension [I10]  Yes   • Type 2 diabetes mellitus with hyperglycemia, without long-term current use of insulin (CMS/[facility]) [E11.65]  Yes   • Permanent atrial fibrillation (CMS/[facility]) [I48.21]  Yes   • Presence of cardiac pacemaker [Z95.0]  Yes      Resolved Hospital Problems   No resolved problems to display.       MEDICAL DECISION MAKING COMPLEXITY BY PROBLEM:   Right sided pneumonia-improved  -HCAP due to patient recent hospitalization 12/2020  -CXR: stable right lower lobe and suspected new mild right upper lobe ill-defined infiltrates  -blood cultures drawn  -WBC 9.9, lactate 2.0, afebrile  -IV cefepime, IV vancomycin started in ER, continue on cefepime      Shortness of breath-improving   -likely multifactorial from COPD, CHF, and pneumonia   -ABG reviewed on home O2 3L, continue home rate and monitor oxygen saturation  -abx as above  -pulmicort and duonebs, cont home theophylline  -covid negative      Acute on chronic HFpEF, PPM  -proBNP 4000 compared to 2,850 on 12/8/20  -pt has no lower extremity edema or crackles on exam  -give one dose IV lasix 40mg for now then reassess  -check bladder to scan to rule out urinary retention   -cont home coreg, lasix  -2D echo 12/8/20: Left ventricular wall thickness consistent with septal asymmetric hypertrophy, EF 60% with left ventricular systolic function normal, right ventricular cavity borderline dilated, right atrial cavity borderline  dilated, mild atrial valve stenosis, right ventricular systolic pressure from tricuspid regurgitation is normal, small pericardial effusion adjacent to the left ventricle     CKD3  -Cr 2.2-3.2 compared to 1.96 on 12/29/20  -monitor BMP     Hypertension-controlled.   -Cont home coreg  -cont home statin     Hyperlipidemia     DM Type II uncontrolled -add lantus  -recent hgb a1c 7.1%  -SSI AC/HS     Afib, nonobstructive CAD  -controlled. Cont home coreg, coumadin. Digoxin recently stopped by cardiology 1/6/21     KIM  -cpap qhs      VTE Prophylaxis -   Mechanical Order History:      Ordered        01/11/21 1441  Place Sequential Compression Device  Once         01/11/21 1441  Maintain Sequential Compression Device  Continuous                 Pharmalogical Order History:      Ordered     Dose Route Frequency Stop    01/12/21 1545  warfarin (COUMADIN) tablet 5 mg     Question:  Target INR  Answer:  2 - 3    5 mg PO Once (Warfarin) --    01/12/21 1543  Pharmacy to dose warfarin     Question:  Target INR  Answer:  2 - 3    -- XX Continuous PRN --    01/11/21 1443  warfarin (COUMADIN) tablet 3 mg  Status:  Discontinued     Question:  Target INR  Answer:  2 - 3    3 mg PO Nightly 01/12/21 1545                  Code Status -   Code Status and Medical Interventions:   Ordered at: 01/11/21 1441     Level Of Support Discussed With:    Patient     Code Status:    CPR     Medical Interventions (Level of Support Prior to Arrest):    Full       This patient has been examined wearing appropriate Personal Protective Equipment and discussed with rn. 01/15/21    Discharge Planning  home    Electronically signed by Henry Hansen MD, 01/15/21, 16:44 EST.  [facility]ist Team

## 2021-01-15 NOTE — PLAN OF CARE
"Goal Outcome Evaluation:  Plan of Care Reviewed With: patient  Progress: improving  Outcome Summary: Pt is 84 yo male being followed for SOA seocndary to PNA, CHF, and sepsis. Pt demos improvement with actiivty tolerance, but continues to require 5L O2. Desaturates to 84% with minimal activity on 4L O2. Pt unsteady with dynamic balance tasks, requiring CGA - Min A. Reviewed MOck tub transfers and encouraged pt to utilize walk-in shower with shower chair. Pt spouse states concerns for pt with long O2 cord secondary to fall juany, and also states that pt has small walkways in home due to furntiure set up. Encouraged home modifications to allow RW through common walk areas, and briefly reviewed CHF maintainence such as daily weights and diet considerations. Pt has strong social support of spouse, but spouse states pt becomes somewhat irritated with her \"nagging\" for precautions. Patient is adamant on d/c home due to family visiting this weekend from out of state, but OT recommends IP rehab. Briefly discussed this, and pt agreeable to [facility] services with OP Pulmonary rehab when appropriate. PPE: Gloves, mask, eyewear  "

## 2021-01-15 NOTE — PROGRESS NOTES
Daily Progress Note        HCAP (healthcare-associated pneumonia)    Permanent atrial fibrillation (CMS/HCC)    Chronic obstructive pulmonary disease (CMS/HCC)    Coronary artery disease of native artery of native heart with stable angina pectoris (CMS/HCC)    Essential hypertension    Presence of cardiac pacemaker    Type 2 diabetes mellitus with hyperglycemia, without long-term current use of insulin (CMS/HCC)    Mixed hyperlipidemia    Shortness of breath    Acute on chronic diastolic heart failure (CMS/HCC)    CKD (chronic kidney disease)      Assessment:     Right lower lobe pneumonia  CXR: stable right lower lobe and suspected new mild right upper lobe ill-defined infiltrates     Acute on chronic CHF  proBNP 4000 on admission        Office note  CT scan 05/18/2020 right lung nodule smaller in size  history of significant cough and dyspnea and hemoptysis in June 2019 CT scan of the chest 07/25/2019 suggestive of resolving pneumonia  CT chest 12/2016 and 12/14/15 revealed stable nodular densities bilaterally. These findings indicate two year stability at this time. Stable post operative changes in the right mid to lower hemithorax.  Hx of Lung cancer 2008 stge I [facility]--right lower lobectomy  history of recurrent sinus infections   current sleep machine is malfunctioning, patient will need in-lab study for new machine  CONT AUTO-CPAP 8 to 20 cm H2O       Recommendations:     antibiotics DC cefepime , po doxycyline  Steroids wean  Continue duo nebs and pulmicort  Anticoagulation coumadin  Continue theophylline, mucinex,   mucomyst nebs    Nephrology consult CKD           LOS: 4 days     Subjective         Objective     Vital signs for last 24 hours:  Vitals:    01/15/21 0730 01/15/21 0810 01/15/21 0815 01/15/21 1058   BP:    123/73   BP Location:    Right arm   Patient Position:    Lying   Pulse:  70 70 73   Resp:  16 16 16   Temp:    97.4 °F (36.3 °C)   TempSrc:    Oral   SpO2: 95% 96% 96% 92%   Weight:        Height:           Intake/Output last 3 shifts:  I/O last 3 completed shifts:  In: 1920 [P.O.:1920]  Out: 1000 [Urine:1000]  Intake/Output this shift:  I/O this shift:  In: 720 [P.O.:720]  Out: -       Radiology  Imaging Results (Last 24 Hours)     Procedure Component Value Units Date/Time    CT CHEST WITHOUT CONTRAST DIAGNOSTIC [[id number]] Collected: 01/14/21 1948     Updated: 01/14/21 2008    Narrative:      CT CHEST WO CONTRAST-     Date of Exam: 1/14/2021 7:22 PM     Indication: Dyspnea, chronic, unclear etiology; J42-Unspecified chronic  bronchitis; A41.9-Sepsis, unspecified organism; J18.9-Pneumonia,  unspecified organism; R06.00-Dyspnea, unspecified.     Comparison: CT chest 12/09/2020, portable chest 01/11/2021, CT chest  12/10/2014      Technique: Serial and axial CT images of the chest were obtained.  Reconstructions in the coronal and sagittal planes were performed.   Automated exposure control and iterative reconstruction methods were  used.     FINDINGS:     There is stable enlargement of the right lobe of the thyroid. There is  aortic and coronary artery calcification. Aorta is normal in size. There  is mild cardiomegaly. There is a very small pericardial effusion,  decreased from the prior. The patient has a bipolar transvenous pacer.  Mediastinal nodes are not significantly enlarged by CT criteria. A few  are slightly larger than in 2014, including a paratracheal node with  transverse diameter of 9 mm.     There is a small/moderate a partly loculated right pleural effusion,  increased in size from the prior. There is a very tiny new left pleural  effusion. There is moderate emphysema. There are some stable bandlike  opacities in the right upper lobe consistent with scarring. There is an  8 mm right lower lobe nodule on image 57, stable since 12/10/2014. There  is mild consolidation in the dependent right lower lobe, slightly  increased from the prior. There is 13 mm nikki fissural nodularity in  the  anterior right lung, unchanged from the 2014 exam. There is a 5 mm left  lower lobe nodule, unchanged from 2014.     Limited abdominal imaging shows no stable mild left adrenal enlargement.  Bone windows show degenerative spurring of the thoracic spine and severe  arthritis of the right shoulder.       Impression:      1. There is a small/moderate, partially loculated right pleural effusion  which is larger than on 12/09/2020. There is mild adjacent right lower  lobe lung consolidation consistent with compressive atelectasis no  evidence of acute disease elsewhere in the right lung.  2. Emphysema.  3. Radiographically stable lung nodules.  4. Interval development of tiny left pleural effusion.  5. Please see above for additional findings.     Electronically Signed By-Elvi Saenz MD On:1/14/2021 8:06 PM  This report was finalized on [id number] by  Elvi Saenz MD.          Labs:  Results from last 7 days   Lab Units 01/15/21  0620   WBC 10*3/mm3 11.70*   HEMOGLOBIN g/dL 9.5*   HEMATOCRIT % 28.8*   PLATELETS 10*3/mm3 268     Results from last 7 days   Lab Units 01/15/21  1406   SODIUM mmol/L 133*   POTASSIUM mmol/L 5.4*   CHLORIDE mmol/L 97*   CO2 mmol/L 26.0   BUN mg/dL 66*   CREATININE mg/dL 3.22*   CALCIUM mg/dL 9.0   GLUCOSE mg/dL 365*     Results from last 7 days   Lab Units 01/11/21  1132   PH, ARTERIAL pH units 7.544*   PO2 ART mm Hg 73.8*   PCO2, ARTERIAL mm Hg 28.4*   HCO3 ART mmol/L 24.5         Results from last 7 days   Lab Units 01/13/21  0250 01/11/21  1059   CK TOTAL U/L 34  --    TROPONIN T ng/mL  --  0.027             Results from last 7 days   Lab Units 01/15/21  0620 01/14/21  0202 01/13/21  0250  01/11/21  1059   INR  1.83* 1.55* 1.40*   < > 1.43*   APTT seconds  --   --   --   --  27.9    < > = values in this interval not displayed.               Meds:   SCHEDULE  acetylcysteine, 2 mL, Nebulization, BID - RT  aspirin, 81 mg, Oral, Daily  atorvastatin, 20 mg, Oral, Daily  budesonide,  0.5 mg, Nebulization, BID - RT  calcitriol, 0.25 mcg, Oral, Once per day on Mon Wed Fri  carvedilol, 12.5 mg, Oral, Nightly  cefepime, 2 g, Intravenous, Q24H  [START ON 1/16/2021] furosemide, 40 mg, Oral, Daily  guaiFENesin, 600 mg, Oral, Q12H  insulin glargine, 25 Units, Subcutaneous, Nightly  insulin lispro, 0-24 Units, Subcutaneous, TID AC  ipratropium-albuterol, 3 mL, Nebulization, 4x Daily - RT  magnesium oxide, 200 mg, Oral, Daily  mirtazapine, 15 mg, Oral, Nightly  predniSONE, 40 mg, Oral, Daily With Breakfast  sodium chloride, 10 mL, Intravenous, Q12H  theophylline, 300 mg, Oral, Nightly  [START ON 1/16/2021] warfarin, 3.5 mg, Oral, Daily  warfarin, 4 mg, Oral, Once      Infusions  Pharmacy to dose warfarin,       PRNs  •  acetaminophen **OR** acetaminophen **OR** acetaminophen  •  ALPRAZolam  •  aluminum-magnesium hydroxide-simethicone  •  dextrose  •  dextrose  •  glucagon (human recombinant)  •  insulin lispro **AND** insulin lispro  •  ipratropium-albuterol  •  melatonin  •  ondansetron **OR** ondansetron  •  Pharmacy to dose warfarin  •  [COMPLETED] Insert peripheral IV **AND** sodium chloride  •  sodium chloride    Physical Exam:  Physical Exam  Cardiovascular:      Heart sounds: Murmur present.   Pulmonary:      Breath sounds: Wheezing, rhonchi and rales present.         ROS  Review of Systems   Respiratory: Positive for cough, shortness of breath and wheezing.              Total time spent with patient greater than: 45 Minutes

## 2021-01-16 NOTE — PLAN OF CARE
Goal Outcome Evaluation:  Plan of Care Reviewed With: patient  Progress: improving  Creatinine improved from previous day, anticipating discharge today, patient still requiring 5l 02, however returning to baseline and uses oxygen at home

## 2021-01-16 NOTE — PLAN OF CARE
Goal Outcome Evaluation:    Patient is feeling better today, he is hoping to go home to be with his daughter who is in town.

## 2021-01-16 NOTE — PROGRESS NOTES
Daily Progress Note        HCAP (healthcare-associated pneumonia)    Permanent atrial fibrillation (CMS/HCC)    Chronic obstructive pulmonary disease (CMS/HCC)    Coronary artery disease of native artery of native heart with stable angina pectoris (CMS/HCC)    Essential hypertension    Presence of cardiac pacemaker    Type 2 diabetes mellitus with hyperglycemia, without long-term current use of insulin (CMS/HCC)    Mixed hyperlipidemia    Shortness of breath    Acute on chronic diastolic heart failure (CMS/HCC)    CKD (chronic kidney disease)      Assessment:     Right lower lobe pneumonia  CXR: stable right lower lobe and suspected new mild right upper lobe ill-defined infiltrates     Acute on chronic CHF  proBNP 4000 on admission        Office note  CT scan 05/18/2020 right lung nodule smaller in size  history of significant cough and dyspnea and hemoptysis in June 2019 CT scan of the chest 07/25/2019 suggestive of resolving pneumonia  CT chest 12/2016 and 12/14/15 revealed stable nodular densities bilaterally. These findings indicate two year stability at this time. Stable post operative changes in the right mid to lower hemithorax.  Hx of Lung cancer 2008 stge I NSCLCA--right lower lobectomy  history of recurrent sinus infections   current sleep machine is malfunctioning, patient will need in-lab study for new machine  CONT AUTO-CPAP 8 to 20 cm H2O       Recommendations:     Continue current plan; patient feels better today    Antibiotics po doxycyline  Steroids weaned  Continue duo nebs and pulmicort  Anticoagulation coumadin  Continue theophylline, mucinex,   mucomyst nebs discontinued    Nephrology consult CKD           LOS: 5 days     Subjective         Objective     Vital signs for last 24 hours:  Vitals:    01/15/21 2100 01/16/21 0000 01/16/21 0007 01/16/21 0617   BP: 153/79   125/87   BP Location:       Patient Position:       Pulse: 75 70 70 70   Resp: 18 20 20 18   Temp: 98.6 °F (37 °C)   98.6 °F (37  °C)   TempSrc:       SpO2: 92% 94% 94% 92%   Weight:       Height:           Intake/Output last 3 shifts:  I/O last 3 completed shifts:  In: 960 [P.O.:960]  Out: 1400 [Urine:1400]  Intake/Output this shift:  I/O this shift:  In: -   Out: 250 [Urine:250]      Radiology  Imaging Results (Last 24 Hours)     ** No results found for the last 24 hours. **          Labs:  Results from last 7 days   Lab Units 01/16/21  0211   WBC 10*3/mm3 12.10*   HEMOGLOBIN g/dL 9.5*   HEMATOCRIT % 28.9*   PLATELETS 10*3/mm3 268     Results from last 7 days   Lab Units 01/16/21  0211   SODIUM mmol/L 135*   POTASSIUM mmol/L 4.8   CHLORIDE mmol/L 99   CO2 mmol/L 25.0   BUN mg/dL 66*   CREATININE mg/dL 2.72*   CALCIUM mg/dL 9.1   GLUCOSE mg/dL 273*     Results from last 7 days   Lab Units 01/11/21  1132   PH, ARTERIAL pH units 7.544*   PO2 ART mm Hg 73.8*   PCO2, ARTERIAL mm Hg 28.4*   HCO3 ART mmol/L 24.5         Results from last 7 days   Lab Units 01/13/21  0250 01/11/21  1059   CK TOTAL U/L 34  --    TROPONIN T ng/mL  --  0.027             Results from last 7 days   Lab Units 01/16/21  0211 01/15/21  0620 01/14/21  0202  01/11/21  1059   INR  2.00 1.83* 1.55*   < > 1.43*   APTT seconds  --   --   --   --  27.9    < > = values in this interval not displayed.               Meds:   SCHEDULE  aspirin, 81 mg, Oral, Daily  atorvastatin, 20 mg, Oral, Daily  budesonide, 0.5 mg, Nebulization, BID - RT  calcitriol, 0.25 mcg, Oral, Once per day on Mon Wed Fri  carvedilol, 12.5 mg, Oral, Nightly  doxycycline, 100 mg, Oral, Q12H  furosemide, 40 mg, Oral, Daily  guaiFENesin, 600 mg, Oral, Q12H  insulin glargine, 25 Units, Subcutaneous, Nightly  insulin lispro, 0-24 Units, Subcutaneous, TID AC  ipratropium-albuterol, 3 mL, Nebulization, 4x Daily - RT  magnesium oxide, 200 mg, Oral, Daily  mirtazapine, 15 mg, Oral, Nightly  predniSONE, 10 mg, Oral, Daily With Breakfast  sodium chloride, 10 mL, Intravenous, Q12H  theophylline, 300 mg, Oral,  Nightly  warfarin, 3.5 mg, Oral, Daily      Infusions  Pharmacy to dose warfarin,       PRNs  •  acetaminophen **OR** acetaminophen **OR** acetaminophen  •  ALPRAZolam  •  aluminum-magnesium hydroxide-simethicone  •  dextrose  •  dextrose  •  glucagon (human recombinant)  •  insulin lispro **AND** insulin lispro  •  ipratropium-albuterol  •  melatonin  •  ondansetron **OR** ondansetron  •  Pharmacy to dose warfarin  •  [COMPLETED] Insert peripheral IV **AND** sodium chloride  •  sodium chloride    Physical Exam:  Physical Exam  Vitals signs reviewed.   Pulmonary:      Breath sounds: Wheezing present.   Skin:     General: Skin is warm and dry.   Neurological:      Mental Status: He is alert and oriented to person, place, and time.         ROS  Review of Systems   Constitutional: Positive for activity change.   Respiratory: Positive for cough.

## 2021-01-16 NOTE — DISCHARGE SUMMARY
[facility] Medicine Services  DISCHARGE SUMMARY        Prepared For PCP:  Yusef Sosa Jr., MD    Patient Name: Cali Santoro  : 1935  MRN: [id number]      Date of Admission:   2021    Date of Discharge:  2021    Length of stay:  LOS: 5 days     Hospital Course     Presenting Problem:   Dyspnea, unspecified type [R06.00]  Pneumonia due to infectious organism, unspecified laterality, unspecified part of lung [J18.9]  Sepsis, due to unspecified organism, unspecified whether acute organ dysfunction present (CMS/[facility]) [A41.9]      Active Hospital Problems    Diagnosis  POA   • **HCAP (healthcare-associated pneumonia) [J18.9]  Yes   • Shortness of breath [R06.02]  Yes   • Acute on chronic diastolic heart failure (CMS/[facility]) [I50.33]  Yes   • CKD (chronic kidney disease) [N18.9]  Yes   • Mixed hyperlipidemia [E78.2]  Yes   • Chronic obstructive pulmonary disease (CMS/[facility]) [J44.9]  Yes   • Coronary artery disease of native artery of native heart with stable angina pectoris (CMS/[facility]) [I25.118]  Yes   • Essential hypertension [I10]  Yes   • Type 2 diabetes mellitus with hyperglycemia, without long-term current use of insulin (CMS/[facility]) [E11.65]  Yes   • Permanent atrial fibrillation (CMS/[facility]) [I48.21]  Yes   • Presence of cardiac pacemaker [Z95.0]  Yes      Resolved Hospital Problems   No resolved problems to display.     Right sided pneumonia-improved  -HCAP due to patient recent hospitalization 2020  -CXR: stable right lower lobe and suspected new mild right upper lobe ill-defined infiltrates  -blood cultures drawn  -WBC 9.9, lactate 2.0, afebrile  -IV cefepime, IV vancomycin started in ER,  on cefepime >po doxy     Shortness of breath-improving   -likely multifactorial from COPD, CHF, and pneumonia   -ABG reviewed on home O2 3L, continue home rate and monitor oxygen saturation  -abx as above  -pulmicort and duonebs, cont home theophylline  -covid negative      Acute on  chronic HFpEF, PPM  -proBNP 4000 compared to 2,850 on 12/8/20  -pt has no lower extremity edema or crackles on exam  -give one dose IV lasix 40mg for now then reassess  -check bladder to scan to rule out urinary retention   -cont home coreg, lasix  -2D echo 12/8/20: Left ventricular wall thickness consistent with septal asymmetric hypertrophy, EF 60% with left ventricular systolic function normal, right ventricular cavity borderline dilated, right atrial cavity borderline dilated, mild atrial valve stenosis, right ventricular systolic pressure from tricuspid regurgitation is normal, small pericardial effusion adjacent to the left ventricle     CKD3  -Cr 2.2-3.2 compared to 1.96 on 12/29/20  -monitor BMP     Hypertension-controlled.   -Cont home coreg  -cont home statin     Hyperlipidemia     DM Type II uncontrolled -add lantus  -recent hgb a1c 7.1%  -SSI AC/HS     Afib, nonobstructive CAD  -controlled. Cont home coreg, coumadin. Digoxin recently stopped by cardiology 1/6/21     KIM  -cpap [facility]            Hospital Course:  Cali Snatoro is a 85 y.o. male with past medical history of diabetes, hyperlipidemia, cardiomyopathy, hypertension, CAD, COPD, A. fib, CKD and CHF who presents to [facility] ER 1/11/21 complaining of shortness of breath. He stated he has been short of breath upon exertion since he was discharged from here back in December 2020. Within the last two days he has become more short of breath. He walked from his living room to the bathroom this morning and could not catch his breath. He checked his oxygen saturation and it was in the 50s. He denied any cough, fever, or lower extremity swelling. He has been taking lasix and urinating. His lasix was recently decreased due to being up 10-13 times a night per his report.      In the ER the patient had CXR that showed stable right lower lobe and suspected new mild right upper lobe ill-defined infiltrates.  Correlate clinically for worsening  pneumonia.  WBC 9.9, lactate 2.0.  Afebrile.  proBNP 4000. Further labs showed K 5.5, BUN 29, Cr 1.81. ABG showed ph 7.54, CO2 28.4, PO2 73.8, HCO3 24.5 on 3L home O2 rate. He was given IV vancomycin and cefepime and admitted for further treatment of pneumonia     Medical record showed patient was discharged 12/14/2020 after treatment for CHF. He received IV diuretics with help of nephrology and switched to oral lasix at discharge  His CT chest showed a small pleural effusion and a right basilar lung density consistent with atelectasis also some scarring in the right lung and a stable 8 mm lung nodule, 5 mm groundglass nodule in the left lower lobe noted as well small pericardial effusion. He qualified for home O2. He was also treated with antibiotics with the possibility of an infectious component. His strep pneumo antigen test did come back positive.  Discharged on omnicef     Date:  1/12/2021: Patient seen and examined, sitting in recliner, says she feels better.  On 5 L of oxygen.  Dyspnea slightly improved.  With complaints of anxiety  1/13/2021-seen in recliner no acute distress on 5 L of oxygen.  says feels better.  Her anxiety overnight. bg 375 lantus added   1/14/21 feeling better today, had some confusion last night, vss, on 5lts     1/15/21 says feels better, dyspnea still on 5lts, renal function slightly worse. bg 355, lantus increased    1/16/21 doing better will dc home    Reasons For Change In Medications and Indications for New Medications:  Prednisone   doxy      Day of Discharge     Vital Signs:   Temp:  [97.8 °F (36.6 °C)-98.6 °F (37 °C)] 97.8 °F (36.6 °C)  Heart Rate:  [65-75] 70  Resp:  [18-20] 18  BP: (123-153)/(66-87) 123/66     Physical Exam  Vitals signs and nursing note reviewed.   Constitutional:       General: He is not in acute distress.     Appearance: Normal appearance. He is well-developed. He is not ill-appearing, toxic-appearing or diaphoretic.   HENT:      Head: Normocephalic and  atraumatic.      Nose: Nose normal. No congestion or rhinorrhea.      Mouth/Throat:      Mouth: Mucous membranes are moist.      Pharynx: No oropharyngeal exudate.   Eyes:      General: No scleral icterus.        Right eye: No discharge.         Left eye: No discharge.      Extraocular Movements: Extraocular movements intact.      Conjunctiva/sclera: Conjunctivae normal.      Pupils: Pupils are equal, round, and reactive to light.   Neck:      Musculoskeletal: Normal range of motion and neck supple. No neck rigidity or muscular tenderness.      Thyroid: No thyromegaly.      Vascular: No carotid bruit or JVD.      Trachea: No tracheal deviation.   Cardiovascular:      Rate and Rhythm: Normal rate and regular rhythm.      Pulses: Normal pulses.      Heart sounds: Normal heart sounds. No murmur. No friction rub. No gallop.    Pulmonary:      Effort: Pulmonary effort is normal. No respiratory distress.      Breath sounds: Normal breath sounds. No stridor. No wheezing, rhonchi or rales.   Chest:      Chest wall: No tenderness.   Abdominal:      General: Bowel sounds are normal. There is no distension.      Palpations: Abdomen is soft. There is no mass.      Tenderness: There is no abdominal tenderness. There is no guarding or rebound.      Hernia: No hernia is present.   Musculoskeletal: Normal range of motion.         General: No swelling, tenderness, deformity or signs of injury.      Right lower leg: No edema.      Left lower leg: No edema.   Lymphadenopathy:      Cervical: No cervical adenopathy.   Skin:     General: Skin is warm and dry.      Coloration: Skin is not jaundiced or pale.      Findings: No bruising, erythema or rash.   Neurological:      General: No focal deficit present.      Mental Status: He is alert and oriented to person, place, and time. Mental status is at baseline.      Cranial Nerves: No cranial nerve deficit.      Sensory: No sensory deficit.      Motor: No weakness or abnormal muscle tone.       Coordination: Coordination normal.   Psychiatric:         Mood and Affect: Mood normal.         Behavior: Behavior normal.         Thought Content: Thought content normal.         Judgment: Judgment normal.         Pertinent  and/or Most Recent Results     Results from last 7 days   Lab Units 01/16/21  0211 01/15/21  1406 01/15/21  0620 01/14/21  0836 01/13/21  0250 01/12/21  0222 01/11/21  1059   WBC 10*3/mm3 12.10*  --  11.70* 11.40* 11.30* 12.60* 9.90   HEMOGLOBIN g/dL 9.5*  --  9.5* 10.1* 10.2* 11.1* 11.4*   HEMATOCRIT % 28.9*  --  28.8* 31.1* 30.8* 34.0* 34.4*   PLATELETS 10*3/mm3 268  --  268 266 228 264 239   SODIUM mmol/L 135* 133* 134* 131* 134* 137 134*   POTASSIUM mmol/L 4.8 5.4* 5.3* 4.6 5.0 5.2 5.5*   CHLORIDE mmol/L 99 97* 97* 93* 97* 98 96*   CO2 mmol/L 25.0 26.0 28.0 28.0 28.0 29.0 28.0   BUN mg/dL 66* 66* 59* 45* 40* 33* 29*   CREATININE mg/dL 2.72* 3.22* 2.77* 2.20* 2.56* 2.23* 1.81*   GLUCOSE mg/dL 273* 365* 234* 306* 234* 206* 267*   CALCIUM mg/dL 9.1 9.0 9.3 9.3 8.6 9.2 9.2     Results from last 7 days   Lab Units 01/16/21  0211 01/15/21  0620 01/14/21  0202 01/13/21  0250 01/12/21 0222 01/11/21  1059   PROTIME Seconds 21.3 19.6 16.7* 15.2* 15.3* 15.5*   INR  2.00 1.83* 1.55* 1.40* 1.41* 1.43*   APTT seconds  --   --   --   --   --  27.9           Invalid input(s): TG, LDLCALC, LDLREALC  Results from last 7 days   Lab Units 01/13/21  0250 01/12/21  1920 01/11/21  1104 01/11/21  1059   PROBNP pg/mL 3,383.0* 3,801.0*  --  4,001.0*   TROPONIN T ng/mL  --   --   --  0.027   PROCALCITONIN ng/mL 0.14  --   --  0.12   LACTATE mmol/L  --   --  2.0  --        Brief Urine Lab Results  (Last result in the past 365 days)      Color   Clarity   Blood   Leuk Est   Nitrite   Protein   CREAT   Urine HCG        12/29/20 1019             25.5             Microbiology Results Abnormal     Procedure Component Value - Date/Time    Blood Culture - Blood, Blood, Venous Line [[id number]] Collected: 01/11/21 1242     Lab Status: Final result Specimen: Blood, Venous Line Updated: 01/16/21 1300     Blood Culture No growth at 5 days    Blood Culture - Blood, Arm, Left [[id number]] Collected: 01/11/21 1059    Lab Status: Final result Specimen: Blood from Arm, Left Updated: 01/16/21 1116     Blood Culture No growth at 5 days    MRSA Screen, PCR (Inpatient) - Swab, Nares [[id number]]  (Normal) Collected: 01/12/21 0309    Lab Status: Final result Specimen: Swab from Nares Updated: 01/12/21 0511     MRSA PCR No MRSA Detected    Legionella Antigen, Urine - Urine, Urine, Clean Catch [[id number]]  (Normal) Collected: 01/11/21 1935    Lab Status: Final result Specimen: Urine, Clean Catch Updated: 01/11/21 2005     LEGIONELLA ANTIGEN, URINE Negative    S. Pneumo Ag Urine or CSF - Urine, Urine, Clean Catch [[id number]]  (Normal) Collected: 01/11/21 1935    Lab Status: Final result Specimen: Urine, Clean Catch Updated: 01/11/21 2005     Strep Pneumo Ag Negative    COVID-19,[facility] [employer] IN-HOUSE,Nasal Swab No Transport Media 3-4 HR TAT - Swab, Nasal Cavity [[id number]]  (Normal) Collected: 01/11/21 1107    Lab Status: Final result Specimen: Swab from Nasal Cavity Updated: 01/11/21 1137     COVID19 Not Detected    Narrative:      Fact sheet for providers: https://www.fda.gov/media/001032/download     Fact sheet for patients: https://www.fda.gov/media/975222/download    Test performed by PCR.          Xr Chest 2 View    Result Date: 12/29/2020  Impression: 1.Persistent but decreasing right lower lobe airspace opacities, likely due to improving pneumonia. 2.Stable enlarged cardiac silhouette.   Electronically Signed By-Silva Pablo MD On:12/29/2020 10:54 AM This report was finalized on [id number] by  Silva Pablo MD.    Ct Chest Without Contrast Diagnostic    Result Date: 1/14/2021  Impression: 1. There is a small/moderate, partially loculated right pleural effusion which is larger than on 12/09/2020. There is mild adjacent right lower lobe lung  consolidation consistent with compressive atelectasis no evidence of acute disease elsewhere in the right lung. 2. Emphysema. 3. Radiographically stable lung nodules. 4. Interval development of tiny left pleural effusion. 5. Please see above for additional findings.  Electronically Signed By-Elvi Saenz MD On:1/14/2021 8:06 PM This report was finalized on [id number] by  Elvi Saenz MD.    Xr Chest 1 View    Result Date: 1/11/2021  Impression:  1. Stable right lower lobe, and suspected new mild right upper lobe ill-defined infiltrates. Correlate clinically for worsening pneumonia.  Electronically Signed By-Alexandra Preston MD On:1/11/2021 11:40 AM This report was finalized on [id number] by  Alexandra Preston MD.      Results for orders placed in visit on 02/06/08   SCANNED VASCULAR STUDIES       Results for orders placed in visit on 02/06/08   SCANNED VASCULAR STUDIES       Results for orders placed during the hospital encounter of 12/08/20   Adult Transthoracic Echo Complete W/ Cont if Necessary Per Protocol    Narrative · Left ventricular wall thickness is consistent with septal asymmetric   hypertrophy.  · Estimated left ventricular EF = 60% Left ventricular systolic function   is normal.  · The right ventricular cavity is borderline dilated.  · The right atrial cavity is borderline dilated.  · Mild aortic valve stenosis is present.  · Estimated right ventricular systolic pressure from tricuspid   regurgitation is normal (<35 mmHg).  · There is a small (<1cm) pericardial effusion adjacent to the left   ventricle.                  Test Results Pending at Discharge        Procedures Performed           Consults:   Consults     Date and Time Order Name Status Description    1/13/2021 0940 Inpatient Nephrology Consult Completed     1/13/2021 0032 Inpatient Pulmonology Consult Completed     1/11/2021 1149 Hospitalist (on-call MD unless specified) Completed     12/9/2020 1135 Inpatient Nephrology Consult  Completed     12/8/2020 1846 Hospitalist (on-call MD unless specified) Completed         Assessment:     Right lower lobe pneumonia  CXR: stable right lower lobe and suspected new mild right upper lobe ill-defined infiltrates     Acute on chronic CHF  proBNP 4000 on admission        Office note  CT scan 05/18/2020 right lung nodule smaller in size  history of significant cough and dyspnea and hemoptysis in June 2019 CT scan of the chest 07/25/2019 suggestive of resolving pneumonia  CT chest 12/2016 and 12/14/15 revealed stable nodular densities bilaterally. These findings indicate two year stability at this time. Stable post operative changes in the right mid to lower hemithorax.  Hx of Lung cancer 2008 stge I NSCLCA--right lower lobectomy  history of recurrent sinus infections   current sleep machine is malfunctioning, patient will need in-lab study for new machine  CONT AUTO-CPAP 8 to 20 cm H2O        Recommendations:     Continue current plan; patient feels better today     Antibiotics po doxycyline  Steroids weaned  Continue duo nebs and pulmicort  Anticoagulation coumadin  Continue theophylline, mucinex,   mucomyst nebs discontinued     N    Discharge Details        Discharge Medications      New Medications      Instructions Start Date   budesonide 0.5 MG/2ML nebulizer solution  Commonly known as: PULMICORT   0.5 mg, Nebulization, 2 Times Daily - RT, J44.9 copd      doxycycline 100 MG tablet  Commonly known as: ADOXA   100 mg, Oral, Every 12 Hours Scheduled      guaiFENesin 600 MG 12 hr tablet  Commonly known as: MUCINEX   600 mg, Oral, Every 12 Hours Scheduled      ipratropium-albuterol 0.5-2.5 mg/3 ml nebulizer  Commonly known as: DUO-NEB   3 mL, Nebulization, Every 4 Hours PRN, COPD j 44.9      predniSONE 10 MG tablet  Commonly known as: DELTASONE   10 mg, Oral, Daily With Breakfast, X 7 days then 5 mg x 5   Start Date: January 17, 2021        Continue These Medications      Instructions Start Date    acetaminophen 500 MG tablet  Commonly known as: TYLENOL   500 mg, Oral, Every 6 Hours PRN      aspirin 81 MG tablet   81 mg, Oral, Nightly      calcitriol 0.25 MCG capsule  Commonly known as: ROCALTROL   0.25 mcg, Oral, 3 Times Weekly, Mon, Wed, Fri       carvedilol 12.5 MG tablet  Commonly known as: COREG   12.5 mg, Oral, Nightly      furosemide 40 MG tablet  Commonly known as: LASIX   40 mg, Oral, Daily      Magnesium 250 MG tablet   1 tablet, Oral, Daily      melatonin 5 MG tablet tablet   10 mg, Oral, Nightly PRN      mirtazapine 15 MG tablet  Commonly known as: REMERON   15 mg, Oral, Nightly      Ozempic (0.25 or 0.5 MG/DOSE) 2 MG/1.5ML solution pen-injector  Generic drug: Semaglutide(0.25 or 0.5MG/DOS)   0.25 mg subcu weekly for 4 weeks then increase to 0.5 mg subcu weekly if tolerated.      simvastatin 40 MG tablet  Commonly known as: ZOCOR   1 tablet p.o. at bedtime.      theophylline 300 MG 12 hr tablet  Commonly known as: THEODUR   300 mg, Oral, Nightly      warfarin 3 MG tablet  Commonly known as: COUMADIN   3 mg, Oral, Nightly             Allergies   Allergen Reactions   • Penicillins Itching         Discharge Disposition:  Home or Self Care    Diet:  Hospital:  Diet Order   Procedures   • Diet Renal, Diabetic/Consistent Carbs; Diabetic - Consistent Carb; 2gm Na+, 2gm K+         Discharge Activity:   Activity Instructions     As tolerated                 CODE STATUS:    Code Status and Medical Interventions:   Ordered at: 01/11/21 1441     Level Of Support Discussed With:    Patient     Code Status:    CPR     Medical Interventions (Level of Support Prior to Arrest):    Full         Follow-up Appointments  Future Appointments   Date Time Provider Department Center   4/7/2021 12:50 PM Luis Paulino MD [facility]ARD [facility] NA   7/2/2021  9:45 AM Jhonatan Mart MD [facility] NA None       Additional Instructions for the Follow-ups that You Need to Schedule     Ambulatory Referral to Home Health    As directed      Face to Face Visit Date: 1/14/2021    Follow-up provider for Plan of Care?: I treated the patient in an acute care facility and will not continue treatment after discharge.    Follow-up provider: PIETER BRAVO JR. [[id number]]    Reason/Clinical Findings: pneumonia    Describe mobility limitations that make leaving home difficult: increased Oxygen needs    Nursing/Therapeutic Services Requested: Other (Resumption of care with [facility])    Frequency: 1 Week 1                 Condition on Discharge:      Stable      This patient has been examined wearing appropriate Personal Protective Equipment and discussed with rn. 01/16/21      Electronically signed by Henry Hansen MD, 01/16/21, 3:39 PM EST.      Time: I spent  35  minutes on this discharge activity which included face-to-face encounter with the patient/reviewing the data in the system/coordination of the care with the nursing staff as well as consultants/documentation/entering orders.

## 2021-01-16 NOTE — OUTREACH NOTE
Prep Survey      Responses   [facility] facility patient discharged from?  [facility]   Is LACE score < 7 ?  No   Emergency Room discharge w/ pulse ox?  No   Eligibility  [facility]   Date of Admission  01/11/21   Date of Discharge  01/16/21   Discharge Disposition  Home-Health Care Svc   Discharge diagnosis  HCAP (healthcare-associated pneumonia Sepsis   Does the patient have one of the following disease processes/diagnoses(primary or secondary)?  Sepsis   Does the patient have Home health ordered?  Yes   What [facility] the [facility] health agency?   [facility]   Is there a DME ordered?  Yes   What DME was ordered?  [facility]   Prep survey completed?  Yes          Stella Lima RN

## 2021-01-16 NOTE — PROGRESS NOTES
"Pharmacy dosing service  Anticoagulant  Warfarin     Subjective:    Cali Santoro is a 85 y.o.male being continued on warfarin for atrial fibrillation.    INR Goal: 2 - 3  Home medication?:  Yes, warfarin 3 mg once daily  Bridge Therapy Present?:  No  Interacting Medications Evaluation (New/Present/Discontinued): cefepime  Additional Contributing Factors:      Assessment/Plan:    INR now within goal range. Will begin with 3.5 mg daily, which is a ~15% increase in daily regimen.    Continue to monitor and adjust based on INR.         Date 1/11 1/12 1/13 1/14 1/15 1/16      INR 1.43 1.41 1.4 1.55 1.83 2      Dose 3 mg 5 mg 5 mg 4 mg 4 mg 3.5 mg          Objective:  [Ht: 170.2 cm (67\"); Wt: 76.7 kg (169 lb 1.5 oz); BMI: Body mass index is 26.48 kg/m².]    Lab Results   Component Value Date    ALBUMIN 4.40 12/29/2020     Lab Results   Component Value Date    INR 2.00 01/16/2021    INR 1.83 (L) 01/15/2021    INR 1.55 (L) 01/14/2021    PROTIME 21.3 01/16/2021    PROTIME 19.6 01/15/2021    PROTIME 16.7 (L) 01/14/2021     Lab Results   Component Value Date    HGB 9.5 (L) 01/16/2021    HGB 9.5 (L) 01/15/2021    HGB 10.1 (L) 01/14/2021     Lab Results   Component Value Date    HCT 28.9 (L) 01/16/2021    HCT 28.8 (L) 01/15/2021    HCT 31.1 (L) 01/14/2021       Doroteo Gtz, [facility]  01/16/21 09:26 EST  "

## 2021-01-18 NOTE — TELEPHONE ENCOUNTER
I wrote the prescription for the nebulizer.  He is to check with his cardiologist regarding his Lanoxin.

## 2021-01-18 NOTE — PROGRESS NOTES
Case Management Discharge Note      Final Note: Patient is current with [facility] (resumption of care written). Ela with [facility] Notified. Patient is now on 5 L NC. Patient in the process of switching over to [employer].         Selected Continued Care - Discharged on 1/16/2021 Admission date: 1/11/2021 - Discharge disposition: Home or Self Care        Home Medical Care     Service Provider Selected Services Address Phone Fax Patient Preferred    [facility] [address] [address] [phone] --                Selected Continued Care - Prior Encounters Includes selections from prior encounters from 10/13/2020 to 1/16/2021    Discharged on 12/14/2020 Admission date: 12/8/2020 - Discharge disposition: Home-Health Care Svc    Durable Medical Equipment     Service Provider Selected Services Address Phone Fax Patient Preferred    [facility]  Durable Medical Equipment [address], [place] [address] [phone] --                                Final Discharge Disposition Code: 06 - home with home health care

## 2021-01-18 NOTE — OUTREACH NOTE
Call Center TCM Note      Responses   [facility] [place] patient discharged from?  [facility]   Does the patient have one of the following disease processes/diagnoses(primary or secondary)?  Sepsis   TCM attempt successful?  No   Unsuccessful attempts  Attempt 1          Jonah Gamez RN    1/18/2021, 16:04 EST

## 2021-01-18 NOTE — OUTREACH NOTE
Call Center TCM Note      Responses   [facility] patient discharged from?  [facility]   Does the patient have one of the following disease processes/diagnoses(primary or secondary)?  Sepsis   TCM attempt successful?  No   Unsuccessful attempts  Attempt 2          Jonah Gamez RN    1/18/2021, 16:23 EST

## 2021-01-18 NOTE — TELEPHONE ENCOUNTER
Gena RN with [facility] calling to discuss the patient taking Dixogin & needs order for nebulier. Please call Gena back at [phone].

## 2021-01-19 PROBLEM — D63.1 ANEMIA DUE TO STAGE 3 CHRONIC KIDNEY DISEASE (HCC): Chronic | Status: ACTIVE | Noted: 2021-01-01

## 2021-01-19 PROBLEM — I48.21 PERMANENT ATRIAL FIBRILLATION (HCC): Chronic | Status: ACTIVE | Noted: 2019-06-17

## 2021-01-19 PROBLEM — G47.30 SLEEP APNEA: Chronic | Status: ACTIVE | Noted: 2019-06-27

## 2021-01-19 PROBLEM — I10 ESSENTIAL HYPERTENSION: Chronic | Status: ACTIVE | Noted: 2019-06-27

## 2021-01-19 PROBLEM — I25.118 CORONARY ARTERY DISEASE OF NATIVE ARTERY OF NATIVE HEART WITH STABLE ANGINA PECTORIS (HCC): Chronic | Status: ACTIVE | Noted: 2019-06-27

## 2021-01-19 PROBLEM — I50.33 ACUTE ON CHRONIC HEART FAILURE WITH PRESERVED EJECTION FRACTION (HCC): Chronic | Status: ACTIVE | Noted: 2020-01-01

## 2021-01-19 PROBLEM — I50.33 ACUTE ON CHRONIC DIASTOLIC HEART FAILURE (HCC): Status: RESOLVED | Noted: 2021-01-01 | Resolved: 2021-01-01

## 2021-01-19 PROBLEM — N18.30 ANEMIA DUE TO STAGE 3 CHRONIC KIDNEY DISEASE (HCC): Chronic | Status: ACTIVE | Noted: 2021-01-01

## 2021-01-19 PROBLEM — N18.32 STAGE 3B CHRONIC KIDNEY DISEASE (HCC): Chronic | Status: ACTIVE | Noted: 2019-07-03

## 2021-01-19 PROBLEM — N18.30 ANEMIA DUE TO STAGE 3 CHRONIC KIDNEY DISEASE (HCC): Status: ACTIVE | Noted: 2021-01-01

## 2021-01-19 PROBLEM — E11.65 TYPE 2 DIABETES MELLITUS WITH HYPERGLYCEMIA, WITHOUT LONG-TERM CURRENT USE OF INSULIN (HCC): Chronic | Status: ACTIVE | Noted: 2019-06-27

## 2021-01-19 PROBLEM — E78.2 MIXED HYPERLIPIDEMIA: Chronic | Status: ACTIVE | Noted: 2019-10-03

## 2021-01-19 PROBLEM — J18.9 HCAP (HEALTHCARE-ASSOCIATED PNEUMONIA): Chronic | Status: ACTIVE | Noted: 2021-01-01

## 2021-01-19 PROBLEM — D63.1 ANEMIA DUE TO STAGE 3 CHRONIC KIDNEY DISEASE (HCC): Status: ACTIVE | Noted: 2021-01-01

## 2021-01-19 PROBLEM — J44.9 CHRONIC OBSTRUCTIVE PULMONARY DISEASE (HCC): Chronic | Status: ACTIVE | Noted: 2019-06-27

## 2021-01-19 PROBLEM — N18.9 CKD (CHRONIC KIDNEY DISEASE): Chronic | Status: RESOLVED | Noted: 2021-01-01 | Resolved: 2021-01-01

## 2021-01-19 PROBLEM — E11.39 TYPE 2 DIABETES MELLITUS WITH OTHER DIABETIC OPHTHALMIC COMPLICATION (HCC): Status: RESOLVED | Noted: 2020-04-01 | Resolved: 2021-01-01

## 2021-01-19 PROBLEM — R06.02 SHORTNESS OF BREATH: Status: RESOLVED | Noted: 2021-01-01 | Resolved: 2021-01-01

## 2021-01-19 NOTE — OUTREACH NOTE
Call Center TCM Note      Responses   [facility] patient discharged from?  [place]   Does the patient have one of the following disease processes/diagnoses(primary or secondary)?  Sepsis   TCM attempt successful?  Yes   Discharge diagnosis  HCAP (healthcare-associated pneumonia Sepsis   Wrap up additional comments  Pt d/c 01/16/2021 and completed face to face TCM [facility] with PCP today 01/19/2021.          Melyssa Glover MA    1/19/2021, 16:41 EST

## 2021-01-19 NOTE — PROGRESS NOTES
Subjective   Cali Santoro is a 85 y.o. male.     Chief Complaint   Patient presents with   • Pneumonia     hospital followup       HPI  Chief complaint: Pneumonia anemia chronic kidney disease hypertension hyperlipidemia    Patient is an 85-year-old white male comes in for follow-up and maintenance of his current problems which include    1.  Pneumonia-new-patient was recently hospitalized because of pneumonia.  Patient was treated for healthcare associated.  Patient had treat with antibiotics corticosteroids bronchodilators and oxygen.  Ultimately improved to the point that he was able to be discharged home on oxygen.  He is now at home with his wife.  He has home health.  He states he is very weak.  States is not able to do anything that he was able to do before.  Patient denied fever chills.    2.  Acute on chronic renal failure-deteriorated-the patient also developed acute on failure.  His creatinine went up to 2.72.  Patient denied urinary frequency intermittency hesitancy or incontinence.    3.  Hypertension-stable-patient is currently taking Lasix 40 mg once a day Coreg 12.5 mg nightly.  He denied headache lightheadedness dizziness or chest pain.    4.  Hyperlipidemia-stable-patient is currently on simvastatin 40 mg daily.  No myalgias no arthralgias.    5.  COPD-deteriorated-patient had exacerbation of his COPD.  Patient is now on prednisone and oxygen in addition to his duo nebs Jeff 300 mg nightly Pulmicort nebulization treatments.  He does have shortness of breath with minimal activity.    6.  Atrial fibrillation-stable-patient on Coumadin Coreg.  His Lanoxin has been decreased to twice a week.    7.  Coronary artery disease-stable-patient denied chest pain shortness of breath orthopnea or PND is currently on aspirin and Coreg.    8.  Type 2 diabetes mellitus-stable-patient has been changed to Ozempic weekly.    The following portions of the patient's history were reviewed and updated as  "appropriate: allergies, current medications, past family history, past medical history, past social history, past surgical history and problem list.    Review of Systems    Objective     /73 (BP Location: Right arm, Patient Position: Sitting, Cuff Size: Adult)   Pulse 70   Temp 96.9 °F (36.1 °C) (Infrared)   Resp 18   Ht 170.2 cm (67\")   Wt 77.9 kg (171 lb 12.8 oz)   SpO2 94% Comment: 4 liters of oxygen  BMI 26.91 kg/m²     Physical Exam  Vitals signs and nursing note reviewed.   Constitutional:       Appearance: He is well-developed and normal weight.   HENT:      Head: Normocephalic and atraumatic.   Neck:      Musculoskeletal: Neck supple.   Cardiovascular:      Rate and Rhythm: Normal rate and regular rhythm.      Pulses: Normal pulses.      Heart sounds: Normal heart sounds.   Pulmonary:      Effort: Pulmonary effort is normal.      Breath sounds: Normal breath sounds.   Abdominal:      General: Abdomen is flat. Bowel sounds are normal.      Palpations: Abdomen is soft.   Musculoskeletal: Normal range of motion.   Skin:     General: Skin is warm and dry.   Neurological:      Mental Status: He is alert and oriented to person, place, and time.   Psychiatric:         Behavior: Behavior normal.         Thought Content: Thought content normal.         Judgment: Judgment normal.           Assessment/Plan   Diagnoses and all orders for this visit:    1. Pneumonia due to Streptococcus pneumoniae, unspecified laterality, unspecified part of lung (CMS/HCC) (Primary)-finished corticosteroids.  Gradual resume regular activities.    2. Acute renal failure superimposed on stage 3 chronic kidney disease, unspecified acute renal failure type, unspecified whether stage 3a or 3b CKD (CMS/HCC)    3. Hypoxemia    4. Essential hypertension-continue Coreg 12.5 mg nightly Lasix 40 mg daily.  -     CBC & Differential; Future  -     Comprehensive Metabolic Panel; Future    5. Mixed hyperlipidemia-continue simvastatin    6. " Anemia due to stage 3 chronic kidney disease, unspecified whether stage 3a or 3b CKD-have a follow-up labs done    7. Coronary artery disease of native artery of native heart with stable angina pectoris (CMS/HCC)-continue aspirin 81 mg daily and Coreg    8. Permanent atrial fibrillation (CMS/HCC)-continue Coreg Lanoxin and Coumadin.      Patient Instructions   Continue your current medications and treatment.    Follow up in the office in 6 weeks.    Have the follow up labs done in 2 weeks.          Yusef Sosa Jr., MD    01/19/21

## 2021-01-19 NOTE — PATIENT INSTRUCTIONS
Continue your current medications and treatment.    Follow up in the office in 6 weeks.    Have the follow up labs done in 2 weeks.

## 2021-01-20 NOTE — TELEPHONE ENCOUNTER
[facility] health nurse calling to report he is on Prednisone. His blood sugar today is 501. His only medication for diabetes is Ozempic. We can reach her back with orders at [phone].

## 2021-01-21 NOTE — TELEPHONE ENCOUNTER
I spoke with Radha and [facility] and gave her this information. She is asking if this is the Humalog? She is going to check and see if he has any insulin and if not will have us send Rx to pharmacy.

## 2021-01-21 NOTE — TELEPHONE ENCOUNTER
I spoke with Radha at [facility] and gave her this clarification. She verbalized understanding. Need medication sent to his [facility] pharmacy.

## 2021-01-25 NOTE — OUTREACH NOTE
Sepsis Week 2 Survey      Responses   [facility] patient discharged from?  [facility]   Does the patient have one of the following disease processes/diagnoses(primary or secondary)?  Sepsis   Week 2 attempt successful?  Yes   Call start time  1558   Call end time  1602   Discharge diagnosis  HCAP (healthcare-associated pneumonia Sepsis   Person spoke with today (if not patient) and relationship  Wife   Meds reviewed with patient/caregiver?  Yes   Is the patient having any side effects they believe may be caused by any medication additions or changes?  No   Does the patient have all medications related to this admission filled (includes all antibiotics, inhalers, nebulizers,steroids,etc.)  Yes   Is the patient taking all medications as directed (includes completed medication regime)?  Yes   Does the patient have a primary care provider?   Yes   Comments regarding PCP  Patient saw PCP last week.  Will also see another provider tomorrow.    Does the patient have an appointment with their PCP within 7 days of discharge?  Yes   Has the patient kept scheduled appointments due by today?  Yes   Comments  patient had Covid vaccine today   What is the Home health agency?   [facility]   Has home health visited the patient within 72 hours of discharge?  Yes   Home health comments  PT will visit [facility]   What DME was ordered?  patient travelled with portable oxygen today when getting vaccine.   Psychosocial issues?  No   Comments  patient is able to shower, ambulate, breathing has improved.    Did the patient receive a copy of their discharge instructions?  Yes   Nursing interventions  Reviewed instructions with patient   What is the patient's perception of their health status since discharge?  Improving   Is the patient/caregiver able to teach back the hierarchy of who to call/visit for symptoms/problems? PCP, Specialist, Home health nurse, Urgent Care, ED, 911  Yes   Week 2 call completed?  Yes   Wrap up additional comments  Spouse  reports patient is doing well at this time.  She denies needs during this call.           Linda Calzada RN

## 2021-01-27 NOTE — TELEPHONE ENCOUNTER
Home Health nurse (Yamil [phone]) called stating patient could never get his insulin. [facility] did not have any and could not find it at any other [facility] pharmacy. He is done with the Prednisone. His sugar today was 321. He is feeling good. Do you want him to try and get the insulin or D/C that order and just monitor his sugars. If we want him to stay on it will need to resend the prescription.

## 2021-02-02 NOTE — PROGRESS NOTES
I spoke with the wife.  The patient is to start lantus 10 units a day and sliding scale insulin.  I wrote the prescription.

## 2021-02-02 NOTE — OUTREACH NOTE
Sepsis Week 3 Survey      Responses   [facility] patient discharged from?  [facility]   Does the patient have one of the following disease processes/diagnoses(primary or secondary)?  Sepsis   Week 3 attempt successful?  Yes   Call start time  1222   Call end time  1230   Discharge diagnosis  HCAP (healthcare-associated pneumonia),  Sepsis   Meds reviewed with patient/caregiver?  Yes   Is the patient taking all medications as directed (includes completed medication regime)?  Yes   Comments regarding appointments  Appt with pulm is on 3/1/21   Comments regarding PCP  3/2/21   Has the patient kept scheduled appointments due by today?  Yes   What is the patient's perception of their health status since discharge?  Improving [BS readings are high. Pt went off of steroids 1/26/21. BS readings are: 321, 406, 438, 420, 512, 285, 420, 353, 540, 384, 568, 353. Dr. Sosa is aware. Weight is 160 lbs today. RN routed message to PCP. ]   Nursing interventions  Nurse provided patient education   Is the patient/caregiver able to teach back Sepsis?  S - Shivering,fever or very cold, S - Sleepy, difficult to arouse,confused, S - Short of breath   Nursing interventions  Nurse provided patient education   Is patient/caregiver able to teach back steps to recovery at home?  Rest and regain strength, Eat a balanced diet   Is the patient/caregiver able to teach back signs and symptoms of worsening condition:  Fever, Hyperthermia, Shortness of breath/rapid respiratory rate, Altered mental status(confusion/coma)   Week 3 call completed?  Yes          Valentina Pereira RN

## 2021-02-09 NOTE — OUTREACH NOTE
Sepsis Week 4 Survey      Responses   [facility] patient discharged from?  [facility]   Does the patient have one of the following disease processes/diagnoses(primary or secondary)?  Sepsis   Week 4 attempt successful?  No          Maryjo Pham RN

## 2021-03-02 PROBLEM — A41.9 SEPSIS (HCC): Status: RESOLVED | Noted: 2021-01-01 | Resolved: 2021-01-01

## 2021-03-02 NOTE — PROGRESS NOTES
"Subjective   Cali Santoro is a 85 y.o. male.     Chief Complaint   Patient presents with   • Pneumonia     6 week followup       HPI  Chief complaint: Pneumonia COPD type 2 diabetes mellitus    Patient is an 85-year-old white male comes in for follow-up and maintenance of his current problems which includes    1.  Pneumonia-improved-patient developed increased shortness of breath and fatigue several weeks ago.  Evaluation revealed onset new onset of right pleural effusion.  Patient was hospitalized.  Was treated empirically for community-acquired pneumonia.  Is completed therapy.  He denies fever chills he.  He does have cough productive of scant sputum.  He does have persistent shortness of breath with minimal activity he has to use his oxygen all the time.  Specific cause for that right pleural effusion was not determined.  It was decided that pneumonia was the source of this.    2.  COPD with hypoxemia-deteriorated and has history of severe chronic obstructive lung disease with hypoxemia.  The patient is on Jeff 300 mg daily Pulmicort mini nebs.  He has ongoing shortness of breath.  He gets short of breath with minimal activity such as walking across the room.    3.  Type 2 diabetes mellitus-not controlled-patient has type 2 diabetes mellitus.  Blood sugars been running high lately.  Patient is currently on Ozempic and sliding scale insulin.  Patient had been on long-acting insulin but this was stopped.  I recommended resuming Lantus at 20 units a day.        The following portions of the patient's history were reviewed and updated as appropriate: allergies, current medications, past family history, past medical history, past social history, past surgical history and problem list.    Review of Systems    Objective     /85 (BP Location: Left arm, Patient Position: Sitting, Cuff Size: Adult)   Pulse 75   Temp 96.8 °F (36 °C) (Infrared)   Resp 18   Ht 170.2 cm (67\")   Wt 77.6 kg (171 lb)   SpO2 92% " Comment: 3 liters of oxygen  BMI 26.78 kg/m²     Physical Exam  Vitals signs and nursing note reviewed.   Constitutional:       Appearance: He is well-developed and normal weight.   HENT:      Head: Normocephalic and atraumatic.      Nose: Nose normal.      Mouth/Throat:      Mouth: Mucous membranes are moist.      Pharynx: Oropharynx is clear.   Eyes:      Extraocular Movements: Extraocular movements intact.      Conjunctiva/sclera: Conjunctivae normal.      Pupils: Pupils are equal, round, and reactive to light.   Neck:      Musculoskeletal: Neck supple.   Cardiovascular:      Rate and Rhythm: Normal rate and regular rhythm.      Pulses: Normal pulses.      Heart sounds: Normal heart sounds.   Pulmonary:      Effort: Pulmonary effort is normal.      Breath sounds: Normal breath sounds.   Abdominal:      General: Abdomen is flat. Bowel sounds are normal.      Palpations: Abdomen is soft.   Musculoskeletal: Normal range of motion.   Skin:     General: Skin is warm and dry.   Neurological:      Mental Status: He is alert and oriented to person, place, and time.   Psychiatric:         Behavior: Behavior normal.         Thought Content: Thought content normal.         Judgment: Judgment normal.           Assessment/Plan   Diagnoses and all orders for this visit:    1. Chronic obstructive pulmonary disease, unspecified COPD type (CMS/HCC) (Primary)  -     CBC & Differential; Future  -     Comprehensive Metabolic Panel; Future    2. Hypoxemia    3. Type 2 diabetes mellitus with hyperglycemia, without long-term current use of insulin (CMS/[facility])    4. HCAP (healthcare-associated pneumonia)  -     XR Chest 2 View; Future    Other orders  -     insulin aspart (NovoLOG FlexPen) 100 UNIT/ML solution pen-injector sc pen; Check BS 4 times a day. Take 1U for 30 gm/dl the blood sugar is above 130  Max 60 units per 24 hours    Dx E11.65  Dispense: 3 pen; Refill: 1  -     Insulin Glargine (LANTUS SOLOSTAR) 100 UNIT/ML injection pen;  Inject 20 Units under the skin into the appropriate area as directed Daily.      Patient Instructions   Continue your current medications and treat,ment.    Have the follow up labs done and call for results.    Increase the lantus to 20 units a day.    Go to pulmonary rehabilitation.    Follow up in the offcie in 6 weeks.      Yusef Sosa Jr., MD    03/02/21

## 2021-03-02 NOTE — PATIENT INSTRUCTIONS
Continue your current medications and treat,ment.    Have the follow up labs done and call for results.    Increase the lantus to 20 units a day.    Go to pulmonary rehabilitation.    Follow up in the offcie in 6 weeks.

## 2021-03-03 NOTE — TELEPHONE ENCOUNTER
"Caller: WILDER MARTINEZ \"RENETTA\"    Relationship: Emergency Contact    Best call back number: [phone]    Medication needed:   **RAJANI PLUS ACCU CHECK STRIPS    When do you need the refill by: 3/4    Does the patient have less than a 3 day supply:  [x] Yes  [] No    What is the patient's [facility] [place] [id number] [facility] [phone] [facility] [phone] FX             "

## 2021-03-04 NOTE — TELEPHONE ENCOUNTER
"Caller: WILDER MARTINEZ \"RENETTA\"    Relationship to patient: Emergency Contact    Best call back number: [phone]    Patient is needing: Patient's wife said that she called [facility] and was told that they have not received refill for test strips yet. She asked if refill could be sent to [facility] today. Patient is out of test strips.   "

## 2021-03-14 PROBLEM — R06.02 SHORTNESS OF BREATH: Status: ACTIVE | Noted: 2021-01-01

## 2021-03-14 NOTE — H&P
[facility] Medicine Services      Patient Name: Cali Santoro  : 1935  MRN: [id number]  Primary Care Physician: Yusef Sosa Jr., MD  Date of admission: 3/14/2021    Patient Care Team:  Yusef Sosa Jr., MD as PCP - General  Yusef Sosa Jr., MD as PCP - Family Medicine  Luis Paulino MD as Consulting Physician (Cardiology)          Subjective   History Present Illness     Chief Complaint:   Chief Complaint   Patient presents with   • Shortness of Breath   Increasing shortness of breath and inability to lie flat      Mr. Santoro is a 85 y.o.  presents to [facility] complaining of shortness of breath.  The patient has numerous diagnoses including COPD, coronary artery disease, sick sinus syndrome and atrial fibrillation.  The patient recently had Covid a month ago.  The patient presented to the emergency room without complaints of chest pain but did have orthopnea, paroxysmal nocturnal dyspnea and severe dyspnea with any kind of movement.  The patient desaturated into the 70s in the emergency room.  He was vacillating between the low 70s and low 90s throughout the time.  He was placed on a facemask and his saturations have increased and are greater than 90 consistently at this time.  The patient is normally on Coumadin for his atrial fib.  The patient has not had any nausea vomiting diaphoresis or other anginal equivalent.    Review of Systems   Constitutional: Positive for decreased appetite.   HENT: Negative.    Eyes: Negative.    Cardiovascular: Positive for dyspnea on exertion, leg swelling, orthopnea and paroxysmal nocturnal dyspnea.   Respiratory: Positive for shortness of breath.    Endocrine: Negative.    Hematologic/Lymphatic: Negative.    Skin: Negative.    Musculoskeletal: Negative.    Gastrointestinal: Negative.    Genitourinary: Negative.    Neurological: Negative.    Psychiatric/Behavioral: Negative.    Allergic/Immunologic:  Negative.    All other systems reviewed and are negative.          Personal History     Past Medical History:   Past Medical History:   Diagnosis Date   • Arrhythmia    • Arthritis    • Bradycardia    • Cancer (CMS/HCC)    • Cardiomyopathy (CMS/HCC)    • Cataract    • CHF (congestive heart failure) (CMS/HCC)    • COPD (chronic obstructive pulmonary disease) (CMS/HCC)    • Coronary artery disease    • Diabetes mellitus, type 2 (CMS/HCC)    • Emphysema, unspecified (CMS/HCC)    • Hx of sick sinus syndrome     S/P Biventricular Pacemaker   • Hyperlipidemia    • Hypertension    • Paroxysmal atrial fibrillation (CMS/HCC)    • Pneumonia    • Sleep apnea    • Ventricular arrhythmia        Surgical History:      Past Surgical History:   Procedure Laterality Date   • CARDIAC CATHETERIZATION  02/04/2015   • CARDIAC CATHETERIZATION  04/25/2016   • CAROTID ENDARTERECTOMY     • COLONOSCOPY     • CORONARY ANGIOPLASTY  1997   • ENDOSCOPY     • EYE SURGERY     • FRACTURE SURGERY     • HERNIA REPAIR     • JOINT REPLACEMENT     • LARYNGOSCOPY     • LUNG SURGERY  2008    Lung resection   • PACEMAKER IMPLANTATION  04/25/2016    Dual chamber; [employer]   • TRACHEOSTOMY             Family History: family history includes Breast cancer in his sister; Colon cancer in his sister; Diabetes in an other family member; Heart disease in his brother and mother; Hypertension in his mother; Stroke in his father. Otherwise pertinent FHx was reviewed and unremarkable.     Social History:  reports that he has quit smoking. His smoking use included cigarettes. He has never used smokeless tobacco. He reports current alcohol use. He reports that he does not use drugs.      Medications:  Prior to Admission medications    Medication Sig Start Date End Date Taking? Authorizing Provider   aspirin 81 MG tablet Take 81 mg by mouth Every Night. 3/7/14  Yes Provider, MD Astrid   carvedilol (COREG) 12.5 MG tablet Take 12.5 mg by mouth Every Night.    Yes Astrid Gonzalez MD   furosemide (LASIX) 40 MG tablet Take 40 mg by mouth Daily.   Yes Astrid Gonzalez MD   insulin aspart (novoLOG FLEXPEN) 100 UNIT/ML solution pen-injector sc pen Inject 1 Units under the skin into the appropriate area as directed 4 (Four) Times a Day With Meals & at Bedtime. Check BS 4 times a day. Take 1U for 20 gm/dl the blood sugar is above 130  Max 60 units per 24 hours   Yes Astrid Gonzalez MD   Insulin Glargine (LANTUS SOLOSTAR) 100 UNIT/ML injection pen Inject 10 Units under the skin into the appropriate area as directed Every Night.   Yes Astrid Gonzalez MD   Magnesium 250 MG tablet Take 1 tablet by mouth Daily.   Yes Astrid Gonzalez MD   mirtazapine (REMERON) 15 MG tablet TAKE 1 TABLET BY MOUTH EVERY NIGHT. 2/1/21  Yes Liliam Goode APRN   simvastatin (ZOCOR) 40 MG tablet 1 tablet p.o. at bedtime. 1/7/20  Yes Jhonatan Mart MD   theophylline (THEODUR) 300 MG 12 hr tablet Take 300 mg by mouth Every Night. 4/8/19  Yes Astrid Gonzalez MD   warfarin (COUMADIN) 3 MG tablet Take 1 tablet by mouth Every Night. 12/28/20  Yes Luis Paulino MD   acetaminophen (TYLENOL) 500 MG tablet Take 500 mg by mouth Every 6 (Six) Hours As Needed for Mild Pain .    Astrid Gonzalez MD   budesonide (PULMICORT) 0.5 MG/2ML nebulizer solution Take 2 mL by nebulization 2 (Two) Times a Day. J44.9 copd 1/16/21   Henry Hansen MD   calcitriol (ROCALTROL) 0.25 MCG capsule Take 0.25 mcg by mouth 3 (Three) Times a Week. Mon, Wed, Fri    Astrid Gonzalez MD   ipratropium-albuterol (DUO-NEB) 0.5-2.5 mg/3 ml nebulizer Take 3 mL by nebulization Every 4 (Four) Hours As Needed for Shortness of Air. COPD j 44.9 1/16/21   Henry Hansen MD   glucose blood (Accu-Chek Helga Plus) test strip Use to check blood sugars 4 times a day    Dx E11.65 3/4/21 3/14/21  Yusef Sosa Jr., MD   guaiFENesin (MUCINEX) 600 MG 12 hr tablet Take 1 tablet by mouth  Every 12 (Twelve) Hours. 1/16/21 3/14/21  Henry Hansen MD   insulin aspart (NovoLOG FlexPen) 100 UNIT/ML solution pen-injector sc pen Check BS 4 times a day. Take 1U for 30 gm/dl the blood sugar is above 130  Max 60 units per 24 hours    Dx E11.65  Patient taking differently: Dx E11.65 3/2/21 3/14/21  Yusef Sosa Jr., MD   Insulin Glargine (LANTUS SOLOSTAR) 100 UNIT/ML injection pen Inject 20 Units under the skin into the appropriate area as directed Daily. 3/2/21 3/14/21  Yusef Sosa Jr., MD   melatonin 5 MG tablet tablet Take 10 mg by mouth At Night As Needed.  3/14/21  ProviderAstrid MD   Semaglutide,0.25 or 0.5MG/DOS, (Ozempic, 0.25 or 0.5 MG/DOSE,) 2 MG/1.5ML solution pen-injector 0.25 mg subcu weekly for 4 weeks then increase to 0.5 mg subcu weekly if tolerated. 1/8/21 3/14/21  Jhonatan Mart MD       Allergies:    Allergies   Allergen Reactions   • Penicillins Itching       Objective   Objective     Vital Signs  Temp:  [97.7 °F (36.5 °C)-97.8 °F (36.6 °C)] 97.7 °F (36.5 °C)  Heart Rate:  [71-92] 91  Resp:  [18-22] 20  BP: (134-193)/() 160/69  SpO2:  [78 %-97 %] 97 %  on  Flow (L/min):  [3-6] 6;   Device (Oxygen Therapy): simple face mask  Body mass index is 27.49 kg/m².    Physical Exam  Vitals and nursing note reviewed.   Constitutional:       General: He is not in acute distress.     Appearance: Normal appearance. He is well-developed. He is not ill-appearing, toxic-appearing or diaphoretic.   HENT:      Head: Normocephalic and atraumatic.      Right Ear: Ear canal and external ear normal.      Left Ear: Ear canal and external ear normal.      Nose: Nose normal. No congestion or rhinorrhea.      Mouth/Throat:      Mouth: Mucous membranes are moist.      Pharynx: No oropharyngeal exudate.   Eyes:      General: No scleral icterus.        Right eye: No discharge.         Left eye: No discharge.      Extraocular Movements: Extraocular movements intact.      Conjunctiva/sclera:  Conjunctivae normal.      Pupils: Pupils are equal, round, and reactive to light.   Neck:      Thyroid: No thyromegaly.      Vascular: No carotid bruit or JVD.      Trachea: No tracheal deviation.   Cardiovascular:      Rate and Rhythm: Normal rate and regular rhythm.      Pulses: Normal pulses.      Heart sounds: Normal heart sounds. No murmur. No friction rub. No gallop.    Pulmonary:      Effort: Pulmonary effort is normal. No respiratory distress.      Breath sounds: No stridor. No wheezing, rhonchi or rales.      Comments: Decreased breath sounds in the bases with dullness to percussion right greater than left.  No active wheezing.  There are a few rales in both bases again more so on the right than the left.  Chest:      Chest wall: No tenderness.   Abdominal:      General: Bowel sounds are normal. There is no distension.      Palpations: Abdomen is soft. There is no mass.      Tenderness: There is no abdominal tenderness. There is no guarding or rebound.      Hernia: No hernia is present.   Musculoskeletal:         General: No swelling, tenderness, deformity or signs of injury. Normal range of motion.      Cervical back: Normal range of motion and neck supple. No rigidity. No muscular tenderness.      Right lower leg: Edema present.      Left lower leg: Edema present.      Comments: 2+ edema bilaterally.  Pulses are 2+/4+.   Lymphadenopathy:      Cervical: No cervical adenopathy.   Skin:     General: Skin is warm and dry.      Coloration: Skin is not jaundiced or pale.      Findings: No bruising, erythema or rash.   Neurological:      General: No focal deficit present.      Mental Status: He is alert and oriented to person, place, and time. Mental status is at baseline.      Cranial Nerves: No cranial nerve deficit.      Sensory: No sensory deficit.      Motor: No weakness or abnormal muscle tone.      Coordination: Coordination normal.   Psychiatric:         Mood and Affect: Mood normal.         Behavior:  Behavior normal.         Thought Content: Thought content normal.         Judgment: Judgment normal.           Results Review:  I have personally reviewed most recent cardiac tracings and lab results and agree with findings, most notably: Laboratory results.    Results from last 7 days   Lab Units 03/14/21  1641 03/14/21  1022   WBC 10*3/mm3 7.90 8.80   HEMOGLOBIN g/dL 11.3* 11.7*   HEMATOCRIT % 35.2* 36.3*   PLATELETS 10*3/mm3 313 286   INR   --  1.70*     Results from last 7 days   Lab Units 03/14/21  1642 03/14/21  1022   SODIUM mmol/L 138 137   POTASSIUM mmol/L 5.4* 4.8   CHLORIDE mmol/L 99 100   CO2 mmol/L 30.0* 22.0   BUN mg/dL 29* 28*   CREATININE mg/dL 2.14* 2.25*   GLUCOSE mg/dL 323* 361*   CALCIUM mg/dL 9.4 8.5*   ALT (SGPT) U/L  --  49*   AST (SGOT) U/L  --  70*   TROPONIN T ng/mL 0.027 0.030   PROBNP pg/mL  --  6,872.0*   PROCALCITONIN ng/mL  --  0.11     Estimated Creatinine Clearance: 26 mL/min (A) (by C-G formula based on SCr of 2.14 mg/dL (H)).  Brief Urine Lab Results  (Last result in the past 365 days)      Color   Clarity   Blood   Leuk Est   Nitrite   Protein   CREAT   Urine HCG        12/29/20 1019             25.5             Microbiology Results (last 10 days)     Procedure Component Value - Date/Time    Respiratory Panel PCR w/COVID-19(SARS-CoV-2) LOBO/MARTHA/TOMAS/PAD/COR/MAD/TITA In-House, NP Swab in [facility]TM/[facility], 3-4 HR TAT - Swab, Nasopharynx [[id number]]  (Normal) Collected: 03/14/21 1026    Lab Status: Final result Specimen: Swab from Nasopharynx Updated: 03/14/21 1122     ADENOVIRUS, PCR Not Detected     Coronavirus 229E Not Detected     Coronavirus HKU1 Not Detected     Coronavirus NL63 Not Detected     Coronavirus OC43 Not Detected     COVID19 Not Detected     Human Metapneumovirus Not Detected     Human Rhinovirus/Enterovirus Not Detected     Influenza A PCR Not Detected     Influenza B PCR Not Detected     Parainfluenza Virus 1 Not Detected     Parainfluenza Virus 2 Not Detected      Parainfluenza Virus 3 Not Detected     Parainfluenza Virus 4 Not Detected     RSV, PCR Not Detected     Bordetella pertussis pcr Not Detected     Bordetella parapertussis PCR Not Detected     Chlamydophila pneumoniae PCR Not Detected     Mycoplasma pneumo by PCR Not Detected    Narrative:      Fact sheet for providers: https://docs.[employer]/wp-content/uploads/RNK9707-5901-MT9.1-EUA-Provider-Fact-Sheet-3.pdf    Fact sheet for patients: https://docs.[employer]/wp-content/uploads/ZBT9474-9867-LT4.1-EUA-Patient-Fact-Sheet-1.pdf    Test performed by PCR.          ECG/EMG Results (most recent)     Procedure Component Value Units Date/Time    ECG 12 Lead [[id number]] Collected: 03/14/21 1013     Updated: 03/14/21 1015     QT Interval 388 ms     Narrative:      HEART RATE= 75  bpm  RR Interval= 745  ms  NJ Interval=   ms  P Horizontal Axis= 190  deg  P Front Axis=   deg  QRSD Interval= 106  ms  QT Interval= 388  ms  QRS Axis= 233  deg  T Wave Axis= 101  deg  - ABNORMAL ECG -  Afib/flut and V-paced complexes  When compared with ECG of 11-Jan-2021 10:56:50,  No significant change  Electronically Signed By:   Date and Time of Study: 2021-03-14 10:13:13          Results for orders placed in visit on 02/06/08    SCANNED VASCULAR STUDIES      Results for orders placed during the hospital encounter of 12/08/20    Adult Transthoracic Echo Complete W/ Cont if Necessary Per Protocol    Interpretation Summary  · Left ventricular wall thickness is consistent with septal asymmetric hypertrophy.  · Estimated left ventricular EF = 60% Left ventricular systolic function is normal.  · The right ventricular cavity is borderline dilated.  · The right atrial cavity is borderline dilated.  · Mild aortic valve stenosis is present.  · Estimated right ventricular systolic pressure from tricuspid regurgitation is normal (<35 mmHg).  · There is a small (<1cm) pericardial effusion adjacent to the left ventricle.      Polysomnography 4 or More  Parameters    Result Date: 3/12/2021  Obstructive Sleep Apnea, G47.33 Hpyoxemia, R09.02  RECOMMENDATION: CPAP titration, patient may benefit from BiPAP due to the severity of obstructive sleep apnea in the supine position AHI was 84, no REM sleep was recorded most likely due to severe obstructive sleep apnea, patient also utilizing 3 L of oxygen during the night which will need to be titrated I have reviewed the entire raw data, all components, including calibrations. Please feel free to contact me, if you wish to discuss this case further.    Alvaro Hopson MD  Date Diplomat, American Board of Sleep Medicine       XR Chest AP    Result Date: 3/14/2021   1. Bilateral basilar pleural effusions with compressive atelectasis. 2. Bilateral mixed interstitial/airspace disease presumably related to the patient's Covid infection. 3. Cardiomegaly.  Electronically Signed By-Doron Hernandez MD On:3/14/2021 10:59 AM This report was finalized on [id number] by  Doron Hernandez MD.        Estimated Creatinine Clearance: 26 mL/min (A) (by C-G formula based on SCr of 2.14 mg/dL (H)).    Assessment/Plan   Assessment/Plan   1.  Pulmonary edema  -The patient has an ejection fraction of 60% on his last echo in December 2020.  -The patient has bilateral pleural effusions with compressive atelectasis as well as interstitial changes that may relate to pulmonary edema but the patient does have a history of Covid  -Patient also has an elevated BNP       -Will use Lasix 40 mg IV twice daily and follow BNP and basic metabolic profile  -Gila scan in the a.m. to see if there is any component of ischemia that may be contributing to this exacerbation of underlying CHF  -Rule out acute myocardial injury with serial troponin    2.  History of sick sinus syndrome and atrial fibrillation  -Patient has a pacemaker placed for this issue.  -Patient is on anticoagulation for this issue but his INR is subtherapeutic at 1.7.    3.  Elevated D-dimer  -This may be  related to his previous Covid infection  -The patient is on Coumadin and will ask pharmacy to dose it for a target INR between 2 and 3  -We will hold on CTA of the chest given his renal function.  If he declines and there is a concern for pulmonary embolism and consideration for placement of an IVC filter consider doing a ventilation/perfusion lung scan    4.  Chronic obstructive pulmonary disease oxygen dependent  -We will hold on antibiotics as the patient's pro-Montrell is low and there is no obvious infiltrate  -Patient is diabetic and is not wheezing therefore will hold on the use of steroids as these might worsen his renal function as well as his diabetic control  -The patient is a patient of Dr. Hopson and is requested that he be consulted and this has been done  -Continue the patient's theophylline noting that his level is 18.  This would need to be adjusted should any medications to be started which interfere with the metabolism of the theophylline.    Active Hospital Problems    Diagnosis  POA   • Shortness of breath [R06.02]  Yes      Resolved Hospital Problems   No resolved problems to display.                 VTE Prophylaxis -   Mechanical Order History:     None      Pharmalogical Order History:      Ordered     Dose Route Frequency Stop    03/14/21 1619  warfarin (COUMADIN) tablet 3 mg     Question:  Target INR  Answer:  2 - 3    3 mg PO Nightly --    03/14/21 1619  Pharmacy to dose warfarin     Question:  Target INR  Answer:  2 - 3    -- XX Continuous PRN --                CODE STATUS:    Code Status and Medical Interventions:   Ordered at: 03/14/21 1526     Level Of Support Discussed With:    Patient     Code Status:    CPR     Medical Interventions (Level of Support Prior to Arrest):    Full       This patient has been examined wearing appropriate Personal Protective Equipment and discussed with hospital infection control department. 03/14/21      I discussed the patient's findings and my recommendations  with the patient and he agrees to proceed as outlined above.      Signature:Electronically signed by Lynette Dutta MD, 03/14/21, 7:51 PM EDT.      [facility] Hospitalist Team

## 2021-03-14 NOTE — ED NOTES
Pt resting in bed.  Pt had no complaints at this time.  Pt adjusted in bed for comfort.       Blayne Maki, LPN  03/14/21 1129

## 2021-03-14 NOTE — ED PROVIDER NOTES
Subjective   Patient is an 85-year-old white male with history of oxygen dependent COPD, CAD, CHF, A. fib, sick sinus syndrome with pacer.  He presents today with complaints of worsening shortness of breath over the last couple of days.  States he has been unable to sleep last night.  He states he had to sit upright to be able to breathe.  He denies any chest pain.  He denies any fever chills cough or congestion.  He does report some mild edema in his lower extremities but denies any leg pain.  Denies any nausea vomiting or sweats.  Reports shortness of breath worse with exertion and when lying flat.          Review of Systems   Constitutional: Negative for chills and fever.   HENT: Negative for congestion and sore throat.    Respiratory: Positive for shortness of breath. Negative for cough.    Cardiovascular: Positive for leg swelling. Negative for chest pain.   Gastrointestinal: Negative for abdominal pain, diarrhea, nausea and vomiting.   Genitourinary: Negative for decreased urine volume and dysuria.   Musculoskeletal: Negative for back pain and neck pain.   Skin: Negative for rash.   Neurological: Negative for dizziness, weakness, light-headedness and headaches.       Past Medical History:   Diagnosis Date   • Arrhythmia    • Bradycardia    • Cardiomyopathy (CMS/HCC)    • Cataract    • COPD (chronic obstructive pulmonary disease) (CMS/HCC)    • Coronary artery disease    • Diabetes mellitus, type 2 (CMS/HCC)    • Emphysema, unspecified (CMS/HCC)    • Hx of sick sinus syndrome     S/P Biventricular Pacemaker   • Hypertension    • Paroxysmal atrial fibrillation (CMS/HCC)    • Pneumonia    • Sleep apnea    • Ventricular arrhythmia        Allergies   Allergen Reactions   • Penicillins Itching       Past Surgical History:   Procedure Laterality Date   • CARDIAC CATHETERIZATION  02/04/2015   • CARDIAC CATHETERIZATION  04/25/2016   • CAROTID ENDARTERECTOMY     • CORONARY ANGIOPLASTY  1997   • LARYNGOSCOPY     • LUNG  SURGERY  2008    Lung resection   • PACEMAKER IMPLANTATION  04/25/2016    Dual chamber; [employer]   • TRACHEOSTOMY         Family History   Problem Relation Age of Onset   • Heart disease Mother    • Hypertension Mother    • Stroke Father    • Breast cancer Sister    • Colon cancer Sister    • Heart disease Brother    • Diabetes Other        Social History     Socioeconomic History   • Marital status:      Spouse name: Not on file   • Number of children: Not on file   • Years of education: Not on file   • Highest education level: Not on file   Tobacco Use   • Smoking status: Former Smoker     Types: Cigarettes   • Smokeless tobacco: Never Used   Vaping Use   • Vaping Use: Never used   Substance and Sexual Activity   • Alcohol use: Yes     Comment: occ   • Drug use: No   • Sexual activity: Defer           Objective   Physical Exam  Vital signs and triage nurse note reviewed.  Constitutional: Awake, alert; well-developed and well-nourished. No acute distress is noted.  Chronically ill-appearing.  HEENT: Normocephalic, atraumatic; pupils are PERRL with intact EOM; oropharynx is pink and moist without exudate or erythema.  No drooling or pooling of oral secretions.  Neck: Supple, full range of motion without pain; no cervical lymphadenopathy. Normal phonation.  Cardiovascular: Regular rate and rhythm, normal S1-S2.  No murmur noted.  Pulmonary: Respiratory effort regular mildly tachypneic, breath sounds few crackles bilaterally.  Abdomen: Soft, nontender, nondistended with normoactive bowel sounds; no rebound or guarding.  Musculoskeletal: Independent range of motion of all extremities with no palpable tenderness.  Trace edema noted to both lower extremities.  There is no erythema or warmth.  No calf tenderness.  Negative Jennifer.  Neuro: Alert oriented x3, speech is clear and appropriate, GCS 15.    Skin: Flesh tone, warm, dry, intact; no erythematous or petechial rash or lesion.      Procedures            ED Course      Labs Reviewed   COMPREHENSIVE METABOLIC PANEL - Abnormal; Notable for the following components:       Result Value    Glucose 361 (*)     BUN 28 (*)     Creatinine 2.25 (*)     Calcium 8.5 (*)     ALT (SGPT) 49 (*)     AST (SGOT) 70 (*)     eGFR Non  Amer 28 (*)     All other components within normal limits    Narrative:     GFR Normal >60  Chronic Kidney Disease <60  Kidney Failure <15     PROTIME-INR - Abnormal; Notable for the following components:    Protime 18.2 (*)     INR 1.70 (*)     All other components within normal limits   BNP (IN-HOUSE) - Abnormal; Notable for the following components:    proBNP 6,872.0 (*)     All other components within normal limits    Narrative:     Among patients with dyspnea, NT-proBNP is highly sensitive for the detection of acute congestive heart failure. In addition NT-proBNP of <300 pg/ml effectively rules out acute congestive heart failure with 99% negative predictive value.    Results may be falsely decreased if patient taking Biotin.     CBC WITH AUTO DIFFERENTIAL - Abnormal; Notable for the following components:    RBC 3.42 (*)     Hemoglobin 11.7 (*)     Hematocrit 36.3 (*)     .1 (*)     MCH 34.2 (*)     RDW 19.0 (*)     RDW-SD 70.9 (*)     Neutrophil % 80.1 (*)     Lymphocyte % 10.5 (*)     All other components within normal limits   BLOOD GAS, ARTERIAL - Abnormal; Notable for the following components:    pO2, Arterial 65.8 (*)     Base Excess, Arterial -1.1 (*)     O2 Saturation, Arterial 92.7 (*)     All other components within normal limits   RESPIRATORY PANEL PCR W/ COVID-19 (SARS-COV-2) LOBO/MARTHA/TOMAS/PAD/COR/MAD/TITA IN-HOUSE, NP SWAB IN UTM/VTP, 3-4 HR TAT - Normal    Narrative:     Fact sheet for providers: https://docs.[employer]/wp-content/uploads/NEF2820-1600-HJ5.1-EUA-Provider-Fact-Sheet-3.pdf    Fact sheet for patients: https://docs.[employer]/wp-content/uploads/IPY5949-7718-YR7.1-EUA-Patient-Fact-Sheet-1.pdf    Test  "performed by PCR.   PROCALCITONIN - Normal    Narrative:     As a Marker for Sepsis (Non-Neonates):   1. <0.5 ng/mL represents a low risk of severe sepsis and/or septic shock.  1. >2 ng/mL represents a high risk of severe sepsis and/or septic shock.    As a Marker for Lower Respiratory Tract Infections that require antibiotic therapy:  PCT on Admission     Antibiotic Therapy             6-12 Hrs later  > 0.5                Strongly Recommended            >0.25 - <0.5         Recommended  0.1 - 0.25           Discouraged                   Remeasure/reassess PCT  <0.1                 Strongly Discouraged          Remeasure/reassess PCT      As 28 day mortality risk marker: \"Change in Procalcitonin Result\" (> 80 % or <=80 %) if Day 0 (or Day 1) and Day 4 values are available. Refer to http://www.[employer] Softwarepct-calculator.com/   Change in PCT <=80 %   A decrease of PCT levels below or equal to 80 % defines a positive change in PCT test result representing a higher risk for 28-day all-cause mortality of patients diagnosed with severe sepsis or septic shock.  Change in PCT > 80 %   A decrease of PCT levels of more than 80 % defines a negative change in PCT result representing a lower risk for 28-day all-cause mortality of patients diagnosed with severe sepsis or septic shock.                Results may be falsely decreased if patient taking Biotin.    APTT - Normal   TROPONIN (IN-HOUSE) - Normal    Narrative:     Troponin T Reference Range:  <= 0.03 ng/mL-   Negative for AMI  >0.03 ng/mL-     Abnormal for myocardial necrosis.  Clinicians would have to utilize clinical acumen, EKG, Troponin and serial changes to determine if it is an Acute Myocardial Infarction or myocardial injury due to an underlying chronic condition.       Results may be falsely decreased if patient taking Biotin.     THEOPHYLLINE LEVEL - Normal   BLOOD CULTURE   BLOOD CULTURE   BLOOD GAS, ARTERIAL   CBC AND DIFFERENTIAL    Narrative:     The following " orders were created for panel order CBC & Differential.  Procedure                               Abnormality         Status                     ---------                               -----------         ------                     Scan Slide[[id number]]                                                                  CBC Auto Differential[[id number]]        Abnormal            Final result                 Please view results for these tests on the individual orders.     XR Chest AP    Result Date: 3/14/2021   1. Bilateral basilar pleural effusions with compressive atelectasis. 2. Bilateral mixed interstitial/airspace disease presumably related to the patient's Covid infection. 3. Cardiomegaly.  Electronically Signed By-Doron Hernandez MD On:3/14/2021 10:59 AM This report was finalized on [id number] by  Doron Hernandez MD.    Medications   sodium chloride 0.9 % flush 10 mL (has no administration in time range)   furosemide (LASIX) injection 40 mg (40 mg Intravenous Given 3/14/21 1144)                                          MDM  Number of Diagnoses or Management Options  Acute kidney injury (CMS/HCC)  Acute on chronic congestive heart failure, unspecified heart failure type (CMS/HCC)  Bilateral pleural effusion  Hyperglycemia  Shortness of breath  Diagnosis management comments: Comorbidities: Oxygen dependent COPD, CAD, CHF, A. fib, sick sinus syndrome with pacer  Differentials: COPD exacerbation, CHF exacerbation, pneumonia, effusion, cardiac ischemia, ACS, Covid;this list is not all inclusive and does not constitute the entirety of considered causes  Discussion with provider:  Radiology interpretation: X-rays reviewed by me and interpreted by radiologist: As above  Lab interpretation: Labs viewed by me significant for: As above    Patient is placed on continuous cardiac monitor.  He had IV established.  He had labs, EKG and chest x-ray obtained.  He was given dose of IV Lasix.    Work-up: EKG reviewed by me, interpreted  by Dr. Alexander shows A. fib/flutter with ventricular paced complexes with a rate of 75.  CBC shows a WBC of 8.8, stable hemoglobin of 11.7.  Procalcitonin normal at 0.11.  Metabolic panel reveals a glucose of 361, BUN 28, creatinine 2.25 which is mildly increased from his baseline of approximately 1.8-2..  Troponin 0 0.03.  proBNP 6872.  Respiratory panel including Covid is negative.  Chest x-ray shows bilateral basilar pleural effusions with compressive atelectasis.  Bilateral mixed interstitial airspace disease.  Cardiomegaly.    On reexamination the patient is resting comfortably.  He is in no acute distress.  He has maintained an O2 saturation of mid upper 90s on 4 L nasal cannula.  His other vital signs of remained stable.  He is afebrile.  Currently his work-up is consistent with CHF exacerbation and fluid overload.  He was given a dose of Lasix.  He will be admitted to the hospital for observation.  He was discussed with the hospitalist who agreed to admit.    Diagnosis and treatment plan discussed with patient.  Patient agreeable to plan.            Amount and/or Complexity of Data Reviewed  Clinical lab tests: reviewed and ordered  Tests in the radiology section of CPT®: ordered and reviewed    Patient Progress  Patient progress: stable      Final diagnoses:   Shortness of breath   Bilateral pleural effusion   Acute kidney injury (CMS/HCC)   Hyperglycemia   Acute on chronic congestive heart failure, unspecified heart failure type (CMS/HCC)            Meenakshi Neely, TERESO  03/14/21 6521

## 2021-03-14 NOTE — CONSULTS
[facility]: [facility]         CONSULT NOTE    Patient Identification:  Cali Santoro  85 y.o.  male  1935  [id number]            Requesting physician: Attending physician    Reason for Consultation: dyspnea        History of Present Illness:   85-year-old white male with history of oxygen dependent COPD, CAD, CHF, A. fib, sick sinus syndrome with pacer.  He presents today with complaints of worsening shortness of breath over the last couple of days.  States he has been unable to sleep last night.  He states he had to sit upright to be able to breathe.  He denies any chest pain.  He denies any fever chills cough or congestion.  He does report some mild edema in his lower extremities but denies any leg pain.  Denies any nausea vomiting or sweats.  Reports shortness of breath worse with exertion and when lying flat.       Assessment:    Acute hypoxic respiratory insufficiency,  ABG 7.3 9/38/65/23 on 6 L  Acute COPD exacerbation, PFTs 1/15/2021, FEV1 56%, DLCO 29%    COVID-19 and viral panel negative on 3/14/2021    Pulmonary edema with small bilateral pleural effusions  2D echo 12/9/2020 EF 60% RVSP 30  Coronary artery disease  A. fib on Coumadin    Acute kidney injury    Hx of Lung cancer 2008 stge I [facility]--right lower lobectomy    Right lower lobe infiltrate, white count is normal  procalcitonin is normal  history of recurrent sinus infections     Obstructive sleep apnea  CONT AUTO-CPAP 8 to 20 cm H2O    CT scan 05/18/2020 right lung nodule smaller in size  history of significant cough and dyspnea and hemoptysis in June 2019 CT scan of the chest 07/25/2019 suggestive of resolving pneumonia  CT chest 12/2016 and 12/14/15 revealed stable nodular densities bilaterally. These findings indicate two year stability at this time. Stable post operative changes in the right mid to lower hemithorax.         Recommendations:    antibiotic p.o. clindamycin  Low-dose steroids prednisone 20 mg twice  daily  Bronchodilators  Consider renal consultation for diuretic management in the face of acute kidney injury          Review of Sytems:  Review of Systems   Respiratory: Positive for cough and shortness of breath.        Past Medical History:  Past Medical History:   Diagnosis Date   • Arrhythmia    • Arthritis    • Bradycardia    • Cancer (CMS/HCC)    • Cardiomyopathy (CMS/HCC)    • Cataract    • CHF (congestive heart failure) (CMS/HCC)    • COPD (chronic obstructive pulmonary disease) (CMS/[facility])    • Coronary artery disease    • Diabetes mellitus, type 2 (CMS/HCC)    • Emphysema, unspecified (CMS/[facility])    • Hx of sick sinus syndrome     S/P Biventricular Pacemaker   • Hyperlipidemia    • Hypertension    • Paroxysmal atrial fibrillation (CMS/HCC)    • Pneumonia    • Sleep apnea    • Ventricular arrhythmia        Past Surgical History:  Past Surgical History:   Procedure Laterality Date   • CARDIAC CATHETERIZATION  02/04/2015   • CARDIAC CATHETERIZATION  04/25/2016   • CAROTID ENDARTERECTOMY     • COLONOSCOPY     • CORONARY ANGIOPLASTY  1997   • ENDOSCOPY     • EYE SURGERY     • FRACTURE SURGERY     • HERNIA REPAIR     • JOINT REPLACEMENT     • LARYNGOSCOPY     • LUNG SURGERY  2008    Lung resection   • PACEMAKER IMPLANTATION  04/25/2016    Dual chamber; [employer]   • TRACHEOSTOMY          Home Meds:  Medications Prior to Admission   Medication Sig Dispense Refill Last Dose   • aspirin 81 MG tablet Take 81 mg by mouth Every Night.   3/13/2021 at Unknown time   • carvedilol (COREG) 12.5 MG tablet Take 12.5 mg by mouth Every Night.   3/13/2021 at Unknown time   • furosemide (LASIX) 40 MG tablet Take 40 mg by mouth Daily.   3/14/2021 at Unknown time   • insulin aspart (novoLOG FLEXPEN) 100 UNIT/ML solution pen-injector sc pen Inject 1 Units under the skin into the appropriate area as directed 4 (Four) Times a Day With Meals & at Bedtime. Check BS 4 times a day. Take 1U for 20 gm/dl the blood sugar is above  130  Max 60 units per 24 hours   3/13/2021 at Unknown time   • Insulin Glargine (LANTUS SOLOSTAR) 100 UNIT/ML injection pen Inject 10 Units under the skin into the appropriate area as directed Every Night.   3/13/2021 at Unknown time   • Magnesium 250 MG tablet Take 1 tablet by mouth Daily.   3/14/2021 at Unknown time   • mirtazapine (REMERON) 15 MG tablet TAKE 1 TABLET BY MOUTH EVERY NIGHT. 90 tablet 1 3/13/2021 at Unknown time   • simvastatin (ZOCOR) 40 MG tablet 1 tablet p.o. at bedtime. 90 tablet 4 3/13/2021 at Unknown time   • theophylline (THEODUR) 300 MG 12 hr tablet Take 300 mg by mouth Every Night.   3/13/2021 at Unknown time   • warfarin (COUMADIN) 3 MG tablet Take 1 tablet by mouth Every Night. 34 tablet 2 3/13/2021 at Unknown time   • acetaminophen (TYLENOL) 500 MG tablet Take 500 mg by mouth Every 6 (Six) Hours As Needed for Mild Pain .      • budesonide (PULMICORT) 0.5 MG/2ML nebulizer solution Take 2 mL by nebulization 2 (Two) Times a Day. J44.9 copd 60 each 3    • calcitriol (ROCALTROL) 0.25 MCG capsule Take 0.25 mcg by mouth 3 (Three) Times a Week. Mon, Wed, Fri   3/5/2021   • ipratropium-albuterol (DUO-NEB) 0.5-2.5 mg/3 ml nebulizer Take 3 mL by nebulization Every 4 (Four) Hours As Needed for Shortness of Air. COPD j 44.9 360 mL 3        Allergies:  Allergies   Allergen Reactions   • Penicillins Itching       Social History:   Social History     Socioeconomic History   • Marital status:      Spouse name: Not on file   • Number of children: Not on file   • Years of education: Not on file   • Highest education level: Not on file   Tobacco Use   • Smoking status: Former Smoker     Types: Cigarettes   • Smokeless tobacco: Never Used   Vaping Use   • Vaping Use: Never used   Substance and Sexual Activity   • Alcohol use: Yes     Comment: occ   • Drug use: No   • Sexual activity: Defer       Family History:  Family History   Problem Relation Age of Onset   • Heart disease Mother    • Hypertension  "Mother    • Stroke Father    • Breast cancer Sister    • Colon cancer Sister    • Heart disease Brother    • Diabetes Other        Physical Exam:  /69 (BP Location: Right arm, Patient Position: Sitting)   Pulse 82   Temp 97.7 °F (36.5 °C) (Oral)   Resp 22   Ht 170.2 cm (67\")   Wt 79.6 kg (175 lb 7.8 oz)   SpO2 (!) 88%   BMI 27.49 kg/m²  Body mass index is 27.49 kg/m². (!) 88% 79.6 kg (175 lb 7.8 oz)  Physical Exam  Cardiovascular:      Heart sounds: Murmur present.   Pulmonary:      Breath sounds: Rhonchi and rales present.         LABS:  Lab Results   Component Value Date    CALCIUM 8.5 (L) 03/14/2021     Results from last 7 days   Lab Units 03/14/21  1641 03/14/21  1022   SODIUM mmol/L  --  137   POTASSIUM mmol/L  --  4.8   CHLORIDE mmol/L  --  100   CO2 mmol/L  --  22.0   BUN mg/dL  --  28*   CREATININE mg/dL  --  2.25*   GLUCOSE mg/dL  --  361*   CALCIUM mg/dL  --  8.5*   WBC 10*3/mm3 7.90 8.80   HEMOGLOBIN g/dL 11.3* 11.7*   PLATELETS 10*3/mm3 313 286   ALT (SGPT) U/L  --  49*   AST (SGOT) U/L  --  70*   PROBNP pg/mL  --  6,872.0*   PROCALCITONIN ng/mL  --  0.11     Lab Results   Component Value Date    CKTOTAL 34 01/13/2021    TROPONINI 0.030 06/28/2019    TROPONINT 0.030 03/14/2021     Results from last 7 days   Lab Units 03/14/21  1022   TROPONIN T ng/mL 0.030         Results from last 7 days   Lab Units 03/14/21  1022   PROCALCITONIN ng/mL 0.11     Results from last 7 days   Lab Units 03/14/21  1042   PH, ARTERIAL pH units 7.399   PCO2, ARTERIAL mm Hg 38.1   PO2 ART mm Hg 65.8*   O2 SATURATION ART % 92.7*   MODALITY  Cannula     Results from last 7 days   Lab Units 03/14/21  1026   ADENOVIRUS DETECTION BY PCR  Not Detected   CORONAVIRUS 229E  Not Detected   CORONAVIRUS HKU1  Not Detected   CORONAVIRUS NL63  Not Detected   CORONAVIRUS OC43  Not Detected   HUMAN METAPNEUMOVIRUS  Not Detected   HUMAN RHINOVIRUS/ENTEROVIRUS  Not Detected   INFLUENZA B PCR  Not Detected   PARAINFLUENZA 1  Not " Detected   PARAINFLUENZA VIRUS 2  Not Detected   PARAINFLUENZA VIRUS 3  Not Detected   PARAINFLUENZA VIRUS 4  Not Detected   BORDETELLA PERTUSSIS PCR  Not Detected   CHLAMYDOPHILA PNEUMONIAE PCR  Not Detected   MYCOPLAMA PNEUMO PCR  Not Detected   INFLUENZA A PCR  Not Detected   RSV, PCR  Not Detected     Results from last 7 days   Lab Units 03/14/21  1022   INR  1.70*         No results found for: TSH  Estimated Creatinine Clearance: 24.7 mL/min (A) (by C-G formula based on SCr of 2.25 mg/dL (H)).         Imaging:  Imaging Results (Last 24 Hours)     Procedure Component Value Units Date/Time    XR Chest AP [[id number]] Collected: 03/14/21 1057     Updated: 03/14/21 1101    Narrative:      DATE OF EXAM:  3/14/2021 10:50 AM     PROCEDURE:  XR CHEST AP-     INDICATIONS:  Covid 19 infection, cough and fever, shortness of breath, hypoxia.      COMPARISON:  03/02/2021.     TECHNIQUE:   Single radiographic view of the chest was obtained.     FINDINGS:  The heart is enlarged. There is bilateral mixed interstitial/airspace  disease which may be related to the patient's Covid 19 infection. The  patient has a new small to moderate right pleural effusion and a small  left pleural effusion. There appears to be compressive atelectasis on  both lung bases.  There is no pneumothorax. There is a left-sided  transvenous pacemaker in place.       Impression:         1. Bilateral basilar pleural effusions with compressive atelectasis.  2. Bilateral mixed interstitial/airspace disease presumably related to  the patient's Covid infection.  3. Cardiomegaly.     Electronically Signed By-Doron Hernandez MD On:3/14/2021 10:59 AM  This report was finalized on [id number] by  Doron Hernandez MD.            Current Meds:   SCHEDULE  aspirin, 81 mg, Oral, Daily  atorvastatin, 20 mg, Oral, Nightly  budesonide, 0.5 mg, Nebulization, BID - RT  [START ON 3/15/2021] calcitriol, 0.25 mcg, Oral, Once per day on Mon Wed Fri  carvedilol, 12.5 mg, Oral,  Nightly  docusate sodium, 100 mg, Oral, BID  [START ON 3/15/2021] famotidine, 40 mg, Oral, Daily  insulin glargine, 10 Units, Subcutaneous, Nightly  insulin lispro, 0-7 Units, Subcutaneous, TID AC  [START ON 3/15/2021] magnesium oxide, 200 mg, Oral, Daily  mirtazapine, 15 mg, Oral, Nightly  sodium chloride, 10 mL, Intravenous, Q12H  theophylline, 300 mg, Oral, Nightly  warfarin, 3 mg, Oral, Nightly      Infusions  Pharmacy to dose warfarin,       PRNs  •  acetaminophen **OR** acetaminophen **OR** acetaminophen  •  dextrose  •  dextrose  •  glucagon (human recombinant)  •  HYDROcodone-acetaminophen  •  insulin lispro **AND** insulin lispro  •  ipratropium-albuterol  •  melatonin  •  nitroglycerin  •  ondansetron **OR** ondansetron  •  Pharmacy to dose warfarin  •  [COMPLETED] Insert peripheral IV **AND** sodium chloride  •  sodium chloride        Alvaro Hopson MD  3/14/2021  17:55 EDT      Much of this encounter note is an electronic transcription/translation of spoken language to printed text using Dragon Software.

## 2021-03-14 NOTE — ED NOTES
Patient's wife given update on patient's condition, and instructed to call back in an hour or so for further discussion of patient's results. Patient's wife verbalized understanding, and stated that she would call back.      Clementina Russo RN  03/14/21 1041

## 2021-03-15 PROBLEM — A41.9 SEPSIS (HCC): Status: ACTIVE | Noted: 2021-01-01

## 2021-03-15 NOTE — CONSULTS
Palliative Care Consultation    Patient Name: Cali Santoro  : 1935  MRN: [id number]  Allergies: Penicillins    Requesting clinician:  Stepan  Reason for consult: Consultation for clarification of goals of care and code status.      Patient Code Status:   Code Status and Medical Interventions:   Ordered at: 21 1526     Level Of Support Discussed With:    Patient     Code Status:    CPR     Medical Interventions (Level of Support Prior to Arrest):    Full           Advanced Care Planning    Advanced Directives: Patient has advance directive, copy requested  Health Care Directive on file: No  Health Care Surrogate:      Palliative Performance Scale Score:    Comments:         Chief Complaint:    Shortness of breath    History of Present Illness    Cali Santoro is a 85 y.o. male who presented to the ED on 3/14 with complaints of shortness of breath. Stated it started several days ago. Worse when laying flat. He also complained of difficulty sleeping due to shortness of breath. Shortness of breath worse with exertion as well. Denied fever, chills, cough, nausea or vomiting. He did report BLE edema.  Chest x-ray showed bilateral basilar pleural effusions with compressive atelectasis.  Bilateral mixed interstitial airspace disease.  Cardiomegaly.    He was given IV lasix and admitted for CHF exacerbation.  Pulmonary and cardiology was consulted. Patient was started on PO antibiotics.     3/15 Palliative consulted to discuss goals of care.     VITAL SIGNS:   Temp:  [97.4 °F (36.3 °C)-98 °F (36.7 °C)] 98 °F (36.7 °C)  Heart Rate:  [70-91] 77  Resp:  [18-22] 19  BP: (132-160)/(69-84) 132/69       PMH:     Past Surgical History:   Procedure Laterality Date   • CARDIAC CATHETERIZATION  2015   • CARDIAC CATHETERIZATION  2016   • CAROTID ENDARTERECTOMY     • COLONOSCOPY     • CORONARY ANGIOPLASTY     • ENDOSCOPY     • EYE SURGERY     • FRACTURE SURGERY     • HERNIA REPAIR     • JOINT REPLACEMENT      • LARYNGOSCOPY     • LUNG SURGERY  2008    Lung resection   • PACEMAKER IMPLANTATION  04/25/2016    Dual chamber; [employer]   • TRACHEOSTOMY         Family History   Problem Relation Age of Onset   • Heart disease Mother    • Hypertension Mother    • Stroke Father    • Breast cancer Sister    • Colon cancer Sister    • Heart disease Brother    • Diabetes Other        Social History     Tobacco Use   • Smoking status: Former Smoker     Types: Cigarettes   • Smokeless tobacco: Never Used   Vaping Use   • Vaping Use: Never used   Substance Use Topics   • Alcohol use: Yes     Comment: occ   • Drug use: No           LABS:    Results from last 7 days   Lab Units 03/15/21  0429   WBC 10*3/mm3 8.50   HEMOGLOBIN g/dL 11.3*   HEMATOCRIT % 34.2*   PLATELETS 10*3/mm3 277     Results from last 7 days   Lab Units 03/15/21  0429   SODIUM mmol/L 137   POTASSIUM mmol/L 5.2   CHLORIDE mmol/L 101   CO2 mmol/L 23.0   BUN mg/dL 32*   CREATININE mg/dL 2.52*   GLUCOSE mg/dL 289*   CALCIUM mg/dL 8.5*     Results from last 7 days   Lab Units 03/15/21  0429 03/14/21  1022   SODIUM mmol/L 137 137   POTASSIUM mmol/L 5.2 4.8   CHLORIDE mmol/L 101 100   CO2 mmol/L 23.0 22.0   BUN mg/dL 32* 28*   CREATININE mg/dL 2.52* 2.25*   CALCIUM mg/dL 8.5* 8.5*   BILIRUBIN mg/dL  --  0.5   ALK PHOS U/L  --  79   ALT (SGPT) U/L  --  49*   AST (SGOT) U/L  --  70*   GLUCOSE mg/dL 289* 361*         IMAGING STUDIES:  XR Chest AP    Result Date: 3/14/2021   1. Bilateral basilar pleural effusions with compressive atelectasis. 2. Bilateral mixed interstitial/airspace disease presumably related to the patient's Covid infection. 3. Cardiomegaly.  Electronically Signed By-Doron Hernandez MD On:3/14/2021 10:59 AM This report was finalized on [id number] by  Doron Hernandez MD.        I reviewed the patient's new clinical results including labs, imaging, and vitals.        Scheduled Meds:  aspirin, 81 mg, Oral, Daily  atorvastatin, 20 mg, Oral, Nightly  budesonide,  0.5 mg, Nebulization, BID - RT  calcitriol, 0.25 mcg, Oral, Once per day on Mon Wed Fri  carvedilol, 12.5 mg, Oral, Nightly  clindamycin, 300 mg, Oral, Q8H  docusate sodium, 100 mg, Oral, BID  furosemide, 40 mg, Intravenous, BID  insulin glargine, 10 Units, Subcutaneous, Nightly  insulin lispro, 0-14 Units, Subcutaneous, 4x Daily With Meals & Nightly  ipratropium-albuterol, 3 mL, Nebulization, 4x Daily - RT  magnesium oxide, 200 mg, Oral, Daily  mirtazapine, 15 mg, Oral, Nightly  predniSONE, 20 mg, Oral, BID With Meals  sodium chloride, 10 mL, Intravenous, Q12H  theophylline, 300 mg, Oral, Nightly  warfarin, 1.5 mg, Oral, Once  warfarin, 3 mg, Oral, Nightly      Continuous Infusions:  Pharmacy Consult - Steroid Insulin Protocol,   Pharmacy to dose warfarin,           Review of Systems   Constitutional: Positive for fatigue.   Respiratory: Positive for chest tightness and shortness of breath.    Neurological: Positive for weakness.   All other systems reviewed and are negative.        Physical Exam  Vitals and nursing note reviewed.   Constitutional:       Appearance: Normal appearance.   HENT:      Head: Normocephalic and atraumatic.      Nose: Nose normal.      Mouth/Throat:      Mouth: Mucous membranes are moist.      Pharynx: Oropharynx is clear.   Eyes:      Extraocular Movements: Extraocular movements intact.      Conjunctiva/sclera: Conjunctivae normal.      Pupils: Pupils are equal, round, and reactive to light.   Cardiovascular:      Rate and Rhythm: Normal rate.      Pulses: Normal pulses.   Pulmonary:      Effort: Pulmonary effort is normal.      Comments: Wearing face mask  Abdominal:      Palpations: Abdomen is soft.   Musculoskeletal:         General: Normal range of motion.   Skin:     General: Skin is warm and dry.   Neurological:      General: No focal deficit present.      Mental Status: He is alert. Mental status is at baseline.   Psychiatric:         Mood and Affect: Mood normal.         Behavior:  Behavior normal.         Thought Content: Thought content normal.         Judgment: Judgment normal.             PROBLEM LIST:    Shortness of breath    Sepsis (CMS/[facility])          ASSESSMENT/PLAN:    Goals of care: Met with patient today. He was pleasant. We discussed goals of care and advanced directives. He believes he has completed a living will. He does say he would not want to be on a ventilator long term. At this time,  He would want to have CPR but he would like some time to discuss with his wife. We also discussed the [facility] program to help decrease hospitalizations and for better symptom management at home. He would like to discuss further with his wife. Leonardo  plans to follow up tomorrow to talk with wife and patient.   CHF exacerbation: EF of 60% on last echo in December 2020. Bilateral pleural effusions. Patient on IV lasix twice a day.   COPD: No antibiotics at this time per Dr. Dutta. Patient requiring 9L of O2.   Elevated D-dimer: possibly related to previous covid infection. Unable to do CTA at this time due to kidney function.         Decisional Capacity: yes  Patient's understanding of illness: adequate  Patient goals of care:  Full code, all interventions at this time.       Thank you for this consult and allowing us to participate in patient's plan of care. Palliative Care Team will continue to follow patient.     Time spent: 49 minutes spent reviewing medical and medication records, assessing and examining patient, discussing with family, answering questions, providing some guidance about a plan and documentation of care, and coordinating care with other healthcare members, with > 50% time spent face to face.         Marisol Brownlee, APRN  3/15/2021

## 2021-03-15 NOTE — PROGRESS NOTES
[facility] Medicine Services Daily Progress Note      Hospitalist Team  LOS 0 days      Patient Care Team:  Yusef Sosa Jr., MD as PCP - General  Yusef Sosa Jr., MD as PCP - Family Medicine  Luis Paulino MD as Consulting Physician (Cardiology)    Patient Location: 232/1      Subjective   Subjective     Chief Complaint / Subjective  Chief Complaint   Patient presents with   • Shortness of Breath         Brief Synopsis of Hospital Course/HPI  Mr. Santoro is a 85 y.o.  presents to [facility] complaining of shortness of breath.  The patient has numerous diagnoses including COPD, coronary artery disease, sick sinus syndrome and atrial fibrillation.  The patient recently had Covid a month ago.  The patient presented to the emergency room without complaints of chest pain but did have orthopnea, paroxysmal nocturnal dyspnea and severe dyspnea with any kind of movement.  The patient desaturated into the 70s in the emergency room.  He was vacillating between the low 70s and low 90s throughout the time.  He was placed on a facemask and his saturations have increased and are greater than 90 consistently at this time.  The patient is normally on Coumadin for his atrial fib.  The patient has not had any nausea vomiting diaphoresis or other anginal equivalent.     On review of records the patient was recently hospitalized 1/11/2021 to 1/16/2021 for healthcare facility acquired pneumonia.  He was also hospitalized with streptococcal pneumoniae pneumonia 12/8/2020 to 12/14/2020    Date:   3/15/2021: Patient reports feeling better today with improvement in his shortness of breath.  Dr. Hopson saw the patient in consultation and recommended clindamycin antibiotic, low-dose oral steroids with prednisone, and bronchodilators.      ROS  12 point review of systems was reviewed and was negative except as above.    Objective   Objective      Vital Signs  Temp:  [97.4 °F (36.3  "°C)-97.9 °F (36.6 °C)] 97.9 °F (36.6 °C)  Heart Rate:  [70-92] 78  Resp:  [18-22] 19  BP: (134-178)/(64-86) 135/78  Oxygen Therapy  SpO2: 93 %  Pulse Oximetry Type: Intermittent  Device (Oxygen Therapy): simple face mask  Flow (L/min): 10  Flowsheet Rows      First Filed Value   Admission Height  170.2 cm (67\") Documented at 03/14/2021 1001   Admission Weight  76.7 kg (169 lb) Documented at 03/14/2021 1001        Intake & Output (last 3 days)       03/12 0701 - 03/13 0700 03/13 0701 - 03/14 0700 03/14 0701 - 03/15 0700 03/15 0701 - 03/16 0700    P.O.   220     Total Intake(mL/kg)   220 (2.7)     Urine (mL/kg/hr)   650     Total Output   650     Net   -430                 Lines, Drains & Airways    Active LDAs     Name:   Placement date:   Placement time:   Site:   Days:    Peripheral IV 03/14/21 1019 Left Antecubital   03/14/21    1019    Antecubital   1                  Physical Exam:    Physical Exam  Vital signs and nurses notes reviewed.  Well-developed well-nourished elderly gentleman in no acute distress sitting up in bed awake and alert; mucous membranes moist; sclerae anicteric; lungs decreased air entry and rhonchi in the bases posteriorly bilaterally; CV regular rate and rhythm; abdomen soft nontender nondistended with active bowel sounds; extremities with trace ankle edema bilaterally, no cyanosis or calf tenderness; palpable pedal pulses bilaterally; no Marroquin catheter.        Procedures:              Results Review:     I reviewed the patient's new clinical results.      Lab Results (last 24 hours)     Procedure Component Value Units Date/Time    POC Glucose Once [[id number]]  (Abnormal) Collected: 03/15/21 0717    Specimen: Blood Updated: 03/15/21 0720     Glucose 284 mg/dL      Comment: Serial Number: [id number]:  [id number]       Basic Metabolic Panel [[id number]]  (Abnormal) Collected: 03/15/21 0429    Specimen: Blood Updated: 03/15/21 0536     Glucose 289 mg/dL      BUN 32 mg/dL      Creatinine " 2.52 mg/dL      Sodium 137 mmol/L      Potassium 5.2 mmol/L      Chloride 101 mmol/L      CO2 23.0 mmol/L      Calcium 8.5 mg/dL      eGFR Non African Amer 25 mL/min/1.73      BUN/Creatinine Ratio 12.7     Anion Gap 13.0 mmol/L     Narrative:      GFR Normal >60  Chronic Kidney Disease <60  Kidney Failure <15      BNP [[id number]]  (Abnormal) Collected: 03/15/21 0429    Specimen: Blood Updated: 03/15/21 0532     proBNP 6,687.0 pg/mL     Narrative:      Among patients with dyspnea, NT-proBNP is highly sensitive for the detection of acute congestive heart failure. In addition NT-proBNP of <300 pg/ml effectively rules out acute congestive heart failure with 99% negative predictive value.    Results may be falsely decreased if patient taking Biotin.      Protime-INR [[id number]]  (Abnormal) Collected: 03/15/21 0429    Specimen: Blood Updated: 03/15/21 0517     Protime 18.1 Seconds      INR 1.69    CBC & Differential [[id number]]  (Abnormal) Collected: 03/15/21 0429    Specimen: Blood Updated: 03/15/21 0515    Narrative:      The following orders were created for panel order CBC & Differential.  Procedure                               Abnormality         Status                     ---------                               -----------         ------                     Scan Slide[[id number]]                                                                  CBC Auto Differential[[id number]]        Abnormal            Final result                 Please view results for these tests on the individual orders.    CBC Auto Differential [[id number]]  (Abnormal) Collected: 03/15/21 0429    Specimen: Blood Updated: 03/15/21 0515     WBC 8.50 10*3/mm3      RBC 3.24 10*6/mm3      Hemoglobin 11.3 g/dL      Hematocrit 34.2 %      .4 fL      MCH 34.9 pg      MCHC 33.1 g/dL      RDW 18.6 %      RDW-SD 68.7 fl      MPV 7.5 fL      Platelets 277 10*3/mm3      Neutrophil % 86.5 %      Lymphocyte % 9.2 %      Monocyte % 1.8 %      Eosinophil  % 1.9 %      Basophil % 0.6 %      Neutrophils, Absolute 7.40 10*3/mm3      Lymphocytes, Absolute 0.80 10*3/mm3      Monocytes, Absolute 0.20 10*3/mm3      Eosinophils, Absolute 0.20 10*3/mm3      Basophils, Absolute 0.00 10*3/mm3      nRBC 0.0 /100 WBC     POC Glucose Once [[id number]]  (Abnormal) Collected: 03/14/21 2011    Specimen: Blood Updated: 03/14/21 2012     Glucose 206 mg/dL      Comment: Serial Number: [id number]:  [id number]       Basic Metabolic Panel [[id number]]  (Abnormal) Collected: 03/14/21 1642    Specimen: Blood Updated: 03/14/21 1804     Glucose 323 mg/dL      BUN 29 mg/dL      Creatinine 2.14 mg/dL      Sodium 138 mmol/L      Potassium 5.4 mmol/L      Chloride 99 mmol/L      CO2 30.0 mmol/L      Calcium 9.4 mg/dL      eGFR Non African Amer 30 mL/min/1.73      BUN/Creatinine Ratio 13.6     Anion Gap 9.0 mmol/L     Narrative:      GFR Normal >60  Chronic Kidney Disease <60  Kidney Failure <15      Troponin [[id number]]  (Normal) Collected: 03/14/21 1642    Specimen: Blood Updated: 03/14/21 1804     Troponin T 0.027 ng/mL     Narrative:      Troponin T Reference Range:  <= 0.03 ng/mL-   Negative for AMI  >0.03 ng/mL-     Abnormal for myocardial necrosis.  Clinicians would have to utilize clinical acumen, EKG, Troponin and serial changes to determine if it is an Acute Myocardial Infarction or myocardial injury due to an underlying chronic condition.       Results may be falsely decreased if patient taking Biotin.      D-dimer, Quantitative [[id number]]  (Abnormal) Collected: 03/14/21 1642    Specimen: Blood Updated: 03/14/21 1759     D-Dimer, Quantitative 1.10 mg/L (FEU)     Narrative:      Reference Range  --------------------------------------------------------------------     < 0.50   Negative Predictive Value  0.50-0.59   Indeterminate    >= 0.60   Probable VTE             A very low percentage of patients with DVT may yield D-Dimer results   below the cut-off of 0.50 mg/L FEU.  This is  known to be more   prevalent in patients with distal DVT.             Results of this test should always be interpreted in conjunction with   the patient's medical history, clinical presentation and other   findings.  Clinical diagnosis should not be based on the result of   [place] D-Dimer alone.    CBC & Differential [[id number]]  (Abnormal) Collected: 03/14/21 1641    Specimen: Blood Updated: 03/14/21 1748    Narrative:      The following orders were created for panel order CBC & Differential.  Procedure                               Abnormality         Status                     ---------                               -----------         ------                     CBC Auto Differential[[id number]]        Abnormal            Final result                 Please view results for these tests on the individual orders.    CBC Auto Differential [[id number]]  (Abnormal) Collected: 03/14/21 1641    Specimen: Blood Updated: 03/14/21 1748     WBC 7.90 10*3/mm3      RBC 3.37 10*6/mm3      Hemoglobin 11.3 g/dL      Hematocrit 35.2 %      .4 fL      MCH 33.6 pg      MCHC 32.2 g/dL      RDW 18.2 %      RDW-SD 66.1 fl      MPV 7.3 fL      Platelets 313 10*3/mm3      Neutrophil % 69.1 %      Lymphocyte % 20.1 %      Monocyte % 7.5 %      Eosinophil % 2.6 %      Basophil % 0.7 %      Neutrophils, Absolute 5.50 10*3/mm3      Lymphocytes, Absolute 1.60 10*3/mm3      Monocytes, Absolute 0.60 10*3/mm3      Eosinophils, Absolute 0.20 10*3/mm3      Basophils, Absolute 0.10 10*3/mm3      nRBC 0.2 /100 WBC     POC Glucose Once [[id number]]  (Abnormal) Collected: 03/14/21 1657    Specimen: Blood Updated: 03/14/21 1700     Glucose 318 mg/dL      Comment: Serial Number: [id number]:  [id number]           No results found for: HGBA1C  Results from last 7 days   Lab Units 03/15/21  0429 03/14/21  1022   INR  1.69* 1.70*       Results from last 7 days   Lab Units 03/14/21  1042   PH, ARTERIAL pH units 7.399   PO2 ART mm Hg 65.8*    PCO2, ARTERIAL mm Hg 38.1   HCO3 ART mmol/L 23.5     No results found for: LIPASE  Lab Results   Component Value Date    CHOL 131 12/29/2020    TRIG 207 (H) 12/29/2020    HDL 40 12/29/2020    LDL 57 12/29/2020       No results found for: INTRAOP, PREDX, FINALDX, COMDX    Microbiology Results (last 10 days)     Procedure Component Value - Date/Time    Respiratory Panel PCR w/COVID-19(SARS-CoV-2) LOBO/MARTHA/TOMAS/PAD/COR/MAD/TITA In-House, NP Swab in UTM/VTM, 3-4 HR TAT - Swab, Nasopharynx [[id number]]  (Normal) Collected: 03/14/21 1026    Lab Status: Final result Specimen: Swab from Nasopharynx Updated: 03/14/21 1122     ADENOVIRUS, PCR Not Detected     Coronavirus 229E Not Detected     Coronavirus HKU1 Not Detected     Coronavirus NL63 Not Detected     Coronavirus OC43 Not Detected     COVID19 Not Detected     Human Metapneumovirus Not Detected     Human Rhinovirus/Enterovirus Not Detected     Influenza A PCR Not Detected     Influenza B PCR Not Detected     Parainfluenza Virus 1 Not Detected     Parainfluenza Virus 2 Not Detected     Parainfluenza Virus 3 Not Detected     Parainfluenza Virus 4 Not Detected     RSV, PCR Not Detected     Bordetella pertussis pcr Not Detected     Bordetella parapertussis PCR Not Detected     Chlamydophila pneumoniae PCR Not Detected     Mycoplasma pneumo by PCR Not Detected    Narrative:      Fact sheet for providers: https://docs.[employer]/wp-content/uploads/LHV4808-7753-HP8.1-EUA-Provider-Fact-Sheet-3.pdf    Fact sheet for patients: https://docs.[employer]/wp-content/uploads/ZXP1263-1673-GS2.1-EUA-Patient-Fact-Sheet-1.pdf    Test performed by PCR.          ECG/EMG Results (most recent)     Procedure Component Value Units Date/Time    ECG 12 Lead [[id number]] Collected: 03/14/21 1013     Updated: 03/14/21 1015     QT Interval 388 ms     Narrative:      HEART RATE= 75  bpm  RR Interval= 745  ms  SD Interval=   ms  P Horizontal Axis= 190  deg  P Front Axis=   deg  QRSD Interval= 106   ms  QT Interval= 388  ms  QRS Axis= 233  deg  T Wave Axis= 101  deg  - ABNORMAL ECG -  Afib/flut and V-paced complexes  When compared with ECG of 11-Jan-2021 10:56:50,  No significant change  Electronically Signed By:   Date and Time of Study: 2021-03-14 10:13:13          Results for orders placed in visit on 02/06/08    SCANNED VASCULAR STUDIES      Results for orders placed during the hospital encounter of 12/08/20    Adult Transthoracic Echo Complete W/ Cont if Necessary Per Protocol    Interpretation Summary  · Left ventricular wall thickness is consistent with septal asymmetric hypertrophy.  · Estimated left ventricular EF = 60% Left ventricular systolic function is normal.  · The right ventricular cavity is borderline dilated.  · The right atrial cavity is borderline dilated.  · Mild aortic valve stenosis is present.  · Estimated right ventricular systolic pressure from tricuspid regurgitation is normal (<35 mmHg).  · There is a small (<1cm) pericardial effusion adjacent to the left ventricle.      Polysomnography 4 or More Parameters    Result Date: 3/12/2021  Obstructive Sleep Apnea, G47.33 Hpyoxemia, R09.02  RECOMMENDATION: CPAP titration, patient may benefit from BiPAP due to the severity of obstructive sleep apnea in the supine position AHI was 84, no REM sleep was recorded most likely due to severe obstructive sleep apnea, patient also utilizing 3 L of oxygen during the night which will need to be titrated I have reviewed the entire raw data, all components, including calibrations. Please feel free to contact me, if you wish to discuss this case further.    Alvaro Hopson MD  Date Diplomat, American Board of Sleep Medicine       XR Chest AP    Result Date: 3/14/2021   1. Bilateral basilar pleural effusions with compressive atelectasis. 2. Bilateral mixed interstitial/airspace disease presumably related to the patient's Covid infection. 3. Cardiomegaly.  Electronically Signed By-Doron Hernandez MD On:3/14/2021  10:59 AM This report was finalized on [id number] by  Doron Hernandez MD.          Xrays, labs reviewed personally by physician.    Medication Review:   I have reviewed the patient's current medication list      Scheduled Meds  aspirin, 81 mg, Oral, Daily  atorvastatin, 20 mg, Oral, Nightly  budesonide, 0.5 mg, Nebulization, BID - RT  calcitriol, 0.25 mcg, Oral, Once per day on Mon Wed Fri  carvedilol, 12.5 mg, Oral, Nightly  clindamycin, 300 mg, Oral, Q8H  docusate sodium, 100 mg, Oral, BID  furosemide, 40 mg, Intravenous, BID  insulin glargine, 10 Units, Subcutaneous, Nightly  insulin lispro, 0-14 Units, Subcutaneous, 4x Daily With Meals & Nightly  magnesium oxide, 200 mg, Oral, Daily  mirtazapine, 15 mg, Oral, Nightly  predniSONE, 20 mg, Oral, BID With Meals  sodium chloride, 10 mL, Intravenous, Q12H  theophylline, 300 mg, Oral, Nightly  warfarin, 1.5 mg, Oral, Once  warfarin, 3 mg, Oral, Nightly        Meds Infusions  Pharmacy to dose warfarin,         Meds PRN  •  acetaminophen **OR** acetaminophen **OR** acetaminophen  •  dextrose  •  dextrose  •  glucagon (human recombinant)  •  HYDROcodone-acetaminophen  •  insulin lispro **AND** insulin lispro  •  ipratropium-albuterol  •  melatonin  •  nitroglycerin  •  ondansetron **OR** ondansetron  •  Pharmacy to dose warfarin  •  [COMPLETED] Insert peripheral IV **AND** sodium chloride  •  sodium chloride        Assessment/Plan   Assessment/Plan     Active Hospital Problems    Diagnosis  POA   • Shortness of breath [R06.02]  Yes      Resolved Hospital Problems   No resolved problems to display.       MEDICAL DECISION MAKING COMPLEXITY BY PROBLEM:     Acute hypoxic respiratory failure secondary to acute exacerbation of COPD and acute congestive heart failure  -Titrate O2 to maintain sat greater than 91% and wean as tolerated    AE COPD  -Chest x-ray showed bilateral basilar pleural effusions with compression atelectasis and bilateral mixed interstitial airspace disease  with cardiomegaly  -Respiratory panel including COVID-19 negative  -Titrate O2 to maintain sat greater than 91%  -Continue theophylline and DuoNebs  -Pulmonary consulting  -Continue clindamycin and prednisone    Obstructive sleep apnea  -Encourage CPAP compliance    Hx of Lung cancer 2008 stage I NSCLCA s/p right lower lobectomy    Bilateral lung nodules  -CT scan 05/18/2020 right lung nodule smaller in size  history of significant cough and dyspnea and hemoptysis in June 2019 CT scan of the chest 07/25/2019 suggestive of resolving pneumonia  CT chest 12/2016 and 12/14/15 revealed stable nodular densities bilaterally. These findings indicate two year stability at this time. Stable post operative changes in the right mid to lower hemithorax.    Acute on chronic diastolic congestive heart failure due to hypertensive heart disease and aortic valve stenosis, rule out worsening systolic heart failure with history of ischemic cardiomyopathy  -Echo 12/9/2020 showed LVEF 60%, RVSP 30 mmHg, mild aortic valve stenosis  -Repeat echo ordered  -proBNP 6687  -Serial troponins 0.030 and 0.027  -EKG showed atrial fibrillation/flutter with ventricular paced complexes, no significant change from 1/11/2021  -Cardiology consulting  -Continue Coreg and IV Lasix    Chronic atrial fibrillation/flutter and sick sinus syndrome status post PPM  -Continue Coumadin with goal INR 2-3 (pharmacy to dose); current INR subtherapeutic (1.7 on admission and 1.69 currently)  -Continue Coreg  -Per last outpatient office visit notes from PCP the patient has been on Lanoxin 0.125 mg twice a week    Coronary artery disease  -Continue aspirin and Coreg    Essential hypertension, chronic and controlled  -Continue Coreg and Lasix    Hyperlipidemia  -Continue statin    Acute renal failure from prerenal azotemia due to diuretics on top of CKD stage IIIb secondary to hypertension and diabetes  -Baseline creatinine 1.9  -Admission creatinine 2.14 and current  creatinine 2.52 after IV diuresis  -Continue Rocaltrol  -Avoid nephrotoxic medications and IV contrast dye  -Consult nephrology for diuresis management    Anemia of CKD  -Monitor hemoglobin and transfuse for less than 7    Type 2 diabetes mellitus  -A1c 7.1 on 12/29/2020   -Patient is on Ozempic weekly per PCP office visit note dated 1/19/2021  -Continue home glargine  -SSI  -Steroid insulin protocol  -Check TSH    Chronic constipation  -Continue Colace    Unintentional weight loss  -Continue mirtazapine    Nutritional supplements  -Continue magnesium and vitamin D    The patient has a living well per medical record review.    VTE Prophylaxis -   Mechanical Order History:     None      Pharmalogical Order History:      Ordered     Dose Route Frequency Stop    03/15/21 0812  warfarin (COUMADIN) tablet 1.5 mg     Question:  Target INR  Answer:  2 - 3    1.5 mg PO Once (Warfarin) --    03/14/21 1619  warfarin (COUMADIN) tablet 3 mg     Question:  Target INR  Answer:  2 - 3    3 mg PO Nightly --    03/14/21 1619  Pharmacy to dose warfarin     Question:  Target INR  Answer:  2 - 3    -- XX Continuous PRN --                  Code Status -   Code Status and Medical Interventions:   Ordered at: 03/14/21 1526     Level Of Support Discussed With:    Patient     Code Status:    CPR     Medical Interventions (Level of Support Prior to Arrest):    Full       This patient has been examined wearing appropriate Personal Protective Equipment. 03/15/21        Discharge Planning  Pending clinical course.        Electronically signed by Marie Ying MD, 03/15/21, 10:21 EDT.  [facility] Hospitalist Team

## 2021-03-15 NOTE — TELEPHONE ENCOUNTER
Wife called to cancel appt for initial pulmonary rehab start today. Will continue to follow up and reschedule. HILARIO Higgins RRT

## 2021-03-15 NOTE — CONSULTS
Referring Provider: Dr. Marie Ying  Reason for Consultation: Renal insufficiency    Chief complaint .  Shortness of breath    History of present illness:    Patient is 85 years old  male with history of chronic kidney disease stage III(baseline creatinine 1.8 to 1.9 mg/DL), hypertension, COPD, CHF, sick sinus syndrome came to the hospital with worsening shortness of breath for the last few days.  Patient also had cough but no fever, chills, expectoration, nausea, vomiting, diarrhea or constipation.  Patient had orthopnea.  No hemoptysis.  He has been diuretics in form of IV Lasix along with steroids and antibiotics.  His creatinine increased to 2.5 mg/DL today which prompted an consult.  Patient overall feeling little better    History  Past Medical History:   Diagnosis Date   • Arrhythmia    • Arthritis    • Bradycardia    • Cancer (CMS/HCC)    • Cardiomyopathy (CMS/[facility])    • Cataract    • CHF (congestive heart failure) (CMS/HCC)    • COPD (chronic obstructive pulmonary disease) (CMS/[facility])    • Coronary artery disease    • Diabetes mellitus, type 2 (CMS/[facility])    • Emphysema, unspecified (CMS/[facility])    • Hx of sick sinus syndrome     S/P Biventricular Pacemaker   • Hyperlipidemia    • Hypertension    • Paroxysmal atrial fibrillation (CMS/[facility])    • Pneumonia    • Sleep apnea    • Ventricular arrhythmia      Past Surgical History:   Procedure Laterality Date   • CARDIAC CATHETERIZATION  02/04/2015   • CARDIAC CATHETERIZATION  04/25/2016   • CAROTID ENDARTERECTOMY     • COLONOSCOPY     • CORONARY ANGIOPLASTY  1997   • ENDOSCOPY     • EYE SURGERY     • FRACTURE SURGERY     • HERNIA REPAIR     • JOINT REPLACEMENT     • LARYNGOSCOPY     • LUNG SURGERY  2008    Lung resection   • PACEMAKER IMPLANTATION  04/25/2016    Dual chamber; [employer]   • TRACHEOSTOMY       Social History     Tobacco Use   • Smoking status: Former Smoker     Types: Cigarettes   • Smokeless tobacco: Never Used   Vaping Use   • Vaping  Use: Never used   Substance Use Topics   • Alcohol use: Yes     Comment: occ   • Drug use: No     Family History   Problem Relation Age of Onset   • Heart disease Mother    • Hypertension Mother    • Stroke Father    • Breast cancer Sister    • Colon cancer Sister    • Heart disease Brother    • Diabetes Other        Review of Systems  ROS  All system reviewed, negative except as mentioned in HPI  Objective     Vital Signs  Temp:  [97.4 °F (36.3 °C)-98 °F (36.7 °C)] 98 °F (36.7 °C)  Heart Rate:  [70-91] 77  Resp:  [18-22] 19  BP: (132-160)/(69-84) 132/69    I/O this shift:  In: 360 [P.O.:360]  Out: 550 [Urine:550]  I/O last 3 completed shifts:  In: 220 [P.O.:220]  Out: 650 [Urine:650]    Physical Exam:  Physical Exam  General.  Awake, alert  Head.  Atraumatic, normocephalic  Neck.  Supple with full range of motion  Respiratory.  Bilateral wheezing and few rhonchi  Cardiovascular.  Regular rhythm  Abdominal.  Obese, soft, nontender  Extremities. no lower extreme edema  Results Review:   I reviewed the patient's new clinical results.    Lab Results   Component Value Date    CALCIUM 8.5 (L) 03/15/2021     Results from last 7 days   Lab Units 03/15/21  0429 03/14/21  1642 03/14/21  1641 03/14/21  1022   SODIUM mmol/L 137 138  --  137   POTASSIUM mmol/L 5.2 5.4*  --  4.8   CHLORIDE mmol/L 101 99  --  100   CO2 mmol/L 23.0 30.0*  --  22.0   BUN mg/dL 32* 29*  --  28*   CREATININE mg/dL 2.52* 2.14*  --  2.25*   GLUCOSE mg/dL 289* 323*  --  361*   CALCIUM mg/dL 8.5* 9.4  --  8.5*   WBC 10*3/mm3 8.50  --  7.90 8.80   HEMOGLOBIN g/dL 11.3*  --  11.3* 11.7*   PLATELETS 10*3/mm3 277  --  313 286   ALT (SGPT) U/L  --   --   --  49*   AST (SGOT) U/L  --   --   --  70*     Lab Results   Component Value Date    CKTOTAL 34 01/13/2021    TROPONINI 0.030 06/28/2019    TROPONINT 0.027 03/14/2021     Estimated Creatinine Clearance: 22.3 mL/min (A) (by C-G formula based on SCr of 2.52 mg/dL (H)).No results found for: URICACID    Brief  Urine Lab Results  (Last result in the past 365 days)      Color   Clarity   Blood   Leuk Est   Nitrite   Protein   CREAT   Urine HCG        12/29/20 1019             25.5             Prior to Admission medications    Medication Sig Start Date End Date Taking? Authorizing Provider   aspirin 81 MG tablet Take 81 mg by mouth Every Night. 3/7/14  Yes Astrid Gonzalez MD   carvedilol (COREG) 12.5 MG tablet Take 12.5 mg by mouth Every Night.   Yes Astrid Gonzalez MD   furosemide (LASIX) 40 MG tablet Take 40 mg by mouth Daily.   Yes Astrid Gonzalez MD   insulin aspart (novoLOG FLEXPEN) 100 UNIT/ML solution pen-injector sc pen Inject 1 Units under the skin into the appropriate area as directed 4 (Four) Times a Day With Meals & at Bedtime. Check BS 4 times a day. Take 1U for 20 gm/dl the blood sugar is above 130  Max 60 units per 24 hours   Yes Astrid Gonzalez MD   Insulin Glargine (LANTUS SOLOSTAR) 100 UNIT/ML injection pen Inject 10 Units under the skin into the appropriate area as directed Every Night.   Yes Astrid Gonzalez MD   Magnesium 250 MG tablet Take 1 tablet by mouth Daily.   Yes Astrid Gonzalez MD   mirtazapine (REMERON) 15 MG tablet TAKE 1 TABLET BY MOUTH EVERY NIGHT. 2/1/21  Yes Liliam Goode APRN   theophylline (THEODUR) 300 MG 12 hr tablet Take 300 mg by mouth Every Night. 4/8/19  Yes Astrid Gonzalez MD   warfarin (COUMADIN) 3 MG tablet Take 1 tablet by mouth Every Night. 12/28/20  Yes Luis Paulino MD   simvastatin (ZOCOR) 40 MG tablet 1 tablet p.o. at bedtime. 1/7/20 3/15/21 Yes Jhonatan Mart MD   acetaminophen (TYLENOL) 500 MG tablet Take 500 mg by mouth Every 6 (Six) Hours As Needed for Mild Pain .    Astrid Gonzalez MD   budesonide (PULMICORT) 0.5 MG/2ML nebulizer solution Take 2 mL by nebulization 2 (Two) Times a Day. J44.9 copd 1/16/21   Henry Hansen MD   calcitriol (ROCALTROL) 0.25 MCG capsule Take 0.25 mcg by mouth 3 (Three)  Times a Week. Mon, Wed, Fri    ProviderAstrid MD   ipratropium-albuterol (DUO-NEB) 0.5-2.5 mg/3 ml nebulizer Take 3 mL by nebulization Every 4 (Four) Hours As Needed for Shortness of Air. COPD j 44.9 1/16/21   Henry Hansen MD   simvastatin (ZOCOR) 40 MG tablet TAKE 1 TABLET AT BEDTIME 3/15/21   Jhonatan Mart MD       aspirin, 81 mg, Oral, Daily  atorvastatin, 20 mg, Oral, Nightly  budesonide, 0.5 mg, Nebulization, BID - RT  calcitriol, 0.25 mcg, Oral, Once per day on Mon Wed Fri  carvedilol, 12.5 mg, Oral, Nightly  clindamycin, 300 mg, Oral, Q8H  docusate sodium, 100 mg, Oral, BID  furosemide, 40 mg, Intravenous, BID  insulin glargine, 10 Units, Subcutaneous, Nightly  insulin lispro, 0-14 Units, Subcutaneous, 4x Daily With Meals & Nightly  ipratropium-albuterol, 3 mL, Nebulization, 4x Daily - RT  magnesium oxide, 200 mg, Oral, Daily  mirtazapine, 15 mg, Oral, Nightly  predniSONE, 20 mg, Oral, BID With Meals  sodium chloride, 10 mL, Intravenous, Q12H  theophylline, 300 mg, Oral, Nightly  warfarin, 1.5 mg, Oral, Once  warfarin, 3 mg, Oral, Nightly      Pharmacy Consult - Steroid Insulin Protocol,   Pharmacy to dose warfarin,         Assessment/Plan       1.  Acute kidney injury on CKD 3  Baseline creatinine around 1.8 to 1.9 mg/DL but his creatinine has fluctuated in the past depending from the diuretics dose.  His creatinine has increased now to 2.5 mg/DL  Electrolytes okay.  Volume status improving  I will decrease the dose of IV Lasix and monitor for renal recovery  Order urinalysis and urine electrolytes    2.  Respiratory failure  Acute COPD exacerbation + volume overload  Patient on IV Lasix, steroids and antibiotics    3.CHFpEF/hypervolemia  Patient has been on IV Lasix for diuresis  I am decreasing the dose due to worsening creatinine.  Will monitor volume status closely    4.  Hyperkalemia  Improving.  Change diet to low K diet    5.  Hypertension with CKD 3  Blood pressure fairly well  controlled on current antihypertensives      I discussed the patients findings and my recommendations with patient and nursing staff    Cam Yan MD  03/15/21  14:06 EDT

## 2021-03-15 NOTE — PLAN OF CARE
Problem: Adult Inpatient Plan of Care  Goal: Absence of Hospital-Acquired Illness or Injury  Intervention: Identify and Manage Fall Risk  Recent Flowsheet Documentation  Taken 3/15/2021 0310 by Serjio Lopez LPN  Safety Promotion/Fall Prevention:   safety round/check completed   room organization consistent   nonskid shoes/slippers when out of bed   muscle strengthening facilitated   mobility aid in reach   lighting adjusted  Taken 3/15/2021 0115 by Serjio Lopez LPN  Safety Promotion/Fall Prevention:   safety round/check completed   room organization consistent   nonskid shoes/slippers when out of bed   muscle strengthening facilitated   mobility aid in reach   lighting adjusted   fall prevention program maintained   clutter free environment maintained   assistive device/personal items within reach  Taken 3/14/2021 2330 by Serjio Lopez LPN  Safety Promotion/Fall Prevention:   room organization consistent   safety round/check completed   muscle strengthening facilitated   nonskid shoes/slippers when out of bed   mobility aid in reach   lighting adjusted   fall prevention program maintained   clutter free environment maintained   assistive device/personal items within reach   activity supervised  Taken 3/14/2021 2105 by Serjio Lopez LPN  Safety Promotion/Fall Prevention:   safety round/check completed   nonskid shoes/slippers when out of bed   muscle strengthening facilitated   room organization consistent   mobility aid in reach   lighting adjusted   activity supervised   assistive device/personal items within reach   fall prevention program maintained   clutter free environment maintained  Taken 3/14/2021 1935 by Serjio Lopez LPN  Safety Promotion/Fall Prevention:   safety round/check completed   room organization consistent   nonskid shoes/slippers when out of bed   muscle strengthening facilitated   mobility aid in reach   lighting adjusted  Intervention: Prevent Skin  Injury  Recent Flowsheet Documentation  Taken 3/15/2021 0310 by Serjio Lopez LPN  Body Position: position changed independently  Taken 3/15/2021 0115 by Serjio Lopez LPN  Body Position: position changed independently  Taken 3/14/2021 2330 by Serjio Lopez LPN  Body Position: position changed independently  Taken 3/14/2021 2105 by Serjio Lopez LPN  Body Position:   position maintained   position changed independently  Taken 3/14/2021 1935 by Serjio Lopez LPN  Body Position: sitting up in bed  Intervention: Prevent Infection  Recent Flowsheet Documentation  Taken 3/15/2021 0310 by Serjio Lopez LPN  Infection Prevention:   visitors restricted/screened   single patient room provided   rest/sleep promoted   personal protective equipment utilized   hand hygiene promoted  Taken 3/15/2021 0115 by Serjio Lopez LPN  Infection Prevention:   single patient room provided   rest/sleep promoted   personal protective equipment utilized   hand hygiene promoted  Taken 3/14/2021 2330 by Serjio Lopez LPN  Infection Prevention:   single patient room provided   rest/sleep promoted   personal protective equipment utilized   hand hygiene promoted   equipment surfaces disinfected  Taken 3/14/2021 2105 by Serjio Lopez LPN  Infection Prevention:   visitors restricted/screened   single patient room provided   rest/sleep promoted   personal protective equipment utilized  Taken 3/14/2021 1935 by Serjio Lopez LPN  Infection Prevention:   visitors restricted/screened   single patient room provided   rest/sleep promoted   personal protective equipment utilized   hand hygiene promoted     Problem: Fall Injury Risk  Goal: Absence of Fall and Fall-Related Injury  Intervention: Promote Injury-Free Environment  Recent Flowsheet Documentation  Taken 3/15/2021 0310 by Serjio Lopez LPN  Safety Promotion/Fall Prevention:   safety round/check completed   room organization  consistent   nonskid shoes/slippers when out of bed   muscle strengthening facilitated   mobility aid in reach   lighting adjusted  Taken 3/15/2021 0115 by Serjio Lopez LPN  Safety Promotion/Fall Prevention:   safety round/check completed   room organization consistent   nonskid shoes/slippers when out of bed   muscle strengthening facilitated   mobility aid in reach   lighting adjusted   fall prevention program maintained   clutter free environment maintained   assistive device/personal items within reach  Taken 3/14/2021 2330 by Serjio Lopez LPN  Safety Promotion/Fall Prevention:   room organization consistent   safety round/check completed   muscle strengthening facilitated   nonskid shoes/slippers when out of bed   mobility aid in reach   lighting adjusted   fall prevention program maintained   clutter free environment maintained   assistive device/personal items within reach   activity supervised  Taken 3/14/2021 2105 by Serjio Lopez LPN  Safety Promotion/Fall Prevention:   safety round/check completed   nonskid shoes/slippers when out of bed   muscle strengthening facilitated   room organization consistent   mobility aid in reach   lighting adjusted   activity supervised   assistive device/personal items within reach   fall prevention program maintained   clutter free environment maintained  Taken 3/14/2021 1935 by Serjio Lopez LPN  Safety Promotion/Fall Prevention:   safety round/check completed   room organization consistent   nonskid shoes/slippers when out of bed   muscle strengthening facilitated   mobility aid in reach   lighting adjusted     Problem: Respiratory Compromise (Pneumonia)  Goal: Effective Oxygenation and Ventilation  Intervention: Optimize Oxygenation and Ventilation  Recent Flowsheet Documentation  Taken 3/15/2021 0310 by Serjio Lopez LPN  Head of Bed (HOB): HOB at 20-30 degrees  Taken 3/15/2021 0115 by Serjio Lopez LPN  Head of Bed (HOB):  HOB at 20-30 degrees  Taken 3/14/2021 2330 by Serjio Lopez LPN  Head of Bed (HOB): HOB at 30-45 degrees  Taken 3/14/2021 2105 by Serjio Lopez LPN  Head of Bed (HOB):   HOB at 30-45 degrees   HOB at 30 degrees  Taken 3/14/2021 1935 by Serjio Lopez LPN  Head of Bed (HOB):   HOB at 30-45 degrees   HOB at 45 degrees   Goal Outcome Evaluation:

## 2021-03-15 NOTE — PROGRESS NOTES
"PULMONARY CRITICAL CARE Progress  NOTE      PATIENT IDENTIFICATION:  Name: Cali Santoro  MRN: [id number]  :  1935     Age: 85 y.o.  Sex: male    DATE OF Note:  3/15/2021   Referring Physician: Lynette Dutta MD                  Subjective:   Feeling better, on O2 mask four liter,  no SOB no chest pain, no nausea or vomiting, no change in bowel habit, no dysuria,  no new  skin rash or itching.      Objective:  tMax 24 hrs: Temp (24hrs), Av.7 °F (36.5 °C), Min:97.4 °F (36.3 °C), Max:97.9 °F (36.6 °C)      Vitals Ranges:   Temp:  [97.4 °F (36.3 °C)-97.9 °F (36.6 °C)] 97.9 °F (36.6 °C)  Heart Rate:  [70-92] 78  Resp:  [18-22] 19  BP: (134-178)/(64-86) 135/78    Intake and Output Last 3 Shifts:   I/O last 3 completed shifts:  In: 220 [P.O.:220]  Out: 650 [Urine:650]    Exam:  /78 (BP Location: Right arm, Patient Position: Lying)   Pulse 78   Temp 97.9 °F (36.6 °C) (Oral)   Resp 19   Ht 170.2 cm (67\")   Wt 81.3 kg (179 lb 3.7 oz)   SpO2 93%   BMI 28.07 kg/m²     General Appearance: Alert      HEENT:  Normocephalic, without obvious abnormality, Conjunctiva/corneas clear,.  Normal external ear canals, Nares normal, no drainage     Neck:  Supple, symmetrical, trachea midline. No JVD.  Lungs /Chest wall:   Bilateral basal rhonchi, respirations unlabored symmetrical wall movement.    Heart:  Regular rate and rhythm, systolic murmur PMI left sternal border  Abdomen: Soft, non-tender, no masses, no organomegaly.    Extremities: Trace edema no clubbing or Cyanosis        Medications:    Current Facility-Administered Medications:   •  acetaminophen (TYLENOL) tablet 650 mg, 650 mg, Oral, Q4H PRN **OR** acetaminophen (TYLENOL) 160 MG/5ML solution 650 mg, 650 mg, Oral, Q4H PRN **OR** acetaminophen (TYLENOL) suppository 650 mg, 650 mg, Rectal, Q4H PRN, Lynette Dutta MD  •  aspirin EC tablet 81 mg, 81 mg, Oral, Daily, Lynette Dutta MD, 81 mg at 03/15/21 08  •  atorvastatin (LIPITOR) tablet 20 mg, 20 mg, " Oral, Nightly, Lynette Dutta MD, 20 mg at 03/14/21 2036  •  budesonide (PULMICORT) nebulizer solution 0.5 mg, 0.5 mg, Nebulization, BID - RT, Lynette Dutta MD, 0.5 mg at 03/15/21 0708  •  calcitriol (ROCALTROL) capsule 0.25 mcg, 0.25 mcg, Oral, Once per day on Mon Wed Fri, Lynette Dutta MD, 0.25 mcg at 03/15/21 0822  •  carvedilol (COREG) tablet 12.5 mg, 12.5 mg, Oral, Nightly, Lynette Dutta MD, 12.5 mg at 03/14/21 2036  •  clindamycin (CLEOCIN) capsule 300 mg, 300 mg, Oral, Q8H, DrawAlvaro MD, 300 mg at 03/15/21 0607  •  dextrose (D50W) 25 g/ 50mL Intravenous Solution 25 g, 25 g, Intravenous, Q15 Min PRN, Lynette Dutta MD  •  dextrose (GLUTOSE) oral gel 15 g, 15 g, Oral, Q15 Min PRN, Lynette Dutta MD  •  docusate sodium (COLACE) capsule 100 mg, 100 mg, Oral, BID, Lynette Dutta MD, 100 mg at 03/15/21 0823  •  furosemide (LASIX) injection 40 mg, 40 mg, Intravenous, BID, Lynette Dutta MD, 40 mg at 03/15/21 0823  •  glucagon (human recombinant) (GLUCAGEN DIAGNOSTIC) injection 1 mg, 1 mg, Subcutaneous, Q15 Min PRN, Lynette Dutta MD  •  HYDROcodone-acetaminophen (NORCO) 7.5-325 MG per tablet 2 tablet, 2 tablet, Oral, Q4H PRN, Lynette Dutta MD, 2 tablet at 03/14/21 2330  •  insulin glargine (LANTUS, SEMGLEE) injection 10 Units, 10 Units, Subcutaneous, Nightly, Lynette Dutta MD, 10 Units at 03/14/21 2038  •  insulin lispro (ADMELOG) injection 0-14 Units, 0-14 Units, Subcutaneous, 4x Daily With Meals & Nightly, 8 Units at 03/15/21 0828 **AND** insulin lispro (ADMELOG) injection 0-14 Units, 0-14 Units, Subcutaneous, PRN, Lynette Dutta MD  •  ipratropium-albuterol (DUO-NEB) nebulizer solution 3 mL, 3 mL, Nebulization, Q4H PRN, Lynette Dutta MD, 3 mL at 03/14/21 1838  •  magnesium oxide (MAG-OX) tablet 200 mg, 200 mg, Oral, Daily, Lynette Dutta MD, 200 mg at 03/15/21 0822  •  melatonin tablet 5 mg, 5 mg, Oral, Nightly PRN, Lynette Dutta MD, 5 mg at 03/14/21 2058  •  mirtazapine (REMERON) tablet 15 mg, 15 mg,  Oral, Nightly, Lynette Dutta MD, 15 mg at 03/14/21 2037  •  nitroglycerin (NITROSTAT) SL tablet 0.4 mg, 0.4 mg, Sublingual, Q5 Min PRN, Lynette Dutta MD  •  ondansetron (ZOFRAN) tablet 4 mg, 4 mg, Oral, Q6H PRN **OR** ondansetron (ZOFRAN) injection 4 mg, 4 mg, Intravenous, Q6H PRN, Lynette Dutta MD  •  Pharmacy to dose warfarin, , Does not apply, Continuous PRN, Lynette Dutta MD  •  predniSONE (DELTASONE) tablet 20 mg, 20 mg, Oral, BID With Meals, Draw, MD Alvaro, 20 mg at 03/15/21 0823  •  [COMPLETED] Insert peripheral IV, , , Once **AND** sodium chloride 0.9 % flush 10 mL, 10 mL, Intravenous, PRN, Lynette Dutta MD  •  sodium chloride 0.9 % flush 10 mL, 10 mL, Intravenous, Q12H, Lynette Dutta MD, 10 mL at 03/15/21 0823  •  sodium chloride 0.9 % flush 10 mL, 10 mL, Intravenous, PRN, Lynette Dutta MD  •  theophylline (SHE-24) 24 hr capsule 300 mg, 300 mg, Oral, Nightly, Lynette Dutta MD, 300 mg at 03/14/21 2036  •  warfarin (COUMADIN) tablet 1.5 mg, 1.5 mg, Oral, Once, Lynette Dutta MD  •  warfarin (COUMADIN) tablet 3 mg, 3 mg, Oral, Nightly, Lynette Dutta MD, 3 mg at 03/14/21 2036    Data Review:  All labs (24hrs):   Recent Results (from the past 24 hour(s))   Procalcitonin    Collection Time: 03/14/21 10:22 AM    Specimen: Arm, Left; Blood   Result Value Ref Range    Procalcitonin 0.11 0.00 - 0.25 ng/mL   Comprehensive Metabolic Panel    Collection Time: 03/14/21 10:22 AM    Specimen: Arm, Left; Blood   Result Value Ref Range    Glucose 361 (H) 65 - 99 mg/dL    BUN 28 (H) 8 - 23 mg/dL    Creatinine 2.25 (H) 0.76 - 1.27 mg/dL    Sodium 137 136 - 145 mmol/L    Potassium 4.8 3.5 - 5.2 mmol/L    Chloride 100 98 - 107 mmol/L    CO2 22.0 22.0 - 29.0 mmol/L    Calcium 8.5 (L) 8.6 - 10.5 mg/dL    Total Protein 7.0 6.0 - 8.5 g/dL    Albumin 3.90 3.50 - 5.20 g/dL    ALT (SGPT) 49 (H) 1 - 41 U/L    AST (SGOT) 70 (H) 1 - 40 U/L    Alkaline Phosphatase 79 39 - 117 U/L    Total Bilirubin 0.5 0.0 - 1.2 mg/dL    eGFR Non   Amer 28 (L) >60 mL/min/1.73    Globulin 3.1 gm/dL    A/G Ratio 1.3 g/dL    BUN/Creatinine Ratio 12.4 7.0 - 25.0    Anion Gap 15.0 5.0 - 15.0 mmol/L   Protime-INR    Collection Time: 03/14/21 10:22 AM    Specimen: Arm, Left; Blood   Result Value Ref Range    Protime 18.2 (L) 19.4 - 28.5 Seconds    INR 1.70 (L) 2.00 - 3.00   aPTT    Collection Time: 03/14/21 10:22 AM    Specimen: Arm, Left; Blood   Result Value Ref Range    PTT 28.6 24.0 - 31.0 seconds   BNP    Collection Time: 03/14/21 10:22 AM    Specimen: Arm, Left; Blood   Result Value Ref Range    proBNP 6,872.0 (H) 0.0-1,800.0 pg/mL   Troponin    Collection Time: 03/14/21 10:22 AM    Specimen: Arm, Left; Blood   Result Value Ref Range    Troponin T 0.030 0.000 - 0.030 ng/mL   Theophylline Level    Collection Time: 03/14/21 10:22 AM    Specimen: Arm, Left; Blood   Result Value Ref Range    Theophylline Level 18.2 10.0 - 20.0 mcg/mL   CBC Auto Differential    Collection Time: 03/14/21 10:22 AM    Specimen: Arm, Left; Blood   Result Value Ref Range    WBC 8.80 3.40 - 10.80 10*3/mm3    RBC 3.42 (L) 4.14 - 5.80 10*6/mm3    Hemoglobin 11.7 (L) 13.0 - 17.7 g/dL    Hematocrit 36.3 (L) 37.5 - 51.0 %    .1 (H) 79.0 - 97.0 fL    MCH 34.2 (H) 26.6 - 33.0 pg    MCHC 32.2 31.5 - 35.7 g/dL    RDW 19.0 (H) 12.3 - 15.4 %    RDW-SD 70.9 (H) 37.0 - 54.0 fl    MPV 7.5 6.0 - 12.0 fL    Platelets 286 140 - 450 10*3/mm3    Neutrophil % 80.1 (H) 42.7 - 76.0 %    Lymphocyte % 10.5 (L) 19.6 - 45.3 %    Monocyte % 5.7 5.0 - 12.0 %    Eosinophil % 3.1 0.3 - 6.2 %    Basophil % 0.6 0.0 - 1.5 %    Neutrophils, Absolute 7.00 1.70 - 7.00 10*3/mm3    Lymphocytes, Absolute 0.90 0.70 - 3.10 10*3/mm3    Monocytes, Absolute 0.50 0.10 - 0.90 10*3/mm3    Eosinophils, Absolute 0.30 0.00 - 0.40 10*3/mm3    Basophils, Absolute 0.10 0.00 - 0.20 10*3/mm3    nRBC 0.1 0.0 - 0.2 /100 WBC   Respiratory Panel PCR w/COVID-19(SARS-CoV-2) [facility]OMAS/[facility]ITA In-House, NP Swab in [facility],  3-4 HR TAT - Swab, Nasopharynx    Collection Time: 03/14/21 10:26 AM    Specimen: Nasopharynx; Swab   Result Value Ref Range    ADENOVIRUS, PCR Not Detected Not Detected    Coronavirus 229E Not Detected Not Detected    Coronavirus HKU1 Not Detected Not Detected    Coronavirus NL63 Not Detected Not Detected    Coronavirus OC43 Not Detected Not Detected    COVID19 Not Detected Not Detected - Ref. Range    Human Metapneumovirus Not Detected Not Detected    Human Rhinovirus/Enterovirus Not Detected Not Detected    Influenza A PCR Not Detected Not Detected    Influenza B PCR Not Detected Not Detected    Parainfluenza Virus 1 Not Detected Not Detected    Parainfluenza Virus 2 Not Detected Not Detected    Parainfluenza Virus 3 Not Detected Not Detected    Parainfluenza Virus 4 Not Detected Not Detected    RSV, PCR Not Detected Not Detected    Bordetella pertussis pcr Not Detected Not Detected    Bordetella parapertussis PCR Not Detected Not Detected    Chlamydophila pneumoniae PCR Not Detected Not Detected    Mycoplasma pneumo by PCR Not Detected Not Detected   Blood Gas, Arterial -    Collection Time: 03/14/21 10:42 AM    Specimen: Arterial Blood   Result Value Ref Range    Site Right Radial     Zheng's Test Positive     pH, Arterial 7.399 7.350 - 7.450 pH units    pCO2, Arterial 38.1 35.0 - 48.0 mm Hg    pO2, Arterial 65.8 (L) 83.0 - 108.0 mm Hg    HCO3, Arterial 23.5 21.0 - 28.0 mmol/L    Base Excess, Arterial -1.1 (L) 0.0 - 3.0 mmol/L    O2 Saturation, Arterial 92.7 (L) 94.0 - 98.0 %    CO2 Content 24.7 22 - 29 mmol/L    Barometric Pressure for Blood Gas      Modality Cannula     FIO2 36 %    Hemodilution No    CBC Auto Differential    Collection Time: 03/14/21  4:41 PM    Specimen: Blood   Result Value Ref Range    WBC 7.90 3.40 - 10.80 10*3/mm3    RBC 3.37 (L) 4.14 - 5.80 10*6/mm3    Hemoglobin 11.3 (L) 13.0 - 17.7 g/dL    Hematocrit 35.2 (L) 37.5 - 51.0 %    .4 (H) 79.0 - 97.0 fL    MCH 33.6 (H) 26.6 - 33.0  pg    MCHC 32.2 31.5 - 35.7 g/dL    RDW 18.2 (H) 12.3 - 15.4 %    RDW-SD 66.1 (H) 37.0 - 54.0 fl    MPV 7.3 6.0 - 12.0 fL    Platelets 313 140 - 450 10*3/mm3    Neutrophil % 69.1 42.7 - 76.0 %    Lymphocyte % 20.1 19.6 - 45.3 %    Monocyte % 7.5 5.0 - 12.0 %    Eosinophil % 2.6 0.3 - 6.2 %    Basophil % 0.7 0.0 - 1.5 %    Neutrophils, Absolute 5.50 1.70 - 7.00 10*3/mm3    Lymphocytes, Absolute 1.60 0.70 - 3.10 10*3/mm3    Monocytes, Absolute 0.60 0.10 - 0.90 10*3/mm3    Eosinophils, Absolute 0.20 0.00 - 0.40 10*3/mm3    Basophils, Absolute 0.10 0.00 - 0.20 10*3/mm3    nRBC 0.2 0.0 - 0.2 /100 WBC   Basic Metabolic Panel    Collection Time: 03/14/21  4:42 PM    Specimen: Blood   Result Value Ref Range    Glucose 323 (H) 65 - 99 mg/dL    BUN 29 (H) 8 - 23 mg/dL    Creatinine 2.14 (H) 0.76 - 1.27 mg/dL    Sodium 138 136 - 145 mmol/L    Potassium 5.4 (H) 3.5 - 5.2 mmol/L    Chloride 99 98 - 107 mmol/L    CO2 30.0 (H) 22.0 - 29.0 mmol/L    Calcium 9.4 8.6 - 10.5 mg/dL    eGFR Non African Amer 30 (L) >60 mL/min/1.73    BUN/Creatinine Ratio 13.6 7.0 - 25.0    Anion Gap 9.0 5.0 - 15.0 mmol/L   Troponin    Collection Time: 03/14/21  4:42 PM    Specimen: Blood   Result Value Ref Range    Troponin T 0.027 0.000 - 0.030 ng/mL   D-dimer, Quantitative    Collection Time: 03/14/21  4:42 PM    Specimen: Blood   Result Value Ref Range    D-Dimer, Quantitative 1.10 (H) 0.00 - 0.59 mg/L (FEU)   POC Glucose Once    Collection Time: 03/14/21  4:57 PM    Specimen: Blood   Result Value Ref Range    Glucose 318 (H) 70 - 105 mg/dL   POC Glucose Once    Collection Time: 03/14/21  8:11 PM    Specimen: Blood   Result Value Ref Range    Glucose 206 (H) 70 - 105 mg/dL   Protime-INR    Collection Time: 03/15/21  4:29 AM    Specimen: Blood   Result Value Ref Range    Protime 18.1 (L) 19.4 - 28.5 Seconds    INR 1.69 (L) 2.00 - 3.00   Basic Metabolic Panel    Collection Time: 03/15/21  4:29 AM    Specimen: Blood   Result Value Ref Range    Glucose  289 (H) 65 - 99 mg/dL    BUN 32 (H) 8 - 23 mg/dL    Creatinine 2.52 (H) 0.76 - 1.27 mg/dL    Sodium 137 136 - 145 mmol/L    Potassium 5.2 3.5 - 5.2 mmol/L    Chloride 101 98 - 107 mmol/L    CO2 23.0 22.0 - 29.0 mmol/L    Calcium 8.5 (L) 8.6 - 10.5 mg/dL    eGFR Non African Amer 25 (L) >60 mL/min/1.73    BUN/Creatinine Ratio 12.7 7.0 - 25.0    Anion Gap 13.0 5.0 - 15.0 mmol/L   BNP    Collection Time: 03/15/21  4:29 AM    Specimen: Blood   Result Value Ref Range    proBNP 6,687.0 (H) 0.0-1,800.0 pg/mL   CBC Auto Differential    Collection Time: 03/15/21  4:29 AM    Specimen: Blood   Result Value Ref Range    WBC 8.50 3.40 - 10.80 10*3/mm3    RBC 3.24 (L) 4.14 - 5.80 10*6/mm3    Hemoglobin 11.3 (L) 13.0 - 17.7 g/dL    Hematocrit 34.2 (L) 37.5 - 51.0 %    .4 (H) 79.0 - 97.0 fL    MCH 34.9 (H) 26.6 - 33.0 pg    MCHC 33.1 31.5 - 35.7 g/dL    RDW 18.6 (H) 12.3 - 15.4 %    RDW-SD 68.7 (H) 37.0 - 54.0 fl    MPV 7.5 6.0 - 12.0 fL    Platelets 277 140 - 450 10*3/mm3    Neutrophil % 86.5 (H) 42.7 - 76.0 %    Lymphocyte % 9.2 (L) 19.6 - 45.3 %    Monocyte % 1.8 (L) 5.0 - 12.0 %    Eosinophil % 1.9 0.3 - 6.2 %    Basophil % 0.6 0.0 - 1.5 %    Neutrophils, Absolute 7.40 (H) 1.70 - 7.00 10*3/mm3    Lymphocytes, Absolute 0.80 0.70 - 3.10 10*3/mm3    Monocytes, Absolute 0.20 0.10 - 0.90 10*3/mm3    Eosinophils, Absolute 0.20 0.00 - 0.40 10*3/mm3    Basophils, Absolute 0.00 0.00 - 0.20 10*3/mm3    nRBC 0.0 0.0 - 0.2 /100 WBC   POC Glucose Once    Collection Time: 03/15/21  7:17 AM    Specimen: Blood   Result Value Ref Range    Glucose 284 (H) 70 - 105 mg/dL        Imaging:  XR Chest AP  Narrative: DATE OF EXAM:  3/14/2021 10:50 AM     PROCEDURE:  XR CHEST AP-     INDICATIONS:  Covid 19 infection, cough and fever, shortness of breath, hypoxia.      COMPARISON:  03/02/2021.     TECHNIQUE:   Single radiographic view of the chest was obtained.     FINDINGS:  The heart is enlarged. There is bilateral mixed  interstitial/airspace  disease which may be related to the patient's Covid 19 infection. The  patient has a new small to moderate right pleural effusion and a small  left pleural effusion. There appears to be compressive atelectasis on  both lung bases.  There is no pneumothorax. There is a left-sided  transvenous pacemaker in place.     Impression:    1. Bilateral basilar pleural effusions with compressive atelectasis.  2. Bilateral mixed interstitial/airspace disease presumably related to  the patient's Covid infection.  3. Cardiomegaly.     Electronically Signed By-Doron Hernandez MD On:3/14/2021 10:59 AM  This report was finalized on [id number] by  Doron Hernandez MD.       ASSESSMENT:    Shortness of breath  Arthritis  CHF  COPD  CAD  DM II  Hx SSS s/p pacemaker  Hyperlipidemia  HTN  A-fib  KIM  Bilateral effusion    PLAN:  Nephrology consulted for elevated creatinine 2.5  Chest x-ray no significant change will monitor  Antibiotics  Bronchodilator  Inhaled corticosteroids  PO steroids  Electrolytes/ glycemic control  IS/Flutter valve  DVT and GI prophylaxis    Discussed with Dr Sherron Theodore, APRN   3/15/2021  10:21 EDT  I personally have examined  and interviewed the patient. I have reviewed the history, data, problems, assessment and plan with our NP.  Critical care time in direct medical management (   ) minutes   Erick Siu MD, D,ABSM  1/23/2021

## 2021-03-15 NOTE — PROGRESS NOTES
"Pharmacy dosing service  Anticoagulant  Warfarin     Subjective:    Cali Santoro is a 85 y.o.male being continued on warfarin for atrial fibrillation.    INR Goal: 2 - 3  Home medication?: Yes, warfarin 3 mg PO every day at 1800  Interacting medications: prednisone (new med - may increase INR)       Assessment/Plan:    INR is subtherapeutic today at 1.69. Will order bolus dose of 4.5 mg today. Plan to resume home regimen tomorrow. May need increase in home dose d/t subtherapeutic INR history.    Continue to monitor and adjust based on INR.         Date 3/14 3/15          INR 1.7 1.69          Dose 3 mg 4.5 mg              Objective:  [Ht: 170.2 cm (67\"); Wt: 81.3 kg (179 lb 3.7 oz); BMI: Body mass index is 28.07 kg/m².]    Lab Results   Component Value Date    ALBUMIN 3.90 03/14/2021     Lab Results   Component Value Date    INR 1.69 (L) 03/15/2021    INR 1.70 (L) 03/14/2021    INR 2.00 01/16/2021    PROTIME 18.1 (L) 03/15/2021    PROTIME 18.2 (L) 03/14/2021    PROTIME 21.3 01/16/2021     Lab Results   Component Value Date    HGB 11.3 (L) 03/15/2021    HGB 11.3 (L) 03/14/2021    HGB 11.7 (L) 03/14/2021     Lab Results   Component Value Date    HCT 34.2 (L) 03/15/2021    HCT 35.2 (L) 03/14/2021    HCT 36.3 (L) 03/14/2021       Miguel Munson, Pharmacy Intern  03/15/21 07:53 EDT   "

## 2021-03-15 NOTE — DISCHARGE PLACEMENT REQUEST
"Ernesto Santoro (85 y.o. Male)     Date of Birth Social Security Number Address Home Phone MRN    1935  [id number] [address] [phone] [id number]    Yazdanism Marital Status          Tenriism        Admission Date Admission Type Admitting Provider Attending Provider Department, Room/Bed    3/14/21 Emergency Marie Ying MD Hall, Kelli G, MD [facility] 2B MEDICAL INPATIENT, 232/    Discharge Date Discharge Disposition Discharge Destination                       Attending Provider: Marie Ying MD    Allergies: Penicillins    Isolation: None   Infection: None   Code Status: CPR    Ht: 170.2 cm (67\")   Wt: 81.3 kg (179 lb 3.7 oz)    Admission Cmt: None   Principal Problem: Shortness of breath [R06.02]                 Active Insurance as of 3/14/2021     Primary Coverage     Payor Plan Insurance Group Employer/Plan Group    HUMANA MEDICARE REPLACEMENT HUMANA MEDICARE REPLACEMENT [id number]     Payor Plan Address Payor Plan Phone Number Payor Plan Fax Number Effective Dates    [address] [address] [phone]  2018 - None Entered    [facility] [id number]       Subscriber Name Subscriber Birth Date Member ID       ERNESTO SANTORO 1935 [id number]                 Emergency Contacts      (Rel.) Home Phone Work Phone Mobile Phone    WILDER SANTORO \"RENETTA\" (Spouse) [phone] -- [phone]               History & Physical      Lynette Dutta MD at [facility]                [facility] Medicine Services      Patient Name: Ernesto Santoro  : 1935  MRN: [id number]  Primary Care Physician: Yusef Sosa Jr., MD  Date of admission: 3/14/2021    Patient Care Team:  Yusef Sosa Jr., MD as PCP - General  Yusef Sosa Jr., MD as PCP - Family Medicine  Luis Paulino MD as Consulting Physician (Cardiology)          Subjective   History Present Illness     Chief Complaint:   Chief Complaint   Patient " presents with   • Shortness of Breath   Increasing shortness of breath and inability to lie flat      Mr. Santoro is a 85 y.o.  presents to [facility] complaining of shortness of breath.  The patient has numerous diagnoses including COPD, coronary artery disease, sick sinus syndrome and atrial fibrillation.  The patient recently had Covid a month ago.  The patient presented to the emergency room without complaints of chest pain but did have orthopnea, paroxysmal nocturnal dyspnea and severe dyspnea with any kind of movement.  The patient desaturated into the 70s in the emergency room.  He was vacillating between the low 70s and low 90s throughout the time.  He was placed on a facemask and his saturations have increased and are greater than 90 consistently at this time.  The patient is normally on Coumadin for his atrial fib.  The patient has not had any nausea vomiting diaphoresis or other anginal equivalent.    Review of Systems   Constitutional: Positive for decreased appetite.   HENT: Negative.    Eyes: Negative.    Cardiovascular: Positive for dyspnea on exertion, leg swelling, orthopnea and paroxysmal nocturnal dyspnea.   Respiratory: Positive for shortness of breath.    Endocrine: Negative.    Hematologic/Lymphatic: Negative.    Skin: Negative.    Musculoskeletal: Negative.    Gastrointestinal: Negative.    Genitourinary: Negative.    Neurological: Negative.    Psychiatric/Behavioral: Negative.    Allergic/Immunologic: Negative.    All other systems reviewed and are negative.          Personal History     Past Medical History:   Past Medical History:   Diagnosis Date   • Arrhythmia    • Arthritis    • Bradycardia    • Cancer (CMS/HCC)    • Cardiomyopathy (CMS/HCC)    • Cataract    • CHF (congestive heart failure) (CMS/HCC)    • COPD (chronic obstructive pulmonary disease) (CMS/HCC)    • Coronary artery disease    • Diabetes mellitus, type 2 (CMS/HCC)    • Emphysema, unspecified (CMS/HCC)    • Hx of  sick sinus syndrome     S/P Biventricular Pacemaker   • Hyperlipidemia    • Hypertension    • Paroxysmal atrial fibrillation (CMS/HCC)    • Pneumonia    • Sleep apnea    • Ventricular arrhythmia        Surgical History:      Past Surgical History:   Procedure Laterality Date   • CARDIAC CATHETERIZATION  02/04/2015   • CARDIAC CATHETERIZATION  04/25/2016   • CAROTID ENDARTERECTOMY     • COLONOSCOPY     • CORONARY ANGIOPLASTY  1997   • ENDOSCOPY     • EYE SURGERY     • FRACTURE SURGERY     • HERNIA REPAIR     • JOINT REPLACEMENT     • LARYNGOSCOPY     • LUNG SURGERY  2008    Lung resection   • PACEMAKER IMPLANTATION  04/25/2016    Dual chamber; [employer]   • TRACHEOSTOMY             Family History: family history includes Breast cancer in his sister; Colon cancer in his sister; Diabetes in an other family member; Heart disease in his brother and mother; Hypertension in his mother; Stroke in his father. Otherwise pertinent FHx was reviewed and unremarkable.     Social History:  reports that he has quit smoking. His smoking use included cigarettes. He has never used smokeless tobacco. He reports current alcohol use. He reports that he does not use drugs.      Medications:  Prior to Admission medications    Medication Sig Start Date End Date Taking? Authorizing Provider   aspirin 81 MG tablet Take 81 mg by mouth Every Night. 3/7/14  Yes ProviderAstrid MD   carvedilol (COREG) 12.5 MG tablet Take 12.5 mg by mouth Every Night.   Yes ProviderAstrid MD   furosemide (LASIX) 40 MG tablet Take 40 mg by mouth Daily.   Yes ProviderAstrid MD   insulin aspart (novoLOG FLEXPEN) 100 UNIT/ML solution pen-injector sc pen Inject 1 Units under the skin into the appropriate area as directed 4 (Four) Times a Day With Meals & at Bedtime. Check BS 4 times a day. Take 1U for 20 gm/dl the blood sugar is above 130  Max 60 units per 24 hours   Yes ProviderAstrid MD   Insulin Glargine (LANTUS SOLOSTAR) 100  UNIT/ML injection pen Inject 10 Units under the skin into the appropriate area as directed Every Night.   Yes Astrid Gonzalez MD   Magnesium 250 MG tablet Take 1 tablet by mouth Daily.   Yes Astrid Gonzalez MD   mirtazapine (REMERON) 15 MG tablet TAKE 1 TABLET BY MOUTH EVERY NIGHT. 2/1/21  Yes Liliam Goode APRN   simvastatin (ZOCOR) 40 MG tablet 1 tablet p.o. at bedtime. 1/7/20  Yes Jhonatan Mart MD   theophylline (THEODUR) 300 MG 12 hr tablet Take 300 mg by mouth Every Night. 4/8/19  Yes Astrid Gonzalez MD   warfarin (COUMADIN) 3 MG tablet Take 1 tablet by mouth Every Night. 12/28/20  Yes Luis Paulino MD   acetaminophen (TYLENOL) 500 MG tablet Take 500 mg by mouth Every 6 (Six) Hours As Needed for Mild Pain .    Astrid Gonzalez MD   budesonide (PULMICORT) 0.5 MG/2ML nebulizer solution Take 2 mL by nebulization 2 (Two) Times a Day. J44.9 copd 1/16/21   Henry Hansen MD   calcitriol (ROCALTROL) 0.25 MCG capsule Take 0.25 mcg by mouth 3 (Three) Times a Week. Mon, Wed, Fri    Astrid Gonzalez MD   ipratropium-albuterol (DUO-NEB) 0.5-2.5 mg/3 ml nebulizer Take 3 mL by nebulization Every 4 (Four) Hours As Needed for Shortness of Air. COPD j 44.9 1/16/21   Henry Hansen MD   glucose blood (Accu-Chek Helga Plus) test strip Use to check blood sugars 4 times a day    Dx E11.65 3/4/21 3/14/21  Yusef Sosa Jr., MD   guaiFENesin (MUCINEX) 600 MG 12 hr tablet Take 1 tablet by mouth Every 12 (Twelve) Hours. 1/16/21 3/14/21  Henry Hansen MD   insulin aspart (NovoLOG FlexPen) 100 UNIT/ML solution pen-injector sc pen Check BS 4 times a day. Take 1U for 30 gm/dl the blood sugar is above 130  Max 60 units per 24 hours    Dx E11.65  Patient taking differently: Dx E11.65 3/2/21 3/14/21  Yusef Sosa Jr., MD   Insulin Glargine (LANTUS SOLOSTAR) 100 UNIT/ML injection pen Inject 20 Units under the skin into the appropriate area as directed Daily. 3/2/21  3/14/21  Yusef Sosa Jr., MD   melatonin 5 MG tablet tablet Take 10 mg by mouth At Night As Needed.  3/14/21  Provider, MD Astrid   Semaglutide,0.25 or 0.5MG/DOS, (Ozempic, 0.25 or 0.5 MG/DOSE,) 2 MG/1.5ML solution pen-injector 0.25 mg subcu weekly for 4 weeks then increase to 0.5 mg subcu weekly if tolerated. 1/8/21 3/14/21  Jhonatan Mart MD       Allergies:    Allergies   Allergen Reactions   • Penicillins Itching       Objective   Objective     Vital Signs  Temp:  [97.7 °F (36.5 °C)-97.8 °F (36.6 °C)] 97.7 °F (36.5 °C)  Heart Rate:  [71-92] 91  Resp:  [18-22] 20  BP: (134-193)/() 160/69  SpO2:  [78 %-97 %] 97 %  on  Flow (L/min):  [3-6] 6;   Device (Oxygen Therapy): simple face mask  Body mass index is 27.49 kg/m².    Physical Exam  Vitals and nursing note reviewed.   Constitutional:       General: He is not in acute distress.     Appearance: Normal appearance. He is well-developed. He is not ill-appearing, toxic-appearing or diaphoretic.   HENT:      Head: Normocephalic and atraumatic.      Right Ear: Ear canal and external ear normal.      Left Ear: Ear canal and external ear normal.      Nose: Nose normal. No congestion or rhinorrhea.      Mouth/Throat:      Mouth: Mucous membranes are moist.      Pharynx: No oropharyngeal exudate.   Eyes:      General: No scleral icterus.        Right eye: No discharge.         Left eye: No discharge.      Extraocular Movements: Extraocular movements intact.      Conjunctiva/sclera: Conjunctivae normal.      Pupils: Pupils are equal, round, and reactive to light.   Neck:      Thyroid: No thyromegaly.      Vascular: No carotid bruit or JVD.      Trachea: No tracheal deviation.   Cardiovascular:      Rate and Rhythm: Normal rate and regular rhythm.      Pulses: Normal pulses.      Heart sounds: Normal heart sounds. No murmur. No friction rub. No gallop.    Pulmonary:      Effort: Pulmonary effort is normal. No respiratory distress.      Breath sounds: No  stridor. No wheezing, rhonchi or rales.      Comments: Decreased breath sounds in the bases with dullness to percussion right greater than left.  No active wheezing.  There are a few rales in both bases again more so on the right than the left.  Chest:      Chest wall: No tenderness.   Abdominal:      General: Bowel sounds are normal. There is no distension.      Palpations: Abdomen is soft. There is no mass.      Tenderness: There is no abdominal tenderness. There is no guarding or rebound.      Hernia: No hernia is present.   Musculoskeletal:         General: No swelling, tenderness, deformity or signs of injury. Normal range of motion.      Cervical back: Normal range of motion and neck supple. No rigidity. No muscular tenderness.      Right lower leg: Edema present.      Left lower leg: Edema present.      Comments: 2+ edema bilaterally.  Pulses are 2+/4+.   Lymphadenopathy:      Cervical: No cervical adenopathy.   Skin:     General: Skin is warm and dry.      Coloration: Skin is not jaundiced or pale.      Findings: No bruising, erythema or rash.   Neurological:      General: No focal deficit present.      Mental Status: He is alert and oriented to person, place, and time. Mental status is at baseline.      Cranial Nerves: No cranial nerve deficit.      Sensory: No sensory deficit.      Motor: No weakness or abnormal muscle tone.      Coordination: Coordination normal.   Psychiatric:         Mood and Affect: Mood normal.         Behavior: Behavior normal.         Thought Content: Thought content normal.         Judgment: Judgment normal.           Results Review:  I have personally reviewed most recent cardiac tracings and lab results and agree with findings, most notably: Laboratory results.    Results from last 7 days   Lab Units 03/14/21  1641 03/14/21  1022   WBC 10*3/mm3 7.90 8.80   HEMOGLOBIN g/dL 11.3* 11.7*   HEMATOCRIT % 35.2* 36.3*   PLATELETS 10*3/mm3 313 286   INR   --  1.70*     Results from last 7  days   Lab Units 03/14/21  1642 03/14/21  1022   SODIUM mmol/L 138 137   POTASSIUM mmol/L 5.4* 4.8   CHLORIDE mmol/L 99 100   CO2 mmol/L 30.0* 22.0   BUN mg/dL 29* 28*   CREATININE mg/dL 2.14* 2.25*   GLUCOSE mg/dL 323* 361*   CALCIUM mg/dL 9.4 8.5*   ALT (SGPT) U/L  --  49*   AST (SGOT) U/L  --  70*   TROPONIN T ng/mL 0.027 0.030   PROBNP pg/mL  --  6,872.0*   PROCALCITONIN ng/mL  --  0.11     Estimated Creatinine Clearance: 26 mL/min (A) (by C-G formula based on SCr of 2.14 mg/dL (H)).  Brief Urine Lab Results  (Last result in the past 365 days)      Color   Clarity   Blood   Leuk Est   Nitrite   Protein   CREAT   Urine HCG        12/29/20 1019             25.5             Microbiology Results (last 10 days)     Procedure Component Value - Date/Time    Respiratory Panel PCR w/COVID-19(SARS-CoV-2) LOBO/MARTHA/TOMAS/PAD/COR/MAD/TITA In-House, NP Swab in UTM/VTM, 3-4 HR TAT - Swab, Nasopharynx [[id number]]  (Normal) Collected: 03/14/21 1026    Lab Status: Final result Specimen: Swab from Nasopharynx Updated: 03/14/21 1122     ADENOVIRUS, PCR Not Detected     Coronavirus 229E Not Detected     Coronavirus HKU1 Not Detected     Coronavirus NL63 Not Detected     Coronavirus OC43 Not Detected     COVID19 Not Detected     Human Metapneumovirus Not Detected     Human Rhinovirus/Enterovirus Not Detected     Influenza A PCR Not Detected     Influenza B PCR Not Detected     Parainfluenza Virus 1 Not Detected     Parainfluenza Virus 2 Not Detected     Parainfluenza Virus 3 Not Detected     Parainfluenza Virus 4 Not Detected     RSV, PCR Not Detected     Bordetella pertussis pcr Not Detected     Bordetella parapertussis PCR Not Detected     Chlamydophila pneumoniae PCR Not Detected     Mycoplasma pneumo by PCR Not Detected    Narrative:      Fact sheet for providers: https://docs.[employer]/wp-content/uploads/OLI8594-8212-QM2.1-EUA-Provider-Fact-Sheet-3.pdf    Fact sheet for patients:  https://docs.[employer]/wp-content/uploads/XWT4116-7354-MK2.1-EUA-Patient-Fact-Sheet-1.pdf    Test performed by PCR.          ECG/EMG Results (most recent)     Procedure Component Value Units Date/Time    ECG 12 Lead [[id number]] Collected: 03/14/21 1013     Updated: 03/14/21 1015     QT Interval 388 ms     Narrative:      HEART RATE= 75  bpm  RR Interval= 745  ms  NV Interval=   ms  P Horizontal Axis= 190  deg  P Front Axis=   deg  QRSD Interval= 106  ms  QT Interval= 388  ms  QRS Axis= 233  deg  T Wave Axis= 101  deg  - ABNORMAL ECG -  Afib/flut and V-paced complexes  When compared with ECG of 11-Jan-2021 10:56:50,  No significant change  Electronically Signed By:   Date and Time of Study: 2021-03-14 10:13:13          Results for orders placed in visit on 02/06/08    SCANNED VASCULAR STUDIES      Results for orders placed during the hospital encounter of 12/08/20    Adult Transthoracic Echo Complete W/ Cont if Necessary Per Protocol    Interpretation Summary  · Left ventricular wall thickness is consistent with septal asymmetric hypertrophy.  · Estimated left ventricular EF = 60% Left ventricular systolic function is normal.  · The right ventricular cavity is borderline dilated.  · The right atrial cavity is borderline dilated.  · Mild aortic valve stenosis is present.  · Estimated right ventricular systolic pressure from tricuspid regurgitation is normal (<35 mmHg).  · There is a small (<1cm) pericardial effusion adjacent to the left ventricle.      Polysomnography 4 or More Parameters    Result Date: 3/12/2021  Obstructive Sleep Apnea, G47.33 Hpyoxemia, R09.02  RECOMMENDATION: CPAP titration, patient may benefit from BiPAP due to the severity of obstructive sleep apnea in the supine position AHI was 84, no REM sleep was recorded most likely due to severe obstructive sleep apnea, patient also utilizing 3 L of oxygen during the night which will need to be titrated I have reviewed the entire raw data, all  components, including calibrations. Please feel free to contact me, if you wish to discuss this case further.    Alvaro Hopson MD  Date Diplomat, American Board of Sleep Medicine       XR Chest AP    Result Date: 3/14/2021   1. Bilateral basilar pleural effusions with compressive atelectasis. 2. Bilateral mixed interstitial/airspace disease presumably related to the patient's Covid infection. 3. Cardiomegaly.  Electronically Signed By-Doron Hernandez MD On:3/14/2021 10:59 AM This report was finalized on [id number] by  Doron Hernandez MD.        Estimated Creatinine Clearance: 26 mL/min (A) (by C-G formula based on SCr of 2.14 mg/dL (H)).    Assessment/Plan   Assessment/Plan   1.  Pulmonary edema  -The patient has an ejection fraction of 60% on his last echo in December 2020.  -The patient has bilateral pleural effusions with compressive atelectasis as well as interstitial changes that may relate to pulmonary edema but the patient does have a history of Covid  -Patient also has an elevated BNP       -Will use Lasix 40 mg IV twice daily and follow BNP and basic metabolic profile  -Gila scan in the a.m. to see if there is any component of ischemia that may be contributing to this exacerbation of underlying CHF  -Rule out acute myocardial injury with serial troponin    2.  History of sick sinus syndrome and atrial fibrillation  -Patient has a pacemaker placed for this issue.  -Patient is on anticoagulation for this issue but his INR is subtherapeutic at 1.7.    3.  Elevated D-dimer  -This may be related to his previous Covid infection  -The patient is on Coumadin and will ask pharmacy to dose it for a target INR between 2 and 3  -We will hold on CTA of the chest given his renal function.  If he declines and there is a concern for pulmonary embolism and consideration for placement of an IVC filter consider doing a ventilation/perfusion lung scan    4.  Chronic obstructive pulmonary disease oxygen dependent  -We will hold on  antibiotics as the patient's pro-Montrell is low and there is no obvious infiltrate  -Patient is diabetic and is not wheezing therefore will hold on the use of steroids as these might worsen his renal function as well as his diabetic control  -The patient is a patient of Dr. Hopson and is requested that he be consulted and this has been done  -Continue the patient's theophylline noting that his level is 18.  This would need to be adjusted should any medications to be started which interfere with the metabolism of the theophylline.    Active Hospital Problems    Diagnosis  POA   • Shortness of breath [R06.02]  Yes      Resolved Hospital Problems   No resolved problems to display.                 VTE Prophylaxis -   Mechanical Order History:     None      Pharmalogical Order History:      Ordered     Dose Route Frequency Stop    03/14/21 1619  warfarin (COUMADIN) tablet 3 mg     Question:  Target INR  Answer:  2 - 3    3 mg PO Nightly --    03/14/21 1619  Pharmacy to dose warfarin     Question:  Target INR  Answer:  2 - 3    -- XX Continuous PRN --                CODE STATUS:    Code Status and Medical Interventions:   Ordered at: 03/14/21 1526     Level Of Support Discussed With:    Patient     Code Status:    CPR     Medical Interventions (Level of Support Prior to Arrest):    Full       This patient has been examined wearing appropriate Personal Protective Equipment and discussed with hospital infection control department. 03/14/21      I discussed the patient's findings and my recommendations with the patient and he agrees to proceed as outlined above.      Signature:Electronically signed by Lynette Dutta MD, 03/14/21, 7:51 PM EDT.      [facility] Hospitalist Team          Electronically signed by Lynette Dutta MD at 03/14/21 4081         Current Facility-Administered Medications   Medication Dose Route Frequency Provider Last Rate Last Admin   • acetaminophen (TYLENOL) tablet 650 mg  650 mg Oral Q4H PRN Tra  MD Lynette        Or   • acetaminophen (TYLENOL) 160 MG/5ML solution 650 mg  650 mg Oral Q4H PRN Lynette Dutta MD        Or   • acetaminophen (TYLENOL) suppository 650 mg  650 mg Rectal Q4H PRN Lynette Dutta MD       • aspirin EC tablet 81 mg  81 mg Oral Daily Lynette Dutta MD   81 mg at 03/15/21 0822   • atorvastatin (LIPITOR) tablet 20 mg  20 mg Oral Nightly Lynette Dutta MD   20 mg at 03/14/21 2036   • budesonide (PULMICORT) nebulizer solution 0.5 mg  0.5 mg Nebulization BID - RT Lynette Dutta MD   0.5 mg at 03/15/21 0708   • calcitriol (ROCALTROL) capsule 0.25 mcg  0.25 mcg Oral Once per day on Mon Wed Fri Lynette Dutta MD   0.25 mcg at 03/15/21 0822   • carvedilol (COREG) tablet 12.5 mg  12.5 mg Oral Nightly Lynette Dutta MD   12.5 mg at 03/14/21 2036   • clindamycin (CLEOCIN) capsule 300 mg  300 mg Oral Q8H Alvaro Hopson MD   300 mg at 03/15/21 1452   • dextrose (D50W) 25 g/ 50mL Intravenous Solution 25 g  25 g Intravenous Q15 Min PRN Lynette Dutta MD       • dextrose (GLUTOSE) oral gel 15 g  15 g Oral Q15 Min PRN Lynette Dutta MD       • docusate sodium (COLACE) capsule 100 mg  100 mg Oral BID Lynette Dutta MD   100 mg at 03/15/21 0823   • furosemide (LASIX) injection 40 mg  40 mg Intravenous BID Lynette Dutta MD   40 mg at 03/15/21 0823   • glucagon (human recombinant) (GLUCAGEN DIAGNOSTIC) injection 1 mg  1 mg Subcutaneous Q15 Min PRN Lynette Dutta MD       • HYDROcodone-acetaminophen (NORCO) 7.5-325 MG per tablet 2 tablet  2 tablet Oral Q4H PRN Lynette Dutta MD   2 tablet at 03/14/21 2330   • insulin glargine (LANTUS, SEMGLEE) injection 10 Units  10 Units Subcutaneous Nightly Lynette Dutta MD   10 Units at 03/14/21 2038   • insulin lispro (ADMELOG) injection 0-14 Units  0-14 Units Subcutaneous 4x Daily With Meals & Nightly Lynette Dutta MD   10 Units at 03/15/21 1251    And   • insulin lispro (ADMELOG) injection 0-14 Units  0-14 Units Subcutaneous PRN Lynette Dutta MD       •  ipratropium-albuterol (DUO-NEB) nebulizer solution 3 mL  3 mL Nebulization Q4H PRN Lynette Dutta MD   3 mL at 03/14/21 1838   • ipratropium-albuterol (DUO-NEB) nebulizer solution 3 mL  3 mL Nebulization 4x Daily - RT Arben Angie L, TERESO   3 mL at 03/15/21 1458   • magnesium oxide (MAG-OX) tablet 200 mg  200 mg Oral Daily Lynette Dutta MD   200 mg at 03/15/21 0822   • melatonin tablet 5 mg  5 mg Oral Nightly PRN Lynette Dutta MD   5 mg at 03/14/21 2058   • mirtazapine (REMERON) tablet 15 mg  15 mg Oral Nightly Lynette Dutta MD   15 mg at 03/14/21 2037   • nitroglycerin (NITROSTAT) SL tablet 0.4 mg  0.4 mg Sublingual Q5 Min PRN Lynette Dutta MD       • ondansetron (ZOFRAN) tablet 4 mg  4 mg Oral Q6H PRN Lynette Dutta MD        Or   • ondansetron (ZOFRAN) injection 4 mg  4 mg Intravenous Q6H PRN Lynette Dutta MD       • Pharmacy Consult - Steroid Insulin Protocol   Does not apply Continuous PRN Marie Ying MD       • Pharmacy to dose warfarin   Does not apply Continuous PRN Lynette Dutta MD       • predniSONE (DELTASONE) tablet 20 mg  20 mg Oral BID With Alvaro Molina MD   20 mg at 03/15/21 0823   • sodium chloride 0.9 % flush 10 mL  10 mL Intravenous PRN Lynette Dutta MD       • sodium chloride 0.9 % flush 10 mL  10 mL Intravenous Q12H Lynette Dutta MD   10 mL at 03/15/21 0823   • sodium chloride 0.9 % flush 10 mL  10 mL Intravenous PRN Lynette Dutta MD       • theophylline (SHE-24) 24 hr capsule 300 mg  300 mg Oral Nightly Lynette Dutta MD   300 mg at 03/14/21 2036   • warfarin (COUMADIN) tablet 1.5 mg  1.5 mg Oral Once Lynette Dutta MD       • warfarin (COUMADIN) tablet 3 mg  3 mg Oral Nightly Lynette Dutta MD   3 mg at 03/14/21 2036     Referral Orders (last 24 hours) (24h ago, onward)     Start     Ordered    03/15/21 0000  Ambulatory Referral to Home Health     Question Answer Comment   Face to Face Visit Date: 3/15/2021    Follow-up provider for Plan of Care? I treated the patient in an  acute care facility and will not continue treatment after discharge.    Follow-up provider: PIETER BRAVO JR.    Reason/Clinical Findings SOB Pulmonary Edema    Describe mobility limitations that make leaving home difficult: Weakness    Nursing/Therapeutic Services Requested Skilled Nursing [facility] eval and treat   Nursing/Therapeutic Services Requested Other    Skilled nursing orders: Other [facility] eval and treat   Frequency: 1 Week 1        03/15/21 4944

## 2021-03-16 NOTE — PROGRESS NOTES
"Pharmacy dosing service  Anticoagulant  Warfarin     Subjective:    Cali Santoro is a 85 y.o.male being continued on warfarin for atrial fibrillation.    INR Goal: 2 - 3  Home medication?: Yes, warfarin 3 mg PO every day at 1800  Interacting medications: prednisone (new med - may increase INR), clindamycin (new med 3/14 dc'd 3/16)       Assessment/Plan:    INR is slightly subtherapeutic today at 1.91. Anticapated to see therapeutic INR tomorrow d/t 4.5 mg bolus dose and concurrent DDI's. Because of this, will resume home regimen of 3 mg daily today. Will monitor INR for dose adjustment needs.    Continue to monitor and adjust based on INR.         Date 3/14 3/15 3/16         INR 1.7 1.69 1.91         Dose 3 mg 4.5 mg 3 mg             Objective:  [Ht: 170.2 cm (67\"); Wt: 81.3 kg (179 lb 3.7 oz); BMI: Body mass index is 28.07 kg/m².]    Lab Results   Component Value Date    ALBUMIN 3.50 03/16/2021     Lab Results   Component Value Date    INR 1.91 (L) 03/16/2021    INR 1.69 (L) 03/15/2021    INR 1.70 (L) 03/14/2021    PROTIME 20.4 03/16/2021    PROTIME 18.1 (L) 03/15/2021    PROTIME 18.2 (L) 03/14/2021     Lab Results   Component Value Date    HGB 10.2 (L) 03/16/2021    HGB 11.3 (L) 03/15/2021    HGB 11.3 (L) 03/14/2021     Lab Results   Component Value Date    HCT 31.1 (L) 03/16/2021    HCT 34.2 (L) 03/15/2021    HCT 35.2 (L) 03/14/2021       Miguel Munson, Pharmacy Intern  03/16/21 13:24 EDT   "

## 2021-03-16 NOTE — PROGRESS NOTES
[facility] Medicine Services Daily Progress Note      Hospitalist Team  LOS 0 days      Patient Care Team:  Yusef Sosa Jr., MD as PCP - General  Yusef Sosa Jr., MD as PCP - Family Medicine  Luis Paulino MD as Consulting Physician (Cardiology)    Patient Location: 232/1      Subjective   Subjective     Chief Complaint / Subjective  Chief Complaint   Patient presents with   • Shortness of Breath         Brief Synopsis of Hospital Course/HPI  Mr. Santoro is a 85 y.o.  presents to [facility] complaining of shortness of breath.  The patient has numerous diagnoses including COPD, coronary artery disease, sick sinus syndrome and atrial fibrillation.  The patient recently had Covid a month ago.  The patient presented to the emergency room without complaints of chest pain but did have orthopnea, paroxysmal nocturnal dyspnea and severe dyspnea with any kind of movement.  The patient desaturated into the 70s in the emergency room.  He was vacillating between the low 70s and low 90s throughout the time.  He was placed on a facemask and his saturations have increased and are greater than 90 consistently at this time.  The patient is normally on Coumadin for his atrial fib.  The patient has not had any nausea vomiting diaphoresis or other anginal equivalent.     On review of records the patient was recently hospitalized 1/11/2021 to 1/16/2021 for healthcare facility acquired pneumonia.  He was also hospitalized with streptococcal pneumoniae pneumonia 12/8/2020 to 12/14/2020.    Date:   3/15/2021: Patient reports feeling better today with improvement in his shortness of breath.  Dr. Hopson saw the patient in consultation and recommended clindamycin antibiotic, low-dose oral steroids with prednisone, and bronchodilators.  3/16/2021: Pt reports that he slept poorly last night despite atarax. No SOA at rest. No c/o CXP; no GI or  c/o.    ROS  12 point review of systems was  "reviewed and was negative except as above.    Objective   Objective      Vital Signs  Temp:  [97.3 °F (36.3 °C)-98.2 °F (36.8 °C)] 97.3 °F (36.3 °C)  Heart Rate:  [70-92] 70  Resp:  [16-19] 17  BP: (114-149)/(64-82) 123/64  Oxygen Therapy  SpO2: 94 %  Pulse Oximetry Type: Intermittent  Device (Oxygen Therapy): simple face mask  Flow (L/min): 10  Oxygen Concentration (%): 80  Flowsheet Rows      First Filed Value   Admission Height  170.2 cm (67\") Documented at 03/14/2021 1001   Admission Weight  76.7 kg (169 lb) Documented at 03/14/2021 1001        Intake & Output (last 3 days)       03/13 0701 - 03/14 0700 03/14 0701 - 03/15 0700 03/15 0701 - 03/16 0700 03/16 0701 - 03/17 0700    P.O.  220 960     Total Intake(mL/kg)  220 (2.7) 960 (11.8)     Urine (mL/kg/hr)  650 1575 (0.8)     Total Output  650 1575     Net  -430 -615                 Lines, Drains & Airways    Active LDAs     Name:   Placement date:   Placement time:   Site:   Days:    Peripheral IV 03/14/21 1019 Left Antecubital   03/14/21    1019    Antecubital   1                  Physical Exam:    Physical Exam  Vital signs and nurses notes reviewed.  Well-developed well-nourished elderly gentleman in no acute distress sitting up in bed awake and alert comfortable on simple mask O2 10 L/min; mucous membranes moist; sclerae anicteric; lungs decreased air entry and crackles in the bases posteriorly bilaterally; CV regular rate and rhythm; abdomen soft nontender nondistended with active bowel sounds; extremities with trace ankle and pedal edema bilaterally, no cyanosis or calf tenderness; trace pedal pulses bilaterally; no Marroquin catheter.        Procedures:              Results Review:     I reviewed the patient's new clinical results.      Lab Results (last 24 hours)     Procedure Component Value Units Date/Time    POC Glucose Once [[id number]]  (Abnormal) Collected: 03/16/21 0701    Specimen: Blood Updated: 03/16/21 0702     Glucose 189 mg/dL      Comment: " Serial Number: [id number]:  [id number]       Renal Function Panel [[id number]]  (Abnormal) Collected: 03/16/21 0410    Specimen: Blood Updated: 03/16/21 0528     Glucose 149 mg/dL      BUN 47 mg/dL      Creatinine 2.16 mg/dL      Sodium 133 mmol/L      Potassium 5.0 mmol/L      Chloride 96 mmol/L      CO2 27.0 mmol/L      Calcium 9.0 mg/dL      Albumin 3.50 g/dL      Phosphorus 5.5 mg/dL      Anion Gap 10.0 mmol/L      BUN/Creatinine Ratio 21.8     eGFR Non African Amer 29 mL/min/1.73     Narrative:      GFR Normal >60  Chronic Kidney Disease <60  Kidney Failure <15      CBC & Differential [[id number]]  (Abnormal) Collected: 03/16/21 0410    Specimen: Blood Updated: 03/16/21 0519    Narrative:      The following orders were created for panel order CBC & Differential.  Procedure                               Abnormality         Status                     ---------                               -----------         ------                     CBC Auto Differential[[id number]]        Abnormal            Final result                 Please view results for these tests on the individual orders.    CBC Auto Differential [[id number]]  (Abnormal) Collected: 03/16/21 0410    Specimen: Blood Updated: 03/16/21 0519     WBC 10.50 10*3/mm3      RBC 3.00 10*6/mm3      Hemoglobin 10.2 g/dL      Hematocrit 31.1 %      .8 fL      MCH 33.9 pg      MCHC 32.6 g/dL      RDW 18.2 %      RDW-SD 65.6 fl      MPV 7.6 fL      Platelets 278 10*3/mm3      Neutrophil % 83.3 %      Lymphocyte % 10.7 %      Monocyte % 5.9 %      Eosinophil % 0.0 %      Basophil % 0.1 %      Neutrophils, Absolute 8.70 10*3/mm3      Lymphocytes, Absolute 1.10 10*3/mm3      Monocytes, Absolute 0.60 10*3/mm3      Eosinophils, Absolute 0.00 10*3/mm3      Basophils, Absolute 0.00 10*3/mm3      nRBC 0.1 /100 WBC     Protime-INR [[id number]]  (Abnormal) Collected: 03/16/21 0410    Specimen: Blood Updated: 03/16/21 0517     Protime 20.4 Seconds      INR 1.91    POC  Glucose Once [[id number]]  (Abnormal) Collected: 03/15/21 2200    Specimen: Blood Updated: 03/15/21 2216     Glucose 159 mg/dL      Comment: Serial Number: [id number]:  [id number]       POC Glucose Once [[id number]]  (Abnormal) Collected: 03/15/21 2105    Specimen: Blood Updated: 03/15/21 2106     Glucose 242 mg/dL      Comment: Serial Number: [id number]:  [id number]       Urinalysis With Microscopic If Indicated (No Culture) - [[id number]]  (Abnormal) Collected: 03/15/21 1651    Specimen: Urine Updated: 03/15/21 1956     Color, UA Yellow     Appearance, UA Clear     pH, UA <=5.0     Specific Gravity, UA 1.015     Glucose, UA >=1000 mg/dL (3+)     Ketones, UA Negative     Bilirubin, UA Negative     Blood, UA Negative     Protein, UA Negative     Leuk Esterase, UA Negative     Nitrite, UA Negative     Urobilinogen, UA 0.2 E.U./dL    Narrative:      Urine microscopic not indicated.    POC Glucose Once [[id number]]  (Abnormal) Collected: 03/15/21 1857    Specimen: Blood Updated: 03/15/21 1901     Glucose 391 mg/dL      Comment: Serial Number: [id number]:  [id number]       Sodium, Urine, Random - Urine, Clean Catch [[id number]] Collected: 03/15/21 1622    Specimen: Urine, Clean Catch Updated: 03/15/21 1653     Sodium, Urine 68 mmol/L     Narrative:      Reference intervals for random urine have not been established.  Clinical usage is dependent upon physician's interpretation in combination with other laboratory tests.       POC Glucose Once [[id number]]  (Abnormal) Collected: 03/15/21 1650    Specimen: Blood Updated: 03/15/21 1651     Glucose 476 mg/dL      Comment: Serial Number: [id number]:  [id number]       POC Glucose Once [[id number]]  (Abnormal) Collected: 03/15/21 1619    Specimen: Blood Updated: 03/15/21 1624     Glucose >500 mg/dL      Comment: Serial Number: [id number]:  [id number]           No results found for: HGBA1C  Results from last 7 days   Lab Units 03/16/21  0410 03/15/21  0420  03/14/21  1022   INR  1.91* 1.69* 1.70*       Results from last 7 days   Lab Units 03/14/21  1042   PH, ARTERIAL pH units 7.399   PO2 ART mm Hg 65.8*   PCO2, ARTERIAL mm Hg 38.1   HCO3 ART mmol/L 23.5     No results found for: LIPASE  Lab Results   Component Value Date    CHOL 131 12/29/2020    TRIG 207 (H) 12/29/2020    HDL 40 12/29/2020    LDL 57 12/29/2020       No results found for: INTRAOP, PREDX, FINALDX, COMDX    Microbiology Results (last 10 days)     Procedure Component Value - Date/Time    Blood Culture - Blood, Arm, Left [[id number]] Collected: 03/14/21 1206    Lab Status: Preliminary result Specimen: Blood from Arm, Left Updated: 03/15/21 1215     Blood Culture No growth at 24 hours    Respiratory Panel PCR w/COVID-19(SARS-CoV-2) LOBO/MARTHA/TOMAS/PAD/COR/MAD/TITA In-House, NP Swab in UTM/VTM, 3-4 HR TAT - Swab, Nasopharynx [[id number]]  (Normal) Collected: 03/14/21 1026    Lab Status: Final result Specimen: Swab from Nasopharynx Updated: 03/14/21 1122     ADENOVIRUS, PCR Not Detected     Coronavirus 229E Not Detected     Coronavirus HKU1 Not Detected     Coronavirus NL63 Not Detected     Coronavirus OC43 Not Detected     COVID19 Not Detected     Human Metapneumovirus Not Detected     Human Rhinovirus/Enterovirus Not Detected     Influenza A PCR Not Detected     Influenza B PCR Not Detected     Parainfluenza Virus 1 Not Detected     Parainfluenza Virus 2 Not Detected     Parainfluenza Virus 3 Not Detected     Parainfluenza Virus 4 Not Detected     RSV, PCR Not Detected     Bordetella pertussis pcr Not Detected     Bordetella parapertussis PCR Not Detected     Chlamydophila pneumoniae PCR Not Detected     Mycoplasma pneumo by PCR Not Detected    Narrative:      Fact sheet for providers: https://docs.[employer]/wp-content/uploads/RIK5520-5544-QG7.1-EUA-Provider-Fact-Sheet-3.pdf    Fact sheet for patients: https://docs.[employer]/wp-content/uploads/LTG7658-1315-KH9.1-EUA-Patient-Fact-Sheet-1.pdf    Test  performed by PCR.    Blood Culture - Blood, Arm, Right [[id number]] Collected: 03/14/21 1025    Lab Status: Preliminary result Specimen: Blood from Arm, Right Updated: 03/15/21 1215     Blood Culture No growth at 24 hours          ECG/EMG Results (most recent)     Procedure Component Value Units Date/Time    ECG 12 Lead [[id number]] Collected: 03/14/21 1013     Updated: 03/14/21 1015     QT Interval 388 ms     Narrative:      HEART RATE= 75  bpm  RR Interval= 745  ms  TX Interval=   ms  P Horizontal Axis= 190  deg  P Front Axis=   deg  QRSD Interval= 106  ms  QT Interval= 388  ms  QRS Axis= 233  deg  T Wave Axis= 101  deg  - ABNORMAL ECG -  Afib/flut and V-paced complexes  When compared with ECG of 11-Jan-2021 10:56:50,  No significant change  Electronically Signed By:   Date and Time of Study: 2021-03-14 10:13:13          Results for orders placed in visit on 02/06/08    SCANNED VASCULAR STUDIES      Results for orders placed during the hospital encounter of 12/08/20    Adult Transthoracic Echo Complete W/ Cont if Necessary Per Protocol    Interpretation Summary  · Left ventricular wall thickness is consistent with septal asymmetric hypertrophy.  · Estimated left ventricular EF = 60% Left ventricular systolic function is normal.  · The right ventricular cavity is borderline dilated.  · The right atrial cavity is borderline dilated.  · Mild aortic valve stenosis is present.  · Estimated right ventricular systolic pressure from tricuspid regurgitation is normal (<35 mmHg).  · There is a small (<1cm) pericardial effusion adjacent to the left ventricle.      Polysomnography 4 or More Parameters    Result Date: 3/12/2021  Obstructive Sleep Apnea, G47.33 Hpyoxemia, R09.02  RECOMMENDATION: CPAP titration, patient may benefit from BiPAP due to the severity of obstructive sleep apnea in the supine position AHI was 84, no REM sleep was recorded most likely due to severe obstructive sleep apnea, patient also utilizing 3 L of  oxygen during the night which will need to be titrated I have reviewed the entire raw data, all components, including calibrations. Please feel free to contact me, if you wish to discuss this case further.    Alvaro Hopson MD  Date Diplomat, American Board of Sleep Medicine       XR Chest AP    Result Date: 3/14/2021   1. Bilateral basilar pleural effusions with compressive atelectasis. 2. Bilateral mixed interstitial/airspace disease presumably related to the patient's Covid infection. 3. Cardiomegaly.  Electronically Signed By-Doron Hernandez MD On:3/14/2021 10:59 AM This report was finalized on [id number] by  Doron Hernandez MD.          Xrays, labs reviewed personally by physician.    Medication Review:   I have reviewed the patient's current medication list      Scheduled Meds  aspirin, 81 mg, Oral, Daily  atorvastatin, 20 mg, Oral, Nightly  budesonide, 0.5 mg, Nebulization, BID - RT  calcitriol, 0.25 mcg, Oral, Once per day on Mon Wed Fri  carvedilol, 12.5 mg, Oral, Nightly  clindamycin, 300 mg, Oral, Q8H  docusate sodium, 100 mg, Oral, BID  furosemide, 40 mg, Intravenous, BID  insulin glargine, 12 Units, Subcutaneous, Nightly  insulin lispro, 0-24 Units, Subcutaneous, 4x Daily With Meals & Nightly  ipratropium-albuterol, 3 mL, Nebulization, 4x Daily - RT  magnesium oxide, 200 mg, Oral, Daily  mirtazapine, 15 mg, Oral, Nightly  predniSONE, 20 mg, Oral, BID With Meals  sodium chloride, 10 mL, Intravenous, Q12H  theophylline, 300 mg, Oral, Nightly  warfarin, 3 mg, Oral, Nightly        Meds Infusions  Pharmacy Consult - Steroid Insulin Protocol,   Pharmacy to dose warfarin,         Meds PRN  •  acetaminophen **OR** acetaminophen **OR** acetaminophen  •  dextrose  •  dextrose  •  glucagon (human recombinant)  •  HYDROcodone-acetaminophen  •  hydrOXYzine  •  insulin lispro **AND** insulin lispro  •  ipratropium-albuterol  •  nitroglycerin  •  ondansetron **OR** ondansetron  •  Pharmacy Consult - Steroid Insulin  Protocol  •  Pharmacy to dose warfarin  •  [COMPLETED] Insert peripheral IV **AND** sodium chloride  •  sodium chloride        Assessment/Plan   Assessment/Plan     Active Hospital Problems    Diagnosis  POA   • **Shortness of breath [R06.02]  Yes     Priority: High   • Sepsis (CMS/HCC) [A41.9]  Yes      Resolved Hospital Problems   No resolved problems to display.       MEDICAL DECISION MAKING COMPLEXITY BY PROBLEM:     Acute hypoxic respiratory failure secondary to acute exacerbation of COPD and acute congestive heart failure  -Titrate O2 to maintain sat greater than 91% and wean as tolerated    AE COPD  -Chest x-ray showed bilateral basilar pleural effusions with compression atelectasis and bilateral mixed interstitial airspace disease with cardiomegaly  -Respiratory panel including COVID-19 negative  -Titrate O2 to maintain sat greater than 91%  -Continue theophylline and DuoNebs  -Pulmonary consulting  -Continue clindamycin and prednisone    Obstructive sleep apnea  -pt uses 3L O2 NC continuously, but he is a mouth breather  -sleep study done 3/9/2021 showed severe KIM (pt slept only 52 minutes because of being uncomfortable in sleep lab)  -pt has gasping episodes almost every night   -Dr. Hopson recommended CPAP titration  -auto BIPAP ordered for 3/16/2021    Hx of Lung cancer 2008 stage I NSCLCA s/p right lower lobectomy    Bilateral lung nodules  -CT scan 05/18/2020 right lung nodule smaller in size  history of significant cough and dyspnea and hemoptysis in June 2019 CT scan of the chest 07/25/2019 suggestive of resolving pneumonia  CT chest 12/2016 and 12/14/15 revealed stable nodular densities bilaterally. These findings indicate two year stability at this time. Stable post operative changes in the right mid to lower hemithorax.    Acute on chronic diastolic congestive heart failure due to hypertensive heart disease and aortic valve stenosis, rule out worsening systolic heart failure with history of  ischemic cardiomyopathy  -Echo 12/9/2020 showed LVEF 60%, RVSP 30 mmHg, mild aortic valve stenosis  -Repeat echo ordered  -proBNP 6687  -Serial troponins 0.030 and 0.027  -EKG showed atrial fibrillation/flutter with ventricular paced complexes, no significant change from 1/11/2021  -Cardiology consulting  -Continue Coreg and diuresis per nephrology    Chronic atrial fibrillation/flutter and sick sinus syndrome status post PPM  -Continue Coumadin with goal INR 2-3 (pharmacy to dose); current INR subtherapeutic (1.7 on admission and 1.69 currently)  -Continue Coreg  -Per last outpatient office visit notes from PCP the patient has been on Lanoxin 0.125 mg twice a week    Coronary artery disease  -Continue aspirin and Coreg    Essential hypertension, chronic and controlled  -Continue Coreg and Lasix    Hyperlipidemia  -Continue statin    Acute renal failure from prerenal azotemia due to diuretics on top of CKD stage IIIb secondary to hypertension and diabetes  -Baseline creatinine 1.9  -Admission creatinine 2.14 and current creatinine 2.52 after IV diuresis  -Continue Rocaltrol  -Avoid nephrotoxic medications and IV contrast dye  -Consult nephrology for diuresis management    Anemia of CKD  -Monitor hemoglobin and transfuse for less than 7    Type 2 diabetes mellitus  -A1c 7.1 on 12/29/2020   -Patient is on Ozempic weekly per PCP office visit note dated 1/19/2021  -Continue home glargine  -SSI  -Steroid insulin protocol  -Check TSH    Chronic constipation  -Continue Colace    Unintentional weight loss  -Continue mirtazapine    Nutritional supplements  -Continue magnesium and vitamin D    The patient has a living well per medical record review.  -palliative care met with pt 3/15/2021 and also with pt's wife 3/16/2021    VTE Prophylaxis -   Mechanical Order History:     None      Pharmalogical Order History:      Ordered     Dose Route Frequency Stop    03/15/21 0812  warfarin (COUMADIN) tablet 1.5 mg     Question:   Target INR  Answer:  2 - 3    1.5 mg PO Once (Warfarin) --    03/14/21 1619  warfarin (COUMADIN) tablet 3 mg     Question:  Target INR  Answer:  2 - 3    3 mg PO Nightly --    03/14/21 1619  Pharmacy to dose warfarin     Question:  Target INR  Answer:  2 - 3    -- XX Continuous PRN --                  Code Status -   Code Status and Medical Interventions:   Ordered at: 03/14/21 1526     Level Of Support Discussed With:    Patient     Code Status:    CPR     Medical Interventions (Level of Support Prior to Arrest):    Full       This patient has been examined wearing appropriate Personal Protective Equipment. 03/16/21        Discharge Planning  Pending clinical course.        Electronically signed by Marie Ying MD, 03/16/21, 09:42 EDT.  [facility] Hospitalist Team

## 2021-03-16 NOTE — PLAN OF CARE
Goal Outcome Evaluation:  Plan of Care Reviewed With: patient, spouse  Progress: no change  Outcome Summary: Pt Alert and oriented at this time,pt had some confusion noted this am. Oxygen continues on 10L simple mask patient tolerating well. Wife at bed side, spoke with patient and wife to answer questions, wife requesting cardiology consult d/t history of heart issues.Awaiting consult, Pallative stopped to touch base with patient and wife, wife appreciative. Vs stable at this time.

## 2021-03-16 NOTE — PROGRESS NOTES
"PULMONARY CRITICAL CARE Progress  NOTE      PATIENT IDENTIFICATION:  Name: Cali Santoro  MRN: [id number]  :  1935     Age: 85 y.o.  Sex: male    DATE OF Note:  3/16/2021   Referring Physician: Marie Ying MD                  Subjective:   Feeling better, on O2 mask four liter,  no SOB no chest pain, no nausea or vomiting, no change in bowel habit, no dysuria,  no new  skin rash or itching.      Objective:  tMax 24 hrs: Temp (24hrs), Av.6 °F (36.4 °C), Min:97.3 °F (36.3 °C), Max:98.2 °F (36.8 °C)      Vitals Ranges:   Temp:  [97.3 °F (36.3 °C)-98.2 °F (36.8 °C)] 97.3 °F (36.3 °C)  Heart Rate:  [70-92] 70  Resp:  [16-18] 17  BP: (114-149)/(64-82) 123/64    Intake and Output Last 3 Shifts:   I/O last 3 completed shifts:  In: 1080 [P.O.:1080]  Out: 1825 [Urine:1825]    Exam:  /64 (BP Location: Right arm, Patient Position: Lying)   Pulse 70   Temp 97.3 °F (36.3 °C) (Oral)   Resp 17   Ht 170.2 cm (67\")   Wt 81.3 kg (179 lb 3.7 oz)   SpO2 94%   BMI 28.07 kg/m²     General Appearance: Alert      HEENT:  Normocephalic, without obvious abnormality, Conjunctiva/corneas clear,.  Normal external ear canals, Nares normal, no drainage     Neck:  Supple, symmetrical, trachea midline. No JVD.  Lungs /Chest wall:   Bilateral basal rhonchi, respirations unlabored symmetrical wall movement.    Heart:  Regular rate and rhythm, systolic murmur PMI left sternal border  Abdomen: Soft, non-tender, no masses, no organomegaly.    Extremities: Trace edema no clubbing or Cyanosis        Medications:    Current Facility-Administered Medications:   •  acetaminophen (TYLENOL) tablet 650 mg, 650 mg, Oral, Q4H PRN, 650 mg at 21 0930 **OR** acetaminophen (TYLENOL) 160 MG/5ML solution 650 mg, 650 mg, Oral, Q4H PRN **OR** acetaminophen (TYLENOL) suppository 650 mg, 650 mg, Rectal, Q4H PRN, Lynette Dutta MD  •  aspirin EC tablet 81 mg, 81 mg, Oral, Daily, Lynette Dutta MD, 81 mg at 21 0926  •  atorvastatin " (LIPITOR) tablet 20 mg, 20 mg, Oral, Nightly, Lynette Dutta MD, 20 mg at 03/15/21 2034  •  budesonide (PULMICORT) nebulizer solution 0.5 mg, 0.5 mg, Nebulization, BID - RT, Lynette Dutta MD, 0.5 mg at 03/16/21 0819  •  calcitriol (ROCALTROL) capsule 0.25 mcg, 0.25 mcg, Oral, Once per day on Mon Wed Fri, Lynette Dutta MD, 0.25 mcg at 03/15/21 0822  •  carvedilol (COREG) tablet 12.5 mg, 12.5 mg, Oral, Nightly, Lynette Dutta MD, 12.5 mg at 03/15/21 2034  •  clindamycin (CLEOCIN) capsule 300 mg, 300 mg, Oral, Q8H, DrawAlvaro MD, 300 mg at 03/16/21 0616  •  dextrose (D50W) 25 g/ 50mL Intravenous Solution 25 g, 25 g, Intravenous, Q15 Min PRN, Lynette Dutta MD  •  dextrose (GLUTOSE) oral gel 15 g, 15 g, Oral, Q15 Min PRN, Lynette Dutta MD  •  docusate sodium (COLACE) capsule 100 mg, 100 mg, Oral, BID, Lynette Dutta MD, 100 mg at 03/16/21 0926  •  furosemide (LASIX) injection 40 mg, 40 mg, Intravenous, BID, Lynette Dutta MD, 40 mg at 03/16/21 0925  •  glucagon (human recombinant) (GLUCAGEN DIAGNOSTIC) injection 1 mg, 1 mg, Subcutaneous, Q15 Min PRN, Lynette Dutta MD  •  HYDROcodone-acetaminophen (NORCO) 7.5-325 MG per tablet 2 tablet, 2 tablet, Oral, Q4H PRN, Lynette Dutta MD, 2 tablet at 03/16/21 0014  •  hydrOXYzine (ATARAX) tablet 25 mg, 25 mg, Oral, Nightly PRN, Marie Ying MD, 25 mg at 03/15/21 2155  •  insulin glargine (LANTUS, SEMGLEE) injection 12 Units, 12 Units, Subcutaneous, Nightly, Marie Ying MD, 12 Units at 03/15/21 2035  •  insulin lispro (ADMELOG) injection 0-24 Units, 0-24 Units, Subcutaneous, 4x Daily With Meals & Nightly, 12 Units at 03/16/21 1153 **AND** insulin lispro (ADMELOG) injection 0-24 Units, 0-24 Units, Subcutaneous, PRN, Marie Ying MD  •  insulin regular (humuLIN R,novoLIN R) injection 4 Units, 4 Units, Subcutaneous, BID, Marie Ying MD  •  ipratropium-albuterol (DUO-NEB) nebulizer solution 3 mL, 3 mL, Nebulization, Q4H PRN, Lynette Dutta MD, 3 mL at 03/14/21 1838  •   ipratropium-albuterol (DUO-NEB) nebulizer solution 3 mL, 3 mL, Nebulization, 4x Daily - RT, Angie Theodore, APRN, 3 mL at 03/16/21 0819  •  magnesium oxide (MAG-OX) tablet 200 mg, 200 mg, Oral, Daily, Lynette Dutta MD, 200 mg at 03/16/21 0926  •  mirtazapine (REMERON) tablet 15 mg, 15 mg, Oral, Nightly, Lynette Dutta MD, 15 mg at 03/15/21 2034  •  nitroglycerin (NITROSTAT) SL tablet 0.4 mg, 0.4 mg, Sublingual, Q5 Min PRN, Lynette Dutta MD  •  ondansetron (ZOFRAN) tablet 4 mg, 4 mg, Oral, Q6H PRN **OR** ondansetron (ZOFRAN) injection 4 mg, 4 mg, Intravenous, Q6H PRN, Lynette Dutta MD  •  Pharmacy Consult - Steroid Insulin Protocol, , Does not apply, Continuous PRN, Marie Ying MD  •  Pharmacy to dose warfarin, , Does not apply, Continuous PRN, Lynette Dutta MD  •  predniSONE (DELTASONE) tablet 20 mg, 20 mg, Oral, BID With Meals, DrawAlvaro MD, 20 mg at 03/16/21 0925  •  [COMPLETED] Insert peripheral IV, , , Once **AND** sodium chloride 0.9 % flush 10 mL, 10 mL, Intravenous, PRN, Lynette Dutta MD  •  sodium chloride 0.9 % flush 10 mL, 10 mL, Intravenous, Q12H, Lynette Dutta MD, 10 mL at 03/16/21 0925  •  sodium chloride 0.9 % flush 10 mL, 10 mL, Intravenous, PRN, Lynette Dutta MD  •  theophylline (SHE-24) 24 hr capsule 300 mg, 300 mg, Oral, Nightly, Lynette Dutta MD, 300 mg at 03/15/21 2033  •  warfarin (COUMADIN) tablet 3 mg, 3 mg, Oral, Nightly, Lynette Dutta MD, 3 mg at 03/15/21 2033    Data Review:  All labs (24hrs):   Recent Results (from the past 24 hour(s))   POC Glucose Once    Collection Time: 03/15/21  4:19 PM    Specimen: Blood   Result Value Ref Range    Glucose >500 (C) 70 - 105 mg/dL   Sodium, Urine, Random - Urine, Clean Catch    Collection Time: 03/15/21  4:22 PM    Specimen: Urine, Clean Catch   Result Value Ref Range    Sodium, Urine 68 mmol/L   POC Glucose Once    Collection Time: 03/15/21  4:50 PM    Specimen: Blood   Result Value Ref Range    Glucose 476 (C) 70 - 105 mg/dL    Urinalysis With Microscopic If Indicated (No Culture) -    Collection Time: 03/15/21  4:51 PM    Specimen: Urine   Result Value Ref Range    Color, UA Yellow Yellow, Straw    Appearance, UA Clear Clear    pH, UA <=5.0 5.0 - 8.0    Specific Gravity, UA 1.015 1.005 - 1.030    Glucose, UA >=1000 mg/dL (3+) (A) Negative    Ketones, UA Negative Negative    Bilirubin, UA Negative Negative    Blood, UA Negative Negative    Protein, UA Negative Negative    Leuk Esterase, UA Negative Negative    Nitrite, UA Negative Negative    Urobilinogen, UA 0.2 E.U./dL 0.2 - 1.0 E.U./dL   POC Glucose Once    Collection Time: 03/15/21  6:57 PM    Specimen: Blood   Result Value Ref Range    Glucose 391 (H) 70 - 105 mg/dL   POC Glucose Once    Collection Time: 03/15/21  9:05 PM    Specimen: Blood   Result Value Ref Range    Glucose 242 (H) 70 - 105 mg/dL   POC Glucose Once    Collection Time: 03/15/21 10:00 PM    Specimen: Blood   Result Value Ref Range    Glucose 159 (H) 70 - 105 mg/dL   Renal Function Panel    Collection Time: 03/16/21  4:10 AM    Specimen: Blood   Result Value Ref Range    Glucose 149 (H) 65 - 99 mg/dL    BUN 47 (H) 8 - 23 mg/dL    Creatinine 2.16 (H) 0.76 - 1.27 mg/dL    Sodium 133 (L) 136 - 145 mmol/L    Potassium 5.0 3.5 - 5.2 mmol/L    Chloride 96 (L) 98 - 107 mmol/L    CO2 27.0 22.0 - 29.0 mmol/L    Calcium 9.0 8.6 - 10.5 mg/dL    Albumin 3.50 3.50 - 5.20 g/dL    Phosphorus 5.5 (H) 2.5 - 4.5 mg/dL    Anion Gap 10.0 5.0 - 15.0 mmol/L    BUN/Creatinine Ratio 21.8 7.0 - 25.0    eGFR Non African Amer 29 (L) >60 mL/min/1.73   Protime-INR    Collection Time: 03/16/21  4:10 AM    Specimen: Blood   Result Value Ref Range    Protime 20.4 19.4 - 28.5 Seconds    INR 1.91 (L) 2.00 - 3.00   CBC Auto Differential    Collection Time: 03/16/21  4:10 AM    Specimen: Blood   Result Value Ref Range    WBC 10.50 3.40 - 10.80 10*3/mm3    RBC 3.00 (L) 4.14 - 5.80 10*6/mm3    Hemoglobin 10.2 (L) 13.0 - 17.7 g/dL    Hematocrit 31.1 (L)  37.5 - 51.0 %    .8 (H) 79.0 - 97.0 fL    MCH 33.9 (H) 26.6 - 33.0 pg    MCHC 32.6 31.5 - 35.7 g/dL    RDW 18.2 (H) 12.3 - 15.4 %    RDW-SD 65.6 (H) 37.0 - 54.0 fl    MPV 7.6 6.0 - 12.0 fL    Platelets 278 140 - 450 10*3/mm3    Neutrophil % 83.3 (H) 42.7 - 76.0 %    Lymphocyte % 10.7 (L) 19.6 - 45.3 %    Monocyte % 5.9 5.0 - 12.0 %    Eosinophil % 0.0 (L) 0.3 - 6.2 %    Basophil % 0.1 0.0 - 1.5 %    Neutrophils, Absolute 8.70 (H) 1.70 - 7.00 10*3/mm3    Lymphocytes, Absolute 1.10 0.70 - 3.10 10*3/mm3    Monocytes, Absolute 0.60 0.10 - 0.90 10*3/mm3    Eosinophils, Absolute 0.00 0.00 - 0.40 10*3/mm3    Basophils, Absolute 0.00 0.00 - 0.20 10*3/mm3    nRBC 0.1 0.0 - 0.2 /100 WBC   POC Glucose Once    Collection Time: 03/16/21  7:01 AM    Specimen: Blood   Result Value Ref Range    Glucose 189 (H) 70 - 105 mg/dL   POC Glucose Once    Collection Time: 03/16/21 11:10 AM    Specimen: Blood   Result Value Ref Range    Glucose 273 (H) 70 - 105 mg/dL        Imaging:  XR Chest AP  Narrative: DATE OF EXAM:  3/14/2021 10:50 AM     PROCEDURE:  XR CHEST AP-     INDICATIONS:  Covid 19 infection, cough and fever, shortness of breath, hypoxia.      COMPARISON:  03/02/2021.     TECHNIQUE:   Single radiographic view of the chest was obtained.     FINDINGS:  The heart is enlarged. There is bilateral mixed interstitial/airspace  disease which may be related to the patient's Covid 19 infection. The  patient has a new small to moderate right pleural effusion and a small  left pleural effusion. There appears to be compressive atelectasis on  both lung bases.  There is no pneumothorax. There is a left-sided  transvenous pacemaker in place.     Impression:    1. Bilateral basilar pleural effusions with compressive atelectasis.  2. Bilateral mixed interstitial/airspace disease presumably related to  the patient's Covid infection.  3. Cardiomegaly.     Electronically Signed By-Doron Hernandez MD On:3/14/2021 10:59 AM  This report was  finalized on [id number] by  Doron Hernandez MD.       ASSESSMENT:    Shortness of breath    Sepsis (CMS/HCC)  Arthritis  CHF  COPD  CAD  DM II  Hx SSS s/p pacemaker  Hyperlipidemia  HTN  A-fib  KIM  Bilateral effusion    PLAN:     Antibiotics will dc   Bronchodilator  Inhaled corticosteroids  PO steroids  Electrolytes/ glycemic control  IS/Flutter valve  DVT and GI prophylaxis      Critical care time in direct medical management (   ) minutes   Erick Siu MD, D,ABSM   3/16/2021

## 2021-03-16 NOTE — PROGRESS NOTES
"   LOS: 2 days    Patient Care Team:  Yusef Sosa Jr., MD as PCP - General  Yusef oSsa Jr., MD as PCP - Family Medicine  Luis Paulino MD as Consulting Physician (Cardiology)      Subjective     Patient was seen and examined this morning.  He is feeling little better  Still has dyspnea but improving    Objective     Vital Sign Min/Max for last 24 hours  Temp:  [97.3 °F (36.3 °C)-98.2 °F (36.8 °C)] 97.3 °F (36.3 °C)  Heart Rate:  [70-92] 70  Resp:  [16-18] 17  BP: (114-149)/(64-82) 123/64                       Flowsheet Rows      First Filed Value   Admission Height  170.2 cm (67\") Documented at 03/14/2021 1001   Admission Weight  76.7 kg (169 lb) Documented at 03/14/2021 1001          I/O this shift:  In: 360 [P.O.:360]  Out: -   I/O last 3 completed shifts:  In: 1080 [P.O.:1080]  Out: 1825 [Urine:1825]    Physical Exam:  Physical Exam    General.  Awake, alert  Head.  Atraumatic, normocephalic  Neck.  Supple, no JVD  Respiratory.  Bilateral wheezing.  Cardiovascular.  Regular rhythm  Abdominal.  Obese, soft, nontender  Extremities. no lower extreme edema     LABS:  Lab Results   Component Value Date    CALCIUM 9.0 03/16/2021    PHOS 5.5 (H) 03/16/2021     Results from last 7 days   Lab Units 03/16/21  0410 03/15/21  0429 03/14/21  1642 03/14/21  1641 03/14/21  1022   SODIUM mmol/L 133* 137 138  --  137   POTASSIUM mmol/L 5.0 5.2 5.4*  --  4.8   CHLORIDE mmol/L 96* 101 99  --  100   CO2 mmol/L 27.0 23.0 30.0*  --  22.0   BUN mg/dL 47* 32* 29*  --  28*   CREATININE mg/dL 2.16* 2.52* 2.14*  --  2.25*   GLUCOSE mg/dL 149* 289* 323*  --  361*   CALCIUM mg/dL 9.0 8.5* 9.4  --  8.5*   WBC 10*3/mm3 10.50 8.50  --  7.90 8.80   HEMOGLOBIN g/dL 10.2* 11.3*  --  11.3* 11.7*   PLATELETS 10*3/mm3 278 277  --  313 286   ALT (SGPT) U/L  --   --   --   --  49*   AST (SGOT) U/L  --   --   --   --  70*     Lab Results   Component Value Date    CKTOTAL 34 01/13/2021    TROPONINI 0.030 06/28/2019    TROPONINT " 0.027 03/14/2021     Estimated Creatinine Clearance: 26 mL/min (A) (by C-G formula based on SCr of 2.16 mg/dL (H)).  Results from last 7 days   Lab Units 03/14/21  1042   PH, ARTERIAL pH units 7.399   PO2 ART mm Hg 65.8*   PCO2, ARTERIAL mm Hg 38.1   HCO3 ART mmol/L 23.5     Brief Urine Lab Results  (Last result in the past 365 days)      Color   Clarity   Blood   Leuk Est   Nitrite   Protein   CREAT   Urine HCG        03/15/21 1651 Yellow Clear Negative Negative Negative Negative             WEIGHTS:     Wt Readings from Last 1 Encounters:   03/15/21 0346 81.3 kg (179 lb 3.7 oz)   03/14/21 1626 79.6 kg (175 lb 7.8 oz)   03/14/21 1001 76.7 kg (169 lb)       aspirin, 81 mg, Oral, Daily  atorvastatin, 20 mg, Oral, Nightly  budesonide, 0.5 mg, Nebulization, BID - RT  calcitriol, 0.25 mcg, Oral, Once per day on Mon Wed Fri  carvedilol, 12.5 mg, Oral, Nightly  docusate sodium, 100 mg, Oral, BID  furosemide, 40 mg, Intravenous, BID  insulin glargine, 12 Units, Subcutaneous, Nightly  insulin lispro, 0-24 Units, Subcutaneous, 4x Daily With Meals & Nightly  insulin regular, 4 Units, Subcutaneous, BID  ipratropium-albuterol, 3 mL, Nebulization, 4x Daily - RT  magnesium oxide, 200 mg, Oral, Daily  mirtazapine, 15 mg, Oral, Nightly  predniSONE, 20 mg, Oral, BID With Meals  sodium chloride, 10 mL, Intravenous, Q12H  theophylline, 300 mg, Oral, Nightly  warfarin, 3 mg, Oral, Nightly      Pharmacy Consult - Steroid Insulin Protocol,   Pharmacy to dose warfarin,         Assessment/Plan       1.  Acute kidney injury on CKD 3  Baseline creatinine around 1.8 to 1.9 mg/DL but his creatinine has fluctuated in the past depending from the diuretics dose.  Patient's creatinine decreasing after decreasing Lasix dose.  Creatinine was 2.16 Mg/DL  Electrolytes okay.  Volume status generous but improving  Change Lasix to oral now     2.  Respiratory failure  Acute COPD exacerbation + volume overload  Patient on Lasix, steroids and  antibiotics     3.CHFpEF/hypervolemia  EF 60% along with mild aortic stenosis per last echocardiogram     4.  Hyperkalemia  Improving.    Keep on low K diet     5.  Hypertension with CKD 3  Blood pressure fairly well controlled on current antihypertensives      Cam Yan MD  03/16/21  13:46 EDT

## 2021-03-16 NOTE — PROGRESS NOTES
Continued Stay Note   [place]     Patient Name: Cali Santoro  MRN: [id number]  Today's Date: 3/16/2021    Admit Date: 3/14/2021    Discharge Plan     Row Name 03/16/21 1356       Plan    Plan  DC Plan: Return home w/spouse, [facility] [place] has accepted. Pt has home O2 and nebulizer.    Plan Comments  Remains on 10L simple mask. Auto BIPAP tonight.        Chart review only.             Bayron Andrews RN

## 2021-03-16 NOTE — PLAN OF CARE
Problem: Adult Inpatient Plan of Care  Goal: Absence of Hospital-Acquired Illness or Injury  Intervention: Identify and Manage Fall Risk  Recent Flowsheet Documentation  Taken 3/16/2021 0110 by Serjio Lopez LPN  Safety Promotion/Fall Prevention:   safety round/check completed   room organization consistent   nonskid shoes/slippers when out of bed   muscle strengthening facilitated   mobility aid in reach  Taken 3/15/2021 2310 by Serjio Lopez LPN  Safety Promotion/Fall Prevention:   safety round/check completed   room organization consistent   nonskid shoes/slippers when out of bed   muscle strengthening facilitated   mobility aid in reach   lighting adjusted   fall prevention program maintained   assistive device/personal items within reach   clutter free environment maintained   activity supervised  Taken 3/15/2021 2105 by Serjio Lopez LPN  Safety Promotion/Fall Prevention:   safety round/check completed   room organization consistent   nonskid shoes/slippers when out of bed   muscle strengthening facilitated   mobility aid in reach   lighting adjusted   fall prevention program maintained   assistive device/personal items within reach   clutter free environment maintained   activity supervised  Taken 3/15/2021 1951 by Serjio Lopez LPN  Safety Promotion/Fall Prevention:   safety round/check completed   room organization consistent   nonskid shoes/slippers when out of bed   muscle strengthening facilitated   mobility aid in reach   fall prevention program maintained   lighting adjusted   activity supervised   assistive device/personal items within reach   clutter free environment maintained  Intervention: Prevent Skin Injury  Recent Flowsheet Documentation  Taken 3/16/2021 0110 by Serjio Lopez LPN  Body Position: position changed independently  Taken 3/15/2021 2310 by Serjio Lopez LPN  Body Position: position changed independently  Taken 3/15/2021 2105 by Jessica  Serjio STANLEY LPN  Body Position: position changed independently  Taken 3/15/2021 1951 by Serjio Lopez LPN  Body Position: position changed independently  Intervention: Prevent Infection  Recent Flowsheet Documentation  Taken 3/16/2021 0110 by Serjio Lopez LPN  Infection Prevention: personal protective equipment utilized  Taken 3/15/2021 2310 by Serjio Lopez LPN  Infection Prevention:   single patient room provided   personal protective equipment utilized   hand hygiene promoted   environmental surveillance performed  Taken 3/15/2021 2105 by Serjio Lopez LPN  Infection Prevention:   personal protective equipment utilized   hand hygiene promoted   single patient room provided   visitors restricted/screened   rest/sleep promoted  Taken 3/15/2021 1951 by Serjio Lopez LPN  Infection Prevention:   visitors restricted/screened   single patient room provided   rest/sleep promoted   personal protective equipment utilized   hand hygiene promoted  Goal: Optimal Comfort and Wellbeing  Intervention: Provide Person-Centered Care  Recent Flowsheet Documentation  Taken 3/15/2021 1951 by Serjio Lopez LPN  Trust Relationship/Rapport: care explained     Problem: Fall Injury Risk  Goal: Absence of Fall and Fall-Related Injury  Intervention: Identify and Manage Contributors to Fall Injury Risk  Recent Flowsheet Documentation  Taken 3/16/2021 0110 by Serjio Lopez LPN  Medication Review/Management: medications reviewed  Taken 3/15/2021 2310 by Serjio Lopez LPN  Medication Review/Management: medications reviewed  Taken 3/15/2021 2105 by Serjio Lopez LPN  Medication Review/Management: medications reviewed  Taken 3/15/2021 1951 by Serjio Lopez LPN  Medication Review/Management: medications reviewed  Intervention: Promote Injury-Free Environment  Recent Flowsheet Documentation  Taken 3/16/2021 0110 by Serjio Lopez LPN  Safety Promotion/Fall Prevention:    safety round/check completed   room organization consistent   nonskid shoes/slippers when out of bed   muscle strengthening facilitated   mobility aid in reach  Taken 3/15/2021 2310 by Serjio Lopez LPN  Safety Promotion/Fall Prevention:   safety round/check completed   room organization consistent   nonskid shoes/slippers when out of bed   muscle strengthening facilitated   mobility aid in reach   lighting adjusted   fall prevention program maintained   assistive device/personal items within reach   clutter free environment maintained   activity supervised  Taken 3/15/2021 2105 by Serjio Lopez LPN  Safety Promotion/Fall Prevention:   safety round/check completed   room organization consistent   nonskid shoes/slippers when out of bed   muscle strengthening facilitated   mobility aid in reach   lighting adjusted   fall prevention program maintained   assistive device/personal items within reach   clutter free environment maintained   activity supervised  Taken 3/15/2021 1951 by Serjio Lopez LPN  Safety Promotion/Fall Prevention:   safety round/check completed   room organization consistent   nonskid shoes/slippers when out of bed   muscle strengthening facilitated   mobility aid in reach   fall prevention program maintained   lighting adjusted   activity supervised   assistive device/personal items within reach   clutter free environment maintained     Problem: Respiratory Compromise (Pneumonia)  Goal: Effective Oxygenation and Ventilation  Intervention: Optimize Oxygenation and Ventilation  Recent Flowsheet Documentation  Taken 3/16/2021 0110 by Serjio Lopez LPN  Head of Bed (HOB): HOB at 20-30 degrees  Taken 3/15/2021 2310 by Serjio Lopez LPN  Head of Bed (HOB): HOB at 20-30 degrees  Taken 3/15/2021 2105 by Serjio Lopez LPN  Head of Bed (HOB): HOB at 20-30 degrees  Taken 3/15/2021 1951 by Serjio Lopez LPN  Head of Bed (HOB): HOB at 30-45 degrees   Goal Outcome  Evaluation:

## 2021-03-16 NOTE — CONSULTS
[facility]   CONSULT NOTE    Referring Provider: Dr. Ying  Reason for Consultation: Acute on chronic congestive heart failure    Patient Care Team:  Yusef Sosa Jr., MD as PCP - General  Yusef Sosa Jr., MD as PCP - Family Medicine  Luis Paulino MD as Consulting Physician (Cardiology)    Chief complaint shortness of breath        History of present illness:  Cali Santoro is a 85 y.o. 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 fib sleep apnea presented to the ED with worsening shortness of breath.  Patient reports over the last few days to week he has had worsening shortness of breath orthopnea dyspnea on exertion.  He reports that this is his third time here since December with either pneumonia or congestive heart failure.  Patient's kidney function has seemed to worsen with creatinine 2.16 today; troponin was negative his proBNP is 6687.  Patient denies any chest pain or lower extremity edema no palpitations noted.    Review of Systems   Constitutional: Positive for decreased appetite and malaise/fatigue. Negative for fever.   HENT: Negative for congestion.    Cardiovascular: Positive for chest pain, dyspnea on exertion and orthopnea. Negative for leg swelling and palpitations.   Respiratory: Positive for cough and shortness of breath. Negative for sputum production.    Gastrointestinal: Negative for abdominal pain, nausea and vomiting.   Genitourinary: Negative for dysuria, flank pain and hematuria.   Neurological: Positive for weakness. Negative for dizziness and light-headedness.   All other systems reviewed and are negative.      History  Past Medical History:   Diagnosis Date   • Arrhythmia    • Arthritis    • Bradycardia    • Cancer (CMS/HCC)    • Cardiomyopathy (CMS/HCC)    • Cataract    • CHF  (congestive heart failure) (CMS/[facility])    • COPD (chronic obstructive pulmonary disease) (CMS/[facility])    • Coronary artery disease    • Diabetes mellitus, type 2 (CMS/[facility])    • Emphysema, unspecified (CMS/[facility])    • Hx of sick sinus syndrome     S/P Biventricular Pacemaker   • Hyperlipidemia    • Hypertension    • Paroxysmal atrial fibrillation (CMS/[facility])    • Pneumonia    • Sleep apnea    • Ventricular arrhythmia        Past Surgical History:   Procedure Laterality Date   • CARDIAC CATHETERIZATION  02/04/2015   • CARDIAC CATHETERIZATION  04/25/2016   • CAROTID ENDARTERECTOMY     • COLONOSCOPY     • CORONARY ANGIOPLASTY  1997   • ENDOSCOPY     • EYE SURGERY     • FRACTURE SURGERY     • HERNIA REPAIR     • JOINT REPLACEMENT     • LARYNGOSCOPY     • LUNG SURGERY  2008    Lung resection   • PACEMAKER IMPLANTATION  04/25/2016    Dual chamber; [employer]   • TRACHEOSTOMY         Family History   Problem Relation Age of Onset   • Heart disease Mother    • Hypertension Mother    • Stroke Father    • Breast cancer Sister    • Colon cancer Sister    • Heart disease Brother    • Diabetes Other        Social History     Tobacco Use   • Smoking status: Former Smoker     Types: Cigarettes   • Smokeless tobacco: Never Used   Vaping Use   • Vaping Use: Never used   Substance Use Topics   • Alcohol use: Yes     Comment: occ   • Drug use: No        Medications Prior to Admission   Medication Sig Dispense Refill Last Dose   • aspirin 81 MG tablet Take 81 mg by mouth Every Night.   3/13/2021 at Unknown time   • carvedilol (COREG) 12.5 MG tablet Take 12.5 mg by mouth Every Night.   3/13/2021 at Unknown time   • furosemide (LASIX) 40 MG tablet Take 40 mg by mouth Daily.   3/14/2021 at Unknown time   • insulin aspart (novoLOG FLEXPEN) 100 UNIT/ML solution pen-injector sc pen Inject 1 Units under the skin into the appropriate area as directed 4 (Four) Times a Day With Meals & at Bedtime. Check BS 4 times a day. Take 1U for 20 gm/dl the  blood sugar is above 130  Max 60 units per 24 hours   3/13/2021 at Unknown time   • Insulin Glargine (LANTUS SOLOSTAR) 100 UNIT/ML injection pen Inject 10 Units under the skin into the appropriate area as directed Every Night.   3/13/2021 at Unknown time   • Magnesium 250 MG tablet Take 1 tablet by mouth Daily.   3/14/2021 at Unknown time   • mirtazapine (REMERON) 15 MG tablet TAKE 1 TABLET BY MOUTH EVERY NIGHT. 90 tablet 1 3/13/2021 at Unknown time   • theophylline (THEODUR) 300 MG 12 hr tablet Take 300 mg by mouth Every Night.   3/13/2021 at Unknown time   • warfarin (COUMADIN) 3 MG tablet Take 1 tablet by mouth Every Night. 34 tablet 2 3/13/2021 at Unknown time   • acetaminophen (TYLENOL) 500 MG tablet Take 500 mg by mouth Every 6 (Six) Hours As Needed for Mild Pain .      • budesonide (PULMICORT) 0.5 MG/2ML nebulizer solution Take 2 mL by nebulization 2 (Two) Times a Day. J44.9 copd 60 each 3    • calcitriol (ROCALTROL) 0.25 MCG capsule Take 0.25 mcg by mouth 3 (Three) Times a Week. Mon, Wed, Fri   3/5/2021   • ipratropium-albuterol (DUO-NEB) 0.5-2.5 mg/3 ml nebulizer Take 3 mL by nebulization Every 4 (Four) Hours As Needed for Shortness of Air. COPD j 44.9 360 mL 3    Allergies      Penicillins    Scheduled Meds:aspirin, 81 mg, Oral, Daily  atorvastatin, 20 mg, Oral, Nightly  budesonide, 0.5 mg, Nebulization, BID - RT  calcitriol, 0.25 mcg, Oral, Once per day on Mon Wed Fri  carvedilol, 12.5 mg, Oral, Nightly  docusate sodium, 100 mg, Oral, BID  furosemide, 40 mg, Oral, BID  insulin glargine, 12 Units, Subcutaneous, Nightly  insulin lispro, 0-24 Units, Subcutaneous, 4x Daily With Meals & Nightly  insulin regular, 4 Units, Subcutaneous, BID  ipratropium-albuterol, 3 mL, Nebulization, 4x Daily - RT  magnesium oxide, 200 mg, Oral, Daily  mirtazapine, 15 mg, Oral, Nightly  predniSONE, 20 mg, Oral, BID With Meals  sodium chloride, 10 mL, Intravenous, Q12H  theophylline, 300 mg, Oral, Nightly  warfarin, 3 mg, Oral,  "Nightly      Continuous Infusions:Pharmacy Consult - Steroid Insulin Protocol,   Pharmacy to dose warfarin,       PRN Meds:.•  acetaminophen **OR** acetaminophen **OR** acetaminophen  •  dextrose  •  dextrose  •  glucagon (human recombinant)  •  HYDROcodone-acetaminophen  •  hydrOXYzine  •  insulin lispro **AND** insulin lispro  •  ipratropium-albuterol  •  nitroglycerin  •  ondansetron **OR** ondansetron  •  Pharmacy Consult - Steroid Insulin Protocol  •  Pharmacy to dose warfarin  •  [COMPLETED] Insert peripheral IV **AND** sodium chloride  •  sodium chloride        VITAL SIGNS  Vitals:    03/16/21 1405 03/16/21 1527 03/16/21 1532 03/16/21 1641   BP: 136/63      BP Location: Right arm      Patient Position: Lying      Pulse: 70 70 73    Resp: 19 20 20    Temp: 97.3 °F (36.3 °C)      TempSrc: Oral      SpO2: 95% 95%     Weight:    81.2 kg (179 lb)   Height:    170.2 cm (67\")       Flowsheet Rows      First Filed Value   Admission Height  170.2 cm (67\") Documented at 03/14/2021 1001   Admission Weight  76.7 kg (169 lb) Documented at 03/14/2021 1001           TELEMETRY: Paced with PVCs    Physical Exam:  Vitals and nursing note reviewed.   Constitutional:       Appearance: Not in distress.   Neck:      Vascular: No JVR. JVD normal.   Pulmonary:      Effort: Increased respiratory effort.      Breath sounds: Decreased air movement present. No wheezing. No rhonchi. No rales.   Chest:      Chest wall: Not tender to palpatation.   Cardiovascular:      PMI at left midclavicular line. Normal rate. Regular rhythm. Normal S1. Normal S2.      Murmurs: There is no murmur.      No gallop. No click. No rub.   Pulses:     Intact distal pulses.   Edema:     Peripheral edema absent.   Abdominal:      General: Bowel sounds are normal.      Palpations: Abdomen is soft.      Tenderness: There is no abdominal tenderness.   Musculoskeletal: Normal range of motion.         General: No tenderness. Skin:     General: Skin is warm and dry. "   Neurological:      General: No focal deficit present.      Mental Status: Alert and oriented to person, place and time.                  LAB RESULTS (LAST 7 DAYS)    CBC  Results from last 7 days   Lab Units 03/16/21  0410 03/15/21  0429 03/14/21  1641 03/14/21  1022   WBC 10*3/mm3 10.50 8.50 7.90 8.80   RBC 10*6/mm3 3.00* 3.24* 3.37* 3.42*   HEMOGLOBIN g/dL 10.2* 11.3* 11.3* 11.7*   HEMATOCRIT % 31.1* 34.2* 35.2* 36.3*   MCV fL 103.8* 105.4* 104.4* 106.1*   PLATELETS 10*3/mm3 278 277 313 286       BMP  Results from last 7 days   Lab Units 03/16/21  0410 03/15/21  0429 03/14/21  1642 03/14/21  1022   SODIUM mmol/L 133* 137 138 137   POTASSIUM mmol/L 5.0 5.2 5.4* 4.8   CHLORIDE mmol/L 96* 101 99 100   CO2 mmol/L 27.0 23.0 30.0* 22.0   BUN mg/dL 47* 32* 29* 28*   CREATININE mg/dL 2.16* 2.52* 2.14* 2.25*   GLUCOSE mg/dL 149* 289* 323* 361*   PHOSPHORUS mg/dL 5.5*  --   --   --        CMP   Results from last 7 days   Lab Units 03/16/21  0410 03/15/21  0429 03/14/21  1642 03/14/21  1022   SODIUM mmol/L 133* 137 138 137   POTASSIUM mmol/L 5.0 5.2 5.4* 4.8   CHLORIDE mmol/L 96* 101 99 100   CO2 mmol/L 27.0 23.0 30.0* 22.0   BUN mg/dL 47* 32* 29* 28*   CREATININE mg/dL 2.16* 2.52* 2.14* 2.25*   GLUCOSE mg/dL 149* 289* 323* 361*   ALBUMIN g/dL 3.50  --   --  3.90   BILIRUBIN mg/dL  --   --   --  0.5   ALK PHOS U/L  --   --   --  79   AST (SGOT) U/L  --   --   --  70*   ALT (SGPT) U/L  --   --   --  49*         BNP        TROPONIN  Results from last 7 days   Lab Units 03/14/21  1642   TROPONIN T ng/mL 0.027       CoAg  Results from last 7 days   Lab Units 03/16/21  0410 03/15/21  0429 03/14/21  1022   INR  1.91* 1.69* 1.70*   APTT seconds  --   --  28.6       Creatinine Clearance  Estimated Creatinine Clearance: 26 mL/min (A) (by C-G formula based on SCr of 2.16 mg/dL (H)).    ABG  Results from last 7 days   Lab Units 03/14/21  1042   PH, ARTERIAL pH units 7.399   PCO2, ARTERIAL mm Hg 38.1   PO2 ART mm Hg 65.8*   O2  SATURATION ART % 92.7*   BASE EXCESS ART mmol/L -1.1*       Radiology  No radiology results for the last day        EKG          I personally viewed and interpreted the patient's EKG/Telemetry data:    ECHOCARDIOGRAM:    Results for orders placed during the hospital encounter of 12/08/20    Adult Transthoracic Echo Complete W/ Cont if Necessary Per Protocol    Interpretation Summary  · Left ventricular wall thickness is consistent with septal asymmetric hypertrophy.  · Estimated left ventricular EF = 60% Left ventricular systolic function is normal.  · The right ventricular cavity is borderline dilated.  · The right atrial cavity is borderline dilated.  · Mild aortic valve stenosis is present.  · Estimated right ventricular systolic pressure from tricuspid regurgitation is normal (<35 mmHg).  · There is a small (<1cm) pericardial effusion adjacent to the left ventricle.      STRESS MYOVIEW:    CARDIAC CATHETERIZATION:    OTHER:         Assessment/Plan       Shortness of breath    Permanent atrial fibrillation (CMS/[facility])    Coronary artery disease of native artery of native heart with stable angina pectoris (CMS/[facility])    Essential hypertension    Presence of cardiac pacemaker    Sleep apnea    Type 2 diabetes mellitus with hyperglycemia, without long-term current use of insulin (CMS/[facility])    Stage 3b chronic kidney disease (CMS/[facility])    Mixed hyperlipidemia    Acute on chronic heart failure with preserved ejection fraction (CMS/[facility])    Sepsis (CMS/[facility])    Plan:  Patient presented with acute worsening shortness of breath patient was found to have bilateral pleural effusions.  This was initially given IV diuretics  Nephrology is following for worsening chronic kidney disease/acute renal failure creatinine is currently 2.14 patient is currently on p.o. diuretics 40 mg twice a day      Echocardiogram pending     Continue beta-blocker,   No ACE ARB secondary to acute renal failure    Continue daily weights, low salt diet   We  will have heart failure nurse to see patient        TERESO Jack  03/16/21d by TERESO Jack, 03/16/21, 5:22 PM EDT.    Patient was seen and examined independently  Patient was admitted to the hospital with worsening shortness of breath most probably multifactorial  Acute diastolic congestive heart failure also most probably RV failure  Needs aggressive control of sleep apnea  Aggressive control of hypertension  Patient does not have any significant lower extremity edema but does have problems with elevated BNP and fluid overload  Caution with diuresis nephrology following  Pulmonary following evaluate for severe pulmonary hypertension  Agree with beta-blockers holding off on ACE inhibitor's for now    Luis Paulino MD

## 2021-03-16 NOTE — PROGRESS NOTES
DNR / Aggressive Measures    Palliative care  met with pt and wife to address goals of care.  Pt's hospitalist also in room for much of visit to support with medical encouragement during discussion.  Pt able to report in front of his wife that he is not interested in continuing with aggressive measures that continue to keep him in the hospital.  Pt also reports that he intends on not having any further surgical interventions.  Pt's wife accepted the pt's expressed wishes and we further discussed code status and possible use of hospice care at home.  Pt's wife had concerns about more actively treating his symptoms at home rather than reflexively calling 911 when pt gets short of air.  These concerns were addressed by hospitalist and palliative care SW.  Hospice list provided, and pt's wife reports that she will discuss this further with her family and let hospital know tomorrow which hospice agency they would like to speak with.      Wife affirmed that they have already completed a living will and healthcare DPOA form, and reports that she will bring a copy for scan into chart.  Emotional support provided.  Will continue to follow.

## 2021-03-17 NOTE — PLAN OF CARE
Problem: Adult Inpatient Plan of Care  Goal: Absence of Hospital-Acquired Illness or Injury  Intervention: Identify and Manage Fall Risk  Recent Flowsheet Documentation  Taken 3/17/2021 0102 by Serjio Lopez LPN  Safety Promotion/Fall Prevention:   safety round/check completed   room organization consistent   nonskid shoes/slippers when out of bed   muscle strengthening facilitated   mobility aid in reach  Taken 3/16/2021 2310 by Serjio Lopez LPN  Safety Promotion/Fall Prevention:   safety round/check completed   room organization consistent   nonskid shoes/slippers when out of bed   muscle strengthening facilitated   mobility aid in reach   activity supervised   assistive device/personal items within reach  Taken 3/16/2021 2110 by Serjio Lopez LPN  Safety Promotion/Fall Prevention:   safety round/check completed   room organization consistent   nonskid shoes/slippers when out of bed   muscle strengthening facilitated   mobility aid in reach   lighting adjusted   activity supervised   assistive device/personal items within reach   clutter free environment maintained   fall prevention program maintained  Taken 3/16/2021 1927 by Serjio Lopez LPN  Safety Promotion/Fall Prevention:   activity supervised   assistive device/personal items within reach   clutter free environment maintained   elopement precautions   fall prevention program maintained   lighting adjusted   mobility aid in reach   muscle strengthening facilitated   nonskid shoes/slippers when out of bed   room organization consistent   safety round/check completed  Intervention: Prevent Skin Injury  Recent Flowsheet Documentation  Taken 3/17/2021 0102 by Serjio Lopez LPN  Body Position: position changed independently  Taken 3/16/2021 2310 by Serjio Lopez LPN  Body Position: position changed independently  Taken 3/16/2021 2110 by Serjio Lopez LPN  Body Position: position changed independently  Taken  3/16/2021 1927 by Serjio Lopez LPN  Body Position:   position changed independently   position maintained  Intervention: Prevent Infection  Recent Flowsheet Documentation  Taken 3/17/2021 0102 by Serjio Lopez LPN  Infection Prevention:   rest/sleep promoted   single patient room provided   visitors restricted/screened   personal protective equipment utilized   hand hygiene promoted  Taken 3/16/2021 2310 by Serjio Lopez LPN  Infection Prevention:   visitors restricted/screened   single patient room provided   personal protective equipment utilized   hand hygiene promoted   equipment surfaces disinfected  Taken 3/16/2021 2110 by Serjio Lopez LPN  Infection Prevention:   hand hygiene promoted   visitors restricted/screened   single patient room provided   rest/sleep promoted   personal protective equipment utilized  Taken 3/16/2021 1927 by Serjio Lopez LPN  Infection Prevention:   visitors restricted/screened   single patient room provided   rest/sleep promoted   personal protective equipment utilized   hand hygiene promoted  Goal: Optimal Comfort and Wellbeing  Intervention: Provide Person-Centered Care  Recent Flowsheet Documentation  Taken 3/16/2021 1927 by Serjio Lopez LPN  Trust Relationship/Rapport: care explained     Problem: Fall Injury Risk  Goal: Absence of Fall and Fall-Related Injury  Intervention: Identify and Manage Contributors to Fall Injury Risk  Recent Flowsheet Documentation  Taken 3/17/2021 0102 by Serjio Lopez LPN  Medication Review/Management: medications reviewed  Taken 3/16/2021 2310 by Serjio Lopez LPN  Medication Review/Management: medications reviewed  Taken 3/16/2021 2110 by Serjio Lopez LPN  Medication Review/Management: medications reviewed  Taken 3/16/2021 1927 by Serjio Lopez LPN  Medication Review/Management: medications reviewed  Intervention: Promote Injury-Free Environment  Recent Flowsheet  Documentation  Taken 3/17/2021 0102 by Serjio Lopez LPN  Safety Promotion/Fall Prevention:   safety round/check completed   room organization consistent   nonskid shoes/slippers when out of bed   muscle strengthening facilitated   mobility aid in reach  Taken 3/16/2021 2310 by Serjio Lopez LPN  Safety Promotion/Fall Prevention:   safety round/check completed   room organization consistent   nonskid shoes/slippers when out of bed   muscle strengthening facilitated   mobility aid in reach   activity supervised   assistive device/personal items within reach  Taken 3/16/2021 2110 by Serjio Lopez LPN  Safety Promotion/Fall Prevention:   safety round/check completed   room organization consistent   nonskid shoes/slippers when out of bed   muscle strengthening facilitated   mobility aid in reach   lighting adjusted   activity supervised   assistive device/personal items within reach   clutter free environment maintained   fall prevention program maintained  Taken 3/16/2021 1927 by Serjio Lopez LPN  Safety Promotion/Fall Prevention:   activity supervised   assistive device/personal items within reach   clutter free environment maintained   elopement precautions   fall prevention program maintained   lighting adjusted   mobility aid in reach   muscle strengthening facilitated   nonskid shoes/slippers when out of bed   room organization consistent   safety round/check completed     Problem: Respiratory Compromise (Pneumonia)  Goal: Effective Oxygenation and Ventilation  Intervention: Optimize Oxygenation and Ventilation  Recent Flowsheet Documentation  Taken 3/17/2021 0102 by Serjio Lopez LPN  Head of Bed (HOB): HOB at 20-30 degrees  Taken 3/16/2021 2310 by Serjio Lopez LPN  Head of Bed (HOB): HOB at 20-30 degrees  Taken 3/16/2021 2110 by Serjio Lopez LPN  Head of Bed (HOB): HOB at 20-30 degrees  Taken 3/16/2021 1927 by Serjio Lopez LPN  Head of Bed (HOB): HOB at  20-30 degrees   Goal Outcome Evaluation:

## 2021-03-17 NOTE — PROGRESS NOTES
"   LOS: 3 days    Patient Care Team:  Yusef Sosa Jr., MD as PCP - General  Yusef Sosa Jr., MD as PCP - Family Medicine  Luis Paulino MD as Consulting Physician (Cardiology)      Subjective     Patient feeling little better.  Still has dyspnea but improving  Denies any chest pain, nausea or vomiting    Objective     Vital Sign Min/Max for last 24 hours  Temp:  [97.3 °F (36.3 °C)-97.9 °F (36.6 °C)] 97.9 °F (36.6 °C)  Heart Rate:  [63-74] 65  Resp:  [18-20] 18  BP: (125-150)/(63-89) 150/86                       Flowsheet Rows      First Filed Value   Admission Height  170.2 cm (67\") Documented at 03/14/2021 1001   Admission Weight  76.7 kg (169 lb) Documented at 03/14/2021 1001          I/O this shift:  In: 480 [P.O.:480]  Out: 400 [Urine:400]  I/O last 3 completed shifts:  In: 720 [P.O.:720]  Out: 900 [Urine:900]    Physical Exam:  Physical Exam    General.  Awake, alert  Head.  Atraumatic, normocephalic  Neck.  Supple, no JVD  Respiratory.  Bilateral wheezing.  Cardiovascular.  Regular rhythm  Abdominal.  Obese, soft, nontender  Extremities. no lower extreme edema     LABS:  Lab Results   Component Value Date    CALCIUM 9.5 03/17/2021    PHOS 5.5 (H) 03/17/2021     Results from last 7 days   Lab Units 03/17/21  0603 03/17/21  0444 03/16/21  0410 03/15/21  0429 03/14/21  1641 03/14/21  1022   SODIUM mmol/L 132*  --  133* 137   < > 137   POTASSIUM mmol/L 5.1  --  5.0 5.2   < > 4.8   CHLORIDE mmol/L 93*  --  96* 101   < > 100   CO2 mmol/L 26.0  --  27.0 23.0   < > 22.0   BUN mg/dL 59*  --  47* 32*   < > 28*   CREATININE mg/dL 2.80*  --  2.16* 2.52*   < > 2.25*   GLUCOSE mg/dL 371*  --  149* 289*   < > 361*   CALCIUM mg/dL 9.5  --  9.0 8.5*   < > 8.5*   WBC 10*3/mm3  --  12.10* 10.50 8.50  --  8.80   HEMOGLOBIN g/dL  --  11.2* 10.2* 11.3*  --  11.7*   PLATELETS 10*3/mm3  --  351 278 277  --  286   ALT (SGPT) U/L  --   --   --   --   --  49*   AST (SGOT) U/L  --   --   --   --   --  70*    < > " = values in this interval not displayed.     Lab Results   Component Value Date    CKTOTAL 34 01/13/2021    TROPONINI 0.030 06/28/2019    TROPONINT 0.027 03/14/2021     Estimated Creatinine Clearance: 19.9 mL/min (A) (by C-G formula based on SCr of 2.8 mg/dL (H)).  Results from last 7 days   Lab Units 03/14/21  1042   PH, ARTERIAL pH units 7.399   PO2 ART mm Hg 65.8*   PCO2, ARTERIAL mm Hg 38.1   HCO3 ART mmol/L 23.5     Brief Urine Lab Results  (Last result in the past 365 days)      Color   Clarity   Blood   Leuk Est   Nitrite   Protein   CREAT   Urine HCG        03/15/21 1651 Yellow Clear Negative Negative Negative Negative             WEIGHTS:     Wt Readings from Last 1 Encounters:   03/17/21 0324 80.1 kg (176 lb 9.4 oz)   03/16/21 1641 81.2 kg (179 lb)   03/15/21 0346 81.3 kg (179 lb 3.7 oz)   03/14/21 1626 79.6 kg (175 lb 7.8 oz)   03/14/21 1001 76.7 kg (169 lb)       aspirin, 81 mg, Oral, Daily  atorvastatin, 20 mg, Oral, Nightly  budesonide, 0.5 mg, Nebulization, BID - RT  calcitriol, 0.25 mcg, Oral, Once per day on Mon Wed Fri  carvedilol, 12.5 mg, Oral, Nightly  docusate sodium, 100 mg, Oral, BID  furosemide, 40 mg, Oral, BID  insulin glargine, 12 Units, Subcutaneous, Nightly  insulin lispro, 0-24 Units, Subcutaneous, 4x Daily With Meals & Nightly  insulin regular, 4 Units, Subcutaneous, BID  ipratropium-albuterol, 3 mL, Nebulization, 4x Daily - RT  magnesium oxide, 200 mg, Oral, Daily  mirtazapine, 15 mg, Oral, Nightly  predniSONE, 20 mg, Oral, BID With Meals  sodium chloride, 10 mL, Intravenous, Q12H  theophylline, 300 mg, Oral, Nightly  warfarin, 3 mg, Oral, Nightly      Pharmacy Consult - Steroid Insulin Protocol,   Pharmacy to dose warfarin,         Assessment/Plan       1.  Acute kidney injury on CKD 3  Baseline creatinine around 1.8 to 1.9 mg/DL but his creatinine has fluctuated in the past depending from the diuretics dose.  Patient's creatinine increased this morning to 2.8 mg/DL.    Electrolytes  okay.  Volume status generous but improving  CT scan is ordered and if pleural effusions look better, will consider decreasing the dose of Lasix  No Need for dialysis at this time     2.  Respiratory failure  Acute COPD exacerbation + volume overload  Patient on Lasix, steroids and antibiotics     3.CHFpEF/hypervolemia  EF 60% along with mild aortic stenosis per last echocardiogram     4.  Hyperkalemia  Improved. Keep on low K diet     5.  Hypertension with CKD 3  Blood pressure fairly well controlled on current antihypertensives      Cam Yan MD  03/17/21  09:37 EDT

## 2021-03-17 NOTE — PROGRESS NOTES
"Pharmacy dosing service  Anticoagulant  Warfarin     Subjective:    Cali Santoro is a 85 y.o.male being continued on warfarin for atrial fibrillation.    INR Goal: 2 - 3  Home medication?: Yes, warfarin 3 mg PO every day at 1800  Interacting medications: prednisone (new med - may increase INR), clindamycin (new med 3/14 dc'd 3/16)      Assessment/Plan:    INR continues to be subtherapeutic again today at 1.90. Did not see anticipated rise to a therapeutic level from DDI's and 3/15 bolus dose. Therefore, will give 4.5 mg bolus dose x1 today and schedule an increased daily dose of 4 mg daily thereafter. Will continue to reevaluate dose adjustment needs.    Continue to monitor and adjust based on INR.         Date 3/14 3/15 3/16 3/17        INR 1.7 1.69 1.91 1.90        Dose 3 mg 4.5 mg 3 mg 4.5 mg            Objective:  [Ht: 170.2 cm (67\"); Wt: 80.1 kg (176 lb 9.4 oz); BMI: Body mass index is 27.66 kg/m².]    Lab Results   Component Value Date    ALBUMIN 3.80 03/17/2021     Lab Results   Component Value Date    INR 1.90 (L) 03/17/2021    INR 1.91 (L) 03/16/2021    INR 1.69 (L) 03/15/2021    PROTIME 20.3 03/17/2021    PROTIME 20.4 03/16/2021    PROTIME 18.1 (L) 03/15/2021     Lab Results   Component Value Date    HGB 11.2 (L) 03/17/2021    HGB 10.2 (L) 03/16/2021    HGB 11.3 (L) 03/15/2021     Lab Results   Component Value Date    HCT 34.1 (L) 03/17/2021    HCT 31.1 (L) 03/16/2021    HCT 34.2 (L) 03/15/2021       Miguel Munson, Pharmacy Intern  03/17/21 09:48 EDT  "

## 2021-03-17 NOTE — PROGRESS NOTES
Reason for follow-up: Acute diastolic CHF  Pulmonary hypertension  Paroxysmal atrial fibrillation  Uncontrolled sleep apnea     Patient Care Team:  Yusef Sosa Jr., MD as PCP - General  Yusef Sosa Jr., MD as PCP - Family Medicine  Luis Paulino MD as Consulting Physician (Cardiology)    Subjective .   Feels okay     Review of Systems   Constitutional: Positive for malaise/fatigue. Negative for fever.   HENT: Negative for congestion and hearing loss.    Eyes: Negative for double vision and visual disturbance.   Cardiovascular: Negative for chest pain, claudication, dyspnea on exertion, leg swelling and syncope.   Respiratory: Negative for cough and shortness of breath.    Endocrine: Negative for cold intolerance.   Skin: Negative for color change and rash.   Musculoskeletal: Negative for arthritis and joint pain.   Gastrointestinal: Negative for abdominal pain and heartburn.   Genitourinary: Negative for hematuria.   Neurological: Negative for excessive daytime sleepiness and dizziness.   Psychiatric/Behavioral: Negative for depression. The patient is not nervous/anxious.    All other systems reviewed and are negative.      Penicillins    Scheduled Meds:aspirin, 81 mg, Oral, Daily  atorvastatin, 20 mg, Oral, Nightly  budesonide, 0.5 mg, Nebulization, BID - RT  calcitriol, 0.25 mcg, Oral, Once per day on Mon Wed Fri  carvedilol, 12.5 mg, Oral, Nightly  docusate sodium, 100 mg, Oral, BID  furosemide, 40 mg, Oral, BID  insulin glargine, 12 Units, Subcutaneous, Nightly  insulin lispro, 0-24 Units, Subcutaneous, 4x Daily With Meals & Nightly  insulin regular, 4 Units, Subcutaneous, BID  ipratropium-albuterol, 3 mL, Nebulization, 4x Daily - RT  magnesium oxide, 200 mg, Oral, Daily  mirtazapine, 15 mg, Oral, Nightly  predniSONE, 20 mg, Oral, BID With Meals  sodium chloride, 10 mL, Intravenous, Q12H  theophylline, 300 mg, Oral, Nightly  warfarin, 3 mg, Oral, Nightly      Continuous  "Infusions:Pharmacy Consult - Steroid Insulin Protocol,   Pharmacy to dose warfarin,       PRN Meds:.•  acetaminophen **OR** acetaminophen **OR** acetaminophen  •  calcium carbonate  •  dextrose  •  dextrose  •  glucagon (human recombinant)  •  HYDROcodone-acetaminophen  •  hydrOXYzine  •  insulin lispro **AND** insulin lispro  •  ipratropium-albuterol  •  nitroglycerin  •  ondansetron **OR** ondansetron  •  Pharmacy Consult - Steroid Insulin Protocol  •  Pharmacy to dose warfarin  •  [COMPLETED] Insert peripheral IV **AND** sodium chloride  •  sodium chloride    Objective   Looks comfortable sitting in the chair    VITAL SIGNS  Vitals:    03/16/21 1950 03/17/21 0324 03/17/21 0803 03/17/21 0806   BP:  125/89  150/86   BP Location:  Left arm  Left arm   Patient Position:  Lying  Sitting   Pulse: 67 70 63 65   Resp: 18 18 18 18   Temp:  97.6 °F (36.4 °C)  97.9 °F (36.6 °C)   TempSrc:  Oral  Oral   SpO2: 95% 97% 93% 100%   Weight:  80.1 kg (176 lb 9.4 oz)     Height:           Flowsheet Rows      First Filed Value   Admission Height  170.2 cm (67\") Documented at 03/14/2021 1001   Admission Weight  76.7 kg (169 lb) Documented at 03/14/2021 1001           TELEMETRY: Atrial fibrillation with ventricular pacing    Physical Exam:  Physical Exam     Vitals and nursing note reviewed.   Constitutional:       Appearance: Not in distress.   Neck:      Vascular: No JVR. JVD normal.   Pulmonary:      Effort: Normal     Breath sounds: Decreased air movement present. No wheezing. No rhonchi. No rales.   Chest:      Chest wall: Not tender to palpatation.   Cardiovascular:      PMI at left midclavicular line. Normal rate. Regular rhythm. Normal S1. Normal S2.      Murmurs: There is no murmur.      No gallop. No click. No rub.   Pulses:     Intact distal pulses.   Edema:     Peripheral edema absent.   Abdominal:      General: Bowel sounds are normal.      Palpations: Abdomen is soft.      Tenderness: There is no abdominal tenderness. "   Musculoskeletal: Normal range of motion.         General: No tenderness. Skin:     General: Skin is warm and dry.   Neurological:      General: No focal deficit present.      Mental Status: Alert              LAB RESULTS (LAST 7 DAYS)    CBC  Results from last 7 days   Lab Units 03/17/21  0444 03/16/21  0410 03/15/21  0429 03/14/21  1641 03/14/21  1022   WBC 10*3/mm3 12.10* 10.50 8.50 7.90 8.80   RBC 10*6/mm3 3.28* 3.00* 3.24* 3.37* 3.42*   HEMOGLOBIN g/dL 11.2* 10.2* 11.3* 11.3* 11.7*   HEMATOCRIT % 34.1* 31.1* 34.2* 35.2* 36.3*   MCV fL 104.1* 103.8* 105.4* 104.4* 106.1*   PLATELETS 10*3/mm3 351 278 277 313 286       BMP  Results from last 7 days   Lab Units 03/17/21  0603 03/16/21  0410 03/15/21  0429 03/14/21  1642 03/14/21  1022   SODIUM mmol/L 132* 133* 137 138 137   POTASSIUM mmol/L 5.1 5.0 5.2 5.4* 4.8   CHLORIDE mmol/L 93* 96* 101 99 100   CO2 mmol/L 26.0 27.0 23.0 30.0* 22.0   BUN mg/dL 59* 47* 32* 29* 28*   CREATININE mg/dL 2.80* 2.16* 2.52* 2.14* 2.25*   GLUCOSE mg/dL 371* 149* 289* 323* 361*   PHOSPHORUS mg/dL 5.5* 5.5*  --   --   --        CMP   Results from last 7 days   Lab Units 03/17/21  0603 03/16/21  0410 03/15/21  0429 03/14/21  1642 03/14/21  1022   SODIUM mmol/L 132* 133* 137 138 137   POTASSIUM mmol/L 5.1 5.0 5.2 5.4* 4.8   CHLORIDE mmol/L 93* 96* 101 99 100   CO2 mmol/L 26.0 27.0 23.0 30.0* 22.0   BUN mg/dL 59* 47* 32* 29* 28*   CREATININE mg/dL 2.80* 2.16* 2.52* 2.14* 2.25*   GLUCOSE mg/dL 371* 149* 289* 323* 361*   ALBUMIN g/dL 3.80 3.50  --   --  3.90   BILIRUBIN mg/dL  --   --   --   --  0.5   ALK PHOS U/L  --   --   --   --  79   AST (SGOT) U/L  --   --   --   --  70*   ALT (SGPT) U/L  --   --   --   --  49*         BNP        TROPONIN  Results from last 7 days   Lab Units 03/14/21  1642   TROPONIN T ng/mL 0.027       CoAg  Results from last 7 days   Lab Units 03/17/21  0444 03/16/21  0410 03/15/21  0429 03/14/21  1022   INR  1.90* 1.91* 1.69* 1.70*   APTT seconds  --   --   --  28.6        Creatinine Clearance  Estimated Creatinine Clearance: 19.9 mL/min (A) (by C-G formula based on SCr of 2.8 mg/dL (H)).    ABG  Results from last 7 days   Lab Units 03/14/21  1042   PH, ARTERIAL pH units 7.399   PCO2, ARTERIAL mm Hg 38.1   PO2 ART mm Hg 65.8*   O2 SATURATION ART % 92.7*   BASE EXCESS ART mmol/L -1.1*       Radiology  No radiology results for the last day          EKG        I personally viewed and interpreted the patient's EKG/Telemetry data:    ECHOCARDIOGRAM:    Results for orders placed during the hospital encounter of 03/14/21    Adult Transthoracic Echo Complete W/ Cont if Necessary Per Protocol    Interpretation Summary  · Left ventricular wall thickness is consistent with moderate concentric hypertrophy.  · Estimated left ventricular EF = 70% Left ventricular systolic function is normal.  · The right ventricular cavity is mildly dilated.  · The right atrial cavity is mild to moderately dilated.  · Mild aortic valve stenosis is present.  · Estimated right ventricular systolic pressure from tricuspid regurgitation is normal (<35 mmHg).  · There is a small (<1cm) pericardial effusion.    STRESS MYOVIEW:    CARDIAC CATHETERIZATION:    OTHER:         Assessment/Plan       Shortness of breath    Permanent atrial fibrillation (CMS/[facility])    Coronary artery disease of native artery of native heart with stable angina pectoris (CMS/[facility])    Essential hypertension    Presence of cardiac pacemaker    Sleep apnea    Type 2 diabetes mellitus with hyperglycemia, without long-term current use of insulin (CMS/[facility])    Stage 3b chronic kidney disease (CMS/[facility])    Mixed hyperlipidemia    Acute on chronic heart failure with preserved ejection fraction (CMS/[facility])    Sepsis (CMS/[facility])      Respiratory failure probably multifactorial  Acute renal failure uncontrolled sleep apnea pulmonary hypertension atrial fibrillation and diastolic congestive heart failure  Patient will be treated with supportive care  Needs to  use the CPAP regularly  Anticoagulation  Cautious diuresis nephrology following  COPD treatment  Discussed with the patient and family at bedside        Luis Paulino MD  03/17/21  09:10 EDT

## 2021-03-17 NOTE — PROGRESS NOTES
Spoke with patient and family member he is to speak with Hospice care today.  So we discussed the S/S of fluid retention and ways to prevent so that he feels well.

## 2021-03-17 NOTE — PLAN OF CARE
Goal Outcome Evaluation:  Plan of Care Reviewed With: patient, spouse  Progress: no change  Outcome Summary: pt alert and oriented able to  make needs known, no confusion noted at this time, Oxygen continues on  10Liters simple mask. Wife at bed side spoke to Pallative and asked to consult hospice [facility], Hospice at bedside at this time speaking to Patient and wife. VS stable at this time.

## 2021-03-17 NOTE — PROGRESS NOTES
[facility] Medicine Services Daily Progress Note      Hospitalist Team  LOS 3 days      Patient Care Team:  Yusef Sosa Jr., MD as PCP - General  Yusef Sosa Jr., MD as PCP - Family Medicine  Luis Paulino MD as Consulting Physician (Cardiology)    Patient Location: 232/1      Subjective   Subjective     Chief Complaint / Subjective  Chief Complaint   Patient presents with    Shortness of Breath         Brief Synopsis of Hospital Course/HPI  Mr. Santoro is a 85 y.o.  presents to [facility] complaining of shortness of breath.  The patient has numerous diagnoses including COPD, coronary artery disease, sick sinus syndrome and atrial fibrillation.  The patient recently had Covid a month ago.  The patient presented to the emergency room without complaints of chest pain but did have orthopnea, paroxysmal nocturnal dyspnea and severe dyspnea with any kind of movement.  The patient desaturated into the 70s in the emergency room.  He was vacillating between the low 70s and low 90s throughout the time.  He was placed on a facemask and his saturations have increased and are greater than 90 consistently at this time.  The patient is normally on Coumadin for his atrial fib.  The patient has not had any nausea vomiting diaphoresis or other anginal equivalent.     On review of records the patient was recently hospitalized 1/11/2021 to 1/16/2021 for healthcare facility acquired pneumonia.  He was also hospitalized with streptococcal pneumoniae pneumonia 12/8/2020 to 12/14/2020.    Date:   3/15/2021: Patient reports feeling better today with improvement in his shortness of breath.  Dr. Hopson saw the patient in consultation and recommended clindamycin antibiotic, low-dose oral steroids with prednisone, and bronchodilators.  3/16/2021: Pt reports that he slept poorly last night despite atarax. No SOA at rest. No c/o CXP; no GI or  c/o.  3/17/2021: Patient reports no  "significant complaints at this time.  Reviewed nurses notes and the patient apparently did not sleep well last night and became confused in the early morning hours.  He did not like the CPAP and refused to use it anymore and asked for it to be removed from the room.  The patient and his wife at the bedside reports that he tolerates his home CPAP better but he just had not used it in months because he did not think he needed anymore.  He is willing to resume the use of CPAP at home.    ROS  12 point review of systems was reviewed and was negative except as above.    Objective   Objective      Vital Signs  Temp:  [97.3 °F (36.3 °C)-97.9 °F (36.6 °C)] 97.3 °F (36.3 °C)  Heart Rate:  [63-75] 73  Resp:  [18-20] 18  BP: (125-150)/(63-89) 143/85  Oxygen Therapy  SpO2: 92 %  Pulse Oximetry Type: Intermittent  Device (Oxygen Therapy): simple face mask  Flow (L/min): 10  Oxygen Concentration (%): 72  Flowsheet Rows        First Filed Value   Admission Height  170.2 cm (67\") Documented at 03/14/2021 1001   Admission Weight  76.7 kg (169 lb) Documented at 03/14/2021 1001          Intake & Output (last 3 days)         03/14 0701 - 03/15 0700 03/15 0701 - 03/16 0700 03/16 0701 - 03/17 0700 03/17 0701 - 03/18 0700    P.O. 220 960 720 480    Total Intake(mL/kg) 220 (2.7) 960 (11.8) 720 (9) 480 (6)    Urine (mL/kg/hr) 650 1575 (0.8) 300 (0.2) 1100 (2.3)    Total Output 650 6796 031 9350    Net -430 -615 +420 -620                  Lines, Drains & Airways      Active LDAs       Name:   Placement date:   Placement time:   Site:   Days:    Peripheral IV 03/14/21 1019 Left Antecubital   03/14/21    1019    Antecubital   1                      Physical Exam:    Physical Exam  Vital signs and nurses notes reviewed.  Well-developed well-nourished elderly gentleman in no acute distress sitting up in bed awake and alert comfortable on simple mask O2 10 L/min; mucous membranes moist; sclerae anicteric; lungs decreased air entry bilaterally; CV " regular rate and rhythm; abdomen soft nontender nondistended with active bowel sounds; extremities with trace ankle and pedal edema bilaterally, no cyanosis or calf tenderness; trace pedal pulses bilaterally; no Marroquin catheter.         Procedures:              Results Review:     I reviewed the patient's new clinical results.      Lab Results (last 24 hours)       Procedure Component Value Units Date/Time    Blood Culture - Blood, Arm, Left [[id number]] Collected: 03/14/21 1206    Specimen: Blood from Arm, Left Updated: 03/17/21 1215     Blood Culture No growth at 3 days    Blood Culture - Blood, Arm, Right [[id number]] Collected: 03/14/21 1025    Specimen: Blood from Arm, Right Updated: 03/17/21 1215     Blood Culture No growth at 3 days    POC Glucose Once [[id number]]  (Abnormal) Collected: 03/17/21 1112    Specimen: Blood Updated: 03/17/21 1116     Glucose 238 mg/dL      Comment: Serial Number: [id number]:  [id number]       POC Glucose Once [[id number]]  (Abnormal) Collected: 03/17/21 0704    Specimen: Blood Updated: 03/17/21 0706     Glucose 342 mg/dL      Comment: Serial Number: [id number]:  [id number]       CBC & Differential [[id number]]  (Abnormal) Collected: 03/17/21 0444    Specimen: Blood Updated: 03/17/21 0657    Narrative:      The following orders were created for panel order CBC & Differential.  Procedure                               Abnormality         Status                     ---------                               -----------         ------                     Scan Slide[[id number]]                                       Final result               CBC Auto Differential[[id number]]        Abnormal            Final result                 Please view results for these tests on the individual orders.    Scan Slide [[id number]] Collected: 03/17/21 0444    Specimen: Blood Updated: 03/17/21 0657     Anisocytosis Slight/1+     Macrocytes Slight/1+     WBC Morphology Normal     Platelet Estimate  Adequate    Narrative:      Slide Reviewed      CBC Auto Differential [[id number]]  (Abnormal) Collected: 03/17/21 0444    Specimen: Blood Updated: 03/17/21 0657     WBC 12.10 10*3/mm3      RBC 3.28 10*6/mm3      Hemoglobin 11.2 g/dL      Hematocrit 34.1 %      .1 fL      MCH 34.3 pg      MCHC 32.9 g/dL      RDW 17.9 %      RDW-SD 64.8 fl      MPV 8.2 fL      Platelets 351 10*3/mm3      Neutrophil % 86.3 %      Lymphocyte % 8.4 %      Monocyte % 4.2 %      Eosinophil % 0.3 %      Basophil % 0.8 %      Neutrophils, Absolute 10.40 10*3/mm3      Lymphocytes, Absolute 1.00 10*3/mm3      Monocytes, Absolute 0.50 10*3/mm3      Eosinophils, Absolute 0.00 10*3/mm3      Basophils, Absolute 0.10 10*3/mm3      nRBC 0.2 /100 WBC     Renal Function Panel [[id number]]  (Abnormal) Collected: 03/17/21 0603    Specimen: Blood Updated: 03/17/21 0640     Glucose 371 mg/dL      BUN 59 mg/dL      Creatinine 2.80 mg/dL      Sodium 132 mmol/L      Potassium 5.1 mmol/L      Comment: Slight hemolysis detected by analyzer. Results may be affected.        Chloride 93 mmol/L      CO2 26.0 mmol/L      Calcium 9.5 mg/dL      Albumin 3.80 g/dL      Phosphorus 5.5 mg/dL      Anion Gap 13.0 mmol/L      BUN/Creatinine Ratio 21.1     eGFR Non African Amer 22 mL/min/1.73     Narrative:      GFR Normal >60  Chronic Kidney Disease <60  Kidney Failure <15      Protime-INR [[id number]]  (Abnormal) Collected: 03/17/21 0444    Specimen: Blood Updated: 03/17/21 0533     Protime 20.3 Seconds      INR 1.90    POC Glucose Once [[id number]]  (Abnormal) Collected: 03/16/21 1909    Specimen: Blood Updated: 03/16/21 1912     Glucose 293 mg/dL      Comment: Serial Number: [id number]:  [id number]       POC Glucose Once [[id number]]  (Abnormal) Collected: 03/16/21 1700    Specimen: Blood Updated: 03/16/21 1702     Glucose 213 mg/dL      Comment: Serial Number: [id number]:  [id number]             No results found for: HGBA1C  Results from last 7 days    Lab Units 03/17/21  0444 03/16/21  0410 03/15/21  0429   INR  1.90* 1.91* 1.69*       Results from last 7 days   Lab Units 03/14/21  1042   PH, ARTERIAL pH units 7.399   PO2 ART mm Hg 65.8*   PCO2, ARTERIAL mm Hg 38.1   HCO3 ART mmol/L 23.5     No results found for: LIPASE  Lab Results   Component Value Date    CHOL 131 12/29/2020    TRIG 207 (H) 12/29/2020    HDL 40 12/29/2020    LDL 57 12/29/2020       No results found for: INTRAOP, PREDX, FINALDX, COMDX    Microbiology Results (last 10 days)       Procedure Component Value - Date/Time    Blood Culture - Blood, Arm, Left [[id number]] Collected: 03/14/21 1206    Lab Status: Preliminary result Specimen: Blood from Arm, Left Updated: 03/17/21 1215     Blood Culture No growth at 3 days    Respiratory Panel PCR w/COVID-19(SARS-CoV-2) LOBO/MARTHA/TOMAS/PAD/COR/MAD/TITA In-House, NP Swab in UTM/VTM, 3-4 HR TAT - Swab, Nasopharynx [[id number]]  (Normal) Collected: 03/14/21 1026    Lab Status: Final result Specimen: Swab from Nasopharynx Updated: 03/14/21 1122     ADENOVIRUS, PCR Not Detected     Coronavirus 229E Not Detected     Coronavirus HKU1 Not Detected     Coronavirus NL63 Not Detected     Coronavirus OC43 Not Detected     COVID19 Not Detected     Human Metapneumovirus Not Detected     Human Rhinovirus/Enterovirus Not Detected     Influenza A PCR Not Detected     Influenza B PCR Not Detected     Parainfluenza Virus 1 Not Detected     Parainfluenza Virus 2 Not Detected     Parainfluenza Virus 3 Not Detected     Parainfluenza Virus 4 Not Detected     RSV, PCR Not Detected     Bordetella pertussis pcr Not Detected     Bordetella parapertussis PCR Not Detected     Chlamydophila pneumoniae PCR Not Detected     Mycoplasma pneumo by PCR Not Detected    Narrative:      Fact sheet for providers: https://docs.[employer]/wp-content/uploads/MGX1146-2677-HR8.1-EUA-Provider-Fact-Sheet-3.pdf    Fact sheet for patients:  https://docs.[employer]/wp-content/uploads/IOF1845-7858-BY1.1-EUA-Patient-Fact-Sheet-1.pdf    Test performed by PCR.    Blood Culture - Blood, Arm, Right [[id number]] Collected: 03/14/21 1025    Lab Status: Preliminary result Specimen: Blood from Arm, Right Updated: 03/17/21 1215     Blood Culture No growth at 3 days            ECG/EMG Results (most recent)       Procedure Component Value Units Date/Time    ECG 12 Lead [[id number]] Collected: 03/14/21 1013     Updated: 03/16/21 1605     QT Interval 388 ms     Narrative:      HEART RATE= 75  bpm  RR Interval= 745  ms  FL Interval=   ms  P Horizontal Axis= 190  deg  P Front Axis=   deg  QRSD Interval= 106  ms  QT Interval= 388  ms  QRS Axis= 233  deg  T Wave Axis= 101  deg  - ABNORMAL ECG -  Afib/flut and V-paced complexes  When compared with ECG of 11-Jan-2021 10:56:50,  No significant change  Electronically Signed By: Jaiden Alexander ([facility]) 16-Mar-2021 16:04:44  Date and Time of Study: 2021-03-14 10:13:13    Adult Transthoracic Echo Complete W/ Cont if Necessary Per Protocol [[id number]] Collected: 03/16/21 1546     Updated: 03/16/21 1800     BSA 1.9 m^2      RVIDd 4.0 cm      IVSd 1.5 cm      LVIDd 3.4 cm      LVIDs 2.0 cm      LVPWd 2.0 cm      IVS/LVPW 0.76     FS 41.9 %      EDV(Teich) 47.8 ml      ESV(Teich) 12.4 ml      EF(Teich) 74.0 %      EDV(cubed) 39.7 ml      ESV(cubed) 7.8 ml      EF(cubed) 80.4 %      LV mass(C)d 235.2 grams      LV mass(C)dI 121.9 grams/m^2      SV(Teich) 35.4 ml      SI(Teich) 18.3 ml/m^2      SV(cubed) 31.9 ml      SI(cubed) 16.5 ml/m^2      Ao root diam 2.9 cm      Ao root area 6.6 cm^2      ACS 1.1 cm      asc Aorta Diam 2.8 cm      LVOT diam 1.6 cm      LVOT area 1.9 cm^2      EDV(MOD-sp4) 56.0 ml      ESV(MOD-sp4) 17.0 ml      EF(MOD-sp4) 69.7 %      SV(MOD-sp4) 39.0 ml      SI(MOD-sp4) 20.2 ml/m^2      Ao root area (BSA corrected) 1.5     LV Patel Vol (BSA corrected) 29.0 ml/m^2      LV Sys Vol (BSA corrected) 8.8 ml/m^2      MV E  max mariam 117.1 cm/sec      MV V2 max 121.5 cm/sec      MV max PG 5.9 mmHg      MV V2 mean 58.4 cm/sec      MV mean PG 1.8 mmHg      MV V2 VTI 30.7 cm      MVA(VTI) 0.95 cm^2      MV dec time 0.22 sec      Ao pk mariam 204.9 cm/sec      Ao max PG 16.8 mmHg      Ao max PG (full) 14.3 mmHg      Ao V2 mean 133.6 cm/sec      Ao mean PG 8.6 mmHg      Ao mean PG (full) 7.2 mmHg      Ao V2 VTI 39.5 cm      VALENCIA(I,A) 0.74 cm^2      VALENCIA(I,D) 0.74 cm^2      VALENCIA(V,A) 0.75 cm^2      VALENCIA(V,D) 0.75 cm^2      LV V1 max PG 2.5 mmHg      LV V1 mean PG 1.4 mmHg      LV V1 max 79.8 cm/sec      LV V1 mean 53.6 cm/sec      LV V1 VTI 15.1 cm      MR max mariam 212.0 cm/sec      MR max PG 18.0 mmHg      SV(Ao) 260.7 ml      SI(Ao) 135.2 ml/m^2      SV(LVOT) 29.1 ml      SI(LVOT) 15.1 ml/m^2      PA V2 max 83.5 cm/sec      PA max PG 2.8 mmHg      PA max PG (full) 1.6 mmHg      PA acc time 0.11 sec      RV V1 max PG 1.2 mmHg      RV V1 mean PG 0.58 mmHg      RV V1 max 53.8 cm/sec      RV V1 mean 35.5 cm/sec      RV V1 VTI 11.8 cm      TR max mariam 214.4 cm/sec      RVSP(TR) 22.1 mmHg      RAP systole 3.0 mmHg      PA pr(Accel) 31.2 mmHg       CV ECHO MAURO - BZI_BMI 28.0 kilograms/m^2       CV ECHO MAURO - BSA(HAYCOCK) 2.0 m^2       CV ECHO MAURO - BZI_METRIC_WEIGHT 81.2 kg       CV ECHO MAURO - BZI_METRIC_HEIGHT 170.2 cm      Target HR (85%) 116 bpm      Max. Pred. HR (100%) 136 bpm      LA dimension(2D) 5.0 cm      Echo EF Estimated 70 %     Narrative:      · Left ventricular wall thickness is consistent with moderate concentric   hypertrophy.  · Estimated left ventricular EF = 70% Left ventricular systolic function   is normal.  · The right ventricular cavity is mildly dilated.  · The right atrial cavity is mild to moderately dilated.  · Mild aortic valve stenosis is present.  · Estimated right ventricular systolic pressure from tricuspid   regurgitation is normal (<35 mmHg).  · There is a small (<1cm) pericardial effusion.                Results for orders placed in visit on 02/06/08    SCANNED VASCULAR STUDIES      Results for orders placed during the hospital encounter of 03/14/21    Adult Transthoracic Echo Complete W/ Cont if Necessary Per Protocol    Interpretation Summary  · Left ventricular wall thickness is consistent with moderate concentric hypertrophy.  · Estimated left ventricular EF = 70% Left ventricular systolic function is normal.  · The right ventricular cavity is mildly dilated.  · The right atrial cavity is mild to moderately dilated.  · Mild aortic valve stenosis is present.  · Estimated right ventricular systolic pressure from tricuspid regurgitation is normal (<35 mmHg).  · There is a small (<1cm) pericardial effusion.      Polysomnography 4 or More Parameters    Result Date: 3/12/2021  Obstructive Sleep Apnea, G47.33 Hpyoxemia, R09.02  RECOMMENDATION: CPAP titration, patient may benefit from BiPAP due to the severity of obstructive sleep apnea in the supine position AHI was 84, no REM sleep was recorded most likely due to severe obstructive sleep apnea, patient also utilizing 3 L of oxygen during the night which will need to be titrated I have reviewed the entire raw data, all components, including calibrations. Please feel free to contact me, if you wish to discuss this case further.    Alvaro Hopson MD  Date Diplomat, American Board of Sleep Medicine       CT Chest Without Contrast Diagnostic    Result Date: 3/17/2021   1. Small to moderate right and small left basilar pleural effusions appear stable, right middle lobe and posterior right lower lobe atelectasis. No acute or new lung consolidations are identified. 2. Features suggestive of interstitial edema superimposed upon emphysema, very similar to the CT chest of 01/14/2021. 3. Noncalcified nodular densities in the right lung have been stable for multiple years in keeping with benign findings. No new pulmonary nodules are seen. 4. Additional findings include: Stable  cardiomegaly, dense coronary artery calcifications, trace pericardial fluid, chronic calcific pancreatitis, uncomplicated cholelithiasis, stable left adrenal adenomatous hyperplasia.  Electronically Signed By-Alexandra Preston MD On:3/17/2021 10:12 AM This report was finalized on [id number] by  Alexandra Preston MD.    XR Chest AP    Result Date: 3/14/2021   1. Bilateral basilar pleural effusions with compressive atelectasis. 2. Bilateral mixed interstitial/airspace disease presumably related to the patient's Covid infection. 3. Cardiomegaly.  Electronically Signed By-Doron Hernandez MD On:3/14/2021 10:59 AM This report was finalized on [id number] by  Doron Hernandez MD.          Xrays, labs reviewed personally by physician.    Medication Review:   I have reviewed the patient's current medication list      Scheduled Meds  aspirin, 81 mg, Oral, Daily  atorvastatin, 20 mg, Oral, Nightly  budesonide, 0.5 mg, Nebulization, BID - RT  calcitriol, 0.25 mcg, Oral, Once per day on Mon Wed Fri  carvedilol, 12.5 mg, Oral, Nightly  docusate sodium, 100 mg, Oral, BID  furosemide, 40 mg, Oral, BID  insulin glargine, 12 Units, Subcutaneous, Nightly  insulin lispro, 0-24 Units, Subcutaneous, 4x Daily With Meals & Nightly  insulin regular, 8 Units, Subcutaneous, BID  ipratropium-albuterol, 3 mL, Nebulization, 4x Daily - RT  magnesium oxide, 200 mg, Oral, Daily  mirtazapine, 15 mg, Oral, Nightly  predniSONE, 20 mg, Oral, BID With Meals  sodium chloride, 10 mL, Intravenous, Q12H  theophylline, 300 mg, Oral, Nightly  [START ON 3/18/2021] warfarin, 4 mg, Oral, Daily  warfarin, 4.5 mg, Oral, Once        Meds Infusions  Pharmacy Consult - Steroid Insulin Protocol,   Pharmacy to dose warfarin,         Meds PRN    acetaminophen **OR** acetaminophen **OR** acetaminophen    calcium carbonate    dextrose    dextrose    glucagon (human recombinant)    HYDROcodone-acetaminophen    hydrOXYzine    insulin lispro **AND** insulin lispro     ipratropium-albuterol    nitroglycerin    ondansetron **OR** ondansetron    Pharmacy Consult - Steroid Insulin Protocol    Pharmacy to dose warfarin    [COMPLETED] Insert peripheral IV **AND** sodium chloride    sodium chloride        Assessment/Plan   Assessment/Plan     Active Hospital Problems    Diagnosis  POA    **Shortness of breath [R06.02]  Yes     Priority: High    Sepsis (CMS/[facility]) [A41.9]  Yes    Acute on chronic heart failure with preserved ejection fraction (CMS/[facility]) [I50.33]  Yes    Mixed hyperlipidemia [E78.2]  Yes    Stage 3b chronic kidney disease (CMS/[facility]) [N18.32]  Yes    Coronary artery disease of native artery of native heart with stable angina pectoris (CMS/[facility]) [I25.118]  Yes    Essential hypertension [I10]  Yes    Sleep apnea [G47.30]  Yes    Type 2 diabetes mellitus with hyperglycemia, without long-term current use of insulin (CMS/[facility]) [E11.65]  Yes    Permanent atrial fibrillation (CMS/[facility]) [I48.21]  Yes    Presence of cardiac pacemaker [Z95.0]  Yes      Resolved Hospital Problems   No resolved problems to display.       MEDICAL DECISION MAKING COMPLEXITY BY PROBLEM:     Acute on chronic hypoxic respiratory failure secondary to acute exacerbation of COPD and acute congestive heart failure  -Titrate O2 to maintain sat greater than 91% and wean as tolerated    AE COPD  -Chest x-ray showed bilateral basilar pleural effusions with compression atelectasis and bilateral mixed interstitial airspace disease with cardiomegaly  -Respiratory panel including COVID-19 negative  -Titrate O2 to maintain sat greater than 91%  -Continue theophylline and DuoNebs  -Pulmonary consulting and discontinued clindamycin   -Taper prednisone    Obstructive sleep apnea  -pt uses 3L O2 NC continuously reportedly at home, but he is a mouth breather; he is more comfortable on 10 L simple mask which he is using continuously in the hospital  -sleep study done 3/9/2021 showed severe KIM (pt slept only 52 minutes because of  being uncomfortable in sleep lab)  -pt has gasping episodes almost every night   -Dr. Hopson recommended CPAP titration  -auto BIPAP attempted 3/16/2021 but patient was unable to tolerate it and asked for it to be removed from the room    Hx of Lung cancer 2008 stage I NSCLCA s/p right lower lobectomy    Bilateral lung nodules  -CT scan 05/18/2020 right lung nodule smaller in size  history of significant cough and dyspnea and hemoptysis in June 2019 CT scan of the chest 07/25/2019 suggestive of resolving pneumonia  CT chest 12/2016 and 12/14/15 revealed stable nodular densities bilaterally. These findings indicate two year stability at this time. Stable post operative changes in the right mid to lower hemithorax.    Acute on chronic diastolic congestive heart failure due to hypertensive heart disease and aortic valve stenosis, rule out worsening systolic heart failure with history of ischemic cardiomyopathy  -Echo 12/9/2020 showed LVEF 60%, RVSP 30 mmHg, mild aortic valve stenosis  -Repeat echo ordered  -proBNP 6687  -Serial troponins 0.030 and 0.027  -EKG showed atrial fibrillation/flutter with ventricular paced complexes, no significant change from 1/11/2021  -Cardiology consulting  -Continue Coreg   -diuresis per nephrology    Chronic atrial fibrillation/flutter and sick sinus syndrome status post PPM  -Continue Coumadin with goal INR 2-3 (pharmacy to dose); current INR subtherapeutic (1.7 on admission and 1.69 currently)  -Continue Coreg  -Per last outpatient office visit notes from PCP the patient has been on Lanoxin 0.125 mg twice a week  -Heart rate currently stable in the 60s and 70s    Coronary artery disease  -Continue aspirin and Coreg    Essential hypertension, chronic and controlled  -Continue Coreg and Lasix    Hyperlipidemia  -Continue statin    Acute renal failure from prerenal azotemia due to diuretics on top of CKD stage IIIb secondary to hypertension and diabetes  -Baseline creatinine  1.9  -Admission creatinine 2.14 and current creatinine 2.80 after IV diuresis  -Continue Rocaltrol  -Avoid nephrotoxic medications and IV contrast dye  - nephrology managing diuresis     Anemia of CKD  -Monitor hemoglobin and transfuse for less than 7  -Current hemoglobin 11.2    Type 2 diabetes mellitus  -A1c 7.1 on 12/29/2020   -Patient is on Ozempic weekly per PCP office visit note dated 1/19/2021  -Continue home glargine  -SSI  -Steroid insulin protocol  -TSH 0.756    Chronic constipation  -Continue Colace    Unintentional weight loss  -Continue mirtazapine    Nutritional supplements  -Continue magnesium and vitamin D    CODE STATUS: DNR/DNI with full care  -palliative care met with pt 3/15/2021 and also with pt's wife 3/16/2021  -Hospice consulted 3/17/2021    VTE Prophylaxis -   Mechanical Order History:       None          Pharmalogical Order History:        Ordered     Dose Route Frequency Stop    03/15/21 0812  warfarin (COUMADIN) tablet 1.5 mg     Question:  Target INR  Answer:  2 - 3    1.5 mg PO Once (Warfarin) --    03/14/21 1619  warfarin (COUMADIN) tablet 3 mg     Question:  Target INR  Answer:  2 - 3    3 mg PO Nightly --    03/14/21 1619  Pharmacy to dose warfarin     Question:  Target INR  Answer:  2 - 3    -- XX Continuous PRN --                      Code Status -   Code Status and Medical Interventions:   Ordered at: 03/16/21 1500     Level Of Support Discussed With:    Patient     Code Status:    No CPR     Medical Interventions (Level of Support Prior to Arrest):    Full       This patient has been examined wearing appropriate Personal Protective Equipment. 03/17/21        Discharge Planning  Pending clinical course.        Electronically signed by Marie Ying MD, 03/17/21, 12:55 EDT.  [facility] Hospitalist Team

## 2021-03-17 NOTE — CONSULTS
"Diabetes Education  Assessment/Teaching    Patient Name:  Cali Santoro  YOB: 1935  MRN: [id number]  Admit Date:  3/14/2021      Assessment Date:  3/16/2021    Most Recent Value   General Information    Referral From:  Blood glucose [Pt seen due to adm bs of 361]   Height  170.2 cm (67\")   Height Method  Stated   Weight  81.2 kg (179 lb)   Weight Method  Bed scale   Pregnancy Assessment   Diabetes History   What type of diabetes do you have?  Type 2   Do you test your blood sugar at home?  yes   Frequency of checks  4 times/day   Meter type  Accuchek Helga   Who performs the test?  wife   Education Preferences   What areas of diabetes would you like to learn about?  avoiding high blood sugar, avoiding low blood sugar, diabetes complications   Nutrition Information   Assessment Topics   Problem Solving - Assessment  Needs education   Reducing Risk - Assessment  Needs education   DM Goals   Problem Solving - Goal  Today   Reducing Risk - Goal  Today            Most Recent Value   DM Education Needs   Meter  Has own   Meter Type  Accuchek   Frequency of Testing  4 times a day   Blood Glucose Target Range  Discussed healthy bs range and healthy A1c target. Discussed importance of bs control   Medication  Insulin, Pen [Pt receives Lantus 10 units hs and Novolog per sliding scale. Wife giving pt his injections.]   Problem Solving  Hypoglycemia, Hyperglycemia, Signs, Symptoms, Treatment [Discussed importance of always keeping low bs tx with him]   Reducing Risks  A1C testing [Gave A1c info sheet]   Healthy Eating  -- [Pt eating 3 meals/day]   Motivation  Engaged   Teaching Method  Explanation, Discussion, Handouts   Patient Response  Verbalized understanding            Other Comments:  Pt sees Dr. LEA Mart at the [facility]. Last visit was 1/8/21 and follow up scheduled for 7/2/21. Wife keeps a log of bs readings. Pt requesting educator contact wife for discussion of medication doses. Called wife. " Discussed importance of sending this report to MD office if bs running outside healthy range. Wife states pt has the supplies for bs monitoring and insulin administration. Wife verbalized understanding of info covered.       Electronically signed by:  Mayuri Infante RN  03/16/21 20:15 EDT

## 2021-03-17 NOTE — PLAN OF CARE
Problem: Adult Inpatient Plan of Care  Goal: Plan of Care Review  Outcome: Ongoing, Progressing  Flowsheets  Taken 3/17/2021 1613  Plan of Care Reviewed With: patient  Outcome Summary: Pt is 86 yo male admitted for SOA, b/l pleural effusion, ZACKERY, acute CHF.  Covid (-).  PMH lung cancer, COPD, CAD, A-fib.  Pt with possible decision to go home with Hospice but would like to complete PT while in hospital to maintain strength.  Pt able to complete transfers with CGA and ambulate short distances using RW with [facility].  Pt desats to 88% on 6L O2 with ambulation but increased to >90% with seated rest break and PLB.  Pt has strong support of wife who is available to assist as needed at home.  Education provided on importance of rest breaks, AD as needed for balance.  Plan home with wife and possible HHPT pending decision regarding Hospice.  PT will follow 3x/week while at [facility].  PPE donned: mask, goggles, gloves.

## 2021-03-17 NOTE — PROGRESS NOTES
Continued Stay Note  [facility]     Patient Name: Cali Santoro  MRN: [id number]  Today's Date: 3/17/2021    Admit Date: 3/14/2021    Discharge Plan    Referral pending for [facility] - pending EOS. [facility] has accepted if pt/family does not pursue hospice services. Pt has home O2 and nebulizer.Referral pending for [facility] - pending EOS. [facility] has accepted if pt/family does not pursue hospice services. Pt has home O2 and nebulizer.       Discharge Codes    Per Palliative SW conversation, SW made referral to [facility] for EOS.         Phone communication or documentation only - no physical contact with patient or family.    Rosalba Black, [facility], LSW    Office: [phone]  Cell: [phone]  Fax: [phone]  [email or web address]: [email or web address]

## 2021-03-17 NOTE — DISCHARGE PLACEMENT REQUEST
"**Please reach out to pt/family for EOS. See below SW for additional information as needed.    Petey Black, CHRISTAW, LSW    Office: [phone]  Cell: [phone]  Fax: [phone]  E-mail: [email or web address]**    Ernesto Santoro (85 y.o. Male)     Date of Birth Social Security Number Address Home Phone MRN    1935  [address] [id number] [phone] [id number]    Faith Marital Status          Taoism        Admission Date Admission Type Admitting Provider Attending Provider Department, Room/Bed    3/14/21 Emergency Marie Ying MD Hall, Kelli G, MD [facility] 2B MEDICAL INPATIENT,     Discharge Date Discharge Disposition Discharge Destination                       Attending Provider: Marie Ying MD    Allergies: Penicillins    Isolation: None   Infection: None   Code Status: No CPR    Ht: 170.2 cm (67\")   Wt: 80.1 kg (176 lb 9.4 oz)    Admission Cmt: None   Principal Problem: Shortness of breath [R06.02]                 Active Insurance as of 3/14/2021     Primary Coverage     Payor Plan Insurance Group Employer/Plan Group    HUMANA MEDICARE REPLACEMENT HUMANA MEDICARE REPLACEMENT [id number]     Payor Plan Address Payor Plan Phone Number Payor Plan Fax Number Effective Dates    [address] [address] [phone]  2018 - None Entered    [facility] [address]       Subscriber Name Subscriber Birth Date Member ID       ERNESTO SANTORO 1935 [id number]                 Emergency Contacts      (Rel.) Home Phone Work Phone Mobile Phone    WILDER SNATORO \"RENETTA\" (Spouse) [phone] -- [phone]               History & Physical      Lynette Dutta MD at 22                [facility]      Patient Name: Ernesto Santoro  : 1935  MRN: [id number]  Primary Care Physician: Yusef Sosa Jr., MD  Date of admission: 3/14/2021    Patient Care Team:  Yusef Sosa " JOSUE Porter MD as PCP [facility] as PCP - Family Medicine  Luis Paulino MD as Consulting Physician (Cardiology)          Subjective   History Present Illness     Chief Complaint:   Chief Complaint   Patient presents with   • Shortness of Breath   Increasing shortness of breath and inability to lie flat      Mr. Santoro is a 85 y.o.  presents to [facility] complaining of shortness of breath.  The patient has numerous diagnoses including COPD, coronary artery disease, sick sinus syndrome and atrial fibrillation.  The patient recently had Covid a month ago.  The patient presented to the emergency room without complaints of chest pain but did have orthopnea, paroxysmal nocturnal dyspnea and severe dyspnea with any kind of movement.  The patient desaturated into the 70s in the emergency room.  He was vacillating between the low 70s and low 90s throughout the time.  He was placed on a facemask and his saturations have increased and are greater than 90 consistently at this time.  The patient is normally on Coumadin for his atrial fib.  The patient has not had any nausea vomiting diaphoresis or other anginal equivalent.    Review of Systems   Constitutional: Positive for decreased appetite.   HENT: Negative.    Eyes: Negative.    Cardiovascular: Positive for dyspnea on exertion, leg swelling, orthopnea and paroxysmal nocturnal dyspnea.   Respiratory: Positive for shortness of breath.    Endocrine: Negative.    Hematologic/Lymphatic: Negative.    Skin: Negative.    Musculoskeletal: Negative.    Gastrointestinal: Negative.    Genitourinary: Negative.    Neurological: Negative.    Psychiatric/Behavioral: Negative.    Allergic/Immunologic: Negative.    All other systems reviewed and are negative.          Personal History     Past Medical History:   Past Medical History:   Diagnosis Date   • Arrhythmia    • Arthritis    • Bradycardia    • Cancer (CMS/HCC)    • Cardiomyopathy (CMS/HCC)     • Cataract    • CHF (congestive heart failure) (CMS/[facility])    • COPD (chronic obstructive pulmonary disease) (CMS/[facility])    • Coronary artery disease    • Diabetes mellitus, type 2 (CMS/[facility])    • Emphysema, unspecified (CMS/[facility])    • Hx of sick sinus syndrome     S/P Biventricular Pacemaker   • Hyperlipidemia    • Hypertension    • Paroxysmal atrial fibrillation (CMS/[facility])    • Pneumonia    • Sleep apnea    • Ventricular arrhythmia        Surgical History:      Past Surgical History:   Procedure Laterality Date   • CARDIAC CATHETERIZATION  02/04/2015   • CARDIAC CATHETERIZATION  04/25/2016   • CAROTID ENDARTERECTOMY     • COLONOSCOPY     • CORONARY ANGIOPLASTY  1997   • ENDOSCOPY     • EYE SURGERY     • FRACTURE SURGERY     • HERNIA REPAIR     • JOINT REPLACEMENT     • LARYNGOSCOPY     • LUNG SURGERY  2008    Lung resection   • PACEMAKER IMPLANTATION  04/25/2016    Dual chamber; [employer]   • TRACHEOSTOMY             Family History: family history includes Breast cancer in his sister; Colon cancer in his sister; Diabetes in an other family member; Heart disease in his brother and mother; Hypertension in his mother; Stroke in his father. Otherwise pertinent FHx was reviewed and unremarkable.     Social History:  reports that he has quit smoking. His smoking use included cigarettes. He has never used smokeless tobacco. He reports current alcohol use. He reports that he does not use drugs.      Medications:  Prior to Admission medications    Medication Sig Start Date End Date Taking? Authorizing Provider   aspirin 81 MG tablet Take 81 mg by mouth Every Night. 3/7/14  Yes Astrid Gonzalez MD   carvedilol (COREG) 12.5 MG tablet Take 12.5 mg by mouth Every Night.   Yes Astrid Gonzalez MD   furosemide (LASIX) 40 MG tablet Take 40 mg by mouth Daily.   Yes Astrid Gonzalez MD   insulin aspart (novoLOG FLEXPEN) 100 UNIT/ML solution pen-injector sc pen Inject 1 Units under the skin into the appropriate  area as directed 4 (Four) Times a Day With Meals & at Bedtime. Check BS 4 times a day. Take 1U for 20 gm/dl the blood sugar is above 130  Max 60 units per 24 hours   Yes Astrid Gonzalez MD   Insulin Glargine (LANTUS SOLOSTAR) 100 UNIT/ML injection pen Inject 10 Units under the skin into the appropriate area as directed Every Night.   Yes Astrid Gonzalez MD   Magnesium 250 MG tablet Take 1 tablet by mouth Daily.   Yes Astrid Gonzalez MD   mirtazapine (REMERON) 15 MG tablet TAKE 1 TABLET BY MOUTH EVERY NIGHT. 2/1/21  Yes Liliam Goode APRN   simvastatin (ZOCOR) 40 MG tablet 1 tablet p.o. at bedtime. 1/7/20  Yes Jhonatan Mart MD   theophylline (THEODUR) 300 MG 12 hr tablet Take 300 mg by mouth Every Night. 4/8/19  Yes Astrid Gonzalez MD   warfarin (COUMADIN) 3 MG tablet Take 1 tablet by mouth Every Night. 12/28/20  Yes Luis Paulino MD   acetaminophen (TYLENOL) 500 MG tablet Take 500 mg by mouth Every 6 (Six) Hours As Needed for Mild Pain .    Astrid Gonzalez MD   budesonide (PULMICORT) 0.5 MG/2ML nebulizer solution Take 2 mL by nebulization 2 (Two) Times a Day. J44.9 copd 1/16/21   Henry Hansen MD   calcitriol (ROCALTROL) 0.25 MCG capsule Take 0.25 mcg by mouth 3 (Three) Times a Week. Mon, Wed, Fri    Astrid Gonzalez MD   ipratropium-albuterol (DUO-NEB) 0.5-2.5 mg/3 ml nebulizer Take 3 mL by nebulization Every 4 (Four) Hours As Needed for Shortness of Air. COPD j 44.9 1/16/21   Henry Hansen MD   glucose blood (Accu-Chek Helga Plus) test strip Use to check blood sugars 4 times a day    Dx E11.65 3/4/21 3/14/21  Yusef Sosa Jr., MD   guaiFENesin (MUCINEX) 600 MG 12 hr tablet Take 1 tablet by mouth Every 12 (Twelve) Hours. 1/16/21 3/14/21  Henry Hansen MD   insulin aspart (NovoLOG FlexPen) 100 UNIT/ML solution pen-injector sc pen Check BS 4 times a day. Take 1U for 30 gm/dl the blood sugar is above 130  Max 60 units per 24 hours    Dx  E11.65  Patient taking differently: Dx E11.65 3/2/21 3/14/21  Yusef Sosa Jr., MD   Insulin Glargine (LANTUS SOLOSTAR) 100 UNIT/ML injection pen Inject 20 Units under the skin into the appropriate area as directed Daily. 3/2/21 3/14/21  Yusef Sosa Jr., MD   melatonin 5 MG tablet tablet Take 10 mg by mouth At Night As Needed.  3/14/21  Provider, MD Astrid   Semaglutide,0.25 or 0.5MG/DOS, (Ozempic, 0.25 or 0.5 MG/DOSE,) 2 MG/1.5ML solution pen-injector 0.25 mg subcu weekly for 4 weeks then increase to 0.5 mg subcu weekly if tolerated. 1/8/21 3/14/21  Jhonatan Mart MD       Allergies:    Allergies   Allergen Reactions   • Penicillins Itching       Objective   Objective     Vital Signs  Temp:  [97.7 °F (36.5 °C)-97.8 °F (36.6 °C)] 97.7 °F (36.5 °C)  Heart Rate:  [71-92] 91  Resp:  [18-22] 20  BP: (134-193)/() 160/69  SpO2:  [78 %-97 %] 97 %  on  Flow (L/min):  [3-6] 6;   Device (Oxygen Therapy): simple face mask  Body mass index is 27.49 kg/m².    Physical Exam  Vitals and nursing note reviewed.   Constitutional:       General: He is not in acute distress.     Appearance: Normal appearance. He is well-developed. He is not ill-appearing, toxic-appearing or diaphoretic.   HENT:      Head: Normocephalic and atraumatic.      Right Ear: Ear canal and external ear normal.      Left Ear: Ear canal and external ear normal.      Nose: Nose normal. No congestion or rhinorrhea.      Mouth/Throat:      Mouth: Mucous membranes are moist.      Pharynx: No oropharyngeal exudate.   Eyes:      General: No scleral icterus.        Right eye: No discharge.         Left eye: No discharge.      Extraocular Movements: Extraocular movements intact.      Conjunctiva/sclera: Conjunctivae normal.      Pupils: Pupils are equal, round, and reactive to light.   Neck:      Thyroid: No thyromegaly.      Vascular: No carotid bruit or JVD.      Trachea: No tracheal deviation.   Cardiovascular:      Rate and Rhythm: Normal rate  and regular rhythm.      Pulses: Normal pulses.      Heart sounds: Normal heart sounds. No murmur. No friction rub. No gallop.    Pulmonary:      Effort: Pulmonary effort is normal. No respiratory distress.      Breath sounds: No stridor. No wheezing, rhonchi or rales.      Comments: Decreased breath sounds in the bases with dullness to percussion right greater than left.  No active wheezing.  There are a few rales in both bases again more so on the right than the left.  Chest:      Chest wall: No tenderness.   Abdominal:      General: Bowel sounds are normal. There is no distension.      Palpations: Abdomen is soft. There is no mass.      Tenderness: There is no abdominal tenderness. There is no guarding or rebound.      Hernia: No hernia is present.   Musculoskeletal:         General: No swelling, tenderness, deformity or signs of injury. Normal range of motion.      Cervical back: Normal range of motion and neck supple. No rigidity. No muscular tenderness.      Right lower leg: Edema present.      Left lower leg: Edema present.      Comments: 2+ edema bilaterally.  Pulses are 2+/4+.   Lymphadenopathy:      Cervical: No cervical adenopathy.   Skin:     General: Skin is warm and dry.      Coloration: Skin is not jaundiced or pale.      Findings: No bruising, erythema or rash.   Neurological:      General: No focal deficit present.      Mental Status: He is alert and oriented to person, place, and time. Mental status is at baseline.      Cranial Nerves: No cranial nerve deficit.      Sensory: No sensory deficit.      Motor: No weakness or abnormal muscle tone.      Coordination: Coordination normal.   Psychiatric:         Mood and Affect: Mood normal.         Behavior: Behavior normal.         Thought Content: Thought content normal.         Judgment: Judgment normal.           Results Review:  I have personally reviewed most recent cardiac tracings and lab results and agree with findings, most notably: Laboratory  results.    Results from last 7 days   Lab Units 03/14/21  1641 03/14/21  1022   WBC 10*3/mm3 7.90 8.80   HEMOGLOBIN g/dL 11.3* 11.7*   HEMATOCRIT % 35.2* 36.3*   PLATELETS 10*3/mm3 313 286   INR   --  1.70*     Results from last 7 days   Lab Units 03/14/21  1642 03/14/21  1022   SODIUM mmol/L 138 137   POTASSIUM mmol/L 5.4* 4.8   CHLORIDE mmol/L 99 100   CO2 mmol/L 30.0* 22.0   BUN mg/dL 29* 28*   CREATININE mg/dL 2.14* 2.25*   GLUCOSE mg/dL 323* 361*   CALCIUM mg/dL 9.4 8.5*   ALT (SGPT) U/L  --  49*   AST (SGOT) U/L  --  70*   TROPONIN T ng/mL 0.027 0.030   PROBNP pg/mL  --  6,872.0*   PROCALCITONIN ng/mL  --  0.11     Estimated Creatinine Clearance: 26 mL/min (A) (by C-G formula based on SCr of 2.14 mg/dL (H)).  Brief Urine Lab Results  (Last result in the past 365 days)      Color   Clarity   Blood   Leuk Est   Nitrite   Protein   CREAT   Urine HCG        12/29/20 1019             25.5             Microbiology Results (last 10 days)     Procedure Component Value - Date/Time    Respiratory Panel PCR w/COVID-19(SARS-CoV-2) LOBO/MARTHA/TOMAS/PAD/COR/MAD/TITA In-House, NP Swab in UTM/VTM, 3-4 HR TAT - Swab, Nasopharynx [[id number]]  (Normal) Collected: 03/14/21 1026    Lab Status: Final result Specimen: Swab from Nasopharynx Updated: 03/14/21 1122     ADENOVIRUS, PCR Not Detected     Coronavirus 229E Not Detected     Coronavirus HKU1 Not Detected     Coronavirus NL63 Not Detected     Coronavirus OC43 Not Detected     COVID19 Not Detected     Human Metapneumovirus Not Detected     Human Rhinovirus/Enterovirus Not Detected     Influenza A PCR Not Detected     Influenza B PCR Not Detected     Parainfluenza Virus 1 Not Detected     Parainfluenza Virus 2 Not Detected     Parainfluenza Virus 3 Not Detected     Parainfluenza Virus 4 Not Detected     RSV, PCR Not Detected     Bordetella pertussis pcr Not Detected     Bordetella parapertussis PCR Not Detected     Chlamydophila pneumoniae PCR Not Detected     Mycoplasma pneumo by  PCR Not Detected    Narrative:      Fact sheet for providers: https://docs.[employer]/wp-content/uploads/STA5010-5577-AK8.1-EUA-Provider-Fact-Sheet-3.pdf    Fact sheet for patients: https://docs.[employer]/wp-content/uploads/YDU9364-3093-IX5.1-EUA-Patient-Fact-Sheet-1.pdf    Test performed by PCR.          ECG/EMG Results (most recent)     Procedure Component Value Units Date/Time    ECG 12 Lead [[id number]] Collected: 03/14/21 1013     Updated: 03/14/21 1015     QT Interval 388 ms     Narrative:      HEART RATE= 75  bpm  RR Interval= 745  ms  AR Interval=   ms  P Horizontal Axis= 190  deg  P Front Axis=   deg  QRSD Interval= 106  ms  QT Interval= 388  ms  QRS Axis= 233  deg  T Wave Axis= 101  deg  - ABNORMAL ECG -  Afib/flut and V-paced complexes  When compared with ECG of 11-Jan-2021 10:56:50,  No significant change  Electronically Signed By:   Date and Time of Study: 2021-03-14 10:13:13          Results for orders placed in visit on 02/06/08    SCANNED VASCULAR STUDIES      Results for orders placed during the hospital encounter of 12/08/20    Adult Transthoracic Echo Complete W/ Cont if Necessary Per Protocol    Interpretation Summary  · Left ventricular wall thickness is consistent with septal asymmetric hypertrophy.  · Estimated left ventricular EF = 60% Left ventricular systolic function is normal.  · The right ventricular cavity is borderline dilated.  · The right atrial cavity is borderline dilated.  · Mild aortic valve stenosis is present.  · Estimated right ventricular systolic pressure from tricuspid regurgitation is normal (<35 mmHg).  · There is a small (<1cm) pericardial effusion adjacent to the left ventricle.      Polysomnography 4 or More Parameters    Result Date: 3/12/2021  Obstructive Sleep Apnea, G47.33 Hpyoxemia, R09.02  RECOMMENDATION: CPAP titration, patient may benefit from BiPAP due to the severity of obstructive sleep apnea in the supine position AHI was 84, no REM sleep was recorded  most likely due to severe obstructive sleep apnea, patient also utilizing 3 L of oxygen during the night which will need to be titrated I have reviewed the entire raw data, all components, including calibrations. Please feel free to contact me, if you wish to discuss this case further.    Alvaro Hopson MD  Date Diplomat, American Board of Sleep Medicine       XR Chest AP    Result Date: 3/14/2021   1. Bilateral basilar pleural effusions with compressive atelectasis. 2. Bilateral mixed interstitial/airspace disease presumably related to the patient's Covid infection. 3. Cardiomegaly.  Electronically Signed By-Doron Hernandez MD On:3/14/2021 10:59 AM This report was finalized on [id number] by  Doron Hernandez MD.        Estimated Creatinine Clearance: 26 mL/min (A) (by C-G formula based on SCr of 2.14 mg/dL (H)).    Assessment/Plan   Assessment/Plan   1.  Pulmonary edema  -The patient has an ejection fraction of 60% on his last echo in December 2020.  -The patient has bilateral pleural effusions with compressive atelectasis as well as interstitial changes that may relate to pulmonary edema but the patient does have a history of Covid  -Patient also has an elevated BNP       -Will use Lasix 40 mg IV twice daily and follow BNP and basic metabolic profile  -Gila scan in the a.m. to see if there is any component of ischemia that may be contributing to this exacerbation of underlying CHF  -Rule out acute myocardial injury with serial troponin    2.  History of sick sinus syndrome and atrial fibrillation  -Patient has a pacemaker placed for this issue.  -Patient is on anticoagulation for this issue but his INR is subtherapeutic at 1.7.    3.  Elevated D-dimer  -This may be related to his previous Covid infection  -The patient is on Coumadin and will ask pharmacy to dose it for a target INR between 2 and 3  -We will hold on CTA of the chest given his renal function.  If he declines and there is a concern for pulmonary embolism and  consideration for placement of an IVC filter consider doing a ventilation/perfusion lung scan    4.  Chronic obstructive pulmonary disease oxygen dependent  -We will hold on antibiotics as the patient's pro-Montrell is low and there is no obvious infiltrate  -Patient is diabetic and is not wheezing therefore will hold on the use of steroids as these might worsen his renal function as well as his diabetic control  -The patient is a patient of Dr. Hopson and is requested that he be consulted and this has been done  -Continue the patient's theophylline noting that his level is 18.  This would need to be adjusted should any medications to be started which interfere with the metabolism of the theophylline.    Active Hospital Problems    Diagnosis  POA   • Shortness of breath [R06.02]  Yes      Resolved Hospital Problems   No resolved problems to display.                 VTE Prophylaxis -   Mechanical Order History:     None      Pharmalogical Order History:      Ordered     Dose Route Frequency Stop    03/14/21 1619  warfarin (COUMADIN) tablet 3 mg     Question:  Target INR  Answer:  2 - 3    3 mg PO Nightly --    03/14/21 1619  Pharmacy to dose warfarin     Question:  Target INR  Answer:  2 - 3    -- XX Continuous PRN --                CODE STATUS:    Code Status and Medical Interventions:   Ordered at: 03/14/21 1526     Level Of Support Discussed With:    Patient     Code Status:    CPR     Medical Interventions (Level of Support Prior to Arrest):    Full       This patient has been examined wearing appropriate Personal Protective Equipment and discussed with hospital infection control department. 03/14/21      I discussed the patient's findings and my recommendations with the patient and he agrees to proceed as outlined above.      Signature:Electronically signed by Lynette Dutta MD, 03/14/21, 7:51 PM EDT.      [facility] Hospitalist Team          Electronically signed by Lynette Dutta MD at 03/14/21 1951        {Outbreak/Travel/Exposure Documentation......;  Question Available Choices Patient Response   Outbreak Screen: Do you currently have a new onset of the following symptoms?        Fever/Chils, Cough, Shortness of air, Loss of taste or smell, No, Unknown  No (03/14/21 1619)   Outbreak Screen: In the last 14 days, have you had contact with anyone who is ill, has show any of the symptoms listed above and/or has been diagnosis with the 2019 Novel Coronavirus? This includes any immediate household members but excludes any patients with whom you have been in contact within your normal work duties wearing proper PPE, if you are a healthcare worker.  Yes, No, Unknown              No (03/14/21 1619)   Outbreak Screen: Who was notified?    Free text  (not recorded)   Travel Screen: Have you traveled in the last month? If so, to what country have you traveled? If [place] what state? Yes, No, Unknown  List of all countries  List of all States No (03/14/21 1619)  (not recorded)  (not recorded)   Infection Risk: Do you currently have the following symptoms?  (If cough is selected, the Tuberculosis Screen is performed.) Cough, Fever, Rash, No No (03/14/21 1619)   Tuberculosis Screen: Do you have any of the following Tuberculosis Risks?  · Have you lived or spent time with anyone who had or may have TB?  · Have you lived in or visited any of the following areas for more than one month: [place], [place], [place], [place] or [place], [place] or [place]?  · Do you have HIV/AIDS?  · Have you lived in or worked in a nursing home, homeless shelter, correctional facility, or substance abuse treatment facility?   · No    If Yes do you have any of the following symptoms? Yes responses display to the right    If Yes, symptoms listed are:  Cough greater than or equal to 3 weeks, Loss of appetite, Unexplained weight loss, Night sweats, Bloody sputum or hemoptysis, Hoarseness, Fever, Fatigue, Chest pain, No (not recorded)  (not  recorded)   Exposure Screen: Have you been exposed to any of these contagious diseases in the last month? Measles, Chickenpox, Meningitis, Pertussis, Whooping Cough, No No (03/14/21 1619)         Current Facility-Administered Medications   Medication Dose Route Frequency Provider Last Rate Last Admin   • acetaminophen (TYLENOL) tablet 650 mg  650 mg Oral Q4H PRN Lynette Dutta MD   650 mg at 03/16/21 0930    Or   • acetaminophen (TYLENOL) 160 MG/5ML solution 650 mg  650 mg Oral Q4H PRN Lynette Dutta MD        Or   • acetaminophen (TYLENOL) suppository 650 mg  650 mg Rectal Q4H PRN Lynette Dutta MD       • aspirin EC tablet 81 mg  81 mg Oral Daily Lynette Dutta MD   81 mg at 03/17/21 0848   • atorvastatin (LIPITOR) tablet 20 mg  20 mg Oral Nightly Lynette Dutta MD   20 mg at 03/16/21 2103   • budesonide (PULMICORT) nebulizer solution 0.5 mg  0.5 mg Nebulization BID - RT Lynette Dutta MD   0.5 mg at 03/17/21 0804   • calcitriol (ROCALTROL) capsule 0.25 mcg  0.25 mcg Oral Once per day on Mon Wed Fri Lynette Dutta MD   0.25 mcg at 03/17/21 0848   • calcium carbonate (TUMS) chewable tablet 500 mg (200 mg elemental)  2 tablet Oral TID PRN Marie Ying MD   2 tablet at 03/17/21 1042   • carvedilol (COREG) tablet 12.5 mg  12.5 mg Oral Nightly Lynette Dutta MD   12.5 mg at 03/16/21 2103   • dextrose (D50W) 25 g/ 50mL Intravenous Solution 25 g  25 g Intravenous Q15 Min PRN Lynette Dutta MD       • dextrose (GLUTOSE) oral gel 15 g  15 g Oral Q15 Min PRN Lynette Dutta MD       • docusate sodium (COLACE) capsule 100 mg  100 mg Oral BID Lynette Dutta MD   100 mg at 03/17/21 0848   • furosemide (LASIX) tablet 40 mg  40 mg Oral BID Cam Yan MD   40 mg at 03/17/21 0848   • glucagon (human recombinant) (GLUCAGEN DIAGNOSTIC) injection 1 mg  1 mg Subcutaneous Q15 Min PRN Lynette Dutta MD       • HYDROcodone-acetaminophen (NORCO) 7.5-325 MG per tablet 2 tablet  2 tablet Oral Q4H PRN Lynette Dutta MD   2 tablet at  03/16/21 0014   • hydrOXYzine (ATARAX) tablet 25 mg  25 mg Oral Nightly PRN Marie Ying MD   25 mg at 03/16/21 2108   • insulin glargine (LANTUS, SEMGLEE) injection 12 Units  12 Units Subcutaneous Nightly Marie Ying MD   12 Units at 03/16/21 2107   • insulin lispro (ADMELOG) injection 0-24 Units  0-24 Units Subcutaneous 4x Daily With Meals & Nightly Marie Ying MD   16 Units at 03/17/21 0847    And   • insulin lispro (ADMELOG) injection 0-24 Units  0-24 Units Subcutaneous PRN Marie Ying MD       • insulin regular (humuLIN R,novoLIN R) injection 8 Units  8 Units Subcutaneous BID Marie Ying MD       • ipratropium-albuterol (DUO-NEB) nebulizer solution 3 mL  3 mL Nebulization Q4H PRN Lynette Dutta MD   3 mL at 03/14/21 1838   • ipratropium-albuterol (DUO-NEB) nebulizer solution 3 mL  3 mL Nebulization 4x Daily - RT Angie Theodore APRN   3 mL at 03/17/21 0803   • magnesium oxide (MAG-OX) tablet 200 mg  200 mg Oral Daily Lynette Dutta MD   200 mg at 03/17/21 0848   • mirtazapine (REMERON) tablet 15 mg  15 mg Oral Nightly Lynette Dutta MD   15 mg at 03/16/21 2103   • nitroglycerin (NITROSTAT) SL tablet 0.4 mg  0.4 mg Sublingual Q5 Min PRN Lynette Dutta MD       • ondansetron (ZOFRAN) tablet 4 mg  4 mg Oral Q6H PRN Lynette Dutta MD        Or   • ondansetron (ZOFRAN) injection 4 mg  4 mg Intravenous Q6H PRN Lynette Dutta MD       • Pharmacy Consult - Steroid Insulin Protocol   Does not apply Continuous PRN Marie Ying MD       • Pharmacy to dose warfarin   Does not apply Continuous PRN Lynette Dutta MD       • predniSONE (DELTASONE) tablet 20 mg  20 mg Oral BID With Meals Alvaro Hopson MD   20 mg at 03/17/21 0848   • sodium chloride 0.9 % flush 10 mL  10 mL Intravenous PRN Lynette Dutta MD       • sodium chloride 0.9 % flush 10 mL  10 mL Intravenous Q12H Lynette Dutta MD   10 mL at 03/17/21 0848   • sodium chloride 0.9 % flush 10 mL  10 mL Intravenous PRN Lynette Dutta MD       • theophylline  "(SHE-24) 24 hr capsule 300 mg  300 mg Oral Nightly Lynette Dutta MD   300 mg at 03/16/21 2103   • [START ON 3/18/2021] warfarin (COUMADIN) tablet 4 mg  4 mg Oral Daily Marie Ying MD       • warfarin (COUMADIN) tablet 4.5 mg  4.5 mg Oral Once Marie Ying MD            Physician Progress Notes (most recent note)      Cam Yan MD at 03/17/21 0956             LOS: 3 days    Patient Care Team:  Yusef Sosa Jr., MD as PCP - General  Yusef Sosa Jr., MD as PCP - Family Medicine  Luis Paulino MD as Consulting Physician (Cardiology)      Subjective     Patient feeling little better.  Still has dyspnea but improving  Denies any chest pain, nausea or vomiting    Objective     Vital Sign Min/Max for last 24 hours  Temp:  [97.3 °F (36.3 °C)-97.9 °F (36.6 °C)] 97.9 °F (36.6 °C)  Heart Rate:  [63-74] 65  Resp:  [18-20] 18  BP: (125-150)/(63-89) 150/86                       Flowsheet Rows      First Filed Value   Admission Height  170.2 cm (67\") Documented at 03/14/2021 1001   Admission Weight  76.7 kg (169 lb) Documented at 03/14/2021 1001          I/O this shift:  In: 480 [P.O.:480]  Out: 400 [Urine:400]  I/O last 3 completed shifts:  In: 720 [P.O.:720]  Out: 900 [Urine:900]    Physical Exam:  Physical Exam    General.  Awake, alert  Head.  Atraumatic, normocephalic  Neck.  Supple, no JVD  Respiratory.  Bilateral wheezing.  Cardiovascular.  Regular rhythm  Abdominal.  Obese, soft, nontender  Extremities. no lower extreme edema     LABS:  Lab Results   Component Value Date    CALCIUM 9.5 03/17/2021    PHOS 5.5 (H) 03/17/2021     Results from last 7 days   Lab Units 03/17/21  0603 03/17/21  0444 03/16/21  0410 03/15/21  0429 03/14/21  1641 03/14/21  1022   SODIUM mmol/L 132*  --  133* 137   < > 137   POTASSIUM mmol/L 5.1  --  5.0 5.2   < > 4.8   CHLORIDE mmol/L 93*  --  96* 101   < > 100   CO2 mmol/L 26.0  --  27.0 23.0   < > 22.0   BUN mg/dL 59*  --  47* 32*   < > 28*   CREATININE mg/dL 2.80*  " --  2.16* 2.52*   < > 2.25*   GLUCOSE mg/dL 371*  --  149* 289*   < > 361*   CALCIUM mg/dL 9.5  --  9.0 8.5*   < > 8.5*   WBC 10*3/mm3  --  12.10* 10.50 8.50  --  8.80   HEMOGLOBIN g/dL  --  11.2* 10.2* 11.3*  --  11.7*   PLATELETS 10*3/mm3  --  351 278 277  --  286   ALT (SGPT) U/L  --   --   --   --   --  49*   AST (SGOT) U/L  --   --   --   --   --  70*    < > = values in this interval not displayed.     Lab Results   Component Value Date    CKTOTAL 34 01/13/2021    TROPONINI 0.030 06/28/2019    TROPONINT 0.027 03/14/2021     Estimated Creatinine Clearance: 19.9 mL/min (A) (by C-G formula based on SCr of 2.8 mg/dL (H)).  Results from last 7 days   Lab Units 03/14/21  1042   PH, ARTERIAL pH units 7.399   PO2 ART mm Hg 65.8*   PCO2, ARTERIAL mm Hg 38.1   HCO3 ART mmol/L 23.5     Brief Urine Lab Results  (Last result in the past 365 days)      Color   Clarity   Blood   Leuk Est   Nitrite   Protein   CREAT   Urine HCG        03/15/21 1651 Yellow Clear Negative Negative Negative Negative             WEIGHTS:     Wt Readings from Last 1 Encounters:   03/17/21 0324 80.1 kg (176 lb 9.4 oz)   03/16/21 1641 81.2 kg (179 lb)   03/15/21 0346 81.3 kg (179 lb 3.7 oz)   03/14/21 1626 79.6 kg (175 lb 7.8 oz)   03/14/21 1001 76.7 kg (169 lb)       aspirin, 81 mg, Oral, Daily  atorvastatin, 20 mg, Oral, Nightly  budesonide, 0.5 mg, Nebulization, BID - RT  calcitriol, 0.25 mcg, Oral, Once per day on Mon Wed Fri  carvedilol, 12.5 mg, Oral, Nightly  docusate sodium, 100 mg, Oral, BID  furosemide, 40 mg, Oral, BID  insulin glargine, 12 Units, Subcutaneous, Nightly  insulin lispro, 0-24 Units, Subcutaneous, 4x Daily With Meals & Nightly  insulin regular, 4 Units, Subcutaneous, BID  ipratropium-albuterol, 3 mL, Nebulization, 4x Daily - RT  magnesium oxide, 200 mg, Oral, Daily  mirtazapine, 15 mg, Oral, Nightly  predniSONE, 20 mg, Oral, BID With Meals  sodium chloride, 10 mL, Intravenous, Q12H  theophylline, 300 mg, Oral,  Nightly  warfarin, 3 mg, Oral, Nightly      Pharmacy Consult - Steroid Insulin Protocol,   Pharmacy to dose warfarin,         Assessment/Plan       1.  Acute kidney injury on CKD 3  Baseline creatinine around 1.8 to 1.9 mg/DL but his creatinine has fluctuated in the past depending from the diuretics dose.  Patient's creatinine increased this morning to 2.8 mg/DL.    Electrolytes okay.  Volume status generous but improving  CT scan is ordered and if pleural effusions look better, will consider decreasing the dose of Lasix  Need for dialysis at this time     2.  Respiratory failure  Acute COPD exacerbation + volume overload  Patient on Lasix, steroids and antibiotics     3.CHFpEF/hypervolemia  EF 60% along with mild aortic stenosis per last echocardiogram     4.  Hyperkalemia  Improved. Keep on low K diet     5.  Hypertension with CKD 3  Blood pressure fairly well controlled on current antihypertensives      Cam Yan MD  03/17/21  09:37 EDT    Electronically signed by Cam Yan MD at 03/17/21 0940          Consult Notes (most recent note)      Luis Paulino MD at 03/16/21 1440      Consult Orders    1. Inpatient Cardiology Consult [[id number]] ordered by Marie Ying MD at 03/16/21 0941               [facility]   CONSULT NOTE    Referring Provider: Dr. Ying  Reason for Consultation: Acute on chronic congestive heart failure    Patient Care Team:  Yusef Sosa Jr., MD as PCP - General  Yusef Sosa Jr., MD as PCP - Family Medicine  Luis Paulino MD as Consulting Physician (Cardiology)    Chief complaint shortness of breath        History of present illness:  Cali Santoro is a 85 y.o. 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 fib sleep apnea presented to the ED with worsening shortness of breath.   Patient reports over the last few days to week he has had worsening shortness of breath orthopnea dyspnea on exertion.  He reports that this is his third time here since December with either pneumonia or congestive heart failure.  Patient's kidney function has seemed to worsen with creatinine 2.16 today; troponin was negative his proBNP is 6687.  Patient denies any chest pain or lower extremity edema no palpitations noted.    Review of Systems   Constitutional: Positive for decreased appetite and malaise/fatigue. Negative for fever.   HENT: Negative for congestion.    Cardiovascular: Positive for chest pain, dyspnea on exertion and orthopnea. Negative for leg swelling and palpitations.   Respiratory: Positive for cough and shortness of breath. Negative for sputum production.    Gastrointestinal: Negative for abdominal pain, nausea and vomiting.   Genitourinary: Negative for dysuria, flank pain and hematuria.   Neurological: Positive for weakness. Negative for dizziness and light-headedness.   All other systems reviewed and are negative.      History  Past Medical History:   Diagnosis Date   • Arrhythmia    • Arthritis    • Bradycardia    • Cancer (CMS/HCC)    • Cardiomyopathy (CMS/HCC)    • Cataract    • CHF (congestive heart failure) (CMS/HCC)    • COPD (chronic obstructive pulmonary disease) (CMS/HCC)    • Coronary artery disease    • Diabetes mellitus, type 2 (CMS/HCC)    • Emphysema, unspecified (CMS/HCC)    • Hx of sick sinus syndrome     S/P Biventricular Pacemaker   • Hyperlipidemia    • Hypertension    • Paroxysmal atrial fibrillation (CMS/HCC)    • Pneumonia    • Sleep apnea    • Ventricular arrhythmia        Past Surgical History:   Procedure Laterality Date   • CARDIAC CATHETERIZATION  02/04/2015   • CARDIAC CATHETERIZATION  04/25/2016   • CAROTID ENDARTERECTOMY     • COLONOSCOPY     • CORONARY ANGIOPLASTY  1997   • ENDOSCOPY     • EYE SURGERY     • FRACTURE SURGERY     • HERNIA REPAIR     • JOINT  REPLACEMENT     • LARYNGOSCOPY     • LUNG SURGERY  2008    Lung resection   • PACEMAKER IMPLANTATION  04/25/2016    Dual chamber; [facility]   • TRACHEOSTOMY         Family History   Problem Relation Age of Onset   • Heart disease Mother    • Hypertension Mother    • Stroke Father    • Breast cancer Sister    • Colon cancer Sister    • Heart disease Brother    • Diabetes Other        Social History     Tobacco Use   • Smoking status: Former Smoker     Types: Cigarettes   • Smokeless tobacco: Never Used   Vaping Use   • Vaping Use: Never used   Substance Use Topics   • Alcohol use: Yes     Comment: occ   • Drug use: No        Medications Prior to Admission   Medication Sig Dispense Refill Last Dose   • aspirin 81 MG tablet Take 81 mg by mouth Every Night.   3/13/2021 at Unknown time   • carvedilol (COREG) 12.5 MG tablet Take 12.5 mg by mouth Every Night.   3/13/2021 at Unknown time   • furosemide (LASIX) 40 MG tablet Take 40 mg by mouth Daily.   3/14/2021 at Unknown time   • insulin aspart (novoLOG FLEXPEN) 100 UNIT/ML solution pen-injector sc pen Inject 1 Units under the skin into the appropriate area as directed 4 (Four) Times a Day With Meals & at Bedtime. Check BS 4 times a day. Take 1U for 20 gm/dl the blood sugar is above 130  Max 60 units per 24 hours   3/13/2021 at Unknown time   • Insulin Glargine (LANTUS SOLOSTAR) 100 UNIT/ML injection pen Inject 10 Units under the skin into the appropriate area as directed Every Night.   3/13/2021 at Unknown time   • Magnesium 250 MG tablet Take 1 tablet by mouth Daily.   3/14/2021 at Unknown time   • mirtazapine (REMERON) 15 MG tablet TAKE 1 TABLET BY MOUTH EVERY NIGHT. 90 tablet 1 3/13/2021 at Unknown time   • theophylline (THEODUR) 300 MG 12 hr tablet Take 300 mg by mouth Every Night.   3/13/2021 at Unknown time   • warfarin (COUMADIN) 3 MG tablet Take 1 tablet by mouth Every Night. 34 tablet 2 3/13/2021 at Unknown time   • acetaminophen (TYLENOL) 500 MG tablet  Take 500 mg by mouth Every 6 (Six) Hours As Needed for Mild Pain .      • budesonide (PULMICORT) 0.5 MG/2ML nebulizer solution Take 2 mL by nebulization 2 (Two) Times a Day. J44.9 copd 60 each 3    • calcitriol (ROCALTROL) 0.25 MCG capsule Take 0.25 mcg by mouth 3 (Three) Times a Week. Mon, Wed, Fri   3/5/2021   • ipratropium-albuterol (DUO-NEB) 0.5-2.5 mg/3 ml nebulizer Take 3 mL by nebulization Every 4 (Four) Hours As Needed for Shortness of Air. COPD j 44.9 360 mL 3    Allergies      Penicillins    Scheduled Meds:aspirin, 81 mg, Oral, Daily  atorvastatin, 20 mg, Oral, Nightly  budesonide, 0.5 mg, Nebulization, BID - RT  calcitriol, 0.25 mcg, Oral, Once per day on Mon Wed Fri  carvedilol, 12.5 mg, Oral, Nightly  docusate sodium, 100 mg, Oral, BID  furosemide, 40 mg, Oral, BID  insulin glargine, 12 Units, Subcutaneous, Nightly  insulin lispro, 0-24 Units, Subcutaneous, 4x Daily With Meals & Nightly  insulin regular, 4 Units, Subcutaneous, BID  ipratropium-albuterol, 3 mL, Nebulization, 4x Daily - RT  magnesium oxide, 200 mg, Oral, Daily  mirtazapine, 15 mg, Oral, Nightly  predniSONE, 20 mg, Oral, BID With Meals  sodium chloride, 10 mL, Intravenous, Q12H  theophylline, 300 mg, Oral, Nightly  warfarin, 3 mg, Oral, Nightly      Continuous Infusions:Pharmacy Consult - Steroid Insulin Protocol,   Pharmacy to dose warfarin,       PRN Meds:.•  acetaminophen **OR** acetaminophen **OR** acetaminophen  •  dextrose  •  dextrose  •  glucagon (human recombinant)  •  HYDROcodone-acetaminophen  •  hydrOXYzine  •  insulin lispro **AND** insulin lispro  •  ipratropium-albuterol  •  nitroglycerin  •  ondansetron **OR** ondansetron  •  Pharmacy Consult - Steroid Insulin Protocol  •  Pharmacy to dose warfarin  •  [COMPLETED] Insert peripheral IV **AND** sodium chloride  •  sodium chloride        VITAL SIGNS  Vitals:    03/16/21 1405 03/16/21 1527 03/16/21 1532 03/16/21 1641   BP: 136/63      BP Location: Right arm      Patient  "Position: Lying      Pulse: 70 70 73    Resp: 19 20 20    Temp: 97.3 °F (36.3 °C)      TempSrc: Oral      SpO2: 95% 95%     Weight:    81.2 kg (179 lb)   Height:    170.2 cm (67\")       Flowsheet Rows      First Filed Value   Admission Height  170.2 cm (67\") Documented at 03/14/2021 1001   Admission Weight  76.7 kg (169 lb) Documented at 03/14/2021 1001           TELEMETRY: Paced with PVCs    Physical Exam:  Vitals and nursing note reviewed.   Constitutional:       Appearance: Not in distress.   Neck:      Vascular: No JVR. JVD normal.   Pulmonary:      Effort: Increased respiratory effort.      Breath sounds: Decreased air movement present. No wheezing. No rhonchi. No rales.   Chest:      Chest wall: Not tender to palpatation.   Cardiovascular:      PMI at left midclavicular line. Normal rate. Regular rhythm. Normal S1. Normal S2.      Murmurs: There is no murmur.      No gallop. No click. No rub.   Pulses:     Intact distal pulses.   Edema:     Peripheral edema absent.   Abdominal:      General: Bowel sounds are normal.      Palpations: Abdomen is soft.      Tenderness: There is no abdominal tenderness.   Musculoskeletal: Normal range of motion.         General: No tenderness. Skin:     General: Skin is warm and dry.   Neurological:      General: No focal deficit present.      Mental Status: Alert and oriented to person, place and time.                  LAB RESULTS (LAST 7 DAYS)    CBC  Results from last 7 days   Lab Units 03/16/21  0410 03/15/21  0429 03/14/21  1641 03/14/21  1022   WBC 10*3/mm3 10.50 8.50 7.90 8.80   RBC 10*6/mm3 3.00* 3.24* 3.37* 3.42*   HEMOGLOBIN g/dL 10.2* 11.3* 11.3* 11.7*   HEMATOCRIT % 31.1* 34.2* 35.2* 36.3*   MCV fL 103.8* 105.4* 104.4* 106.1*   PLATELETS 10*3/mm3 278 277 313 286       BMP  Results from last 7 days   Lab Units 03/16/21  0410 03/15/21  0429 03/14/21  1642 03/14/21  1022   SODIUM mmol/L 133* 137 138 137   POTASSIUM mmol/L 5.0 5.2 5.4* 4.8   CHLORIDE mmol/L 96* 101 99 100 "   CO2 mmol/L 27.0 23.0 30.0* 22.0   BUN mg/dL 47* 32* 29* 28*   CREATININE mg/dL 2.16* 2.52* 2.14* 2.25*   GLUCOSE mg/dL 149* 289* 323* 361*   PHOSPHORUS mg/dL 5.5*  --   --   --        CMP   Results from last 7 days   Lab Units 03/16/21  0410 03/15/21  0429 03/14/21  1642 03/14/21  1022   SODIUM mmol/L 133* 137 138 137   POTASSIUM mmol/L 5.0 5.2 5.4* 4.8   CHLORIDE mmol/L 96* 101 99 100   CO2 mmol/L 27.0 23.0 30.0* 22.0   BUN mg/dL 47* 32* 29* 28*   CREATININE mg/dL 2.16* 2.52* 2.14* 2.25*   GLUCOSE mg/dL 149* 289* 323* 361*   ALBUMIN g/dL 3.50  --   --  3.90   BILIRUBIN mg/dL  --   --   --  0.5   ALK PHOS U/L  --   --   --  79   AST (SGOT) U/L  --   --   --  70*   ALT (SGPT) U/L  --   --   --  49*         BNP        TROPONIN  Results from last 7 days   Lab Units 03/14/21  1642   TROPONIN T ng/mL 0.027       CoAg  Results from last 7 days   Lab Units 03/16/21  0410 03/15/21  0429 03/14/21  1022   INR  1.91* 1.69* 1.70*   APTT seconds  --   --  28.6       Creatinine Clearance  Estimated Creatinine Clearance: 26 mL/min (A) (by C-G formula based on SCr of 2.16 mg/dL (H)).    ABG  Results from last 7 days   Lab Units 03/14/21  1042   PH, ARTERIAL pH units 7.399   PCO2, ARTERIAL mm Hg 38.1   PO2 ART mm Hg 65.8*   O2 SATURATION ART % 92.7*   BASE EXCESS ART mmol/L -1.1*       Radiology  No radiology results for the last day        EKG          I personally viewed and interpreted the patient's EKG/Telemetry data:    ECHOCARDIOGRAM:    Results for orders placed during the hospital encounter of 12/08/20    Adult Transthoracic Echo Complete W/ Cont if Necessary Per Protocol    Interpretation Summary  · Left ventricular wall thickness is consistent with septal asymmetric hypertrophy.  · Estimated left ventricular EF = 60% Left ventricular systolic function is normal.  · The right ventricular cavity is borderline dilated.  · The right atrial cavity is borderline dilated.  · Mild aortic valve stenosis is present.  · Estimated  right ventricular systolic pressure from tricuspid regurgitation is normal (<35 mmHg).  · There is a small (<1cm) pericardial effusion adjacent to the left ventricle.      STRESS MYOVIEW:    CARDIAC CATHETERIZATION:    OTHER:         Assessment/Plan       Shortness of breath    Permanent atrial fibrillation (CMS/[facility])    Coronary artery disease of native artery of native heart with stable angina pectoris (CMS/[facility])    Essential hypertension    Presence of cardiac pacemaker    Sleep apnea    Type 2 diabetes mellitus with hyperglycemia, without long-term current use of insulin (CMS/[facility])    Stage 3b chronic kidney disease (CMS/[facility])    Mixed hyperlipidemia    Acute on chronic heart failure with preserved ejection fraction (CMS/[facility])    Sepsis (CMS/[facility])    Plan:  Patient presented with acute worsening shortness of breath patient was found to have bilateral pleural effusions.  This was initially given IV diuretics  Nephrology is following for worsening chronic kidney disease/acute renal failure creatinine is currently 2.14 patient is currently on p.o. diuretics 40 mg twice a day      Echocardiogram pending     Continue beta-blocker,   No ACE ARB secondary to acute renal failure    Continue daily weights, low salt diet   We will have heart failure nurse to see patient        TERESO Jack  03/16/21d by TERESO Jack, 03/16/21, 5:22 PM EDT.    Patient was seen and examined independently  Patient was admitted to the hospital with worsening shortness of breath most probably multifactorial  Acute diastolic congestive heart failure also most probably RV failure  Needs aggressive control of sleep apnea  Aggressive control of hypertension  Patient does not have any significant lower extremity edema but does have problems with elevated BNP and fluid overload  Caution with diuresis nephrology following  Pulmonary following evaluate for severe pulmonary hypertension  Agree with beta-blockers holding off on ACE inhibitor's for  now    Luis Paulino MD      Electronically signed by Luis Paulino MD at 03/16/21 1950

## 2021-03-17 NOTE — TELEPHONE ENCOUNTER
I spoke with the patient's.  The patient's wife was advised that the patient's problems that predisposed him to repeated hospitalization are not curable and will gradually get worse over time.  I recommended that she engage the services of hospice.

## 2021-03-17 NOTE — TELEPHONE ENCOUNTER
I spoke with Yennifer.  I told her that I recommended that the wife engage the services of hospice.

## 2021-03-17 NOTE — PROGRESS NOTES
"PULMONARY CRITICAL CARE Progress  NOTE      PATIENT IDENTIFICATION:  Name: Cali Santoro  MRN: [id number]  :  1935     Age: 85 y.o.  Sex: male    DATE OF Note:  3/17/2021   Referring Physician: Marie Ying MD                  Subjective:   Feeling better, but still requiring O2 mask four liter,  no SOB no chest pain, no nausea or vomiting, no change in bowel habit, no dysuria,  no new  skin rash or itching.      Objective:  tMax 24 hrs: Temp (24hrs), Av.5 °F (36.4 °C), Min:97.3 °F (36.3 °C), Max:97.7 °F (36.5 °C)      Vitals Ranges:   Temp:  [97.3 °F (36.3 °C)-97.7 °F (36.5 °C)] 97.6 °F (36.4 °C)  Heart Rate:  [63-78] 65  Resp:  [17-20] 18  BP: (123-137)/(63-89) 125/89    Intake and Output Last 3 Shifts:   I/O last 3 completed shifts:  In: 720 [P.O.:720]  Out: 900 [Urine:900]    Exam:  /89 (BP Location: Left arm, Patient Position: Lying)   Pulse 65   Temp 97.6 °F (36.4 °C) (Oral)   Resp 18   Ht 170.2 cm (67\")   Wt 80.1 kg (176 lb 9.4 oz)   SpO2 93%   BMI 27.66 kg/m²     General Appearance: Alert      HEENT:  Normocephalic, without obvious abnormality, Conjunctiva/corneas clear,.  Normal external ear canals, Nares normal, no drainage     Neck:  Supple, symmetrical, trachea midline. No JVD.  Lungs /Chest wall:   Bilateral basal rhonchi, respirations unlabored symmetrical wall movement.    Heart:  Regular rate and rhythm, systolic murmur PMI left sternal border  Abdomen: Soft, non-tender, no masses, no organomegaly.    Extremities: Trace edema no clubbing or Cyanosis        Medications:    Current Facility-Administered Medications:   •  acetaminophen (TYLENOL) tablet 650 mg, 650 mg, Oral, Q4H PRN, 650 mg at 21 0930 **OR** acetaminophen (TYLENOL) 160 MG/5ML solution 650 mg, 650 mg, Oral, Q4H PRN **OR** acetaminophen (TYLENOL) suppository 650 mg, 650 mg, Rectal, Q4H PRN, Lynette Dutta MD  •  aspirin EC tablet 81 mg, 81 mg, Oral, Daily, Lynette Dutta MD, 81 mg at 21 0926  •  " atorvastatin (LIPITOR) tablet 20 mg, 20 mg, Oral, Nightly, Lynette Dutta MD, 20 mg at 03/16/21 2103  •  budesonide (PULMICORT) nebulizer solution 0.5 mg, 0.5 mg, Nebulization, BID - RT, Lynette Dutta MD, 0.5 mg at 03/17/21 0804  •  calcitriol (ROCALTROL) capsule 0.25 mcg, 0.25 mcg, Oral, Once per day on Mon Wed Fri, Lynette Dutta MD, 0.25 mcg at 03/15/21 0822  •  calcium carbonate (TUMS) chewable tablet 500 mg (200 mg elemental), 2 tablet, Oral, TID PRN, Marie Ying MD  •  carvedilol (COREG) tablet 12.5 mg, 12.5 mg, Oral, Nightly, Lynette Dutta MD, 12.5 mg at 03/16/21 2103  •  dextrose (D50W) 25 g/ 50mL Intravenous Solution 25 g, 25 g, Intravenous, Q15 Min PRN, Lynette Dutta MD  •  dextrose (GLUTOSE) oral gel 15 g, 15 g, Oral, Q15 Min PRN, Lynette Dutta MD  •  docusate sodium (COLACE) capsule 100 mg, 100 mg, Oral, BID, Lynette Dutta MD, 100 mg at 03/16/21 2103  •  furosemide (LASIX) tablet 40 mg, 40 mg, Oral, BID, Cam Yan MD, 40 mg at 03/16/21 1710  •  glucagon (human recombinant) (GLUCAGEN DIAGNOSTIC) injection 1 mg, 1 mg, Subcutaneous, Q15 Min PRN, Lynette Dutta MD  •  HYDROcodone-acetaminophen (NORCO) 7.5-325 MG per tablet 2 tablet, 2 tablet, Oral, Q4H PRN, Lynette Dutta MD, 2 tablet at 03/16/21 0014  •  hydrOXYzine (ATARAX) tablet 25 mg, 25 mg, Oral, Nightly PRN, Marie Ying MD, 25 mg at 03/16/21 2108  •  insulin glargine (LANTUS, SEMGLEE) injection 12 Units, 12 Units, Subcutaneous, Nightly, Marie Ying MD, 12 Units at 03/16/21 2107  •  insulin lispro (ADMELOG) injection 0-24 Units, 0-24 Units, Subcutaneous, 4x Daily With Meals & Nightly, 12 Units at 03/16/21 2108 **AND** insulin lispro (ADMELOG) injection 0-24 Units, 0-24 Units, Subcutaneous, PRN, Marie Ying MD  •  insulin regular (humuLIN R,novoLIN R) injection 4 Units, 4 Units, Subcutaneous, BID, Marie Ying MD, 4 Units at 03/16/21 1711  •  ipratropium-albuterol (DUO-NEB) nebulizer solution 3 mL, 3 mL, Nebulization, Q4H PRN,  Lynette Dutta MD, 3 mL at 03/14/21 1838  •  ipratropium-albuterol (DUO-NEB) nebulizer solution 3 mL, 3 mL, Nebulization, 4x Daily - RT, Angie Theodore APRN, 3 mL at 03/17/21 0803  •  magnesium oxide (MAG-OX) tablet 200 mg, 200 mg, Oral, Daily, Lynette Dutta MD, 200 mg at 03/16/21 0926  •  mirtazapine (REMERON) tablet 15 mg, 15 mg, Oral, Nightly, Lynette Dutta MD, 15 mg at 03/16/21 2103  •  nitroglycerin (NITROSTAT) SL tablet 0.4 mg, 0.4 mg, Sublingual, Q5 Min PRN, Lynette Dutta MD  •  ondansetron (ZOFRAN) tablet 4 mg, 4 mg, Oral, Q6H PRN **OR** ondansetron (ZOFRAN) injection 4 mg, 4 mg, Intravenous, Q6H PRN, Lynette Dutta MD  •  Pharmacy Consult - Steroid Insulin Protocol, , Does not apply, Continuous PRN, Marie Ying MD  •  Pharmacy to dose warfarin, , Does not apply, Continuous PRN, Lynette Dutta MD  •  predniSONE (DELTASONE) tablet 20 mg, 20 mg, Oral, BID With Meals, DrawAlvaro MD, 20 mg at 03/16/21 1710  •  [COMPLETED] Insert peripheral IV, , , Once **AND** sodium chloride 0.9 % flush 10 mL, 10 mL, Intravenous, PRN, Lynette Dutta MD  •  sodium chloride 0.9 % flush 10 mL, 10 mL, Intravenous, Q12H, Lynette Dutta MD, 10 mL at 03/16/21 2103  •  sodium chloride 0.9 % flush 10 mL, 10 mL, Intravenous, PRN, Lynette Dutta MD  •  theophylline (SHE-24) 24 hr capsule 300 mg, 300 mg, Oral, Nightly, Lynette Dutta MD, 300 mg at 03/16/21 2103  •  warfarin (COUMADIN) tablet 3 mg, 3 mg, Oral, Nightly, Lynette Dutta MD, 3 mg at 03/16/21 5732    Data Review:  All labs (24hrs):   Recent Results (from the past 24 hour(s))   POC Glucose Once    Collection Time: 03/16/21 11:10 AM    Specimen: Blood   Result Value Ref Range    Glucose 273 (H) 70 - 105 mg/dL   Adult Transthoracic Echo Complete W/ Cont if Necessary Per Protocol    Collection Time: 03/16/21  4:21 PM   Result Value Ref Range    BSA 1.9 m^2    RVIDd 4.0 cm    IVSd 1.5 cm    LVIDd 3.4 cm    LVIDs 2.0 cm    LVPWd 2.0 cm    IVS/LVPW 0.76     FS 41.9 %     EDV(Teich) 47.8 ml    ESV(Teich) 12.4 ml    EF(Teich) 74.0 %    EDV(cubed) 39.7 ml    ESV(cubed) 7.8 ml    EF(cubed) 80.4 %    LV mass(C)d 235.2 grams    LV mass(C)dI 121.9 grams/m^2    SV(Teich) 35.4 ml    SI(Teich) 18.3 ml/m^2    SV(cubed) 31.9 ml    SI(cubed) 16.5 ml/m^2    Ao root diam 2.9 cm    Ao root area 6.6 cm^2    ACS 1.1 cm    asc Aorta Diam 2.8 cm    LVOT diam 1.6 cm    LVOT area 1.9 cm^2    EDV(MOD-sp4) 56.0 ml    ESV(MOD-sp4) 17.0 ml    EF(MOD-sp4) 69.7 %    SV(MOD-sp4) 39.0 ml    SI(MOD-sp4) 20.2 ml/m^2    Ao root area (BSA corrected) 1.5     LV Patel Vol (BSA corrected) 29.0 ml/m^2    LV Sys Vol (BSA corrected) 8.8 ml/m^2    MV E max mariam 117.1 cm/sec    MV V2 max 121.5 cm/sec    MV max PG 5.9 mmHg    MV V2 mean 58.4 cm/sec    MV mean PG 1.8 mmHg    MV V2 VTI 30.7 cm    MVA(VTI) 0.95 cm^2    MV dec time 0.22 sec    Ao pk mariam 204.9 cm/sec    Ao max PG 16.8 mmHg    Ao max PG (full) 14.3 mmHg    Ao V2 mean 133.6 cm/sec    Ao mean PG 8.6 mmHg    Ao mean PG (full) 7.2 mmHg    Ao V2 VTI 39.5 cm    VALENCIA(I,A) 0.74 cm^2    VALENCIA(I,D) 0.74 cm^2    VALENCIA(V,A) 0.75 cm^2    VALENCIA(V,D) 0.75 cm^2    LV V1 max PG 2.5 mmHg    LV V1 mean PG 1.4 mmHg    LV V1 max 79.8 cm/sec    LV V1 mean 53.6 cm/sec    LV V1 VTI 15.1 cm    MR max mariam 212.0 cm/sec    MR max PG 18.0 mmHg    SV(Ao) 260.7 ml    SI(Ao) 135.2 ml/m^2    SV(LVOT) 29.1 ml    SI(LVOT) 15.1 ml/m^2    PA V2 max 83.5 cm/sec    PA max PG 2.8 mmHg    PA max PG (full) 1.6 mmHg    PA acc time 0.11 sec    RV V1 max PG 1.2 mmHg    RV V1 mean PG 0.58 mmHg    RV V1 max 53.8 cm/sec    RV V1 mean 35.5 cm/sec    RV V1 VTI 11.8 cm    TR max mariam 214.4 cm/sec    RVSP(TR) 22.1 mmHg    RAP systole 3.0 mmHg    PA pr(Accel) 31.2 mmHg     CV ECHO MAURO - BZI_BMI 28.0 kilograms/m^2     CV ECHO MAURO - BSA(HAYCOCK) 2.0 m^2     CV ECHO MAURO - BZI_METRIC_WEIGHT 81.2 kg     CV ECHO MAURO - BZI_METRIC_HEIGHT 170.2 cm    Target HR (85%) 116 bpm    Max. Pred. HR (100%) 136 bpm    LA  dimension(2D) 5.0 cm    Echo EF Estimated 70 %   POC Glucose Once    Collection Time: 03/16/21  5:00 PM    Specimen: Blood   Result Value Ref Range    Glucose 213 (H) 70 - 105 mg/dL   POC Glucose Once    Collection Time: 03/16/21  7:09 PM    Specimen: Blood   Result Value Ref Range    Glucose 293 (H) 70 - 105 mg/dL   Protime-INR    Collection Time: 03/17/21  4:44 AM    Specimen: Blood   Result Value Ref Range    Protime 20.3 19.4 - 28.5 Seconds    INR 1.90 (L) 2.00 - 3.00   CBC Auto Differential    Collection Time: 03/17/21  4:44 AM    Specimen: Blood   Result Value Ref Range    WBC 12.10 (H) 3.40 - 10.80 10*3/mm3    RBC 3.28 (L) 4.14 - 5.80 10*6/mm3    Hemoglobin 11.2 (L) 13.0 - 17.7 g/dL    Hematocrit 34.1 (L) 37.5 - 51.0 %    .1 (H) 79.0 - 97.0 fL    MCH 34.3 (H) 26.6 - 33.0 pg    MCHC 32.9 31.5 - 35.7 g/dL    RDW 17.9 (H) 12.3 - 15.4 %    RDW-SD 64.8 (H) 37.0 - 54.0 fl    MPV 8.2 6.0 - 12.0 fL    Platelets 351 140 - 450 10*3/mm3    Neutrophil % 86.3 (H) 42.7 - 76.0 %    Lymphocyte % 8.4 (L) 19.6 - 45.3 %    Monocyte % 4.2 (L) 5.0 - 12.0 %    Eosinophil % 0.3 0.3 - 6.2 %    Basophil % 0.8 0.0 - 1.5 %    Neutrophils, Absolute 10.40 (H) 1.70 - 7.00 10*3/mm3    Lymphocytes, Absolute 1.00 0.70 - 3.10 10*3/mm3    Monocytes, Absolute 0.50 0.10 - 0.90 10*3/mm3    Eosinophils, Absolute 0.00 0.00 - 0.40 10*3/mm3    Basophils, Absolute 0.10 0.00 - 0.20 10*3/mm3    nRBC 0.2 0.0 - 0.2 /100 WBC   Scan Slide    Collection Time: 03/17/21  4:44 AM    Specimen: Blood   Result Value Ref Range    Anisocytosis Slight/1+ None Seen    Macrocytes Slight/1+ None Seen    WBC Morphology Normal Normal    Platelet Estimate Adequate Normal   Renal Function Panel    Collection Time: 03/17/21  6:03 AM    Specimen: Blood   Result Value Ref Range    Glucose 371 (H) 65 - 99 mg/dL    BUN 59 (H) 8 - 23 mg/dL    Creatinine 2.80 (H) 0.76 - 1.27 mg/dL    Sodium 132 (L) 136 - 145 mmol/L    Potassium 5.1 3.5 - 5.2 mmol/L    Chloride 93 (L) 98 -  107 mmol/L    CO2 26.0 22.0 - 29.0 mmol/L    Calcium 9.5 8.6 - 10.5 mg/dL    Albumin 3.80 3.50 - 5.20 g/dL    Phosphorus 5.5 (H) 2.5 - 4.5 mg/dL    Anion Gap 13.0 5.0 - 15.0 mmol/L    BUN/Creatinine Ratio 21.1 7.0 - 25.0    eGFR Non African Amer 22 (L) >60 mL/min/1.73   POC Glucose Once    Collection Time: 03/17/21  7:04 AM    Specimen: Blood   Result Value Ref Range    Glucose 342 (H) 70 - 105 mg/dL        Imaging:  Adult Transthoracic Echo Complete W/ Cont if Necessary Per Protocol  · Left ventricular wall thickness is consistent with moderate concentric   hypertrophy.  · Estimated left ventricular EF = 70% Left ventricular systolic function   is normal.  · The right ventricular cavity is mildly dilated.  · The right atrial cavity is mild to moderately dilated.  · Mild aortic valve stenosis is present.  · Estimated right ventricular systolic pressure from tricuspid   regurgitation is normal (<35 mmHg).  · There is a small (<1cm) pericardial effusion.          ASSESSMENT:  Shortness of breath    Permanent atrial fibrillation (CMS/HCC)    Coronary artery disease of native artery of native heart with stable angina pectoris (CMS/HCC)    Essential hypertension    Presence of cardiac pacemaker    Sleep apnea    Type 2 diabetes mellitus with hyperglycemia, without long-term current use of insulin (CMS/[facility])    Stage 3b chronic kidney disease (CMS/HCC)    Mixed hyperlipidemia    Acute on chronic heart failure with preserved ejection fraction (CMS/HCC)    Sepsis (CMS/[facility])  Arthritis  Diastolic CHF  COPD  CAD  DM II  Hx SSS s/p pacemaker  Hyperlipidemia  HTN  A-fib  KIM  Bilateral effusion    PLAN:  Continue with    Diuretics per renal need to be more aggressive  Bronchodilator  Inhaled corticosteroids  PO steroids  Electrolytes/ glycemic control  IS/Flutter valve  DVT and GI prophylaxis      Critical care time in direct medical management (   ) minutes   Erick Siu MD, YARED,ABSM   3/17/2021

## 2021-03-17 NOTE — TELEPHONE ENCOUNTER
PATIENTS WIFE, RENETTA, CALLING TO ADVISE PATIENT IS CURRENTLY IN THE ED AND THEY RECOMMEND PALATIVE CARE. THEY WANT HER TO MAKE A DECISION ON WHICH AGENCY ([facility])    RENETTA CAN BE REACHED [phone].

## 2021-03-17 NOTE — PROGRESS NOTES
DNR / Hospice referral for excplanation of services      Palliative care  met with pt and wife to f/u regarding hospice referral.  Pt's wife at bedside reports that she spoke with her children and it was affirmed by the family to refer a hospice agency.  [facility] was chosen for referral, and request was relayed to care coordination .  Copies of pt's DPOA and Living Will were also obtained and sent to medical records for upload into EMR.  Pt's wife hopes to be able to meet with hospice agency today and clarify their goals.  Emotional support provided.  Will continue to follow peripherally.

## 2021-03-18 NOTE — PLAN OF CARE
Goal Outcome Evaluation:  Plan of Care Reviewed With: patient  Progress: no change  Outcome Summary: Patient remains on 6-10 L O2 via simple mask.  Waiting for family to decide between hospice or home health.  Otherwise, patient able to sleep through the night without complaint.  Will continue to monitor.

## 2021-03-18 NOTE — PROGRESS NOTES
Continued Stay Note   [place]     Patient Name: Cali Santoro  MRN: [id number]  Today's Date: 3/18/2021    Admit Date: 3/14/2021    Discharge Plan     Row Name 03/18/21 1254       Plan    Plan  Home with [facility] on 3/18. Wife to transport. Pt has home O2 and nebulizer.    Plan Comments  Per pt's wife, pt should discharge home with [facility]. [facility] can admit pt from home, per liaison. SW collaborated with [facility] and pt MDs on 3/18 to ensure d/c-readiness. Pt going home on 3/18 and wife to transport.        Phone communication or documentation only - no physical contact with patient or family.    Rosalba Black, CALEB, LSW    Office: [phone]  Cell: [phone]  Fax: [phone]  E-mail: [email or web address]

## 2021-03-18 NOTE — PROGRESS NOTES
"Pharmacy dosing service  Anticoagulant  Warfarin     Subjective:    Cali Santoro is a 85 y.o.male being continued on warfarin for atrial fibrillation.    INR Goal: 2 - 3  Home medication?: Yes, warfarin 3 mg PO every day at 1800  Interacting medications: prednisone (new med - may increase INR), clindamycin (new med 3/14 dc'd 3/16)      Assessment/Plan:    INR therapeutic today. Will continue patient now on 4 mg daily.     Continue to monitor and adjust based on INR.         Date 3/14 3/15 3/16 3/17 3/18       INR 1.7 1.69 1.91 1.90 2.01       Dose 3 mg 4.5 mg 3 mg 4.5 mg 4 mg           Objective:  [Ht: 170.2 cm (67\"); Wt: 81.3 kg (179 lb 3.7 oz); BMI: Body mass index is 28.07 kg/m².]    Lab Results   Component Value Date    ALBUMIN 3.50 03/18/2021     Lab Results   Component Value Date    INR 2.01 03/18/2021    INR 1.90 (L) 03/17/2021    INR 1.91 (L) 03/16/2021    PROTIME 21.4 03/18/2021    PROTIME 20.3 03/17/2021    PROTIME 20.4 03/16/2021     Lab Results   Component Value Date    HGB 10.5 (L) 03/18/2021    HGB 11.2 (L) 03/17/2021    HGB 10.2 (L) 03/16/2021     Lab Results   Component Value Date    HCT 31.1 (L) 03/18/2021    HCT 34.1 (L) 03/17/2021    HCT 31.1 (L) 03/16/2021       Carlie Willis, PharmD  03/18/21 14:16 EDT  "

## 2021-03-18 NOTE — PROGRESS NOTES
"PULMONARY CRITICAL CARE Progress  NOTE      PATIENT IDENTIFICATION:  Name: Cali Santoro  MRN: [id number]  :  1935     Age: 85 y.o.  Sex: male    DATE OF Note:  3/18/2021   Referring Physician: Marie Ying MD                  Subjective:   less SOB no chest pain, no nausea or vomiting, no change in bowel habit, no dysuria,  no new  skin rash or itching.    Objective:  tMax 24 hrs: Temp (24hrs), Av.7 °F (36.5 °C), Min:97.3 °F (36.3 °C), Max:98 °F (36.7 °C)      Vitals Ranges:   Temp:  [97.3 °F (36.3 °C)-98 °F (36.7 °C)] 97.5 °F (36.4 °C)  Heart Rate:  [63-84] 78  Resp:  [16-18] 18  BP: (114-150)/(68-86) 114/68    Intake and Output Last 3 Shifts:   I/O last 3 completed shifts:  In: 1200 [P.O.:1200]  Out: 3800 [Urine:3800]    Exam:  /68 (BP Location: Left arm, Patient Position: Lying)   Pulse 78   Temp 97.5 °F (36.4 °C) (Oral)   Resp 18   Ht 170.2 cm (67\")   Wt 81.3 kg (179 lb 3.7 oz)   SpO2 93%   BMI 28.07 kg/m²     General Appearance: Alert      HEENT:  Normocephalic, without obvious abnormality, Conjunctiva/corneas clear,.  Normal external ear canals, Nares normal, no drainage     Neck:  Supple, symmetrical, trachea midline. No JVD.  Lungs /Chest wall:   Bilateral basal rhonchi, respirations unlabored symmetrical wall movement.    Heart:  Regular rate and rhythm, systolic murmur PMI left sternal border  Abdomen: Soft, non-tender, no masses, no organomegaly.    Extremities: Trace edema no clubbing or Cyanosis        Medications:    Current Facility-Administered Medications:   •  acetaminophen (TYLENOL) tablet 650 mg, 650 mg, Oral, Q4H PRN, 650 mg at 21 0930 **OR** acetaminophen (TYLENOL) 160 MG/5ML solution 650 mg, 650 mg, Oral, Q4H PRN **OR** acetaminophen (TYLENOL) suppository 650 mg, 650 mg, Rectal, Q4H PRN, Lynetet Dutta MD  •  aspirin EC tablet 81 mg, 81 mg, Oral, Daily, Lynette Dutta MD, 81 mg at 21 0848  •  atorvastatin (LIPITOR) tablet 20 mg, 20 mg, Oral, Nightly, " Lynette Dutta MD, 20 mg at 03/17/21 2007  •  budesonide (PULMICORT) nebulizer solution 0.5 mg, 0.5 mg, Nebulization, BID - RT, Lynette Dutta MD, 0.5 mg at 03/18/21 0702  •  calcitriol (ROCALTROL) capsule 0.25 mcg, 0.25 mcg, Oral, Once per day on Mon Wed Fri, Lynette Dutta MD, 0.25 mcg at 03/17/21 0848  •  calcium carbonate (TUMS) chewable tablet 500 mg (200 mg elemental), 2 tablet, Oral, TID PRN, Marie Ying MD, 2 tablet at 03/17/21 1042  •  carvedilol (COREG) tablet 12.5 mg, 12.5 mg, Oral, Nightly, Lynette Dutta MD, 12.5 mg at 03/17/21 2007  •  dextrose (D50W) 25 g/ 50mL Intravenous Solution 25 g, 25 g, Intravenous, Q15 Min PRN, Lynette Dutta MD  •  dextrose (GLUTOSE) oral gel 15 g, 15 g, Oral, Q15 Min PRN, Lynette Dutta MD  •  docusate sodium (COLACE) capsule 100 mg, 100 mg, Oral, BID, Lynette Dutta MD, 100 mg at 03/17/21 2007  •  furosemide (LASIX) tablet 40 mg, 40 mg, Oral, BID, Cam Yan MD, 40 mg at 03/17/21 1700  •  glucagon (human recombinant) (GLUCAGEN DIAGNOSTIC) injection 1 mg, 1 mg, Subcutaneous, Q15 Min PRN, Lynette Dutta MD  •  HYDROcodone-acetaminophen (NORCO) 7.5-325 MG per tablet 2 tablet, 2 tablet, Oral, Q4H PRN, Lynette Dutta MD, 2 tablet at 03/16/21 0014  •  hydrOXYzine (ATARAX) tablet 25 mg, 25 mg, Oral, Nightly PRN, Marie Ying MD, 25 mg at 03/16/21 2108  •  insulin glargine (LANTUS, SEMGLEE) injection 12 Units, 12 Units, Subcutaneous, Nightly, Marie Ying MD, 12 Units at 03/17/21 2007  •  insulin lispro (ADMELOG) injection 0-24 Units, 0-24 Units, Subcutaneous, 4x Daily With Meals & Nightly, 12 Units at 03/17/21 2019 **AND** insulin lispro (ADMELOG) injection 0-24 Units, 0-24 Units, Subcutaneous, PRN, Marie Ying MD  •  insulin regular (humuLIN R,novoLIN R) injection 8 Units, 8 Units, Subcutaneous, BID, Marie Ying MD, 8 Units at 03/17/21 1701  •  ipratropium-albuterol (DUO-NEB) nebulizer solution 3 mL, 3 mL, Nebulization, Q4H PRN, Lynette Dutta MD, 3 mL at 03/14/21  1838  •  ipratropium-albuterol (DUO-NEB) nebulizer solution 3 mL, 3 mL, Nebulization, 4x Daily - RT, Angie Theodore, APRN, 3 mL at 03/18/21 0702  •  magnesium oxide (MAG-OX) tablet 200 mg, 200 mg, Oral, Daily, Lynette Dutta MD, 200 mg at 03/17/21 0848  •  mirtazapine (REMERON) tablet 15 mg, 15 mg, Oral, Nightly, Lynette Dutta MD, 15 mg at 03/17/21 2007  •  nitroglycerin (NITROSTAT) SL tablet 0.4 mg, 0.4 mg, Sublingual, Q5 Min PRN, Lynette Dutta MD  •  ondansetron (ZOFRAN) tablet 4 mg, 4 mg, Oral, Q6H PRN **OR** ondansetron (ZOFRAN) injection 4 mg, 4 mg, Intravenous, Q6H PRN, Lynette Dutta MD  •  Pharmacy Consult - Steroid Insulin Protocol, , Does not apply, Continuous PRN, Marie Ying MD  •  Pharmacy to dose warfarin, , Does not apply, Continuous PRN, Lynette Dutta MD  •  predniSONE (DELTASONE) tablet 20 mg, 20 mg, Oral, BID With Meals, DrawAlvaro MD, 20 mg at 03/17/21 1700  •  [COMPLETED] Insert peripheral IV, , , Once **AND** sodium chloride 0.9 % flush 10 mL, 10 mL, Intravenous, PRN, Lynette Dutta MD  •  sodium chloride 0.9 % flush 10 mL, 10 mL, Intravenous, Q12H, Lynette Dutta MD, 10 mL at 03/17/21 2007  •  sodium chloride 0.9 % flush 10 mL, 10 mL, Intravenous, PRN, Lynette Dutta MD  •  theophylline (SHE-24) 24 hr capsule 300 mg, 300 mg, Oral, Nightly, Lynette Dutta MD, 300 mg at 03/17/21 2007  •  warfarin (COUMADIN) tablet 4 mg, 4 mg, Oral, Daily, Marie Ying MD    Data Review:  All labs (24hrs):   Recent Results (from the past 24 hour(s))   POC Glucose Once    Collection Time: 03/17/21 11:12 AM    Specimen: Blood   Result Value Ref Range    Glucose 238 (H) 70 - 105 mg/dL   POC Glucose Once    Collection Time: 03/17/21  4:04 PM    Specimen: Blood   Result Value Ref Range    Glucose 192 (H) 70 - 105 mg/dL   POC Glucose Once    Collection Time: 03/17/21  7:00 PM    Specimen: Blood   Result Value Ref Range    Glucose 292 (H) 70 - 105 mg/dL   Renal Function Panel    Collection Time: 03/18/21  4:00  AM    Specimen: Blood   Result Value Ref Range    Glucose 150 (H) 65 - 99 mg/dL    BUN 68 (H) 8 - 23 mg/dL    Creatinine 2.59 (H) 0.76 - 1.27 mg/dL    Sodium 136 136 - 145 mmol/L    Potassium 4.4 3.5 - 5.2 mmol/L    Chloride 96 (L) 98 - 107 mmol/L    CO2 28.0 22.0 - 29.0 mmol/L    Calcium 9.6 8.6 - 10.5 mg/dL    Albumin 3.50 3.50 - 5.20 g/dL    Phosphorus 5.1 (H) 2.5 - 4.5 mg/dL    Anion Gap 12.0 5.0 - 15.0 mmol/L    BUN/Creatinine Ratio 26.3 (H) 7.0 - 25.0    eGFR Non African Amer 24 (L) >60 mL/min/1.73   Protime-INR    Collection Time: 03/18/21  4:00 AM    Specimen: Blood   Result Value Ref Range    Protime 21.4 19.4 - 28.5 Seconds    INR 2.01 2.00 - 3.00   CBC Auto Differential    Collection Time: 03/18/21  4:00 AM    Specimen: Blood   Result Value Ref Range    WBC 10.80 3.40 - 10.80 10*3/mm3    RBC 3.06 (L) 4.14 - 5.80 10*6/mm3    Hemoglobin 10.5 (L) 13.0 - 17.7 g/dL    Hematocrit 31.1 (L) 37.5 - 51.0 %    .9 (H) 79.0 - 97.0 fL    MCH 34.4 (H) 26.6 - 33.0 pg    MCHC 33.8 31.5 - 35.7 g/dL    RDW 17.9 (H) 12.3 - 15.4 %    RDW-SD 63.9 (H) 37.0 - 54.0 fl    MPV 7.4 6.0 - 12.0 fL    Platelets 295 140 - 450 10*3/mm3    Neutrophil % 83.0 (H) 42.7 - 76.0 %    Lymphocyte % 11.2 (L) 19.6 - 45.3 %    Monocyte % 5.8 5.0 - 12.0 %    Eosinophil % 0.0 (L) 0.3 - 6.2 %    Basophil % 0.0 0.0 - 1.5 %    Neutrophils, Absolute 9.00 (H) 1.70 - 7.00 10*3/mm3    Lymphocytes, Absolute 1.20 0.70 - 3.10 10*3/mm3    Monocytes, Absolute 0.60 0.10 - 0.90 10*3/mm3    Eosinophils, Absolute 0.00 0.00 - 0.40 10*3/mm3    Basophils, Absolute 0.00 0.00 - 0.20 10*3/mm3    nRBC 0.0 0.0 - 0.2 /100 WBC   POC Glucose Once    Collection Time: 03/18/21  7:08 AM    Specimen: Blood   Result Value Ref Range    Glucose 176 (H) 70 - 105 mg/dL        Imaging:  CT Chest Without Contrast Diagnostic  Narrative: CT CHEST WO CONTRAST DIAGNOSTIC-     Date of Exam: 3/17/2021 9:12 AM     Indication: Pneumonia, unresolved     Comparison: AP portable chest  03/14/2021, CT chest 01/14/2021     Technique: Serial and axial CT images of the chest were obtained.  Reconstructions in the coronal and sagittal planes were performed.   Automated exposure control and iterative reconstruction methods were  used.     FINDINGS: Small to moderate right basilar pleural effusion and small  left pleural effusion are thought to be stable since 01/14/2021.     Emphysematous changes are present. Smooth interstitial thickening is  seen within both lungs, predominantly in a bibasilar distribution,  favored to reflect changes of interstitial edema. Airspace disease in  the right middle lobe and right lower lobe is favored to represent  atelectasis.     Benign calcified granulomas are present in both lungs. Presumed wedge  resection changes within the posterior right upper lobe.     A previously described 8 mm nodular density in the right lower lobe  along the lateral pleural margin is unchanged (series 5 image 57).  According to prior report, this is a stable finding since 2014, in  keeping with a benign finding. There is chronic. Fissural thickening or  nodularity in the anterior right upper lobe (series 5 image 46). A  previously described 5 mm left lower lobe lung nodule is not well  visualized on today's examination, obscured by atelectasis. No new  pulmonary nodules are identified.     There is stable mild cardiac enlargement, with dense coronary  calcifications, with trace pericardial fluid. Pacemaker leads are in  place. Moderate calcific atherosclerosis is seen within the thoracic  aorta. The descending thoracic aorta is ectatic but not frankly  aneurysmal. Stable asymmetric enlargement of the right thyroid lobe.     There are signs of chronic calcific pancreatitis, but no evidence of  acute pancreatitis. Mild left adrenal hyperplasia stable. Uncomplicated  cholelithiasis. Incompletely imaged low-density lesion at the anterior  medial right mid kidney measuring 14 mm is stable since  12/09/2020,  nonspecific but favored to represent a cyst. Dense calcific  atherosclerosis at the origin of the SMA. Remainder of included upper  abdominal organs have a normal noncontrast appearance.     Degenerative changes of the spine and of both shoulders. No acute or  suspicious osseous abnormalities are identified.              Impression:    1. Small to moderate right and small left basilar pleural effusions  appear stable, right middle lobe and posterior right lower lobe  atelectasis. No acute or new lung consolidations are identified.  2. Features suggestive of interstitial edema superimposed upon  emphysema, very similar to the CT chest of 01/14/2021.  3. Noncalcified nodular densities in the right lung have been stable for  multiple years in keeping with benign findings. No new pulmonary nodules  are seen.  4. Additional findings include: Stable cardiomegaly, dense coronary  artery calcifications, trace pericardial fluid, chronic calcific  pancreatitis, uncomplicated cholelithiasis, stable left adrenal  adenomatous hyperplasia.     Electronically Signed By-Alexandra Preston MD On:3/17/2021 10:12 AM  This report was finalized on [id number] by  Alexandra Preston MD.       ASSESSMENT:  Shortness of breath    Permanent atrial fibrillation (CMS/HCC)    Coronary artery disease of native artery of native heart with stable angina pectoris (CMS/HCC)    Essential hypertension    Presence of cardiac pacemaker    Sleep apnea    Type 2 diabetes mellitus with hyperglycemia, without long-term current use of insulin (CMS/HCC)    Stage 3b chronic kidney disease (CMS/HCC)    Mixed hyperlipidemia    Acute on chronic heart failure with preserved ejection fraction (CMS/HCC)    Sepsis (CMS/HCC)  Arthritis  Diastolic CHF  COPD  CAD  DM II  Hx SSS s/p pacemaker  Hyperlipidemia  HTN  A-fib  KIM  Bilateral effusion small    PLAN:  Continue with    Diuretics per renal need to be more aggressive  Bronchodilator  Inhaled  corticosteroids  PO steroids  Electrolytes/ glycemic control  IS/Flutter valve  DVT and GI prophylaxis    Critical care time in direct medical management (   ) minutes   Erick Siu MD, D,ABSM  3/18/2021

## 2021-03-18 NOTE — PROGRESS NOTES
"   LOS: 4 days    Patient Care Team:  Yusef Sosa Jr., MD as PCP - General  Yusef Sosa Jr., MD as PCP - Family Medicine  Luis Paulino MD as Consulting Physician (Cardiology)      Subjective     Patient sitting comfortably.  Feeling little tired  And has dyspnea and cough but improving slowly  Denies any chest pain, nausea or vomiting    Objective     Vital Sign Min/Max for last 24 hours  Temp:  [97.5 °F (36.4 °C)-98 °F (36.7 °C)] 97.9 °F (36.6 °C)  Heart Rate:  [67-84] 79  Resp:  [16-18] 18  BP: (107-165)/(68-91) 107/68                       Flowsheet Rows      First Filed Value   Admission Height  170.2 cm (67\") Documented at 03/14/2021 1001   Admission Weight  76.7 kg (169 lb) Documented at 03/14/2021 1001          I/O this shift:  In: 360 [P.O.:360]  Out: 500 [Urine:500]  I/O last 3 completed shifts:  In: 1200 [P.O.:1200]  Out: 3800 [Urine:3800]    Physical Exam:  Physical Exam    General.  Awake, alert  Head.  Atraumatic, normocephalic  Neck.  Supple, no JVD  Respiratory.  Bilateral wheezing.  Cardiovascular.  Regular rhythm  Abdominal.  Obese, soft, nontender  Extremities. no lower extreme edema     LABS:  Lab Results   Component Value Date    CALCIUM 9.6 03/18/2021    PHOS 5.1 (H) 03/18/2021     Results from last 7 days   Lab Units 03/18/21  0400 03/17/21  0603 03/17/21  0444 03/16/21  0410 03/14/21  1641 03/14/21  1022   SODIUM mmol/L 136 132*  --  133*   < > 137   POTASSIUM mmol/L 4.4 5.1  --  5.0   < > 4.8   CHLORIDE mmol/L 96* 93*  --  96*   < > 100   CO2 mmol/L 28.0 26.0  --  27.0   < > 22.0   BUN mg/dL 68* 59*  --  47*   < > 28*   CREATININE mg/dL 2.59* 2.80*  --  2.16*   < > 2.25*   GLUCOSE mg/dL 150* 371*  --  149*   < > 361*   CALCIUM mg/dL 9.6 9.5  --  9.0   < > 8.5*   WBC 10*3/mm3 10.80  --  12.10* 10.50  --  8.80   HEMOGLOBIN g/dL 10.5*  --  11.2* 10.2*  --  11.7*   PLATELETS 10*3/mm3 295  --  351 278  --  286   ALT (SGPT) U/L  --   --   --   --   --  49*   AST (SGOT) " U/L  --   --   --   --   --  70*    < > = values in this interval not displayed.     Lab Results   Component Value Date    CKTOTAL 34 01/13/2021    TROPONINI 0.030 06/28/2019    TROPONINT 0.027 03/14/2021     Estimated Creatinine Clearance: 21.7 mL/min (A) (by C-G formula based on SCr of 2.59 mg/dL (H)).  Results from last 7 days   Lab Units 03/14/21  1042   PH, ARTERIAL pH units 7.399   PO2 ART mm Hg 65.8*   PCO2, ARTERIAL mm Hg 38.1   HCO3 ART mmol/L 23.5     Brief Urine Lab Results  (Last result in the past 365 days)      Color   Clarity   Blood   Leuk Est   Nitrite   Protein   CREAT   Urine HCG        03/15/21 1651 Yellow Clear Negative Negative Negative Negative             WEIGHTS:     Wt Readings from Last 1 Encounters:   03/18/21 0304 81.3 kg (179 lb 3.7 oz)   03/17/21 0324 80.1 kg (176 lb 9.4 oz)   03/16/21 1641 81.2 kg (179 lb)   03/15/21 0346 81.3 kg (179 lb 3.7 oz)   03/14/21 1626 79.6 kg (175 lb 7.8 oz)   03/14/21 1001 76.7 kg (169 lb)       aspirin, 81 mg, Oral, Daily  atorvastatin, 20 mg, Oral, Nightly  budesonide, 0.5 mg, Nebulization, BID - RT  calcitriol, 0.25 mcg, Oral, Once per day on Mon Wed Fri  carvedilol, 12.5 mg, Oral, Nightly  docusate sodium, 100 mg, Oral, BID  furosemide, 40 mg, Oral, BID  insulin glargine, 12 Units, Subcutaneous, Nightly  insulin lispro, 0-24 Units, Subcutaneous, 4x Daily With Meals & Nightly  insulin regular, 8 Units, Subcutaneous, BID  ipratropium-albuterol, 3 mL, Nebulization, 4x Daily - RT  magnesium oxide, 200 mg, Oral, Daily  mirtazapine, 15 mg, Oral, Nightly  predniSONE, 20 mg, Oral, BID With Meals  sodium chloride, 10 mL, Intravenous, Q12H  theophylline, 300 mg, Oral, Nightly  warfarin, 4 mg, Oral, Daily      Pharmacy Consult - Steroid Insulin Protocol,   Pharmacy to dose warfarin,         Assessment/Plan       1.  Acute kidney injury on CKD 3  Baseline creatinine around 1.8 to 1.9 mg/DL but his creatinine has fluctuated in the past depending from the diuretics  dose.  Patient's creatinine little better but still higher than baseline -due to diuretics  May have to accept some degree of azotemia related to Lasix  No need for dialysis     2.  Respiratory failure  Acute COPD exacerbation + volume overload  Patient on Lasix, steroids and antibiotics     3.CHFpEF/hypervolemia  EF 60% along with mild aortic stenosis per last echocardiogram  Volume status improving.  Continue oral Lasix at current dose     4.  Hyperkalemia  Improved. Keep on low K diet     5.  Hypertension with CKD 3  Blood pressure fairly well controlled on current antihypertensives      Cam Yan MD  03/18/21  11:34 EDT

## 2021-03-18 NOTE — CONSULTS
Diabetes Education    Patient Name:  Cali Santoro  YOB: 1935  MRN: [id number]  Admit Date:  3/14/2021    Follow-up note: Wife at bedside and has brought bs log and wanting educator to review. Discussed proper documentation of bs with columns for breakfast, lunch, supper and hs. Wife sometimes checking after meals. Discussed importance of checking ac and hs. Recommended she record if bs taken after the meal. Gave sample log sheet. Pt's bs reading have been running >200. Discussed importance of contacting [facility] with bs readings since pt's next appt not scheduled until 7/2021. Gave [facility] phone number and fax number. Wife verbalized understanding of info.       Electronically signed by:  Mayuri Infante RN  03/18/21 15:08 EDT

## 2021-03-19 NOTE — TELEPHONE ENCOUNTER
Called patient to see how is doing since just discharged home from hospital and wife informed me he was resting but now on [facility] care. I did walk her through setting patients home cpap up so he could get a little relief while resting. HILARIO Higgins RRT

## 2021-03-19 NOTE — SIGNIFICANT NOTE
Case Management Discharge Note      Final Note: (P) d/c home with [facility]         Selected Continued Care - Discharged on 3/18/2021 Admission date: 3/14/2021 - Discharge disposition: Hospice/Home              Selected Continued Care - Prior Encounters Includes selections from prior encounters from 12/14/2020 to 3/18/2021      Discharged on 1/16/2021 Admission date: 1/11/2021 - Discharge disposition: Home-Health Care [facility]      Home Medical Care       Service Provider Selected Services Address Phone Fax Patient Preferred    [facility] [address] [place] [address] [place] [address] [phone] --                      Discharged on 12/14/2020 Admission date: 12/8/2020 - Discharge disposition: Home-Health Care S[facility]      Durable Medical Equipment       Service Provider Selected Services Address Phone Fax Patient Preferred    [facility]  Durable Medical Equipment [address] [facility] [address] [address] [phone] --              Home Medical Care       Service Provider Selected Services Address Phone Fax Patient Preferred    [facility] [address] [place] [address] [place] [address] [phone] --                               Final Discharge Disposition Code: (P) 51 - home with hospice   Sheath #2: Dressed using transparent dressing. Site: clean, dry, & intact, no bleeding and no hematoma.

## 2021-03-22 NOTE — DISCHARGE SUMMARY
[facility] Medicine Services  DISCHARGE SUMMARY        Prepared For PCP:  Yusef Sosa Jr., MD    Patient Name: Cali Santoro  : 1935  MRN: [id number]      Date of Admission:   3/14/2021    Date of Discharge:  3/22/2021    Length of stay:  LOS: 4 days     Hospital Course     Presenting Problem:   Shortness of breath [R06.02]  Hyperglycemia [R73.9]  Bilateral pleural effusion [J90]  Acute kidney injury (CMS/[facility]) [N17.9]  Acute on chronic congestive heart failure, unspecified heart failure type (CMS/[facility]) [I50.9]  Sepsis (CMS/[facility]) [A41.9]      Active Hospital Problems    Diagnosis  POA   • **Shortness of breath [R06.02]  Yes     Priority: High   • Acute on chronic heart failure with preserved ejection fraction (CMS/[facility]) [I50.33]  Yes   • Mixed hyperlipidemia [E78.2]  Yes   • Stage 3b chronic kidney disease (CMS/[facility]) [N18.32]  Yes   • Coronary artery disease of native artery of native heart with stable angina pectoris (CMS/[facility]) [I25.118]  Yes   • Essential hypertension [I10]  Yes   • Sleep apnea [G47.30]  Yes   • Type 2 diabetes mellitus with hyperglycemia, without long-term current use of insulin (CMS/[facility]) [E11.65]  Yes   • Permanent atrial fibrillation (CMS/[facility]) [I48.21]  Yes   • Presence of cardiac pacemaker [Z95.0]  Yes      Resolved Hospital Problems   No resolved problems to display.     Assessment and plan:  Acute on chronic hypoxic respiratory failure secondary to acute exacerbation of COPD and acute congestive heart failure, improved  -Titrate O2 to maintain sat greater than 91% and wean as tolerated     AE COPD, improved  -Chest x-ray showed bilateral basilar pleural effusions with compression atelectasis and bilateral mixed interstitial airspace disease with cardiomegaly  -Respiratory panel including COVID-19 negative  -Continue theophylline and DuoNebs  -Pulmonary consulting and discontinued clindamycin   -Taper prednisone     Obstructive sleep apnea  -pt uses 3L O2 NC  continuously reportedly at home, but he is a mouth breather; he is more comfortable on 10 L simple mask which he is using continuously in the hospital  -sleep study done 3/9/2021 showed severe KIM (pt slept only 52 minutes because of being uncomfortable in sleep lab)  -pt has gasping episodes almost every night   -Dr. Hopson recommended CPAP titration  -auto BIPAP attempted 3/16/2021 but patient was unable to tolerate it and asked for it to be removed from the room  -pt reports that me will try to use his home CPAP again once the O2 is hooked up to it     Hx of Lung cancer 2008 stage I NSCLCA s/p right lower lobectomy     Bilateral lung nodules  -CT scan 05/18/2020 right lung nodule smaller in size  history of significant cough and dyspnea and hemoptysis in June 2019 CT scan of the chest 07/25/2019 suggestive of resolving pneumonia  CT chest 12/2016 and 12/14/15 revealed stable nodular densities bilaterally. These findings indicate two year stability at this time. Stable post operative changes in the right mid to lower hemithorax.     Acute on chronic diastolic congestive heart failure due to hypertensive heart disease and aortic valve stenosis, rule out worsening systolic heart failure with history of ischemic cardiomyopathy  -Echo 12/9/2020 showed LVEF 60%, RVSP 30 mmHg, mild aortic valve stenosis  -proBNP 6687  -Serial troponins 0.030 and 0.027  -EKG showed atrial fibrillation/flutter with ventricular paced complexes, no significant change from 1/11/2021  -Cardiology consulting  -Continue Coreg   -diuresis per nephrology     Chronic atrial fibrillation/flutter and sick sinus syndrome status post PPM  -Continue Coumadin with goal INR 2-3  -Continue Coreg  -Per last outpatient office visit notes from PCP the patient has been on Lanoxin 0.125 mg twice a week, but has not required it in the hospital  -Heart rate currently stable in the 60s and 70s     Coronary artery disease  -Continue aspirin, statin and  Coreg     Essential hypertension, chronic and controlled  -Continue Coreg and Lasix     Hyperlipidemia  -Continue statin     Acute renal failure from prerenal azotemia due to diuretics on top of CKD stage IIIb secondary to hypertension and diabetes  -Baseline creatinine 1.9  -Admission creatinine 2.25 and current creatinine 2.59 after IV diuresis  -Continue Rocaltrol  -Avoid nephrotoxic medications and IV contrast dye  - nephrology managing diuresis      Anemia of CKD  -Monitor hemoglobin and transfuse for less than 7  -Current hemoglobin 10.5     Type 2 diabetes mellitus  -A1c 7.1 on 12/29/2020   -A1c 9.4 on 3/18/2021  -Patient is on Ozempic weekly per PCP office visit note dated 1/19/2021  -Continue home glargine  -TSH 0.756     Chronic constipation  -Continue Colace     Unintentional weight loss  -Continue mirtazapine     Nutritional supplements  -Continue magnesium and vitamin D      Hospital Course:  Cali Santoro is a 85 y.o. male presents to [facility] complaining of shortness of breath.  The patient has numerous diagnoses including COPD, coronary artery disease, sick sinus syndrome and atrial fibrillation.  The patient recently had Covid a month ago.  The patient presented to the emergency room without complaints of chest pain but did have orthopnea, paroxysmal nocturnal dyspnea and severe dyspnea with any kind of movement. The patient desaturated into the 70s in the emergency room.  He was vacillating between the low 70s and low 90s throughout the time.  He was placed on a facemask and his saturations have increased and are greater than 90 consistently at this time.  The patient is normally on Coumadin for his atrial fib.  The patient has not had any nausea vomiting diaphoresis or other anginal equivalent.     On review of records the patient was recently hospitalized 1/11/2021 to 1/16/2021 for healthcare facility acquired pneumonia.  He was also hospitalized with streptococcal pneumoniae  pneumonia 12/8/2020 to 12/14/2020.     Date:   3/15/2021: Patient reports feeling better today with improvement in his shortness of breath.  Dr. Hopson saw the patient in consultation and recommended clindamycin antibiotic, low-dose oral steroids with prednisone, and bronchodilators.  3/16/2021: Pt reports that he slept poorly last night despite atarax. No SOA at rest. No c/o CXP; no GI or  c/o.  3/17/2021: Patient reports no significant complaints at this time.  Reviewed nurses notes and the patient apparently did not sleep well last night and became confused in the early morning hours.  He did not like the CPAP and refused to use it anymore and asked for it to be removed from the room.  The patient and his wife at the bedside reports that he tolerates his home CPAP better but he just had not used it in months because he did not think he needed anymore.  He is willing to resume the use of CPAP at home.  3/18/2021: Patient reports feeling better currently on the simple facemask which he finds more comfortable than a nasal cannula.  He and his wife have made the decision to have hospice come to his home and arrangements are now made so that he can be discharged home.  Condition on discharge is stable.      Day of Discharge       Vital Signs:     Blood pressure 107/68, pulse 81, respirations 18, temperature 97.9, O2 sat 96% on 6 L simple facemask    Physical Exam:  Physical Exam   Vital signs and nurses notes reviewed.  Well-developed well-nourished elderly gentleman comfortable on simple facemask in no acute distress sitting up in bed awake and alert; mucous membranes moist; sclerae anicteric; lungs with decreased air entry but clear to auscultation bilaterally; CV regular rate and rhythm; abdomen soft nontender nondistended with active bowel sounds; extremities with trace ankle and pedal edema bilaterally with no cyanosis or calf tenderness;  no Marroquin catheter.    Pertinent  and/or Most Recent Results     Results  from last 7 days   Lab Units 03/18/21  0400 03/17/21  0603 03/17/21  0444 03/16/21  0410   WBC 10*3/mm3 10.80  --  12.10* 10.50   HEMOGLOBIN g/dL 10.5*  --  11.2* 10.2*   HEMATOCRIT % 31.1*  --  34.1* 31.1*   PLATELETS 10*3/mm3 295  --  351 278   SODIUM mmol/L 136 132*  --  133*   POTASSIUM mmol/L 4.4 5.1  --  5.0   CHLORIDE mmol/L 96* 93*  --  96*   CO2 mmol/L 28.0 26.0  --  27.0   BUN mg/dL 68* 59*  --  47*   CREATININE mg/dL 2.59* 2.80*  --  2.16*   GLUCOSE mg/dL 150* 371*  --  149*   CALCIUM mg/dL 9.6 9.5  --  9.0     Results from last 7 days   Lab Units 03/18/21  0400 03/17/21  0444 03/16/21  0410   PROTIME Seconds 21.4 20.3 20.4   INR  2.01 1.90* 1.91*           Invalid input(s): TG, LDLCALC, LDLREALC  Results from last 7 days   Lab Units 03/18/21  0400   HEMOGLOBIN A1C % 9.4*       Brief Urine Lab Results  (Last result in the past 365 days)      Color   Clarity   Blood   Leuk Est   Nitrite   Protein   CREAT   Urine HCG        03/15/21 1651 Yellow Clear Negative Negative Negative Negative               Microbiology Results Abnormal     Procedure Component Value - Date/Time    Blood Culture - Blood, Arm, Left [[id number]] Collected: 03/14/21 1206    Lab Status: Final result Specimen: Blood from Arm, Left Updated: 03/19/21 1215     Blood Culture No growth at 5 days    Blood Culture - Blood, Arm, Right [[id number]] Collected: 03/14/21 1025    Lab Status: Final result Specimen: Blood from Arm, Right Updated: 03/19/21 1215     Blood Culture No growth at 5 days    Respiratory Panel PCR w/COVID-19(SARS-CoV-2) LOBO/MARTHA/[facility]/PAD/COR/MAD/TITA In-House, NP Swab in UTM/VTM, 3-4 HR TAT - Swab, Nasopharynx [[id number]]  (Normal) Collected: 03/14/21 1026    Lab Status: Final result Specimen: Swab from Nasopharynx Updated: 03/14/21 1122     ADENOVIRUS, PCR Not Detected     Coronavirus 229E Not Detected     Coronavirus HKU1 Not Detected     Coronavirus NL63 Not Detected     Coronavirus OC43 Not Detected     COVID19 Not Detected      Human Metapneumovirus Not Detected     Human Rhinovirus/Enterovirus Not Detected     Influenza A PCR Not Detected     Influenza B PCR Not Detected     Parainfluenza Virus 1 Not Detected     Parainfluenza Virus 2 Not Detected     Parainfluenza Virus 3 Not Detected     Parainfluenza Virus 4 Not Detected     RSV, PCR Not Detected     Bordetella pertussis pcr Not Detected     Bordetella parapertussis PCR Not Detected     Chlamydophila pneumoniae PCR Not Detected     Mycoplasma pneumo by PCR Not Detected    Narrative:      Fact sheet for providers: https://docs.[employer]/wp-content/uploads/KRK0794-8636-HD5.1-EUA-Provider-Fact-Sheet-3.pdf    Fact sheet for patients: https://docs.[employer]/wp-content/uploads/KNE4013-9244-PC5.1-EUA-Patient-Fact-Sheet-1.pdf    Test performed by PCR.          Polysomnography 4 or More Parameters    Result Date: 3/12/2021  Impression: Obstructive Sleep Apnea, G47.33 Hpyoxemia, R09.02  RECOMMENDATION: CPAP titration, patient may benefit from BiPAP due to the severity of obstructive sleep apnea in the supine position AHI was 84, no REM sleep was recorded most likely due to severe obstructive sleep apnea, patient also utilizing 3 L of oxygen during the night which will need to be titrated I have reviewed the entire raw data, all components, including calibrations. Please feel free to contact me, if you wish to discuss this case further.    Alvaro Hopson MD  Date Diplomat, American Board of Sleep Medicine       XR Chest 2 View    Result Date: 3/2/2021  Impression: 1. Cardiomegaly with chronic coarsened interstitial markings likely related to underlying chronic lung disease or less likely interstitial edema. 2. No evidence of acute infectious consolidation.  Electronically Signed By-Viktor Theodore MD On:3/2/2021 2:15 PM This report was finalized on [id number] by  Viktor Theodore MD.    CT Chest Without Contrast Diagnostic    Result Date: 3/17/2021  Impression:  1. Small to moderate  right and small left basilar pleural effusions appear stable, right middle lobe and posterior right lower lobe atelectasis. No acute or new lung consolidations are identified. 2. Features suggestive of interstitial edema superimposed upon emphysema, very similar to the CT chest of 01/14/2021. 3. Noncalcified nodular densities in the right lung have been stable for multiple years in keeping with benign findings. No new pulmonary nodules are seen. 4. Additional findings include: Stable cardiomegaly, dense coronary artery calcifications, trace pericardial fluid, chronic calcific pancreatitis, uncomplicated cholelithiasis, stable left adrenal adenomatous hyperplasia.  Electronically Signed By-Alexandra Preston MD On:3/17/2021 10:12 AM This report was finalized on [id number] by  Alexandra Preston MD.    XR Chest AP    Result Date: 3/14/2021  Impression:  1. Bilateral basilar pleural effusions with compressive atelectasis. 2. Bilateral mixed interstitial/airspace disease presumably related to the patient's Covid infection. 3. Cardiomegaly.  Electronically Signed By-Doron Hernandez MD On:3/14/2021 10:59 AM This report was finalized on [id number] by  Doron Hernandez MD.      Results for orders placed in visit on 02/06/08    SCANNED VASCULAR STUDIES      Results for orders placed in visit on 02/06/08    SCANNED VASCULAR STUDIES      Results for orders placed during the hospital encounter of 03/14/21    Adult Transthoracic Echo Complete W/ Cont if Necessary Per Protocol    Interpretation Summary  · Left ventricular wall thickness is consistent with moderate concentric hypertrophy.  · Estimated left ventricular EF = 70% Left ventricular systolic function is normal.  · The right ventricular cavity is mildly dilated.  · The right atrial cavity is mild to moderately dilated.  · Mild aortic valve stenosis is present.  · Estimated right ventricular systolic pressure from tricuspid regurgitation is normal (<35 mmHg).  · There is a small  (<1cm) pericardial effusion.              Test Results Pending at Discharge        Procedures Performed           Consults:   Consults     Date and Time Order Name Status Description    3/16/2021  9:41 AM Inpatient Cardiology Consult Completed     3/15/2021 10:39 AM Inpatient Nephrology Consult Completed     3/14/2021  3:39 PM Inpatient Pulmonology Consult Completed             Discharge Details        Discharge Medications      New Medications      Instructions Start Date   docusate sodium 100 MG capsule   100 mg, Oral, 2 Times Daily, To prevent constipation.  It is available over-the-counter.         Changes to Medications      Instructions Start Date   Insulin Glargine 100 UNIT/ML injection pen  Commonly known as: LANTUS SOLOSTAR  What changed: how much to take   12 Units, Subcutaneous, Nightly      simvastatin 40 MG tablet  Commonly known as: ZOCOR  What changed: additional instructions   TAKE 1 TABLET AT BEDTIME      warfarin 4 MG tablet  Commonly known as: COUMADIN  What changed:   · medication strength  · how much to take   4 mg, Oral, Nightly         Continue These Medications      Instructions Start Date   acetaminophen 500 MG tablet  Commonly known as: TYLENOL   500 mg, Oral, Every 6 Hours PRN      aspirin 81 MG tablet   81 mg, Oral, Nightly      budesonide 0.5 MG/2ML nebulizer solution  Commonly known as: PULMICORT   0.5 mg, Nebulization, 2 Times Daily - RT, J44.9 copd      calcitriol 0.25 MCG capsule  Commonly known as: ROCALTROL   0.25 mcg, Oral, 3 Times Weekly, Mon, Wed, Fri       carvedilol 12.5 MG tablet  Commonly known as: COREG   12.5 mg, Oral, Nightly      furosemide 40 MG tablet  Commonly known as: LASIX   40 mg, Oral, Daily      insulin aspart 100 UNIT/ML solution pen-injector sc pen  Commonly known as: novoLOG FLEXPEN   1 Units, Subcutaneous, 4 Times Daily With Meals & Nightly, Check BS 4 times a day. Take 1U for 20 gm/dl the blood sugar is above 130 Max 60 units per 24 hours        ipratropium-albuterol 0.5-2.5 mg/3 ml nebulizer  Commonly known as: DUO-NEB   3 mL, Nebulization, Every 4 Hours PRN, COPD j 44.9      Magnesium 250 MG tablet   1 tablet, Oral, Daily      mirtazapine 15 MG tablet  Commonly known as: REMERON   15 mg, Oral, Nightly      theophylline 300 MG 12 hr tablet  Commonly known as: THEODUR   300 mg, Oral, Nightly             Allergies   Allergen Reactions   • Penicillins Itching         Discharge Disposition:  Hospice/Home    Diet:  Hospital:No active diet order        Discharge Activity:   Activity Instructions     Activity as Tolerated              CODE STATUS:    Code Status and Medical Interventions:   Ordered at: 03/16/21 1500     Level Of Support Discussed With:    Patient     Code Status:    No CPR     Medical Interventions (Level of Support Prior to Arrest):    Full         Follow-up Appointments  Future Appointments   Date Time Provider Department Center   4/7/2021 12:50 PM Luis Paulino MD [facility]   4/13/2021 11:15 AM Pieter Sosa Jr., MD [facility]   7/2/2021  9:45 AM Jhonatan Mart MD [facility] NA None       Additional Instructions for the Follow-ups that You Need to Schedule     Ambulatory Referral to Home Health   As directed      Face to Face Visit Date: 3/15/2021    Follow-up provider for Plan of Care?: I treated the patient in an acute care facility and will not continue treatment after discharge.    Follow-up provider: PIETER SOSA JR. [[id number]]    Reason/Clinical Findings: SOB Pulmonary Edema    Describe mobility limitations that make leaving home difficult: Weakness    Nursing/Therapeutic Services Requested: Skilled Nursing ([facility] eval and treat) Other    Skilled nursing orders: Other ([facility] eval and treat)    Frequency: 1 Week 1         Call MD With Problems / Concerns   As directed      Instructions: Call [phone] or email [email or web address] for problems or concerns.    Order Comments: Instructions: Call  [phone] or email [email or web address] for problems or concerns.          Discharge Follow-up with PCP   As directed       Currently Documented PCP:    Yusef Sosa Jr., MD    PCP Phone Number:    [phone]     Follow Up Details: 2-3 business days via telehealth if possible                 Condition on Discharge:      Stable      This patient has been examined wearing appropriate Personal Protective Equipment. 03/22/21      Electronically signed by Marie Ying MD, 03/22/21, 4:23 PM EDT.      Time: I spent  45 minutes on this discharge activity which included face-to-face encounter with the patient/reviewing the data in the system/coordination of the care with the nursing staff as well as consultants/documentation/entering orders.

## 2021-03-22 NOTE — PROGRESS NOTES
Case Management Discharge Note      Final Note: d/c home with [facility]             Home Medical Care    No services have been selected for the patient.              Therapy    No services have been selected for the patient.              Community Resources    No services have been selected for the patient.                Selected Continued Care - Prior Encounters Includes selections from prior encounters from 12/14/2020 to 3/18/2021    Discharged on 1/16/2021 Admission date: 1/11/2021 - Discharge disposition: [place] [facility] [place]    Home Medical Care     Service Provider Selected Services Address Phone Fax Patient Preferred    [facility] [facility] [place] [address] [phone] --                Discharged on 12/14/2020 Admission date: 12/8/2020 - Discharge disposition: Home-Health Care [facility] [place]    Durable Medical Equipment     Service Provider Selected Services Address Phone Fax Patient Preferred    [facility]  Durable Medical Equipment [address] [address] [phone] --          Home Medical Care     Service Provider Selected Services Address Phone Fax Patient Preferred    [facility] [facility] [place] [address] [phone] --                         Final Discharge Disposition Code: 50 - home with hospice

## 2021-04-12 NOTE — TELEPHONE ENCOUNTER
Pt currently in hospice care and spouse was advsd wouldn't have to have as many doc appts. Should she schedule a f/u and she is going to find out if hospice can draw labs. Please advise if pt needs to continue f/u.

## 2021-04-12 NOTE — TELEPHONE ENCOUNTER
Called pt spoke with spouse and she stated that she wanted to schedule for July and would call if something comes up. Scheduled 7/9/2021

## 2021-04-13 PROBLEM — R06.02 SHORTNESS OF BREATH: Status: RESOLVED | Noted: 2021-01-01 | Resolved: 2021-01-01

## 2021-04-13 PROBLEM — F41.9 ANXIETY: Chronic | Status: ACTIVE | Noted: 2021-01-01

## 2021-04-13 PROBLEM — J13 PNEUMONIA DUE TO STREPTOCOCCUS PNEUMONIAE (HCC): Status: RESOLVED | Noted: 2020-02-07 | Resolved: 2021-01-01

## 2021-04-13 PROBLEM — Z51.5 HOSPICE CARE: Status: ACTIVE | Noted: 2021-01-01

## 2021-04-13 PROBLEM — F41.9 ANXIETY: Status: ACTIVE | Noted: 2021-01-01

## 2021-04-13 NOTE — PROGRESS NOTES
Subjective   Cali Santoro is a 85 y.o. male.     Chief Complaint   Patient presents with   • COPD     6 week followup    • Shortness of Breath       HPI  Chief complaint: Pneumonia and COPD sleep apnea coronary artery disease atrial fibrillation hypertension hyperlipidemia    The patient is an 85-year-old white male comes in for follow-up and maintenance of his current problems which include    1.  Healthcare associated pneumonia-deteriorated-so patient last he was hospitalized once again for pneumonia with respiratory failure.  Patient was treated antibiotics oxygen bronchodilators.  He was discharged home.  Since being discharged home family members have engaged the services of hospice.  Patient is getting along fairly well although he does have problems with anxiety for which she is being given Xanax.  Patient does get short of breath with minimal activity.  He is on oxygen.    2.  COPD with hypoxemia-stable-the patient is currently on mini nebs on an as-needed basis.  On Jeff 300 mg nightly and he also is on oxygen.  He gets short of breath with minimal activity as outlined above.  Patient's stop using the Pulmicort because it causes his blood sugar to go up.    3.  Sleep apnea-deteriorated-patient's not been using CPAP regularly.  He states he feels like it shuts off his breathing.  He was strongly encouraged to use his CPAP with oxygen at nighttime.      4.  Coronary artery disease/heart failure with normal ejection fraction-stable-patient is currently taking Coreg 12.5 mg once a day Lasix 40 mg daily Lanoxin 0.125 mg daily.  He denies chest pain.  Does have shortness of breath as outlined above.    5.  Atrial fibrillation-stable-patient is on Coreg Lanoxin and Coumadin.  He denies episodes of rapid or slow heart rhythm.    6.  Type 2 diabetes mellitus-stable-the patient is currently on long and short acting insulin.  Patient's blood sugars being checked 3 to 4-day.    7.  Anxiety disorder-new-patient is  "wife states the patient has problems with extreme anxiety primarily when he gets short of breath.  He is using Xanax throughout the day.  He is also using Remeron at night to help him sleep.  Patient's wife states that this does not help.  I recommended a trial of Rozerem.    7.  Hospice-new-since patient is from home from the hospital last time the patient and wife have engaged the services of hospice.    The following portions of the patient's history were reviewed and updated as appropriate: allergies, current medications, past family history, past medical history, past social history, past surgical history and problem list.    Review of Systems    Objective     /87 (BP Location: Right arm, Patient Position: Sitting, Cuff Size: Adult)   Pulse 70   Temp 97.1 °F (36.2 °C) (Infrared)   Resp 16   Ht 170.2 cm (67\")   Wt 81.6 kg (179 lb 12.8 oz)   SpO2 93% Comment: 4 liters of oxygen  BMI 28.16 kg/m²     Physical Exam  Vitals and nursing note reviewed.   Constitutional:       Appearance: He is well-developed and normal weight.   HENT:      Head: Normocephalic and atraumatic.   Cardiovascular:      Rate and Rhythm: Normal rate and regular rhythm.      Pulses: Normal pulses.      Heart sounds: Normal heart sounds.   Pulmonary:      Effort: Pulmonary effort is normal.      Breath sounds: Normal breath sounds.   Abdominal:      General: Abdomen is flat. Bowel sounds are normal.      Palpations: Abdomen is soft.   Musculoskeletal:         General: Normal range of motion.   Skin:     General: Skin is warm and dry.   Neurological:      Mental Status: He is alert and oriented to person, place, and time.   Psychiatric:         Behavior: Behavior normal.         Thought Content: Thought content normal.         Judgment: Judgment normal.           Assessment/Plan   Diagnoses and all orders for this visit:    1. HCAP (healthcare-associated pneumonia) (Primary)    2. Chronic obstructive pulmonary disease, unspecified " COPD type (CMS/[facility])    3. Sleep apnea, unspecified type    4. Acute on chronic heart failure with preserved ejection fraction (CMS/[facility])    5. Permanent atrial fibrillation (CMS/[facility])    6. Essential hypertension    7. Mixed hyperlipidemia    8. Type 2 diabetes mellitus with hyperglycemia, without long-term current use of insulin (CMS/[facility])    9. Anxiety    10. Hospice care    Other orders  -     ramelteon (Rozerem) 8 MG tablet; Take 1 tablet by mouth Every Night.  Dispense: 7 tablet; Refill: 0      Patient Instructions   Continue your current medications and treatment.    Try Rozerem at night for sleep.    Follow up in the office in 6-12 weeks.    Further care will depend on how you progress.          Yusef Sosa Jr., MD    04/13/21

## 2021-04-13 NOTE — PATIENT INSTRUCTIONS
Continue your current medications and treatment.    Try Rozerem at night for sleep.    Follow up in the office in 6-12 weeks.    Further care will depend on how you progress.

## 2025-02-28 NOTE — NURSING NOTE
Pt glucose greater then 400, MD notified, continue insuline as ordered. Pt alert and oriented, able to make needs known.  
Pt has an episode of disorienting again this am, pt up in bedside chair , chair alarm activated,review with pt where pt was reason for admission , review plan of care with pt, reassurance given provide refreshment. Room light left on , clock in view and time of day review with pt,  Pt looking for spouse reminded pt that spouse is at home and will be back in am,  Reassurance given, frequent checks on pt preformed.   Pt attempt CPAP didn't tolerated and unwilling to use again, CPAP unit remove from room due to causing pt unease and was afraid cpap would use again ,contiue to monitor   
no